# Patient Record
Sex: FEMALE | Race: WHITE | NOT HISPANIC OR LATINO | Employment: UNEMPLOYED | URBAN - METROPOLITAN AREA
[De-identification: names, ages, dates, MRNs, and addresses within clinical notes are randomized per-mention and may not be internally consistent; named-entity substitution may affect disease eponyms.]

---

## 2019-01-20 ENCOUNTER — HOSPITAL ENCOUNTER (OUTPATIENT)
Dept: RADIOLOGY | Facility: MEDICAL CENTER | Age: 54
End: 2019-01-20

## 2019-01-20 ENCOUNTER — HOSPITAL ENCOUNTER (INPATIENT)
Facility: MEDICAL CENTER | Age: 54
LOS: 24 days | DRG: 981 | End: 2019-02-13
Attending: HOSPITALIST | Admitting: HOSPITALIST
Payer: MEDICAID

## 2019-01-20 ENCOUNTER — APPOINTMENT (OUTPATIENT)
Dept: RADIOLOGY | Facility: MEDICAL CENTER | Age: 54
DRG: 981 | End: 2019-01-20
Attending: HOSPITALIST
Payer: MEDICAID

## 2019-01-20 ENCOUNTER — HOSPITAL ENCOUNTER (OUTPATIENT)
Facility: MEDICAL CENTER | Age: 54
DRG: 981 | End: 2019-01-20
Payer: MEDICAID

## 2019-01-20 VITALS — BODY MASS INDEX: 17.04 KG/M2 | HEIGHT: 69 IN | WEIGHT: 115.08 LBS

## 2019-01-20 DIAGNOSIS — R05.9 COUGH: ICD-10-CM

## 2019-01-20 DIAGNOSIS — F41.9 ANXIETY: ICD-10-CM

## 2019-01-20 DIAGNOSIS — C34.90 NON-SMALL CELL LUNG CANCER, UNSPECIFIED LATERALITY (HCC): ICD-10-CM

## 2019-01-20 DIAGNOSIS — J44.89 COPD WITH ASTHMA: ICD-10-CM

## 2019-01-20 PROBLEM — R91.8 LUNG MASS: Status: ACTIVE | Noted: 2019-01-20

## 2019-01-20 PROBLEM — D72.829 LEUKOCYTOSIS: Status: ACTIVE | Noted: 2019-01-20

## 2019-01-20 PROBLEM — Z72.0 TOBACCO ABUSE: Status: ACTIVE | Noted: 2019-01-20

## 2019-01-20 PROBLEM — E43 SEVERE PROTEIN-CALORIE MALNUTRITION (HCC): Status: ACTIVE | Noted: 2019-01-20

## 2019-01-20 LAB
CANCER AG125 SERPL-ACNC: 66.5 U/ML (ref 0–35)
CANCER AG19-9 SERPL-ACNC: 180 U/ML (ref 0–35)
CEA SERPL-MCNC: 27.5 NG/ML (ref 0–3)
EST. AVERAGE GLUCOSE BLD GHB EST-MCNC: 117 MG/DL
HBA1C MFR BLD: 5.7 % (ref 0–5.6)
TSH SERPL DL<=0.005 MIU/L-ACNC: 2.17 UIU/ML (ref 0.38–5.33)

## 2019-01-20 PROCEDURE — 84443 ASSAY THYROID STIM HORMONE: CPT

## 2019-01-20 PROCEDURE — 99255 IP/OBS CONSLTJ NEW/EST HI 80: CPT | Performed by: INTERNAL MEDICINE

## 2019-01-20 PROCEDURE — 700117 HCHG RX CONTRAST REV CODE 255: Performed by: HOSPITALIST

## 2019-01-20 PROCEDURE — 94760 N-INVAS EAR/PLS OXIMETRY 1: CPT

## 2019-01-20 PROCEDURE — 770004 HCHG ROOM/CARE - ONCOLOGY PRIVATE *

## 2019-01-20 PROCEDURE — 86304 IMMUNOASSAY TUMOR CA 125: CPT

## 2019-01-20 PROCEDURE — 83036 HEMOGLOBIN GLYCOSYLATED A1C: CPT

## 2019-01-20 PROCEDURE — 86301 IMMUNOASSAY TUMOR CA 19-9: CPT

## 2019-01-20 PROCEDURE — 74177 CT ABD & PELVIS W/CONTRAST: CPT

## 2019-01-20 PROCEDURE — 82378 CARCINOEMBRYONIC ANTIGEN: CPT

## 2019-01-20 PROCEDURE — A9270 NON-COVERED ITEM OR SERVICE: HCPCS | Performed by: INTERNAL MEDICINE

## 2019-01-20 PROCEDURE — 700102 HCHG RX REV CODE 250 W/ 637 OVERRIDE(OP): Performed by: INTERNAL MEDICINE

## 2019-01-20 PROCEDURE — 82103 ALPHA-1-ANTITRYPSIN TOTAL: CPT

## 2019-01-20 PROCEDURE — 82105 ALPHA-FETOPROTEIN SERUM: CPT

## 2019-01-20 PROCEDURE — 700111 HCHG RX REV CODE 636 W/ 250 OVERRIDE (IP): Performed by: HOSPITALIST

## 2019-01-20 PROCEDURE — 36415 COLL VENOUS BLD VENIPUNCTURE: CPT

## 2019-01-20 PROCEDURE — 99232 SBSQ HOSP IP/OBS MODERATE 35: CPT | Performed by: HOSPITALIST

## 2019-01-20 RX ORDER — IPRATROPIUM BROMIDE AND ALBUTEROL SULFATE 2.5; .5 MG/3ML; MG/3ML
3 SOLUTION RESPIRATORY (INHALATION)
Status: DISCONTINUED | OUTPATIENT
Start: 2019-01-20 | End: 2019-02-13 | Stop reason: HOSPADM

## 2019-01-20 RX ORDER — PROMETHAZINE HYDROCHLORIDE 25 MG/1
12.5-25 TABLET ORAL EVERY 4 HOURS PRN
Status: DISCONTINUED | OUTPATIENT
Start: 2019-01-20 | End: 2019-02-13 | Stop reason: HOSPADM

## 2019-01-20 RX ORDER — HEPARIN SODIUM 5000 [USP'U]/ML
5000 INJECTION, SOLUTION INTRAVENOUS; SUBCUTANEOUS EVERY 8 HOURS
Status: DISCONTINUED | OUTPATIENT
Start: 2019-01-20 | End: 2019-01-22

## 2019-01-20 RX ORDER — ONDANSETRON 2 MG/ML
4 INJECTION INTRAMUSCULAR; INTRAVENOUS EVERY 4 HOURS PRN
Status: DISCONTINUED | OUTPATIENT
Start: 2019-01-20 | End: 2019-02-13 | Stop reason: HOSPADM

## 2019-01-20 RX ORDER — IBUPROFEN 200 MG
200 TABLET ORAL EVERY 6 HOURS PRN
COMMUNITY
End: 2019-02-21

## 2019-01-20 RX ORDER — POLYETHYLENE GLYCOL 3350 17 G/17G
1 POWDER, FOR SOLUTION ORAL
Status: DISCONTINUED | OUTPATIENT
Start: 2019-01-20 | End: 2019-02-13 | Stop reason: HOSPADM

## 2019-01-20 RX ORDER — PROMETHAZINE HYDROCHLORIDE 25 MG/1
12.5-25 SUPPOSITORY RECTAL EVERY 4 HOURS PRN
Status: DISCONTINUED | OUTPATIENT
Start: 2019-01-20 | End: 2019-02-13 | Stop reason: HOSPADM

## 2019-01-20 RX ORDER — AMOXICILLIN 250 MG
2 CAPSULE ORAL 2 TIMES DAILY
Status: DISCONTINUED | OUTPATIENT
Start: 2019-01-20 | End: 2019-02-13 | Stop reason: HOSPADM

## 2019-01-20 RX ORDER — NICOTINE 21 MG/24HR
21 PATCH, TRANSDERMAL 24 HOURS TRANSDERMAL
Status: DISCONTINUED | OUTPATIENT
Start: 2019-01-20 | End: 2019-01-21

## 2019-01-20 RX ORDER — ALBUTEROL SULFATE 90 UG/1
2 AEROSOL, METERED RESPIRATORY (INHALATION) EVERY 4 HOURS
Status: DISCONTINUED | OUTPATIENT
Start: 2019-01-20 | End: 2019-01-23

## 2019-01-20 RX ORDER — BUDESONIDE AND FORMOTEROL FUMARATE DIHYDRATE 160; 4.5 UG/1; UG/1
2 AEROSOL RESPIRATORY (INHALATION) 2 TIMES DAILY
Status: DISCONTINUED | OUTPATIENT
Start: 2019-01-20 | End: 2019-02-13 | Stop reason: HOSPADM

## 2019-01-20 RX ORDER — BISACODYL 10 MG
10 SUPPOSITORY, RECTAL RECTAL
Status: DISCONTINUED | OUTPATIENT
Start: 2019-01-20 | End: 2019-02-13 | Stop reason: HOSPADM

## 2019-01-20 RX ORDER — ONDANSETRON 4 MG/1
4 TABLET, ORALLY DISINTEGRATING ORAL EVERY 4 HOURS PRN
Status: DISCONTINUED | OUTPATIENT
Start: 2019-01-20 | End: 2019-02-13 | Stop reason: HOSPADM

## 2019-01-20 RX ADMIN — HEPARIN SODIUM 5000 UNITS: 5000 INJECTION, SOLUTION INTRAVENOUS; SUBCUTANEOUS at 14:57

## 2019-01-20 RX ADMIN — ALBUTEROL SULFATE 2 PUFF: 90 AEROSOL, METERED RESPIRATORY (INHALATION) at 18:00

## 2019-01-20 RX ADMIN — IOHEXOL 100 ML: 350 INJECTION, SOLUTION INTRAVENOUS at 09:30

## 2019-01-20 RX ADMIN — HEPARIN SODIUM 5000 UNITS: 5000 INJECTION, SOLUTION INTRAVENOUS; SUBCUTANEOUS at 21:34

## 2019-01-20 RX ADMIN — NICOTINE 21 MG: 21 PATCH, EXTENDED RELEASE TRANSDERMAL at 08:38

## 2019-01-20 RX ADMIN — BUDESONIDE AND FORMOTEROL FUMARATE DIHYDRATE 2 PUFF: 160; 4.5 AEROSOL RESPIRATORY (INHALATION) at 17:56

## 2019-01-20 RX ADMIN — HEPARIN SODIUM 5000 UNITS: 5000 INJECTION, SOLUTION INTRAVENOUS; SUBCUTANEOUS at 09:50

## 2019-01-20 RX ADMIN — ALBUTEROL SULFATE 2 PUFF: 90 AEROSOL, METERED RESPIRATORY (INHALATION) at 21:33

## 2019-01-20 ASSESSMENT — ENCOUNTER SYMPTOMS
ALLERGIC/IMMUNOLOGIC NEGATIVE: 1
BLURRED VISION: 0
NAUSEA: 0
EYE DISCHARGE: 0
DEPRESSION: 0
DOUBLE VISION: 0
FOCAL WEAKNESS: 0
SPEECH CHANGE: 0
HALLUCINATIONS: 0
BRUISES/BLEEDS EASILY: 0
WEIGHT LOSS: 1
PSYCHIATRIC NEGATIVE: 1
COUGH: 1
HEMOPTYSIS: 0
SENSORY CHANGE: 0
PALPITATIONS: 0
UNEXPECTED WEIGHT CHANGE: 1
NEUROLOGICAL NEGATIVE: 1
ENDOCRINE NEGATIVE: 1
WEAKNESS: 0
EYES NEGATIVE: 1
VOMITING: 0
CHILLS: 0
FLANK PAIN: 0
MYALGIAS: 0
FATIGUE: 1
DIZZINESS: 0
ABDOMINAL PAIN: 0
SHORTNESS OF BREATH: 1
HEARTBURN: 0
FEVER: 0

## 2019-01-20 ASSESSMENT — COGNITIVE AND FUNCTIONAL STATUS - GENERAL
SUGGESTED CMS G CODE MODIFIER MOBILITY: CH
SUGGESTED CMS G CODE MODIFIER DAILY ACTIVITY: CH
MOBILITY SCORE: 24
DAILY ACTIVITIY SCORE: 24

## 2019-01-20 ASSESSMENT — PAIN SCALES - GENERAL
PAINLEVEL_OUTOF10: 7
PAINLEVEL_OUTOF10: 8

## 2019-01-20 ASSESSMENT — LIFESTYLE VARIABLES
SUBSTANCE_ABUSE: 0
ALCOHOL_USE: NO
EVER_SMOKED: YES

## 2019-01-20 ASSESSMENT — COPD QUESTIONNAIRES
DURING THE PAST 4 WEEKS HOW MUCH DID YOU FEEL SHORT OF BREATH: MOST  OR ALL OF THE TIME
HAVE YOU SMOKED AT LEAST 100 CIGARETTES IN YOUR ENTIRE LIFE: YES
DO YOU EVER COUGH UP ANY MUCUS OR PHLEGM?: YES, A FEW DAYS A WEEK OR MONTH
COPD SCREENING SCORE: 8
IN THE PAST 12 MONTHS DO YOU DO LESS THAN YOU USED TO BECAUSE OF YOUR BREATHING PROBLEMS: STRONGLY AGREE

## 2019-01-20 ASSESSMENT — PATIENT HEALTH QUESTIONNAIRE - PHQ9
1. LITTLE INTEREST OR PLEASURE IN DOING THINGS: NOT AT ALL
2. FEELING DOWN, DEPRESSED, IRRITABLE, OR HOPELESS: NOT AT ALL
SUM OF ALL RESPONSES TO PHQ9 QUESTIONS 1 AND 2: 0

## 2019-01-20 NOTE — DIETARY
"Nutrition services: Day 0 of admit.  Amy Maki is a 53 y.o. female with admitting DX of New Lung Mass. Further Dx per MD notes of Leukocytosis, Severe protein-calorie malnutrition, smoker.     Consult received for Failure to Thrive. Pt reports wt loss over past 2-3 months for unknown reason, pt was eating her normal intake of ~ 2 meals/day. Per pt, UBW was 135#. Discussed importance of adequate intake, pt verbalized understanding and declined supplements at this time but agreeable to high protein snacks.       Assessment:  Height: 175.3 cm (5' 9.02\")  Weight: 54.2 kg (119 lb 7.8 oz)-per stand  Up scale.   Body mass index is 17.64 kg/m².  Diet: Regular.     Evaluation:   1. Pt with 12% wt loss in 2-3 months (Severe) and low BMI.     Malnutrition Risk: Pt with severe malnutrition in context of acute illness related malnutrition likely related to new lung mass with history of smoking as evidenced by severe wt loss noted above and noted fat loss to temporal area and upper arms per observation.     Recommendations/Plan:  1. Added high protein snacks twice daily.   2. Encourage PO intake.   3. Document intake of all PO as % taken in ADL's to provide interdisciplinary communication across all shifts.   4. Monitor weight.  5. Nutrition rep will continue to see patient for ongoing meal and snack preferences.     RD following for adequate intake.             "

## 2019-01-20 NOTE — CONSULTS
Pulmonary Consultation    Date of consult: 1/20/2019    Referring Physician  Lu Metz D.O.    Reason for Consultation  Jean-Pierre mass lobulated    History of Presenting Illness  53 y.o. female who presented 1/20/2019 with SOB since April.  Hx of cervical dysplasias and had several D and Cs and then s/p hysterectomy  25 pound weight loss since April  CXR and followed by CT chest shows left upper lobe hilar lobulated mass obstructing the left main stem. Also has a smaller right hilar mass  She has LLL atelectasis and mass  Diffuse adenopathy  +smoker 30 pk year at least  Father with emphysema and mother dies of lung cancer age 74  + exposure to second hand smoke from parents  No mining/ no metal work/ no asbestos exposure    Code Status  Full Code    Review of Systems  Review of Systems   Constitutional: Positive for fatigue and unexpected weight change.   HENT: Negative.    Eyes: Negative.    Respiratory: Positive for cough and shortness of breath.    Endocrine: Negative.    Genitourinary: Negative.    Musculoskeletal:        Right lateral rib pain for 2 weeks   Skin: Negative.    Allergic/Immunologic: Negative.    Neurological: Negative.    Psychiatric/Behavioral: Negative.        Past Medical History  Cervical dysplasia    Surgical History  Hysterectomy    Family History  Father emphysema  Mother lung cancer    Social History   reports that she has been smoking.  She has been smoking about 0.50 packs per day. She has never used smokeless tobacco. She reports that she does not drink alcohol or use drugs.from [place]    Medications    Current Facility-Administered Medications   Medication Dose Route Frequency Provider Last Rate Last Dose   • senna-docusate (PERICOLACE or SENOKOT S) 8.6-50 MG per tablet 2 Tab  2 Tab Oral BID Katie Raya M.D.        And   • polyethylene glycol/lytes (MIRALAX) PACKET 1 Packet  1 Packet Oral QDAY PRN Katie Raya M.D.        And   • magnesium hydroxide (MILK OF MAGNESIA)  suspension 30 mL  30 mL Oral QDAY PRN Katie Raya M.D.        And   • bisacodyl (DULCOLAX) suppository 10 mg  10 mg Rectal QDAY PRN Katie Raya M.D.       • Respiratory Care per Protocol   Nebulization Continuous RT Katie Raya M.D.       • heparin injection 5,000 Units  5,000 Units Subcutaneous Q8HRS Katie Raya M.D.   5,000 Units at 01/20/19 0950   • ondansetron (ZOFRAN) syringe/vial injection 4 mg  4 mg Intravenous Q4HRS PRN Katie Raya M.D.       • ondansetron (ZOFRAN ODT) dispertab 4 mg  4 mg Oral Q4HRS PRN Katie Raya M.D.       • promethazine (PHENERGAN) tablet 12.5-25 mg  12.5-25 mg Oral Q4HRS PRN Katie Raya M.D.       • promethazine (PHENERGAN) suppository 12.5-25 mg  12.5-25 mg Rectal Q4HRS PRN Katie Raya M.D.       • prochlorperazine (COMPAZINE) injection 5-10 mg  5-10 mg Intravenous Q4HRS PRN Katie Raya M.D.       • nicotine (NICODERM) 21 MG/24HR 21 mg  21 mg Transdermal Daily-0600 ANA HargroveO.   21 mg at 01/20/19 0838    And   • nicotine polacrilex (NICORETTE) 2 MG piece 2 mg  2 mg Oral Q HOUR PRN ANA HargroveO.           Allergies  Allergies   Allergen Reactions   • Percocet [Oxycodone-Acetaminophen] Hives       Vital Signs last 24 hours  Temp:  [36.3 °C (97.4 °F)-36.9 °C (98.4 °F)] 36.9 °C (98.4 °F)  Pulse:  [73-78] 77  Resp:  [18] 18  BP: (134-150)/(74-83) 134/83  SpO2:  [93 %-97 %] 93 %    Physical Exam  Physical Exam   Constitutional: She is oriented to person, place, and time. She appears well-developed and well-nourished. No distress.   HENT:   Head: Normocephalic and atraumatic.   Right Ear: External ear normal.   Left Ear: External ear normal.   Nose: Nose normal.   Mouth/Throat: Oropharynx is clear and moist. No oropharyngeal exudate.   Eyes: Pupils are equal, round, and reactive to light. Conjunctivae and EOM are normal. Right eye exhibits no discharge. Left eye exhibits no discharge. No scleral icterus.   Neck: Normal range  of motion. Neck supple. No JVD present. No tracheal deviation present. No thyromegaly present.   Cardiovascular: Normal rate, regular rhythm and normal heart sounds.    No murmur heard.  Pulmonary/Chest: No stridor. No respiratory distress. She has no wheezes. She has no rales.   Diminished b/l =   Abdominal: Soft. Bowel sounds are normal. She exhibits no distension and no mass. There is no tenderness. There is no rebound and no guarding.   Musculoskeletal: She exhibits tenderness. She exhibits no edema or deformity.   Right lateral Rib pain T10, 11  + clubbing   Lymphadenopathy:     She has no cervical adenopathy.   Neurological: She is alert and oriented to person, place, and time. She has normal reflexes. No cranial nerve deficit. Coordination normal.   Skin: Skin is warm and dry. No rash noted. She is not diaphoretic. No erythema. No pallor.   Psychiatric: She has a normal mood and affect. Her behavior is normal. Judgment and thought content normal.       Fluids  No intake or output data in the 24 hours ending 01/20/19 1147    Laboratory  Recent Results (from the past 48 hour(s))   CBC WITH DIFFERENTIAL    Collection Time: 01/19/19 11:03 PM   Result Value Ref Range    WBC 12.3 (H) 4.8 - 10.8 K/uL    RBC 4.70 4.20 - 5.40 M/uL    Hemoglobin 14.6 13.0 - 17.0 g/dL    Hematocrit 42.9 39.0 - 50.0 %    MCV 91.3 81.0 - 99.0 fL    MCH 31.1 (H) 27.0 - 31.0 pg    MCHC 34.0 33.0 - 37.0 g/dL    RDW 11.9 11.5 - 14.5 %    Platelet Count 441 (H) 130 - 400 K/uL    MPV 8.6 7.4 - 10.4 fL    Neutrophils Automated 56.1 39.0 - 70.0 %    Lymphocytes Automated 28.4 21.0 - 50.0 %    Monocytes Automated 13.1 (H) 2.0 - 9.0 %    Eosinophils Automated 1.4 0.0 - 5.0 %    Basophils Automated 0.7 0.0 - 3.0 %    Abs Neutrophils Automated 6.9 1.8 - 7.7 K/uL    Abs Lymph Automated 3.5 1.2 - 4.8 K/uL    Eosinophil Count, Blood 0.17 0.00 - 0.50 K/uL   COMP METABOLIC PANEL    Collection Time: 01/19/19 11:03 PM   Result Value Ref Range    Sodium 137  "136 - 145 mmol/L    Potassium 4.1 3.5 - 5.1 mmol/L    Chloride 102 98 - 107 mmol/L    Co2 24 21 - 32 mmol/L    Anion Gap 15 10 - 18 mmol/L    Glucose 94 74 - 99 mg/dL    Bun 13 7 - 18 mg/dL    Creatinine 0.8 0.6 - 1.0 mg/dL    Calcium 9.3 8.5 - 11.0 mg/dL    AST(SGOT) 20 15 - 37 U/L    ALT(SGPT) 14 12 - 78 U/L    Alkaline Phosphatase 103 46 - 116 U/L    Total Bilirubin 0.3 0.2 - 1.0 mg/dL    Albumin 3.4 3.4 - 5.0 g/dL    Total Protein 7.6 6.4 - 8.2 g/dL    A-G Ratio 0.8    TROPONIN    Collection Time: 01/19/19 11:03 PM   Result Value Ref Range    Troponin I <0.02 0.00 - 0.06 ng/mL   BTYPE NATRIURETIC PEPTIDE    Collection Time: 01/19/19 11:03 PM   Result Value Ref Range    B Natriuretic Peptide 19 5 - 65 pg/mL   LACTIC ACID    Collection Time: 01/19/19 11:03 PM   Result Value Ref Range    Lactic Acid 0.8 0.4 - 2.0 mmol/L   ESTIMATED GFR    Collection Time: 01/19/19 11:03 PM   Result Value Ref Range    GFR If African American >60 >60 mL/min/1.73 m 2    GFR If Non African American >60 >60 mL/min/1.73 m 2   TSH (Thyroid Stimulating Hormone)    Collection Time: 01/20/19 10:19 AM   Result Value Ref Range    TSH 2.170 0.380 - 5.330 uIU/mL   CEA    Collection Time: 01/20/19 10:19 AM   Result Value Ref Range    Carcinoembryonic Antigen 27.5 (H) 0.0 - 3.0 ng/mL   CA 19-9    Collection Time: 01/20/19 10:19 AM   Result Value Ref Range    Ca 19-9 180.0 (H) 0.0 - 35.0 U/mL       Collection Time: 01/20/19 10:19 AM   Result Value Ref Range    Ca 125 66.5 (H) 0.0 - 35.0 U/mL       Imaging  1/19/19 CT of the chest personally reviewed and patient has MIREYA mass lobulated mass obstructing the left min stem  Diffuse emphysematous changes. Also has mediastinal adenopathy    \"MEDIASTINUM: There is a large left hilar mass. This measures roughly 5.3 x 4.2 cm in greatest axial dimensions. There is marked narrowing with probable invasion of the left mainstem bronchus and adjacent airways of the left lower lobe. Please see axial   image " "#58 There is mass effect on the left main pulmonary artery. Please see axial image #56. Markedly enlarged bihilar lymphadenopathy, left greater than right    Assessment/Plan\"   #Left large hilar mass with known hx of smoking very suspicious for primary lung ca but also mets  Bronchoscopy and EBUS should be able to yield dx  Unable to arrange/schedule EBUS today so will need to be done tomorrow and have to check with Dr. Collado's schedule who is on consults sandovalHermes ALBERTS after midnight  To call extension [phone] in am to schedule for EBUS  Reviewed with patient that high suspicion for cancer lung versus other  She will need a PET scan post as well  Concerning is the right rib pain for mets in the ribs on CT    #Emphysema  Diffuse  Check alpha 1 antitrypsin  Start symbicort 2 puffs bid  Albuterol prn    D/w Dr. Metz        "

## 2019-01-20 NOTE — PROGRESS NOTES
Direct-admit patient coming from [facility] [place] for new lung mass. Dr Brock sending. Dr Simon accepting. ADT order has been signed and held at 0156 - to be released when patient arrives. Oncology staff to contact on-call hospitalist for further orders when patient arrives. Per [facility] staff, patient has opted to come via privately-owned vehicle.

## 2019-01-20 NOTE — H&P
Hospital Medicine History & Physical Note    Date of Service  1/20/2019    Primary Care Physician  Pcp Pt States None    Consultants  none    Code Status  full    Chief Complaint  Shortness of breath    History of Presenting Illness  53 y.o. female who presented 1/20/2019 with cough and shortness of breath since April.  She has diagnosis of bronchitis previously treated with antibiotics prednisone inhalers and no improvement of her symptoms.  She had worsening shortness of breath initially had an x-ray at an outside facility that was concerning for pneumonia or mass.  She denies any fevers she does have night sweats and 25 pound unintentional weight loss and her main areas of pain in her chest.  She was found to have pulmonary masses and transferred to [facility] for further evaluation.  She continues to have shortness of breath especially with exertion here in the hospital.  She does have dizziness no syncope and she continues to smoke.  She otherwise has no known alleviating or exacerbating factors to her symptoms      Upon personal review of outside hospital records patient has a CT a of the chest with large left and small right hilar masses with endobronchial spread of tumor on the left and narrowing of the left main pulmonary artery.  Sodium 137 potassium 4.1 chloride 102 CO2 24 anion gap 15 glucose 94 BUN 13 creatinine 0.8 LFTs unremarkable troponin less than 0.02 lactic acid 0.8 BNP 19 WBC count 12.3 hemoglobin 14.6 platelet count 441    Review of Systems  Review of Systems   Constitutional: Positive for weight loss. Negative for chills and fever.   HENT: Negative for congestion, hearing loss and tinnitus.    Eyes: Negative for blurred vision, double vision and discharge.   Respiratory: Positive for cough and shortness of breath. Negative for hemoptysis.    Cardiovascular: Negative for chest pain, palpitations and leg swelling.   Gastrointestinal: Negative for abdominal pain, heartburn, nausea and vomiting.    Genitourinary: Negative for dysuria and flank pain.   Musculoskeletal: Negative for joint pain and myalgias.   Skin: Negative for rash.   Neurological: Negative for dizziness, sensory change, speech change, focal weakness and weakness.   Endo/Heme/Allergies: Negative for environmental allergies. Does not bruise/bleed easily.   Psychiatric/Behavioral: Negative for depression, hallucinations and substance abuse.       Past Medical History  Reviewed and not pertinent    Surgical History  Reviewed and not pertinent    Family History  Reviewed and not pertinent    Social History   reports that she has been smoking.  She has been smoking about 0.50 packs per day. She has never used smokeless tobacco. She reports that she does not drink alcohol or use drugs.    Allergies  Allergies   Allergen Reactions   • Percocet [Oxycodone-Acetaminophen] Hives       Medications  Prior to Admission Medications   Prescriptions Last Dose Informant Patient Reported? Taking?   ibuprofen (MOTRIN) 200 MG Tab   Yes Yes   Sig: Take 200 mg by mouth every 6 hours as needed.      Facility-Administered Medications: None       Physical Exam       Physical Exam    Laboratory:  Recent Labs      01/19/19   2303   WBC  12.3*   RBC  4.70   HEMOGLOBIN  14.6   HEMATOCRIT  42.9   MCV  91.3   MCH  31.1*   MCHC  34.0   RDW  11.9   PLATELETCT  441*   MPV  8.6     Recent Labs      01/19/19   2303   SODIUM  137   POTASSIUM  4.1   CHLORIDE  102   CO2  24   GLUCOSE  94   BUN  13   CREATININE  0.8   CALCIUM  9.3     Recent Labs      01/19/19   2303   ALTSGPT  14   ASTSGOT  20   ALKPHOSPHAT  103   TBILIRUBIN  0.3   GLUCOSE  94         Recent Labs      01/19/19   2303   BNPBTYPENAT  19         Recent Labs      01/19/19 2303   TROPONINI  <0.02       Urinalysis:    No results found     Imaging:  CT-ABDOMEN-PELVIS WITH    (Results Pending)   DX-NEEDLE BX-LUNG - MEDIASTINUM RIGHT    (Results Pending)   CT a of the chest with large left and small right hilar masses  with endobronchial spread of tumor on the left and narrowing of the left main pulmonary artery.      Assessment/Plan:  I anticipate this patient will require at least two midnights for appropriate medical management, necessitating inpatient admission.    * Lung mass   Assessment & Plan    Bilateral upper lobe lung masses noted on CT   Oncology consult in am   Biopsy ordered may need consult with pulmonary  CT abd/pelvis        Leukocytosis   Assessment & Plan    This is likely reactive  Cont to monitor   No evidence of infectious etiology      Severe protein-calorie malnutrition (HCC)   Assessment & Plan    Nutrition consulted       Tobacco abuse   Assessment & Plan    Encouraged smoking cessation          VTE prophylaxis: heparin

## 2019-01-20 NOTE — PROGRESS NOTES
Assumed care of the patient at 715, received report from the NOC RN. Patient is AOx4, VSS, w/ complaint of 7/10 mid/left upper back pain and R low flank/rib pain (exacerbated by coughing). Patient declines medication.     Patient is anxious and tearful, emotional support provided. Patient updated on POC and all questions have been answered at this time.     Patient is a current smoker. Smoking cessation education provided and nicotine patch applied.    PIV placed in patient's R forearm, SL.    Patient up self/steady, fall precautions implemented. Call light within reach and hourly rounding in place.

## 2019-01-20 NOTE — CARE PLAN
Problem: Communication  Goal: The ability to communicate needs accurately and effectively will improve    Intervention: Educate patient and significant other/support system about the plan of care, procedures, treatments, medications and allow for questions  Patient updated on POC, all questions have been answered at this time.      Problem: Psychosocial Needs:  Goal: Level of anxiety will decrease    Intervention: Identify and develop with patient strategies to cope with anxiety triggers  Patient encouraged to verbalize triggers for anxiety. Emotional support provided.

## 2019-01-21 PROBLEM — J44.89 COPD WITH ASTHMA (HCC): Status: ACTIVE | Noted: 2019-01-21

## 2019-01-21 PROBLEM — F41.9 ANXIETY: Status: ACTIVE | Noted: 2019-01-21

## 2019-01-21 LAB
ALBUMIN SERPL BCP-MCNC: 3.5 G/DL (ref 3.2–4.9)
ALBUMIN/GLOB SERPL: 1.1 G/DL
ALP SERPL-CCNC: 78 U/L (ref 30–99)
ALT SERPL-CCNC: 9 U/L (ref 2–50)
ANION GAP SERPL CALC-SCNC: 5 MMOL/L (ref 0–11.9)
AST SERPL-CCNC: 12 U/L (ref 12–45)
BASOPHILS # BLD AUTO: 0.9 % (ref 0–1.8)
BASOPHILS # BLD: 0.07 K/UL (ref 0–0.12)
BILIRUB SERPL-MCNC: 0.3 MG/DL (ref 0.1–1.5)
BUN SERPL-MCNC: 14 MG/DL (ref 8–22)
CALCIUM SERPL-MCNC: 9.6 MG/DL (ref 8.5–10.5)
CHLORIDE SERPL-SCNC: 107 MMOL/L (ref 96–112)
CHOLEST SERPL-MCNC: 130 MG/DL (ref 100–199)
CO2 SERPL-SCNC: 21 MMOL/L (ref 20–33)
CREAT SERPL-MCNC: 0.72 MG/DL (ref 0.5–1.4)
EOSINOPHIL # BLD AUTO: 0.14 K/UL (ref 0–0.51)
EOSINOPHIL NFR BLD: 1.7 % (ref 0–6.9)
ERYTHROCYTE [DISTWIDTH] IN BLOOD BY AUTOMATED COUNT: 41.7 FL (ref 35.9–50)
GLOBULIN SER CALC-MCNC: 3.1 G/DL (ref 1.9–3.5)
GLUCOSE SERPL-MCNC: 105 MG/DL (ref 65–99)
HCT VFR BLD AUTO: 40 % (ref 37–47)
HDLC SERPL-MCNC: 36 MG/DL
HGB BLD-MCNC: 13.3 G/DL (ref 12–16)
IMM GRANULOCYTES # BLD AUTO: 0.01 K/UL (ref 0–0.11)
IMM GRANULOCYTES NFR BLD AUTO: 0.1 % (ref 0–0.9)
LDLC SERPL CALC-MCNC: 83 MG/DL
LYMPHOCYTES # BLD AUTO: 3.16 K/UL (ref 1–4.8)
LYMPHOCYTES NFR BLD: 38.4 % (ref 22–41)
MCH RBC QN AUTO: 31.1 PG (ref 27–33)
MCHC RBC AUTO-ENTMCNC: 33.3 G/DL (ref 33.6–35)
MCV RBC AUTO: 93.7 FL (ref 81.4–97.8)
MONOCYTES # BLD AUTO: 1.13 K/UL (ref 0–0.85)
MONOCYTES NFR BLD AUTO: 13.7 % (ref 0–13.4)
NEUTROPHILS # BLD AUTO: 3.71 K/UL (ref 2–7.15)
NEUTROPHILS NFR BLD: 45.2 % (ref 44–72)
NRBC # BLD AUTO: 0 K/UL
NRBC BLD-RTO: 0 /100 WBC
PLATELET # BLD AUTO: 383 K/UL (ref 164–446)
PMV BLD AUTO: 8.7 FL (ref 9–12.9)
POTASSIUM SERPL-SCNC: 4.2 MMOL/L (ref 3.6–5.5)
PROT SERPL-MCNC: 6.6 G/DL (ref 6–8.2)
RBC # BLD AUTO: 4.27 M/UL (ref 4.2–5.4)
SODIUM SERPL-SCNC: 133 MMOL/L (ref 135–145)
TRIGL SERPL-MCNC: 53 MG/DL (ref 0–149)
WBC # BLD AUTO: 8.2 K/UL (ref 4.8–10.8)

## 2019-01-21 PROCEDURE — 80061 LIPID PANEL: CPT

## 2019-01-21 PROCEDURE — 99232 SBSQ HOSP IP/OBS MODERATE 35: CPT | Performed by: INTERNAL MEDICINE

## 2019-01-21 PROCEDURE — A9270 NON-COVERED ITEM OR SERVICE: HCPCS | Performed by: INTERNAL MEDICINE

## 2019-01-21 PROCEDURE — 700111 HCHG RX REV CODE 636 W/ 250 OVERRIDE (IP): Performed by: HOSPITALIST

## 2019-01-21 PROCEDURE — 36415 COLL VENOUS BLD VENIPUNCTURE: CPT

## 2019-01-21 PROCEDURE — 700102 HCHG RX REV CODE 250 W/ 637 OVERRIDE(OP): Performed by: HOSPITALIST

## 2019-01-21 PROCEDURE — 700102 HCHG RX REV CODE 250 W/ 637 OVERRIDE(OP): Performed by: INTERNAL MEDICINE

## 2019-01-21 PROCEDURE — 99233 SBSQ HOSP IP/OBS HIGH 50: CPT | Performed by: INTERNAL MEDICINE

## 2019-01-21 PROCEDURE — 770004 HCHG ROOM/CARE - ONCOLOGY PRIVATE *

## 2019-01-21 PROCEDURE — 80053 COMPREHEN METABOLIC PANEL: CPT

## 2019-01-21 PROCEDURE — A9270 NON-COVERED ITEM OR SERVICE: HCPCS | Performed by: HOSPITALIST

## 2019-01-21 PROCEDURE — 85025 COMPLETE CBC W/AUTO DIFF WBC: CPT

## 2019-01-21 RX ORDER — NICOTINE 21 MG/24HR
21 PATCH, TRANSDERMAL 24 HOURS TRANSDERMAL
Status: DISCONTINUED | OUTPATIENT
Start: 2019-01-21 | End: 2019-02-13 | Stop reason: HOSPADM

## 2019-01-21 RX ORDER — ALPRAZOLAM 0.25 MG/1
0.25 TABLET ORAL EVERY 8 HOURS PRN
Status: DISCONTINUED | OUTPATIENT
Start: 2019-01-21 | End: 2019-02-13 | Stop reason: HOSPADM

## 2019-01-21 RX ADMIN — ALBUTEROL SULFATE 2 PUFF: 90 AEROSOL, METERED RESPIRATORY (INHALATION) at 21:34

## 2019-01-21 RX ADMIN — SENNOSIDES AND DOCUSATE SODIUM 2 TABLET: 8.6; 5 TABLET ORAL at 17:32

## 2019-01-21 RX ADMIN — NICOTINE 21 MG: 21 PATCH, EXTENDED RELEASE TRANSDERMAL at 16:25

## 2019-01-21 RX ADMIN — BUDESONIDE AND FORMOTEROL FUMARATE DIHYDRATE 2 PUFF: 160; 4.5 AEROSOL RESPIRATORY (INHALATION) at 05:40

## 2019-01-21 RX ADMIN — ALBUTEROL SULFATE 2 PUFF: 90 AEROSOL, METERED RESPIRATORY (INHALATION) at 05:39

## 2019-01-21 RX ADMIN — NICOTINE 21 MG: 21 PATCH, EXTENDED RELEASE TRANSDERMAL at 05:41

## 2019-01-21 RX ADMIN — ALBUTEROL SULFATE 2 PUFF: 90 AEROSOL, METERED RESPIRATORY (INHALATION) at 02:32

## 2019-01-21 RX ADMIN — ALBUTEROL SULFATE 2 PUFF: 90 AEROSOL, METERED RESPIRATORY (INHALATION) at 10:00

## 2019-01-21 RX ADMIN — HEPARIN SODIUM 5000 UNITS: 5000 INJECTION, SOLUTION INTRAVENOUS; SUBCUTANEOUS at 14:24

## 2019-01-21 RX ADMIN — HEPARIN SODIUM 5000 UNITS: 5000 INJECTION, SOLUTION INTRAVENOUS; SUBCUTANEOUS at 21:33

## 2019-01-21 RX ADMIN — BUDESONIDE AND FORMOTEROL FUMARATE DIHYDRATE 2 PUFF: 160; 4.5 AEROSOL RESPIRATORY (INHALATION) at 17:32

## 2019-01-21 ASSESSMENT — ENCOUNTER SYMPTOMS
ABDOMINAL PAIN: 0
HEARTBURN: 0
WEAKNESS: 1
PHOTOPHOBIA: 0
SPEECH CHANGE: 0
CONSTIPATION: 0
SHORTNESS OF BREATH: 0
NAUSEA: 0
SORE THROAT: 0
SENSORY CHANGE: 0
DIZZINESS: 0
NERVOUS/ANXIOUS: 1
DEPRESSION: 0
HEADACHES: 0
MYALGIAS: 0
BLURRED VISION: 0
INSOMNIA: 1
DIARRHEA: 0
WEIGHT LOSS: 1
FEVER: 0
CHILLS: 0
CLAUDICATION: 0
VOMITING: 0

## 2019-01-21 ASSESSMENT — PAIN SCALES - GENERAL
PAINLEVEL_OUTOF10: 0
PAINLEVEL_OUTOF10: 7

## 2019-01-21 NOTE — PROGRESS NOTES
Patient seen and examined, new lung mass and sent from [facility] for further evaluation and oncology w/u.  D/w Dr Rashid, lung mass would be safer to reach via bronchoscopy with EBUS.  Dr Hwang consulted by me and can coordinate this on Tuesday with Dr Collado (not Monday due to holiday).  Normal CT abdomen/pelvis reviewed with patient and family.

## 2019-01-21 NOTE — PROGRESS NOTES
Assumed care of the patient at 715, received report from the NOC RN.     Patient is AOx4, VSS, w/ complaint of mild nasal congestion and headache.     Patient updated on POC, MRI this afternoon, all questions have been answered.     PIV, SL.     Patient up self/steady, fall precautions implemented. Call light within reach and hourly rounding in place.

## 2019-01-21 NOTE — CARE PLAN
Problem: Safety  Goal: Will remain free from falls  Outcome: PROGRESSING AS EXPECTED  Patient is upself and steady. Family at bedside. Safety precautions in place and safety education provided.    Problem: Bowel/Gastric:  Goal: Normal bowel function is maintained or improved  Outcome: PROGRESSING AS EXPECTED  Patient reports last BM today, 1/20/19.

## 2019-01-21 NOTE — PROGRESS NOTES
Patient A&Ox4, upself with steady gait. Family at bedside. Patient has been ambulating the halls tonight with family. She denies pain, nausea, and discomfort at this time. Patient understands she will be NPO @ 0000 for possible bronchoscopy tomorrow. Call light within reach, safety precautions in place, all needs met at this time.

## 2019-01-21 NOTE — RESPIRATORY CARE
COPD EDUCATION by COPD CLINICAL EDUCATOR  1/21/2019 at 7:05 AM by Nalini Henyr     Patient reviewed by COPD education team. Patient does not qualify for COPD program.

## 2019-01-21 NOTE — CARE PLAN
Problem: Communication  Goal: The ability to communicate needs accurately and effectively will improve    Intervention: Educate patient and significant other/support system about the plan of care, procedures, treatments, medications and allow for questions  Patient updated on POC, all questions have been answered at this time.      Problem: Infection  Goal: Will remain free from infection    Intervention: Implement standard precautions and perform hand washing before and after patient contact  Standard infection prevention has been implemented. Hand hygiene has been performed before and after patient care interactions.

## 2019-01-22 ENCOUNTER — HOSPITAL ENCOUNTER (OUTPATIENT)
Dept: RADIOLOGY | Facility: MEDICAL CENTER | Age: 54
End: 2019-01-22
Attending: INTERNAL MEDICINE

## 2019-01-22 LAB
A1AT SERPL-MCNC: 184 MG/DL (ref 90–200)
AFP-TM SERPL-MCNC: 1 NG/ML (ref 0–9)
ALBUMIN SERPL BCP-MCNC: 3.7 G/DL (ref 3.2–4.9)
ALBUMIN/GLOB SERPL: 1.2 G/DL
ALP SERPL-CCNC: 79 U/L (ref 30–99)
ALT SERPL-CCNC: 12 U/L (ref 2–50)
ANION GAP SERPL CALC-SCNC: 7 MMOL/L (ref 0–11.9)
APTT PPP: 34.8 SEC (ref 24.7–36)
AST SERPL-CCNC: 16 U/L (ref 12–45)
BASOPHILS # BLD AUTO: 0.9 % (ref 0–1.8)
BASOPHILS # BLD: 0.08 K/UL (ref 0–0.12)
BILIRUB SERPL-MCNC: 0.4 MG/DL (ref 0.1–1.5)
BUN SERPL-MCNC: 13 MG/DL (ref 8–22)
CALCIUM SERPL-MCNC: 9.8 MG/DL (ref 8.5–10.5)
CHLORIDE SERPL-SCNC: 103 MMOL/L (ref 96–112)
CO2 SERPL-SCNC: 23 MMOL/L (ref 20–33)
CREAT SERPL-MCNC: 0.75 MG/DL (ref 0.5–1.4)
CYTOLOGY REG CYTOL: NORMAL
EOSINOPHIL # BLD AUTO: 0.12 K/UL (ref 0–0.51)
EOSINOPHIL NFR BLD: 1.4 % (ref 0–6.9)
ERYTHROCYTE [DISTWIDTH] IN BLOOD BY AUTOMATED COUNT: 41.2 FL (ref 35.9–50)
GLOBULIN SER CALC-MCNC: 3.2 G/DL (ref 1.9–3.5)
GLUCOSE SERPL-MCNC: 96 MG/DL (ref 65–99)
HCT VFR BLD AUTO: 43.7 % (ref 37–47)
HGB BLD-MCNC: 14.2 G/DL (ref 12–16)
IMM GRANULOCYTES # BLD AUTO: 0.02 K/UL (ref 0–0.11)
IMM GRANULOCYTES NFR BLD AUTO: 0.2 % (ref 0–0.9)
INR PPP: 0.95 (ref 0.87–1.13)
LYMPHOCYTES # BLD AUTO: 2.59 K/UL (ref 1–4.8)
LYMPHOCYTES NFR BLD: 29.8 % (ref 22–41)
MCH RBC QN AUTO: 30.8 PG (ref 27–33)
MCHC RBC AUTO-ENTMCNC: 32.5 G/DL (ref 33.6–35)
MCV RBC AUTO: 94.8 FL (ref 81.4–97.8)
MONOCYTES # BLD AUTO: 1.22 K/UL (ref 0–0.85)
MONOCYTES NFR BLD AUTO: 14.1 % (ref 0–13.4)
NEUTROPHILS # BLD AUTO: 4.65 K/UL (ref 2–7.15)
NEUTROPHILS NFR BLD: 53.6 % (ref 44–72)
NRBC # BLD AUTO: 0 K/UL
NRBC BLD-RTO: 0 /100 WBC
PLATELET # BLD AUTO: 383 K/UL (ref 164–446)
PMV BLD AUTO: 8.7 FL (ref 9–12.9)
POTASSIUM SERPL-SCNC: 4.3 MMOL/L (ref 3.6–5.5)
PROT SERPL-MCNC: 6.9 G/DL (ref 6–8.2)
PROTHROMBIN TIME: 12.8 SEC (ref 12–14.6)
RBC # BLD AUTO: 4.61 M/UL (ref 4.2–5.4)
SODIUM SERPL-SCNC: 133 MMOL/L (ref 135–145)
WBC # BLD AUTO: 8.7 K/UL (ref 4.8–10.8)

## 2019-01-22 PROCEDURE — 88112 CYTOPATH CELL ENHANCE TECH: CPT

## 2019-01-22 PROCEDURE — 99233 SBSQ HOSP IP/OBS HIGH 50: CPT | Mod: 25 | Performed by: INTERNAL MEDICINE

## 2019-01-22 PROCEDURE — 88104 CYTOPATH FL NONGYN SMEARS: CPT

## 2019-01-22 PROCEDURE — 85610 PROTHROMBIN TIME: CPT

## 2019-01-22 PROCEDURE — 700117 HCHG RX CONTRAST REV CODE 255: Performed by: INTERNAL MEDICINE

## 2019-01-22 PROCEDURE — 99223 1ST HOSP IP/OBS HIGH 75: CPT | Performed by: RADIOLOGY

## 2019-01-22 PROCEDURE — 85730 THROMBOPLASTIN TIME PARTIAL: CPT

## 2019-01-22 PROCEDURE — 0BD88ZX EXTRACTION OF LEFT UPPER LOBE BRONCHUS, VIA NATURAL OR ARTIFICIAL OPENING ENDOSCOPIC, DIAGNOSTIC: ICD-10-PCS | Performed by: INTERNAL MEDICINE

## 2019-01-22 PROCEDURE — 88305 TISSUE EXAM BY PATHOLOGIST: CPT

## 2019-01-22 PROCEDURE — 31624 DX BRONCHOSCOPE/LAVAGE: CPT | Performed by: INTERNAL MEDICINE

## 2019-01-22 PROCEDURE — 88313 SPECIAL STAINS GROUP 2: CPT

## 2019-01-22 PROCEDURE — 70553 MRI BRAIN STEM W/O & W/DYE: CPT

## 2019-01-22 PROCEDURE — 88341 IMHCHEM/IMCYTCHM EA ADD ANTB: CPT | Mod: 91

## 2019-01-22 PROCEDURE — 700102 HCHG RX REV CODE 250 W/ 637 OVERRIDE(OP): Performed by: HOSPITALIST

## 2019-01-22 PROCEDURE — 700111 HCHG RX REV CODE 636 W/ 250 OVERRIDE (IP)

## 2019-01-22 PROCEDURE — 31623 DX BRONCHOSCOPE/BRUSH: CPT | Performed by: INTERNAL MEDICINE

## 2019-01-22 PROCEDURE — 99232 SBSQ HOSP IP/OBS MODERATE 35: CPT | Performed by: HOSPITALIST

## 2019-01-22 PROCEDURE — 85025 COMPLETE CBC W/AUTO DIFF WBC: CPT

## 2019-01-22 PROCEDURE — 770004 HCHG ROOM/CARE - ONCOLOGY PRIVATE *

## 2019-01-22 PROCEDURE — 88342 IMHCHEM/IMCYTCHM 1ST ANTB: CPT

## 2019-01-22 PROCEDURE — 36415 COLL VENOUS BLD VENIPUNCTURE: CPT

## 2019-01-22 PROCEDURE — 99152 MOD SED SAME PHYS/QHP 5/>YRS: CPT | Performed by: INTERNAL MEDICINE

## 2019-01-22 PROCEDURE — 99153 MOD SED SAME PHYS/QHP EA: CPT | Performed by: INTERNAL MEDICINE

## 2019-01-22 PROCEDURE — 0B988ZX DRAINAGE OF LEFT UPPER LOBE BRONCHUS, VIA NATURAL OR ARTIFICIAL OPENING ENDOSCOPIC, DIAGNOSTIC: ICD-10-PCS | Performed by: INTERNAL MEDICINE

## 2019-01-22 PROCEDURE — A9270 NON-COVERED ITEM OR SERVICE: HCPCS | Performed by: INTERNAL MEDICINE

## 2019-01-22 PROCEDURE — 80053 COMPREHEN METABOLIC PANEL: CPT

## 2019-01-22 PROCEDURE — 700102 HCHG RX REV CODE 250 W/ 637 OVERRIDE(OP): Performed by: INTERNAL MEDICINE

## 2019-01-22 PROCEDURE — A9585 GADOBUTROL INJECTION: HCPCS | Performed by: INTERNAL MEDICINE

## 2019-01-22 PROCEDURE — 700111 HCHG RX REV CODE 636 W/ 250 OVERRIDE (IP): Performed by: HOSPITALIST

## 2019-01-22 PROCEDURE — 302978 HCHG BRONCHOSCOPY-DIAGNOSTIC

## 2019-01-22 PROCEDURE — A9270 NON-COVERED ITEM OR SERVICE: HCPCS | Performed by: HOSPITALIST

## 2019-01-22 PROCEDURE — 160048 HCHG OR STATISTICAL LEVEL 1-5: Performed by: INTERNAL MEDICINE

## 2019-01-22 RX ORDER — MIDAZOLAM HYDROCHLORIDE 1 MG/ML
.5-2 INJECTION INTRAMUSCULAR; INTRAVENOUS PRN
Status: ACTIVE | OUTPATIENT
Start: 2019-01-22 | End: 2019-01-22

## 2019-01-22 RX ORDER — MIDAZOLAM HYDROCHLORIDE 1 MG/ML
INJECTION INTRAMUSCULAR; INTRAVENOUS
Status: DISCONTINUED | OUTPATIENT
Start: 2019-01-22 | End: 2019-01-22 | Stop reason: HOSPADM

## 2019-01-22 RX ORDER — GADOBUTROL 604.72 MG/ML
7.5 INJECTION INTRAVENOUS ONCE
Status: COMPLETED | OUTPATIENT
Start: 2019-01-22 | End: 2019-01-22

## 2019-01-22 RX ORDER — IBUPROFEN 400 MG/1
600 TABLET ORAL EVERY 6 HOURS PRN
Status: DISCONTINUED | OUTPATIENT
Start: 2019-01-22 | End: 2019-02-13 | Stop reason: HOSPADM

## 2019-01-22 RX ORDER — SODIUM CHLORIDE 9 MG/ML
500 INJECTION, SOLUTION INTRAVENOUS
Status: ACTIVE | OUTPATIENT
Start: 2019-01-22 | End: 2019-01-22

## 2019-01-22 RX ADMIN — HYDROCODONE BITARTRATE AND HOMATROPINE METHYLBROMIDE 5 ML: 5; 1.5 SOLUTION ORAL at 21:54

## 2019-01-22 RX ADMIN — ALPRAZOLAM 0.25 MG: 0.25 TABLET ORAL at 01:19

## 2019-01-22 RX ADMIN — GADOBUTROL 7.5 ML: 604.72 INJECTION INTRAVENOUS at 00:40

## 2019-01-22 RX ADMIN — ONDANSETRON 4 MG: 2 INJECTION INTRAMUSCULAR; INTRAVENOUS at 12:48

## 2019-01-22 RX ADMIN — BUDESONIDE AND FORMOTEROL FUMARATE DIHYDRATE 2 PUFF: 160; 4.5 AEROSOL RESPIRATORY (INHALATION) at 05:22

## 2019-01-22 RX ADMIN — ALBUTEROL SULFATE 2 PUFF: 90 AEROSOL, METERED RESPIRATORY (INHALATION) at 20:57

## 2019-01-22 RX ADMIN — IBUPROFEN 600 MG: 400 TABLET ORAL at 16:41

## 2019-01-22 RX ADMIN — NICOTINE 21 MG: 21 PATCH, EXTENDED RELEASE TRANSDERMAL at 05:22

## 2019-01-22 ASSESSMENT — ENCOUNTER SYMPTOMS
FEVER: 0
WEAKNESS: 1
COUGH: 0
PALPITATIONS: 0
WEIGHT LOSS: 1
ABDOMINAL PAIN: 0
DIZZINESS: 0
NAUSEA: 0
DIARRHEA: 0
INSOMNIA: 0
HEARTBURN: 0
NERVOUS/ANXIOUS: 1
CONSTIPATION: 0
VOMITING: 0
SHORTNESS OF BREATH: 0
CHILLS: 0
DEPRESSION: 0
HEADACHES: 0

## 2019-01-22 ASSESSMENT — PAIN SCALES - GENERAL: PAINLEVEL_OUTOF10: 9

## 2019-01-22 NOTE — PROGRESS NOTES
Patient A&O x4, upself with steady gait. Family at bedside. C/o back and rib pain but denies need for pain medicine. MRI completed tonight. Patient has been NPO since 0000 for bronchoscopy at 1100 today. Call light within reach, safety precautions in place, all needs met at this time.

## 2019-01-22 NOTE — PROGRESS NOTES
[facility] Medicine Daily Progress Note    Date of Service  1/22/2019    Chief Complaint  53 y.o. female admitted 1/20/2019 with cough since April 2018, multiple round of antibiotics with only transient improvement.  CT chest with lung mass.    Hospital Course    Patient seen by pulmonology as IR recommended bronchial biopsy given central location of mass.  EBUS initially planned but Dr Lynne feels traditional biopsy able to gather necessary tissue for diagnosis.      Interval Problem Update  Bronchoscopy done this morning. I discussed case with Dr. Lynne who informed me that the mass was too friable with easy bleeding so brush biopsy and washings were sent.  I discussed case with Dr. Conteh who will be seeing patient  Having headache which is normal for her post anaesthesia  She is otherwise without complaints. Just feeling anxious about biopsy results      Consultants/Specialty  Pulm - [facility]  Radiation oncology     Code Status  full    Disposition  tbd    Review of Systems  Review of Systems   Constitutional: Positive for weight loss. Negative for chills and fever.   Respiratory: Negative for cough and shortness of breath.    Cardiovascular: Negative for chest pain, palpitations and leg swelling.   Gastrointestinal: Negative for abdominal pain, constipation, diarrhea, heartburn, nausea and vomiting.   Genitourinary: Negative for dysuria and hematuria.   Skin: Negative for itching and rash.   Neurological: Positive for weakness. Negative for dizziness and headaches.   Psychiatric/Behavioral: Negative for depression. The patient is nervous/anxious. The patient does not have insomnia.    All other systems reviewed and are negative.       Physical Exam  Temp:  [36.2 °C (97.2 °F)-36.9 °C (98.4 °F)] 36.8 °C (98.2 °F)  Pulse:  [65-96] 82  Resp:  [17-78] 18  BP: (107-145)/(61-89) 139/84  SpO2:  [92 %-100 %] 94 %    Physical Exam   Constitutional: She is oriented to person, place, and time. She appears well-developed and  well-nourished. No distress.   HENT:   Head: Normocephalic and atraumatic.   Eyes: Conjunctivae are normal. No scleral icterus.   Cardiovascular: Normal rate, regular rhythm and normal heart sounds.    No murmur heard.  Pulmonary/Chest: Effort normal and breath sounds normal. No respiratory distress. She exhibits tenderness (right lower chest wall).   Decreased at bases bilaterally     Abdominal: Soft. Bowel sounds are normal. She exhibits no distension. There is no guarding.   Musculoskeletal: She exhibits no edema or tenderness.   Neurological: She is alert and oriented to person, place, and time. No cranial nerve deficit.   Skin: Skin is warm and dry. She is not diaphoretic. No erythema. No pallor.   Psychiatric: She has a normal mood and affect. Her behavior is normal.   Nursing note and vitals reviewed.      Fluids    Intake/Output Summary (Last 24 hours) at 01/22/19 1836  Last data filed at 01/22/19 1200   Gross per 24 hour   Intake              500 ml   Output                0 ml   Net              500 ml       Laboratory  Recent Labs      01/19/19 2303 01/21/19 0008 01/22/19 0142   WBC  12.3*  8.2  8.7   RBC  4.70  4.27  4.61   HEMOGLOBIN  14.6  13.3  14.2   HEMATOCRIT  42.9  40.0  43.7   MCV  91.3  93.7  94.8   MCH  31.1*  31.1  30.8   MCHC  34.0  33.3*  32.5*   RDW  11.9  41.7  41.2   PLATELETCT  441*  383  383   MPV  8.6  8.7*  8.7*     Recent Labs      01/19/19 2303 01/21/19 0008 01/22/19 0142   SODIUM  137  133*  133*   POTASSIUM  4.1  4.2  4.3   CHLORIDE  102  107  103   CO2  24  21  23   GLUCOSE  94  105*  96   BUN  13  14  13   CREATININE  0.8  0.72  0.75   CALCIUM  9.3  9.6  9.8     Recent Labs      01/22/19 0142   APTT  34.8   INR  0.95     Recent Labs      01/19/19 2303   BNPBTYPENAT  19     Recent Labs      01/21/19 0008   TRIGLYCERIDE  53   HDL  36*   LDL  83       Imaging  MR-BRAIN-WITH & W/O   Final Result      1.  There is no intracranial metastasis.   2.  Mild cerebral  atrophy.   3.  Mild chronic microvascular ischemic disease.      CT-ABDOMEN-PELVIS WITH   Final Result      No evidence of metastatic disease is identified.      Status post cholecystectomy.      Pancreatic calcifications likely represent sequelae of chronic pancreatitis.      Atherosclerotic plaque.      Nonvisualization of the appendix, limiting evaluation.      Left lower lobe lobulated masslike density with surrounding patchy opacities, better evaluated on the prior CT chest. Volume loss is again noted on the left.           Assessment/Plan  * Lung mass   Assessment & Plan    Bilateral upper lobe lung masses noted on CT, suspcious for lung cancer  Pulmonology consulted - d/w Julianna Hwang and Maged  CT abdomen pelvis without evidence of malignancy  MRI brain without mets  1/22 bronchoscopy with brush biopsy done. F/u pathology    I discussed case with Dr. Lynne and Dr. Conteh       Anxiety   Assessment & Plan    Secondary to possible cancer  Low dose xanax PRN       Leukocytosis   Assessment & Plan    Resolved   likely reactive  No evidence of infectious etiology      Severe protein-calorie malnutrition (HCC)   Assessment & Plan    Nutrition consulted       Tobacco abuse   Assessment & Plan    Encouraged smoking cessation           VTE prophylaxis: scds

## 2019-01-22 NOTE — PROCEDURES
DATE OF SERVICE:  01/22/2019    PROCEDURES:  Bronchoscopy with brush biopsy.    INDICATION:  Left lung mass, large left upper lobe endobronchial with smoking   history, suspect bronchogenic cancer.    DESCRIPTION OF PROCEDURE:  The patient gave informed consent, we explained the   risks of bleeding, heart rhythm disturbance, low oxygen level, even death,   benefit to allow diagnosis of what appears to be bronchogenic malignancy   potentially advanced age.  The patient gave consent, family was informed and   in the bronchoscopy suite, we were able with nurse in attendance and   respiratory therapy assistance to pass a flexible scope, right naris, but the   size of the scope initially utilized did not allow passage of brush or biopsy   forceps.  Small caliber diagnostic scope was then exchanged for a standard   adult larger caliber scope, and this admitted bronchoscopy forceps and brush.    The patient required 6 mg of Versed, titrated over the duration of the   procedure carefully, maintaining saturations carefully monitored, at one point   with cough, blood pressure did rise, but when rechecked without cough was   within acceptable range.    We found a large friable occluding mass in the left upper lobe, photos were   taken, but photo capability on the bronchoscopic device was not available, and   no photos were able to process.  We obtained blank images for the photos   taken, but the visualization of a large friable tumor within 2 cm of the main   geraldine, occluding, very friable.    We were able to wash and lavage, with coughing the patient did develop some   bleeding, this was washed, lavaged, suctioned and cleared.  After reinspection   and control, brush sampling was obtained and slides were sent for cytologic   study.  With the bleeding evident in the marked friability, we did not do   transbronchial lung biopsy forceps sampling.    We then removed the bronchoscope, heart rate was 85, sinus rhythm, blood    pressure 125/75, saturations 98% and breathing rate is 27 at conclusion of   procedure.  She is easily aroused, has normal peripheral motor exam, and   appears stable.  She was observed in the post-procedure days until   hemodynamics and saturations were in normal range.    IMPRESSION:  1.  Large friable mass totally occluding left upper lobe.  2.  Marked friability and easy bleeding.  3.  Brush sampling obtained.  4.  Wash and brush sent for cytologic study.    DISCUSSION:  The procedure was accomplished with exchange of the bronchoscope   was noted, titrated Versed up to 6 mg, fentanyl 75 mcg over the duration of   the procedure.  Nurse in attendance, RT in attendance and no significant   bleeding at the conclusion of procedure, the last inspection with the   bronchoscope showed no active bleeding from the left upper lobe lesion.    Procedure is tolerated and patient can be transported back to the oncology   floor.       ____________________________________     MD RODOLFO VARGAS / RYLAN    DD:  01/22/2019 12:03:33  DT:  01/22/2019 12:51:59    D#:  [id number]  Job#:  [id number]

## 2019-01-22 NOTE — PROGRESS NOTES
Pulmonary Care Progress Note    Date of admission  1/20/2019    Chief Complaint  53 y.o. female admitted 1/20/2019 with lung mass and 25 pound weight loss    Hospital Course    Patient admitted with possible postobstructive pneumonia, left upper lobe atelectasis, CT scan demonstrates significant obstruction and multiple areas of involvement      Interval Problem Update  Reviewed last 24 hour events:  Rib pain could be from cough fracture or metastatic bronchogenic cancer; bronchoscopy scheduled, appears to have proximal endobronchial lesions amenable and accessible to standard bronchoscopy discussed in detail with patient, family at bedside, including risks and indications for procedure    Review of Systems  ROS Review of Systems   Constitutional: Negative for chills and fever. Significant for 25 pound weight loss over the past year  HENT: Negative for congestion and sore throat.    Eyes: Negative for photophobia and discharge.   Respiratory: positive for cough,scant sputum production, mildshortness of breath and wheezing.    Cardiovascular: Negative for chest pain, palpitations and leg swelling.   Gastrointestinal: Negative for abdominal pain, diarrhea, nausea and vomiting.   Musculoskeletal: Negative for back pain and myalgias.  Does have right lateral chest pain, possible cough fracture, locally tender   Neurological: Negative for focal weakness, weakness and headaches.     Vital Signs for last 24 hours   Temp:  [36.4 °C (97.6 °F)-36.8 °C (98.2 °F)] 36.4 °C (97.6 °F)  Pulse:  [73-92] 73  Resp:  [18] 18  BP: (118-124)/(74-78) 122/74  SpO2:  [93 %] 93 %    Hemodynamic parameters for last 24 hours       Respiratory       Physical Exam   Physical Exam   Constitutional: She is oriented to person, place, and time. She appears well-developed.   HENT:   Head: Normocephalic.   Eyes: Pupils are equal, round, and reactive to light.   Neck: Normal range of motion.   Cardiovascular: Normal rate, regular rhythm and normal  heart sounds.    Pulmonary/Chest: Effort normal and breath sounds normal. She exhibits tenderness.   Slightly diminished, also has tenderness right lateral chest wall   Abdominal: Soft. Bowel sounds are normal.   Musculoskeletal: Normal range of motion.   Neurological: She is alert and oriented to person, place, and time.   Skin: Skin is warm and dry.   Psychiatric: She has a normal mood and affect.       Medications  Current Facility-Administered Medications   Medication Dose Route Frequency Provider Last Rate Last Dose   • ALPRAZolam (XANAX) tablet 0.25 mg  0.25 mg Oral Q8HRS PRN CAROLANN Hargrove.O.       • nicotine (NICODERM) 21 MG/24HR 21 mg  21 mg Transdermal Daily-0600 Lu Metz D.O.        And   • nicotine polacrilex (NICORETTE) 2 MG piece 2 mg  2 mg Oral Q HOUR PRN ANA HargroveO.       • senna-docusate (PERICOLACE or SENOKOT S) 8.6-50 MG per tablet 2 Tab  2 Tab Oral BID Katie Raya M.D.   Stopped at 01/21/19 0541    And   • polyethylene glycol/lytes (MIRALAX) PACKET 1 Packet  1 Packet Oral QDAY PRN Katie Raya M.D.        And   • magnesium hydroxide (MILK OF MAGNESIA) suspension 30 mL  30 mL Oral QDAY PRN Katie Raya M.D.        And   • bisacodyl (DULCOLAX) suppository 10 mg  10 mg Rectal QDAY PRN Katie Raya M.D.       • Respiratory Care per Protocol   Nebulization Continuous RT Katie Raya M.D.       • heparin injection 5,000 Units  5,000 Units Subcutaneous Q8HRS Katie Raya M.D.   5,000 Units at 01/21/19 1424   • ondansetron (ZOFRAN) syringe/vial injection 4 mg  4 mg Intravenous Q4HRS PRN Katie Raya M.D.       • ondansetron (ZOFRAN ODT) dispertab 4 mg  4 mg Oral Q4HRS PRN Katie Raya M.D.       • promethazine (PHENERGAN) tablet 12.5-25 mg  12.5-25 mg Oral Q4HRS PRN Katie Raya, M.D.       • promethazine (PHENERGAN) suppository 12.5-25 mg  12.5-25 mg Rectal Q4HRS ASHOK Raya M.D.       • prochlorperazine (COMPAZINE) injection 5-10 mg   5-10 mg Intravenous Q4HRS PRN Katie Raya M.D.       • budesonide-formoterol (SYMBICORT) 160-4.5 MCG/ACT inhaler 2 Puff  2 Puff Inhalation BID Yan Wolff M.D.   2 Puff at 01/21/19 0540   • albuterol inhaler 2 Puff  2 Puff Inhalation Q4HRS Yan Wolff M.D.   2 Puff at 01/21/19 1000   • ipratropium-albuterol (DUONEB) nebulizer solution  3 mL Nebulization Q4H PRN (RT) Lu Metz D.O.           Fluids  No intake or output data in the 24 hours ending 01/21/19 1600    Laboratory      Recent Labs      01/19/19 2303   TROPONINI  <0.02   BNPBTYPENAT  19     Recent Labs      01/19/19 2303 01/21/19   0008   SODIUM  137  133*   POTASSIUM  4.1  4.2   CHLORIDE  102  107   CO2  24  21   BUN  13  14   CREATININE  0.8  0.72   CALCIUM  9.3  9.6     Recent Labs      01/19/19 2303 01/21/19   0008   ALTSGPT  14  9   ASTSGOT  20  12   ALKPHOSPHAT  103  78   TBILIRUBIN  0.3  0.3   GLUCOSE  94  105*     Recent Labs      01/19/19 2303 01/21/19   0008   WBC  12.3*  8.2   NEUTSPOLYS   --   45.20   LYMPHOCYTES   --   38.40   MONOCYTES   --   13.70*   EOSINOPHILS   --   1.70   BASOPHILS   --   0.90   ASTSGOT  20  12   ALTSGPT  14  9   ALKPHOSPHAT  103  78   TBILIRUBIN  0.3  0.3     Recent Labs      01/19/19 2303 01/21/19   0008   RBC  4.70  4.27   HEMOGLOBIN  14.6  13.3   HEMATOCRIT  42.9  40.0   PLATELETCT  441*  383       Imaging  Personally reviewed imaging including CAT scan and discussed with family    Assessment/Plan  * Lung mass   Assessment & Plan    Imaging reviewed, proximal obstruction/ atelectasis, should be reachable by standard bronchoscopy , tentatively scheduled for 11 am jan 22, tomorrow will discuss with Pt     COPD with asthma (HCC)   Assessment & Plan    Symbicort, NPPB     Tobacco abuse   Assessment & Plan    Counseling          Reviewed with patient and family in detail, high likelihood of advanced stage bronchogenic cancer, endobronchial appearance lends to simple bronchoscopy  and hopefully diagnosis.  Oncology will guide subsequent recommendations, radiation and chemotherapy could be considerations.

## 2019-01-22 NOTE — CARE PLAN
Problem: Communication  Goal: The ability to communicate needs accurately and effectively will improve  Outcome: PROGRESSING AS EXPECTED  Discussed POC with patient to included ordered MRI and NPO @ 0000 for bronchoscopy tomorrow. Patient verbalizes understanding, all questions answered at this time.    Problem: Pain Management  Goal: Pain level will decrease to patient's comfort goal  Outcome: PROGRESSING AS EXPECTED  Patient c/o intermittent back pain and rib pain when she coughs and rates it at 7/10. Denies need for pain medication at this time. Will continue to assess.

## 2019-01-22 NOTE — PROGRESS NOTES
[facility] Medicine Daily Progress Note    Date of Service  1/21/2019    Chief Complaint  53 y.o. female admitted 1/20/2019 with cough since April 2018, multiple round of antibiotics with only transient improvement.  CT chest with lung mass.    Hospital Course    Patient seen by pulmonology as IR recommended bronchial biopsy given central location of mass.  EBUS initially planned but Dr Lynne feels traditional biopsy able to gather necessary tissue for diagnosis.      Interval Problem Update  Patient anxious and tearful of possible diagnosis.  Family comforting patient and requesting small dose medication to allow patient to calm down.  Xanax 0.25 mg ordered PRN.  NPO at [facility] for bronchoscopy 11am tomorrow.    Consultants/Specialty  Rose Lynne    Code Status  full    Disposition  tbd    Review of Systems  Review of Systems   Constitutional: Positive for weight loss. Negative for chills and fever.   HENT: Negative for congestion and sore throat.    Eyes: Negative for blurred vision and photophobia.   Respiratory: Negative for shortness of breath.    Cardiovascular: Negative for chest pain, claudication and leg swelling.   Gastrointestinal: Negative for abdominal pain, constipation, diarrhea, heartburn, nausea and vomiting.   Genitourinary: Negative for dysuria and hematuria.   Musculoskeletal: Negative for joint pain and myalgias.   Skin: Negative for itching and rash.   Neurological: Positive for weakness. Negative for dizziness, sensory change, speech change and headaches.   Psychiatric/Behavioral: Negative for depression. The patient is nervous/anxious and has insomnia.         Physical Exam  Temp:  [36.4 °C (97.6 °F)-36.9 °C (98.4 °F)] 36.9 °C (98.4 °F)  Pulse:  [73-88] 80  Resp:  [18] 18  BP: (118-122)/(66-77) 121/66  SpO2:  [91 %-93 %] 91 %    Physical Exam   Constitutional: She is oriented to person, place, and time. She appears well-developed and well-nourished. No distress.   HENT:   Head:  Normocephalic and atraumatic.   Eyes: Conjunctivae are normal. No scleral icterus.   Neck: Neck supple. No JVD present.   Cardiovascular: Normal rate, regular rhythm and normal heart sounds.  Exam reveals no gallop and no friction rub.    No murmur heard.  Pulmonary/Chest: Effort normal and breath sounds normal. No respiratory distress. She exhibits no tenderness.   Decreased at bases bilaterally     Abdominal: Soft. Bowel sounds are normal. She exhibits no distension. There is no guarding.   Musculoskeletal: She exhibits no edema or tenderness.   Lymphadenopathy:     She has no cervical adenopathy.   Neurological: She is alert and oriented to person, place, and time. No cranial nerve deficit.   Skin: Skin is warm and dry. She is not diaphoretic. No erythema. No pallor.   Psychiatric: She has a normal mood and affect. Her behavior is normal.   Nursing note and vitals reviewed.      Fluids  No intake or output data in the 24 hours ending 01/21/19 2035    Laboratory  Recent Labs      01/19/19 2303 01/21/19   0008   WBC  12.3*  8.2   RBC  4.70  4.27   HEMOGLOBIN  14.6  13.3   HEMATOCRIT  42.9  40.0   MCV  91.3  93.7   MCH  31.1*  31.1   MCHC  34.0  33.3*   RDW  11.9  41.7   PLATELETCT  441*  383   MPV  8.6  8.7*     Recent Labs      01/19/19 2303 01/21/19   0008   SODIUM  137  133*   POTASSIUM  4.1  4.2   CHLORIDE  102  107   CO2  24  21   GLUCOSE  94  105*   BUN  13  14   CREATININE  0.8  0.72   CALCIUM  9.3  9.6         Recent Labs      01/19/19 2303   BNPBTYPENAT  19     Recent Labs      01/21/19   0008   TRIGLYCERIDE  53   HDL  36*   LDL  83       Imaging  CT-ABDOMEN-PELVIS WITH   Final Result      No evidence of metastatic disease is identified.      Status post cholecystectomy.      Pancreatic calcifications likely represent sequelae of chronic pancreatitis.      Atherosclerotic plaque.      Nonvisualization of the appendix, limiting evaluation.      Left lower lobe lobulated masslike density with  surrounding patchy opacities, better evaluated on the prior CT chest. Volume loss is again noted on the left.      MR-BRAIN-WITH & W/O    (Results Pending)        Assessment/Plan  * Lung mass   Assessment & Plan    Bilateral upper lobe lung masses noted on CT, suspcious for lung cancer  Pulmonology consulted - d/w Julianna Hwang and Maged  Bronchoscopy planned 1/22 at 11am with Dr Lynne  CT abdomen pelvis without evidence of malignancy  MRI brain pending          Anxiety   Assessment & Plan    Secondary to possible cancer  Low dose xanax PRN       Leukocytosis   Assessment & Plan    Resolved   likely reactive  No evidence of infectious etiology      Severe protein-calorie malnutrition (HCC)   Assessment & Plan    Nutrition consulted       Tobacco abuse   Assessment & Plan    Encouraged smoking cessation           VTE prophylaxis: scds

## 2019-01-22 NOTE — CONSULTS
RADIATION ONCOLOGY CONSULT    DATE OF SERVICE: 1/22/2019    IDENTIFICATION: A 53 y.o. female with left upper lobe mass and right hilar mass.  Patient has worsening shortness of breath cough and 25 pound weight loss.  She is here at the kind request of Dr. Ramos and Dr. Lynne.      HISTORY OF PRESENT ILLNESS: Consulted today for a 53-year-old female patient with left upper lobe mass and right hilar mass.  Patient started to exhibit shortness of breath with a cough in April 2018.  Cough and shortness of breath continued to get worse.  Patient was seen in a traveling  medical clinic in [place] in July 2018 where they thought this was a chronic cough and gave steroids.  Patient went to [facility] August 2018 for continued signs of shortness of breath cough and weight loss.  She received a chest x-ray at the time along with steroids and antibiotics.  Patient continued to exhibit worsening cough with one episode of hemoptysis, shortness of breath, and weight loss of 25 pounds and returned again to [facility] on 18 January 2019.  X-ray was obtained and physician told patient that this was either pneumonia or a mass.  Physician suggested that she go to the emergency room for CT and further evaluation.  Patient was seen in the emergency room on 19 January 2019.  CT scan on 19 January 2019 showed large left hilar mass markedly narrows both left main pulmonary and left mainstem bronchus.  Left lung base consolidation.  Right hilar lymphadenopathy likely local regional metastasis.  MR brain with and without contrast was obtained on 22 January 2019 that showed no intracranial metastases.  Bronchoscopy on 22 January 2019 showed large mass in the left mainstem level of left upper lobe washing in brush biopsy were obtained no further sample obtained due to patient bleeding.  Pending pathological results.  She is seen here today about the role of radiation therapy in her overall treatment plan  PAST MEDICAL HISTORY:   Reviewed  and no significant past medical history    PAST SURGICAL HISTORY:  -Sinusectomy and rhinoplasty   -Hysterectomy   -Gallbladder   GYNECOLOGICAL STATUS:      CURRENT MEDICATIONS:  Current Facility-Administered Medications   Medication Dose Route Frequency Provider Last Rate Last Dose   • NS (BOLUS) infusion 500 mL  500 mL Intravenous Once PRN Lois Lynne M.D.       • fentaNYL (SUBLIMAZE) injection 12.5-50 mcg  12.5-50 mcg Intravenous PRN Lois Lynne M.D.       • midazolam (VERSED) injection 0.5-2 mg  0.5-2 mg Intravenous PRN Lois Lynne M.D.       • ALPRAZolam (XANAX) tablet 0.25 mg  0.25 mg Oral Q8HRS PRN CAROLANN Hargrove.O.   0.25 mg at 19 0119   • nicotine (NICODERM) 21 MG/24HR 21 mg  21 mg Transdermal Daily-0600 CAROLANN Hargrove.O.   21 mg at 19 0522    And   • nicotine polacrilex (NICORETTE) 2 MG piece 2 mg  2 mg Oral Q HOUR PRN Lu Metz D.O.       • senna-docusate (PERICOLACE or SENOKOT S) 8.6-50 MG per tablet 2 Tab  2 Tab Oral BID Katie Raya M.D.   2 Tab at 19 1732    And   • polyethylene glycol/lytes (MIRALAX) PACKET 1 Packet  1 Packet Oral QDAY PRN Katie Raya M.D.        And   • magnesium hydroxide (MILK OF MAGNESIA) suspension 30 mL  30 mL Oral QDAY PRN Katie Raya M.D.        And   • bisacodyl (DULCOLAX) suppository 10 mg  10 mg Rectal QDAY PRN Katie Raya M.D.       • Respiratory Care per Protocol   Nebulization Continuous RT Katie Raya M.D.       • heparin injection 5,000 Units  5,000 Units Subcutaneous Q8HRS Katie Raya M.D.   Stopped at 19 0600   • ondansetron (ZOFRAN) syringe/vial injection 4 mg  4 mg Intravenous Q4HRS PRN Katie Raya M.D.   4 mg at 19 1248   • ondansetron (ZOFRAN ODT) dispertab 4 mg  4 mg Oral Q4HRS PRN Katie Raya M.D.       • promethazine (PHENERGAN) tablet 12.5-25 mg  12.5-25 mg Oral Q4HRS PRN Katie Raya M.D.       • promethazine (PHENERGAN) suppository 12.5-25  mg  12.5-25 mg Rectal Q4HRS PRN Katie Raya M.D.       • prochlorperazine (COMPAZINE) injection 5-10 mg  5-10 mg Intravenous Q4HRS PRN Katie Raya M.D.       • budesonide-formoterol (SYMBICORT) 160-4.5 MCG/ACT inhaler 2 Puff  2 Puff Inhalation BID Yan Wolff M.D.   2 Puff at 19 0522   • albuterol inhaler 2 Puff  2 Puff Inhalation Q4HRS Yan Wolff M.D.   Stopped at 19 1000   • ipratropium-albuterol (DUONEB) nebulizer solution  3 mL Nebulization Q4H PRN (RT) Lu Metz D.O.           ALLERGIES:    Percocet [oxycodone-acetaminophen]    FAMILY HISTORY:    -Mother:  of lung cancer at age 76 lifetime smoker  -Father:  of stroke in his 40s  -Maternal Grandmother  of breast cancer at age 86        SOCIAL HISTORY:    -Occasional light drinker (once or twice a month)  -30 pack year history  -No illicit drug use since her teens  -Works as a  in [employer]. Lives with her sister, daughter, son-in-law, and grandaughter    Review of Systems:   Constitutional: No fever/chills, weight loss of 25 since 2018  HENT: No neck pain, Pt has off and on headache, no dizziness, no hearing loss  Eyes: No vision changes,   Respiratory: See HPI  Cardiovascular: No palpations, chest pain or easy fatiguability   Gastrointestinal: No diarrhea, abdominal pain or constipation.  No blood in stool.  Genitourinal: No hematuria, dysuria, incontinence  Musculoskeletal: Lower rib mid clavicular line   Skin: No itching or rash, or new lesions  Neurological: No numbness or tingling, weakness, siezures  Endocrine: No thyroid problems or diabetes      PHYSICAL EXAM:    0= Fully active, able to carry on all pre-disease performance without restriction.  Vitals:    19 1249 19 1309 19 1320 19 1342   BP: 136/89 127/78 136/80 133/82   Pulse: 86 77 75 79   Resp: 20 20 18 20   Temp: 36.3 °C (97.3 °F) 36.3 °C (97.3 °F) 36.2 °C (97.2 °F) 36.3 °C (97.3 °F)   TempSrc:  Temporal  Temporal Temporal   SpO2: 94% 93% 98% 97%   Weight:       Height:       Location: Back, Rib Cage  Description: Aching, Sharp        General: NAD, Appears older than stated age.  HEENT:  Pupils are equal, round, and reactive to light.  Extraocular muscles   are intact. Sclerae nonicteric.  Conjunctivae pink.  Oral cavity, tongue   protrudes midline.  1cm well circumscribed pink papule with talengtasias, non-ulcerated, non-bleeding.  NECK:   No peripheral adenopathy of the neck, supraclavicular fossa.  LUNGS:  Clear to ascultation. TTP on the 8th rib mid clavicular line  HEART:  Regular rate and rhythm.  No murmur appreciated  ABDOMEN:  Soft. No TTP No evidence of hepatosplenomegaly.    EXTREMITIES:  Without Edema.  NEUROLOGIC:  Normal stance and gait motor and sensory grossly within normal limits. Normal strength, reflexes 2+. Normal sensation.      IMPRESSION:    A 53 y.o. with left upper lobe and right hilar mass pending pathological results.    RECOMMENDATIONS:   Patient understands we have to await the pathology results to determine what is the best treatment for her.  I think we should also consider a PET CT scan when she gets discharged from the hospital.  I've briefly described the details of radiation along with the side effects both acute and chronic, including but not exclusive to fatigue, skin reaction, local soreness, swelling, delayed healing, scarring fibrosis discoloration. Ample time was allowed for questions, and patient understands.    We will follow along and await the other consultants opinions and the pathology results.    Thank you for the opportunity to participate in her care.  If any questions or comments, please do not hesitate in calling.    Please note that this dictation was created using voice recognition software. I have made every reasonable attempt to correct obvious errors, but I expect that there are errors of grammar and possibly content that I did not discover before finalizing  the note.

## 2019-01-22 NOTE — PROCEDURES
Bronchoscopy revealed MIREYA large occluding mass, friable, brush biopsy and washes for cytology, arises within close proximity to main geraldine. See full dictation    [id number]    Lois Lynne MD

## 2019-01-22 NOTE — PROGRESS NOTES
Pulmonary Care Progress Note    Date of admission  1/20/2019    Chief Complaint  53 y.o. female admitted 1/20/2019 with lung mass and 25 pound weight loss    Hospital Course    Patient admitted with possible postobstructive pneumonia, left upper lobe atelectasis, CT scan demonstrates significant obstruction and multiple areas of involvement      Interval Problem Update  Reviewed last 24 hour events:  Rib pain could be from cough fracture or metastatic bronchogenic cancer; bronchoscopy done today revealed large mass left upper lobe occluding, very friable, easy bleeding, brush biopsy and washings sampling sent.  Discussed with family, hospitalist, may need early radiation oncology intervention.  Attempted photos but bronchoscopic device was not processing.    Review of Systems  ROS Review of Systems   Constitutional: Negative for chills and fever. Significant for 25 pound weight loss over the past year  HENT: Negative for congestion and sore throat.    Eyes: Negative for photophobia and discharge.   Respiratory: positive for cough,scant sputum production, mildshortness of breath and wheezing.    Cardiovascular: Negative for chest pain, palpitations and leg swelling.   Gastrointestinal: Negative for abdominal pain, diarrhea, nausea and vomiting.   Musculoskeletal: Negative for back pain and myalgias.  Does have right lateral chest pain, possible cough fracture, locally tender   Neurological: Negative for focal weakness, weakness and headaches.     Vital Signs for last 24 hours   Temp:  [36.3 °C (97.3 °F)-36.9 °C (98.4 °F)] 36.9 °C (98.4 °F)  Pulse:  [65-96] 96  Resp:  [17-78] 22  BP: (107-121)/(61-83) 121/83  SpO2:  [91 %-100 %] 95 %    Hemodynamic parameters for last 24 hours       Respiratory       Physical Exam   Physical Exam   Constitutional: She is oriented to person, place, and time. She appears well-developed.   HENT:   Head: Normocephalic.   Eyes: Pupils are equal, round, and reactive to light.   Neck: Normal  range of motion.   Cardiovascular: Normal rate, regular rhythm and normal heart sounds.    Pulmonary/Chest: Effort normal and breath sounds normal. She exhibits tenderness.   Slightly diminished, also has tenderness right lateral chest wall   Abdominal: Soft. Bowel sounds are normal.   Musculoskeletal: Normal range of motion.   Neurological: She is alert and oriented to person, place, and time.   Skin: Skin is warm and dry.   Psychiatric: She has a normal mood and affect.       Medications  Current Facility-Administered Medications   Medication Dose Route Frequency Provider Last Rate Last Dose   • NS (BOLUS) infusion 500 mL  500 mL Intravenous Once PRN Lois Lynne M.D.       • fentaNYL (SUBLIMAZE) injection 12.5-50 mcg  12.5-50 mcg Intravenous PRN Lois Lynne M.D.       • midazolam (VERSED) injection 0.5-2 mg  0.5-2 mg Intravenous PRN Lois Lynne M.D.       • [MAR Hold] ALPRAZolam (XANAX) tablet 0.25 mg  0.25 mg Oral Q8HRS PRN Lu Metz D.O.   0.25 mg at 01/22/19 0119   • [MAR Hold] nicotine (NICODERM) 21 MG/24HR 21 mg  21 mg Transdermal Daily-0600 CAROLANN Hargrove.O.   21 mg at 01/22/19 0522    And   • [MAR Hold] nicotine polacrilex (NICORETTE) 2 MG piece 2 mg  2 mg Oral Q HOUR PRN Lu Metz D.O.       • [MAR Hold] senna-docusate (PERICOLACE or SENOKOT S) 8.6-50 MG per tablet 2 Tab  2 Tab Oral BID Katie Raya M.D.   2 Tab at 01/21/19 1732    And   • [MAR Hold] polyethylene glycol/lytes (MIRALAX) PACKET 1 Packet  1 Packet Oral QDAY PRN Katie Raya M.D.        And   • [MAR Hold] magnesium hydroxide (MILK OF MAGNESIA) suspension 30 mL  30 mL Oral QDAY PRN Katie Raya M.D.        And   • [MAR Hold] bisacodyl (DULCOLAX) suppository 10 mg  10 mg Rectal QDAY PRN Katie Raya M.D.       • [MAR Hold] Respiratory Care per Protocol   Nebulization Continuous RT Katie Raya M.D.       • [MAR Hold] heparin injection 5,000 Units  5,000 Units Subcutaneous Q8HRS Katie Raya,  M.D.   Stopped at 01/22/19 0600   • [MAR Hold] ondansetron (ZOFRAN) syringe/vial injection 4 mg  4 mg Intravenous Q4HRS PRN Katie Raya M.D.       • [MAR Hold] ondansetron (ZOFRAN ODT) dispertab 4 mg  4 mg Oral Q4HRS PRN Katie Raya M.D.       • [MAR Hold] promethazine (PHENERGAN) tablet 12.5-25 mg  12.5-25 mg Oral Q4HRS PRN Katie Raya M.D.       • [MAR Hold] promethazine (PHENERGAN) suppository 12.5-25 mg  12.5-25 mg Rectal Q4HRS PREDUARDO Raya M.D.       • [MAR Hold] prochlorperazine (COMPAZINE) injection 5-10 mg  5-10 mg Intravenous Q4HRS PRN Katie Raya M.D.       • [MAR Hold] budesonide-formoterol (SYMBICORT) 160-4.5 MCG/ACT inhaler 2 Puff  2 Puff Inhalation BID Yan Wolff M.D.   2 Puff at 01/22/19 0522   • [MAR Hold] albuterol inhaler 2 Puff  2 Puff Inhalation Q4HRS Yan Wolff M.D.   2 Puff at 01/21/19 2134   • [MAR Hold] ipratropium-albuterol (DUONEB) nebulizer solution  3 mL Nebulization Q4H PRN (RT) Lu Metz D.O.           Fluids    Intake/Output Summary (Last 24 hours) at 01/22/19 1230  Last data filed at 01/22/19 1200   Gross per 24 hour   Intake              500 ml   Output                0 ml   Net              500 ml       Laboratory      Recent Labs      01/19/19 2303   TROPONINI  <0.02   BNPBTYPENAT  19     Recent Labs      01/19/19   2303  01/21/19   0008  01/22/19   0142   SODIUM  137  133*  133*   POTASSIUM  4.1  4.2  4.3   CHLORIDE  102  107  103   CO2  24  21  23   BUN  13  14  13   CREATININE  0.8  0.72  0.75   CALCIUM  9.3  9.6  9.8     Recent Labs      01/19/19 2303 01/21/19 0008  01/22/19 0142   ALTSGPT  14  9  12   ASTSGOT  20  12  16   ALKPHOSPHAT  103  78  79   TBILIRUBIN  0.3  0.3  0.4   GLUCOSE  94  105*  96     Recent Labs      01/19/19 2303 01/21/19 0008 01/22/19 0142   WBC  12.3*  8.2  8.7   NEUTSPOLYS   --   45.20  53.60   LYMPHOCYTES   --   38.40  29.80   MONOCYTES   --   13.70*  14.10*   EOSINOPHILS   --    1.70  1.40   BASOPHILS   --   0.90  0.90   ASTSGOT  20  12  16   ALTSGPT  14  9  12   ALKPHOSPHAT  103  78  79   TBILIRUBIN  0.3  0.3  0.4     Recent Labs      01/19/19   2303  01/21/19   0008  01/22/19   0142   RBC  4.70  4.27  4.61   HEMOGLOBIN  14.6  13.3  14.2   HEMATOCRIT  42.9  40.0  43.7   PLATELETCT  441*  383  383   PROTHROMBTM   --    --   12.8   APTT   --    --   34.8   INR   --    --   0.95       Imaging  Personally reviewed imaging including CAT scan and discussed with family    Assessment/Plan  * Lung mass   Assessment & Plan    Bronchoscopy reveals large mass in left mainstem at level of left upper lobe, nearly occluding, washes and brush biopsy sampling obtained.  Reviewed with hospitalist, will likely need radiation giving the large proximal involvement and potential for occlusion of the entire left mainstem.   Discussed results with family, likely bronchogenic and advanced stage     COPD with asthma (HCC)   Assessment & Plan    Symbicort, NPPB     Tobacco abuse   Assessment & Plan    Counseling          Reviewed with patient and family in detail, high likelihood of advanced stage bronchogenic cancer, endobronchial appearance sampled brush bx with bronchoscopy and hopefully diagnosis.  Oncology will guide subsequent recommendations, radiation and chemotherapy could be considerations.

## 2019-01-23 PROCEDURE — 99232 SBSQ HOSP IP/OBS MODERATE 35: CPT | Performed by: HOSPITALIST

## 2019-01-23 PROCEDURE — A9270 NON-COVERED ITEM OR SERVICE: HCPCS | Performed by: HOSPITALIST

## 2019-01-23 PROCEDURE — A9270 NON-COVERED ITEM OR SERVICE: HCPCS | Performed by: INTERNAL MEDICINE

## 2019-01-23 PROCEDURE — 700102 HCHG RX REV CODE 250 W/ 637 OVERRIDE(OP): Performed by: INTERNAL MEDICINE

## 2019-01-23 PROCEDURE — 700111 HCHG RX REV CODE 636 W/ 250 OVERRIDE (IP): Performed by: HOSPITALIST

## 2019-01-23 PROCEDURE — 700102 HCHG RX REV CODE 250 W/ 637 OVERRIDE(OP): Performed by: HOSPITALIST

## 2019-01-23 PROCEDURE — 99233 SBSQ HOSP IP/OBS HIGH 50: CPT | Performed by: INTERNAL MEDICINE

## 2019-01-23 PROCEDURE — 770004 HCHG ROOM/CARE - ONCOLOGY PRIVATE *

## 2019-01-23 RX ORDER — ALBUTEROL SULFATE 90 UG/1
2 AEROSOL, METERED RESPIRATORY (INHALATION) EVERY 4 HOURS PRN
Status: DISCONTINUED | OUTPATIENT
Start: 2019-01-23 | End: 2019-02-13 | Stop reason: HOSPADM

## 2019-01-23 RX ORDER — NICOTINE 21 MG/24HR
21 PATCH, TRANSDERMAL 24 HOURS TRANSDERMAL ONCE
Status: COMPLETED | OUTPATIENT
Start: 2019-01-23 | End: 2019-01-24

## 2019-01-23 RX ADMIN — HYDROCODONE BITARTRATE AND HOMATROPINE METHYLBROMIDE 5 ML: 5; 1.5 SOLUTION ORAL at 23:19

## 2019-01-23 RX ADMIN — IBUPROFEN 600 MG: 400 TABLET ORAL at 04:27

## 2019-01-23 RX ADMIN — NICOTINE 21 MG: 21 PATCH, EXTENDED RELEASE TRANSDERMAL at 04:28

## 2019-01-23 RX ADMIN — BUDESONIDE AND FORMOTEROL FUMARATE DIHYDRATE 2 PUFF: 160; 4.5 AEROSOL RESPIRATORY (INHALATION) at 04:26

## 2019-01-23 RX ADMIN — SENNOSIDES AND DOCUSATE SODIUM 2 TABLET: 8.6; 5 TABLET ORAL at 04:28

## 2019-01-23 RX ADMIN — NICOTINE 21 MG: 21 PATCH, EXTENDED RELEASE TRANSDERMAL at 09:35

## 2019-01-23 RX ADMIN — ONDANSETRON 4 MG: 4 TABLET, ORALLY DISINTEGRATING ORAL at 22:53

## 2019-01-23 RX ADMIN — HYDROCODONE BITARTRATE AND HOMATROPINE METHYLBROMIDE 5 ML: 5; 1.5 SOLUTION ORAL at 22:53

## 2019-01-23 RX ADMIN — ENOXAPARIN SODIUM 40 MG: 100 INJECTION SUBCUTANEOUS at 04:26

## 2019-01-23 RX ADMIN — ONDANSETRON 4 MG: 4 TABLET, ORALLY DISINTEGRATING ORAL at 09:39

## 2019-01-23 ASSESSMENT — ENCOUNTER SYMPTOMS
VOMITING: 0
DIARRHEA: 0
CHILLS: 0
DIZZINESS: 0
PALPITATIONS: 0
WEIGHT LOSS: 1
HEADACHES: 0
NERVOUS/ANXIOUS: 1
ABDOMINAL PAIN: 0
DEPRESSION: 0
SHORTNESS OF BREATH: 0
CONSTIPATION: 0
WEAKNESS: 1
NAUSEA: 0
INSOMNIA: 0
COUGH: 0
FEVER: 0
HEARTBURN: 0

## 2019-01-23 ASSESSMENT — PAIN SCALES - GENERAL: PAINLEVEL_OUTOF10: 9

## 2019-01-23 NOTE — PROGRESS NOTES
Pulmonary Care Progress Note    Date of admission  1/20/2019    Chief Complaint  53 y.o. female admitted 1/20/2019 with lung mass and 25 pound weight loss    Hospital Course    Patient admitted with possible postobstructive pneumonia, left upper lobe atelectasis, CT scan demonstrates significant obstruction and multiple areas of involvement      Interval Problem Update  Reviewed last 24 hour events:  Rib pain could be from cough fracture or metastatic bronchogenic cancer; bronchoscopy done today revealed large mass left upper lobe occluding, very friable, easy bleeding, brush biopsy and washings sampling sent.  Discussed with family, hospitalist, may need early radiation oncology intervention.  Attempted photos but bronchoscopic device was not processing.    1/23, reviewed today with pathology, cytology and cell blocks show malignancy.  Final stains may give us more specific cell type.    Review of Systems  ROS Review of Systems   Constitutional: Negative for chills and fever. Significant for 25 pound weight loss over the past year  HENT: Negative for congestion and sore throat.    Eyes: Negative for photophobia and discharge.   Respiratory: positive for cough,scant sputum production, mildshortness of breath and wheezing.    Cardiovascular: Negative for chest pain, palpitations and leg swelling.   Gastrointestinal: Negative for abdominal pain, diarrhea, nausea and vomiting.   Musculoskeletal: Negative for back pain and myalgias.  Does have right lateral chest pain, possible cough fracture, locally tender   Neurological: Negative for focal weakness, weakness and headaches.     Vital Signs for last 24 hours   Temp:  [36.2 °C (97.2 °F)-36.9 °C (98.5 °F)] 36.9 °C (98.5 °F)  Pulse:  [75-96] 93  Resp:  [18-78] 18  BP: (106-145)/(65-89) 106/73  SpO2:  [93 %-100 %] 93 %    Hemodynamic parameters for last 24 hours       Respiratory       Physical Exam   Physical Exam   Constitutional: She is oriented to person, place, and  time. She appears well-developed.   HENT:   Head: Normocephalic.   Eyes: Pupils are equal, round, and reactive to light.   Neck: Normal range of motion.   Cardiovascular: Normal rate, regular rhythm and normal heart sounds.    Pulmonary/Chest: Effort normal and breath sounds normal. She exhibits tenderness.   Slightly diminished, also has tenderness right lateral chest wall   Abdominal: Soft. Bowel sounds are normal.   Musculoskeletal: Normal range of motion.   Neurological: She is alert and oriented to person, place, and time.   Skin: Skin is warm and dry.   Psychiatric: She has a normal mood and affect.       Medications  Current Facility-Administered Medications   Medication Dose Route Frequency Provider Last Rate Last Dose   • ibuprofen (MOTRIN) tablet 600 mg  600 mg Oral Q6HRS PRN Bob Ramos M.D.   600 mg at 01/23/19 0427   • enoxaparin (LOVENOX) inj 40 mg  40 mg Subcutaneous DAILY Bob Ramos M.D.   40 mg at 01/23/19 0426   • HYDROcodone-homatropine 5-1.5 mg/5 mL solution 5 mL  5 mL Oral Q4HRS PRN Bob Ramos M.D.   5 mL at 01/22/19 2154   • ALPRAZolam (XANAX) tablet 0.25 mg  0.25 mg Oral Q8HRS PRN Lu Metz D.O.   0.25 mg at 01/22/19 0119   • nicotine (NICODERM) 21 MG/24HR 21 mg  21 mg Transdermal Daily-0600 CAROLANN Hargrove.O.   21 mg at 01/23/19 0428    And   • nicotine polacrilex (NICORETTE) 2 MG piece 2 mg  2 mg Oral Q HOUR PRN Lu Metz D.O.       • senna-docusate (PERICOLACE or SENOKOT S) 8.6-50 MG per tablet 2 Tab  2 Tab Oral BID Katie Raya M.D.   2 Tab at 01/23/19 0428    And   • polyethylene glycol/lytes (MIRALAX) PACKET 1 Packet  1 Packet Oral QDAY PRN Katie Raya M.D.        And   • magnesium hydroxide (MILK OF MAGNESIA) suspension 30 mL  30 mL Oral QDAY PRN Katie Raya M.D.        And   • bisacodyl (DULCOLAX) suppository 10 mg  10 mg Rectal QDAY PRN Katie Raya M.D.       • Respiratory Care per Protocol   Nebulization Continuous RT Katie Raya,  M.D.       • ondansetron (ZOFRAN) syringe/vial injection 4 mg  4 mg Intravenous Q4HRS PRN Katie Raya M.D.   4 mg at 01/22/19 1248   • ondansetron (ZOFRAN ODT) dispertab 4 mg  4 mg Oral Q4HRS PRN Katie Raya M.D.       • promethazine (PHENERGAN) tablet 12.5-25 mg  12.5-25 mg Oral Q4HRS PRN Katie Raya M.D.       • promethazine (PHENERGAN) suppository 12.5-25 mg  12.5-25 mg Rectal Q4HRS PRN Katie Raya M.D.       • prochlorperazine (COMPAZINE) injection 5-10 mg  5-10 mg Intravenous Q4HRS PRN Katie Raya M.D.       • budesonide-formoterol (SYMBICORT) 160-4.5 MCG/ACT inhaler 2 Puff  2 Puff Inhalation BID Yan Wolff M.D.   2 Puff at 01/23/19 0426   • albuterol inhaler 2 Puff  2 Puff Inhalation Q4HRS Yan Wolff M.D.   2 Puff at 01/22/19 2057   • ipratropium-albuterol (DUONEB) nebulizer solution  3 mL Nebulization Q4H PRN (RT) Lu Metz D.O.           Fluids    Intake/Output Summary (Last 24 hours) at 01/23/19 0730  Last data filed at 01/22/19 1200   Gross per 24 hour   Intake              500 ml   Output                0 ml   Net              500 ml       Laboratory          Recent Labs      01/21/19   0008  01/22/19   0142   SODIUM  133*  133*   POTASSIUM  4.2  4.3   CHLORIDE  107  103   CO2  21  23   BUN  14  13   CREATININE  0.72  0.75   CALCIUM  9.6  9.8     Recent Labs      01/21/19   0008  01/22/19   0142   ALTSGPT  9  12   ASTSGOT  12  16   ALKPHOSPHAT  78  79   TBILIRUBIN  0.3  0.4   GLUCOSE  105*  96     Recent Labs      01/21/19   0008  01/22/19   0142   WBC  8.2  8.7   NEUTSPOLYS  45.20  53.60   LYMPHOCYTES  38.40  29.80   MONOCYTES  13.70*  14.10*   EOSINOPHILS  1.70  1.40   BASOPHILS  0.90  0.90   ASTSGOT  12  16   ALTSGPT  9  12   ALKPHOSPHAT  78  79   TBILIRUBIN  0.3  0.4     Recent Labs      01/21/19   0008  01/22/19   0142   RBC  4.27  4.61   HEMOGLOBIN  13.3  14.2   HEMATOCRIT  40.0  43.7   PLATELETCT  383  383   PROTHROMBTM   --   12.8   APTT    --   34.8   INR   --   0.95       Imaging  Personally reviewed imaging including CAT scan and discussed with family    Assessment/Plan  * Lung mass   Assessment & Plan    Bronchoscopy reveals large mass in left mainstem at level of left upper lobe, nearly occluding, washes and brush biopsy sampling obtained.  Reviewed with hospitalist, will likely need radiation giving the large proximal involvement and potential for occlusion of the entire left mainstem.   Discussed results with family, likely bronchogenic and advanced stage     COPD with asthma (HCC)   Assessment & Plan    Symbicort, NPPB     Tobacco abuse   Assessment & Plan    Counseling          Reviewed with patient and family in detail, high likelihood of advanced stage bronchogenic cancer, endobronchial appearance sampled brush bx with bronchoscopy and hopefully diagnosis.  Oncology will guide subsequent recommendations, radiation and chemotherapy could be considerations.    1/23, discussed with pathology, malignancy on cell block and cytology, final stains for more specific cell type, appears to be bronchogenic malignancy.    Lois Lynne MD , FCCP, Pulmonary Service

## 2019-01-23 NOTE — PROGRESS NOTES
Patient alert and oriented. Tolerated bronchoscopy. Nausea following sedation improved with zofran use. Medicated with ibuprofen for headache. Orders received for codeine PRN for cough. Family at bedside. Up self with steady gait.

## 2019-01-23 NOTE — PROGRESS NOTES
Hospital Medicine Daily Progress Note    Date of Service  1/23/2019    Chief Complaint  53 y.o. female admitted 1/20/2019 with cough since April 2018, multiple round of antibiotics with only transient improvement.  CT chest with lung mass.    Hospital Course    Patient seen by pulmonology as IR recommended bronchial biopsy given central location of mass.  EBUS initially planned but Dr Lynne feels traditional biopsy able to gather necessary tissue for diagnosis.      Interval Problem Update  Pt very anxious about biopsy results  Otherwise without any complaints today  Headache has resolved    Consultants/Specialty  Pulm - Smith  Radiation oncology     Code Status  full    Disposition  tbd    Review of Systems  Review of Systems   Constitutional: Positive for weight loss. Negative for chills and fever.   Respiratory: Negative for cough and shortness of breath.    Cardiovascular: Negative for chest pain, palpitations and leg swelling.   Gastrointestinal: Negative for abdominal pain, constipation, diarrhea, heartburn, nausea and vomiting.   Genitourinary: Negative for dysuria and hematuria.   Skin: Negative for itching and rash.   Neurological: Positive for weakness. Negative for dizziness and headaches.   Psychiatric/Behavioral: Negative for depression. The patient is nervous/anxious. The patient does not have insomnia.    All other systems reviewed and are negative.       Physical Exam  Temp:  [36.7 °C (98.1 °F)-37 °C (98.6 °F)] 37 °C (98.6 °F)  Pulse:  [83-94] 94  Resp:  [16-20] 16  BP: (103-109)/(65-73) 103/70  SpO2:  [92 %-95 %] 92 %    Physical Exam   Constitutional: She is oriented to person, place, and time. She appears well-developed and well-nourished. No distress.   HENT:   Head: Normocephalic and atraumatic.   Eyes: Conjunctivae are normal. No scleral icterus.   Cardiovascular: Normal rate, regular rhythm and normal heart sounds.    No murmur heard.  Pulmonary/Chest: Effort normal and breath sounds normal.  No respiratory distress. She exhibits tenderness (right lower chest wall).   Decreased at bases bilaterally     Abdominal: Soft. Bowel sounds are normal. She exhibits no distension. There is no guarding.   Musculoskeletal: She exhibits no edema or tenderness.   Neurological: She is alert and oriented to person, place, and time. No cranial nerve deficit.   Skin: Skin is warm and dry. She is not diaphoretic. No erythema. No pallor.   Psychiatric: She has a normal mood and affect. Her behavior is normal.   Nursing note and vitals reviewed.      Fluids  No intake or output data in the 24 hours ending 01/23/19 1741    Laboratory  Recent Labs      01/21/19 0008 01/22/19   0142   WBC  8.2  8.7   RBC  4.27  4.61   HEMOGLOBIN  13.3  14.2   HEMATOCRIT  40.0  43.7   MCV  93.7  94.8   MCH  31.1  30.8   MCHC  33.3*  32.5*   RDW  41.7  41.2   PLATELETCT  383  383   MPV  8.7*  8.7*     Recent Labs      01/21/19 0008  01/22/19 0142   SODIUM  133*  133*   POTASSIUM  4.2  4.3   CHLORIDE  107  103   CO2  21  23   GLUCOSE  105*  96   BUN  14  13   CREATININE  0.72  0.75   CALCIUM  9.6  9.8     Recent Labs      01/22/19 0142   APTT  34.8   INR  0.95         Recent Labs      01/21/19 0008   TRIGLYCERIDE  53   HDL  36*   LDL  83       Imaging  MR-BRAIN-WITH & W/O   Final Result      1.  There is no intracranial metastasis.   2.  Mild cerebral atrophy.   3.  Mild chronic microvascular ischemic disease.      CT-ABDOMEN-PELVIS WITH   Final Result      No evidence of metastatic disease is identified.      Status post cholecystectomy.      Pancreatic calcifications likely represent sequelae of chronic pancreatitis.      Atherosclerotic plaque.      Nonvisualization of the appendix, limiting evaluation.      Left lower lobe lobulated masslike density with surrounding patchy opacities, better evaluated on the prior CT chest. Volume loss is again noted on the left.           Assessment/Plan  * Lung mass   Assessment & Plan    Bilateral  upper lobe lung masses noted on CT, suspcious for lung cancer  Pulmonology consulted - d/w Julianna Hwang and Maged  CT abdomen pelvis without evidence of malignancy  MRI brain without mets  1/22 bronchoscopy with brush biopsy done. F/u pathology    I discussed case with Dr. Lynne today       Anxiety   Assessment & Plan    Secondary to possible cancer  Low dose xanax PRN       Leukocytosis   Assessment & Plan    Resolved   likely reactive  No evidence of infectious etiology      Severe protein-calorie malnutrition (HCC)   Assessment & Plan    Nutrition consulted       Tobacco abuse   Assessment & Plan    Encouraged smoking cessation           VTE prophylaxis: scds

## 2019-01-23 NOTE — PROGRESS NOTES
Patient A&O x4, upself and steady. Family at bedside; anxious and tearful tonight. Emotional support provided. Patient requested cough syrup before bed to help her sleep. Otherwise, she has no complaints. She has required 1L O2 overnight for oxygen sats in the high 80s while sleeping. Will continue to monitor oxygen and wean patient as tolerable. Call light within reach, all needs met at this time.

## 2019-01-23 NOTE — CARE PLAN
Problem: Safety  Goal: Will remain free from injury  Outcome: PROGRESSING AS EXPECTED  Patient is upself and steady. Family at bedside. Safety precautions in place.    Problem: Respiratory:  Goal: Respiratory status will improve  Outcome: PROGRESSING AS EXPECTED  Patient tolerating RA while awake and 1L O@ via NC while sleeping for sats in the high 80s.  in place to monitor oxygen sats.

## 2019-01-23 NOTE — DIETARY
Nutrition services update: following for adequate nutritional intake    Day 3 of admit. Regular diet, no PO intake documented in ADLs at this time.     Visited pt at bedside, however pt was sleeping soundly - spoke with family member at bedside. Family states that pt has been eating meals consistently, however does not consumed all of the meals. Estimated that pt has been consuming ~50% of each tray. Pt has already declined supplements, but offered a high protein milkshake which family member thinks pt will enjoy - also an additional banana added as snack per family member's request.     Plan/Recommend:  1. Snacks BID - high protein milkshake once a day  2. Encourage PO intake of meals and snacks  3. Nutrition Representative to see daily for meal selections/snack modification  4. Please record intake as % meals/snacks consumed in ADLs  5. RD to monitor PO intake, wt trends, and nutrition labs/meds    RD will continue to follow

## 2019-01-24 PROBLEM — D72.829 LEUKOCYTOSIS: Status: RESOLVED | Noted: 2019-01-20 | Resolved: 2019-01-24

## 2019-01-24 PROCEDURE — 700111 HCHG RX REV CODE 636 W/ 250 OVERRIDE (IP): Performed by: HOSPITALIST

## 2019-01-24 PROCEDURE — 700102 HCHG RX REV CODE 250 W/ 637 OVERRIDE(OP): Performed by: INTERNAL MEDICINE

## 2019-01-24 PROCEDURE — 99233 SBSQ HOSP IP/OBS HIGH 50: CPT | Performed by: INTERNAL MEDICINE

## 2019-01-24 PROCEDURE — 700102 HCHG RX REV CODE 250 W/ 637 OVERRIDE(OP): Performed by: HOSPITALIST

## 2019-01-24 PROCEDURE — 99232 SBSQ HOSP IP/OBS MODERATE 35: CPT | Performed by: HOSPITALIST

## 2019-01-24 PROCEDURE — A9270 NON-COVERED ITEM OR SERVICE: HCPCS | Performed by: HOSPITALIST

## 2019-01-24 PROCEDURE — A9270 NON-COVERED ITEM OR SERVICE: HCPCS | Performed by: INTERNAL MEDICINE

## 2019-01-24 PROCEDURE — 770004 HCHG ROOM/CARE - ONCOLOGY PRIVATE *

## 2019-01-24 RX ADMIN — ONDANSETRON 4 MG: 4 TABLET, ORALLY DISINTEGRATING ORAL at 21:12

## 2019-01-24 RX ADMIN — NICOTINE 21 MG: 21 PATCH, EXTENDED RELEASE TRANSDERMAL at 09:48

## 2019-01-24 RX ADMIN — BUDESONIDE AND FORMOTEROL FUMARATE DIHYDRATE 2 PUFF: 160; 4.5 AEROSOL RESPIRATORY (INHALATION) at 17:37

## 2019-01-24 RX ADMIN — HYDROCODONE BITARTRATE AND HOMATROPINE METHYLBROMIDE 5 ML: 5; 1.5 SOLUTION ORAL at 21:12

## 2019-01-24 RX ADMIN — ENOXAPARIN SODIUM 40 MG: 100 INJECTION SUBCUTANEOUS at 05:58

## 2019-01-24 ASSESSMENT — ENCOUNTER SYMPTOMS
COUGH: 0
INSOMNIA: 0
VOMITING: 0
NAUSEA: 0
DIZZINESS: 0
SHORTNESS OF BREATH: 0
CHILLS: 0
PALPITATIONS: 0
ABDOMINAL PAIN: 0
HEADACHES: 0
DIARRHEA: 0
HEARTBURN: 0
DEPRESSION: 0
WEIGHT LOSS: 1
NERVOUS/ANXIOUS: 1
WEAKNESS: 1
FEVER: 0
CONSTIPATION: 0

## 2019-01-24 NOTE — PROGRESS NOTES
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    Lois Lynne MD , [facility], [facility]

## 2019-01-24 NOTE — PROGRESS NOTES
Pt A & O x 4. Pt having intermittent nausea. Resolved w/ zofran PRN. Still waiting for brush biopsy results. Patient up self. On room air, satting in the 90's. Family in the room, very helpful and interested in her care.

## 2019-01-24 NOTE — PROGRESS NOTES
Pulmonary Care Progress Note    Date of admission  1/20/2019    Chief Complaint  53 y.o. female admitted 1/20/2019 with lung mass and 25 pound weight loss    Hospital Course    Patient admitted with possible postobstructive pneumonia, left upper lobe atelectasis, CT scan demonstrates significant obstruction and multiple areas of involvement      Interval Problem Update  Reviewed last 24 hour events:  Rib pain could be from cough fracture or metastatic bronchogenic cancer; bronchoscopy done today revealed large mass left upper lobe occluding, very friable, easy bleeding, brush biopsy and washings sampling sent.  Discussed with family, hospitalist, may need early radiation oncology intervention.  Attempted photos but bronchoscopic device was not processing.    1/23, reviewed today with pathology, cytology and cell blocks show malignancy.  Final stains may give us more specific cell type.    1/24, Pathologist indicates poorly differentiated non small cell, d/w Pt, family, Dr Conteh and Dr Gauthier    Review of Systems  ROS Review of Systems   Constitutional: Negative for chills and fever. Significant for 25 pound weight loss over the past year  HENT: Negative for congestion and sore throat.    Eyes: Negative for photophobia and discharge.   Respiratory: positive for cough,scant sputum production, mildshortness of breath and wheezing.    Cardiovascular: Negative for chest pain, palpitations and leg swelling.   Gastrointestinal: Negative for abdominal pain, diarrhea, nausea and vomiting.   Musculoskeletal: Negative for back pain and myalgias.  Does have right lateral chest pain, possible cough fracture, locally tender   Neurological: Negative for focal weakness, weakness and headaches.     Vital Signs for last 24 hours   Temp:  [36.6 °C (97.9 °F)-37.1 °C (98.7 °F)] 37.1 °C (98.7 °F)  Pulse:  [70-91] 70  Resp:  [18-24] 18  BP: (105-132)/(61-78) 123/78  SpO2:  [90 %-95 %] 90 %    Hemodynamic parameters for last 24 hours        Respiratory       Physical Exam   Physical Exam   Constitutional: She is oriented to person, place, and time. She appears well-developed.   HENT:   Head: Normocephalic.   Eyes: Pupils are equal, round, and reactive to light.   Neck: Normal range of motion.   Cardiovascular: Normal rate, regular rhythm and normal heart sounds.    Pulmonary/Chest: Effort normal and breath sounds normal. She exhibits tenderness.   Slightly diminished, also has tenderness right lateral chest wall   Abdominal: Soft. Bowel sounds are normal.   Musculoskeletal: Normal range of motion.   Neurological: She is alert and oriented to person, place, and time.   Skin: Skin is warm and dry.   Psychiatric: She has a normal mood and affect.       Medications  Current Facility-Administered Medications   Medication Dose Route Frequency Provider Last Rate Last Dose   • albuterol inhaler 2 Puff  2 Puff Inhalation Q4HRS PRN Lois Lynne M.D.       • ibuprofen (MOTRIN) tablet 600 mg  600 mg Oral Q6HRS PRN Bob Ramos M.D.   600 mg at 01/23/19 0427   • enoxaparin (LOVENOX) inj 40 mg  40 mg Subcutaneous DAILY Bob Ramos M.D.   40 mg at 01/24/19 0558   • HYDROcodone-homatropine 5-1.5 mg/5 mL solution 5 mL  5 mL Oral Q4HRS PRN Bob Ramos M.D.   5 mL at 01/23/19 2319   • ALPRAZolam (XANAX) tablet 0.25 mg  0.25 mg Oral Q8HRS PRN ANA HargroveOHermes   0.25 mg at 01/22/19 0119   • nicotine (NICODERM) 21 MG/24HR 21 mg  21 mg Transdermal Daily-0600 ANA HargroveO.   21 mg at 01/24/19 0948    And   • nicotine polacrilex (NICORETTE) 2 MG piece 2 mg  2 mg Oral Q HOUR PRN ANA HargroveO.       • senna-docusate (PERICOLACE or SENOKOT S) 8.6-50 MG per tablet 2 Tab  2 Tab Oral BID Katie Raya M.D.   2 Tab at 01/23/19 0428    And   • polyethylene glycol/lytes (MIRALAX) PACKET 1 Packet  1 Packet Oral QDAY PRN Katie Y Hanif, M.D.        And   • magnesium hydroxide (MILK OF MAGNESIA) suspension 30 mL  30 mL Oral QDAY PRN Mohammad Y Hanif,  M.D.        And   • bisacodyl (DULCOLAX) suppository 10 mg  10 mg Rectal QDAY PRN Katie Raya M.D.       • Respiratory Care per Protocol   Nebulization Continuous RT Katie Raya M.D.       • ondansetron (ZOFRAN) syringe/vial injection 4 mg  4 mg Intravenous Q4HRS PRN Katie Raya M.D.   4 mg at 01/22/19 1248   • ondansetron (ZOFRAN ODT) dispertab 4 mg  4 mg Oral Q4HRS PRN Katie Raya M.D.   4 mg at 01/23/19 2253   • promethazine (PHENERGAN) tablet 12.5-25 mg  12.5-25 mg Oral Q4HRS PRN Katie Raya M.D.       • promethazine (PHENERGAN) suppository 12.5-25 mg  12.5-25 mg Rectal Q4HRS PRN Katie Raya M.D.       • prochlorperazine (COMPAZINE) injection 5-10 mg  5-10 mg Intravenous Q4HRS PRN Katie Raya M.D.       • budesonide-formoterol (SYMBICORT) 160-4.5 MCG/ACT inhaler 2 Puff  2 Puff Inhalation BID Yan Wolff M.D.   2 Puff at 01/23/19 0426   • ipratropium-albuterol (DUONEB) nebulizer solution  3 mL Nebulization Q4H PRN (RT) Lu Metz D.O.           Fluids    Intake/Output Summary (Last 24 hours) at 01/24/19 1307  Last data filed at 01/24/19 0446   Gross per 24 hour   Intake              360 ml   Output                0 ml   Net              360 ml       Laboratory          Recent Labs      01/22/19   0142   SODIUM  133*   POTASSIUM  4.3   CHLORIDE  103   CO2  23   BUN  13   CREATININE  0.75   CALCIUM  9.8     Recent Labs      01/22/19   0142   ALTSGPT  12   ASTSGOT  16   ALKPHOSPHAT  79   TBILIRUBIN  0.4   GLUCOSE  96     Recent Labs      01/22/19   0142   WBC  8.7   NEUTSPOLYS  53.60   LYMPHOCYTES  29.80   MONOCYTES  14.10*   EOSINOPHILS  1.40   BASOPHILS  0.90   ASTSGOT  16   ALTSGPT  12   ALKPHOSPHAT  79   TBILIRUBIN  0.4     Recent Labs      01/22/19   0142   RBC  4.61   HEMOGLOBIN  14.2   HEMATOCRIT  43.7   PLATELETCT  383   PROTHROMBTM  12.8   APTT  34.8   INR  0.95       Imaging  Personally reviewed imaging including CAT scan and discussed with  family    Assessment/Plan  * Lung mass   Assessment & Plan    Bronchoscopy reveals large mass in left mainstem at level of left upper lobe, nearly occluding, washes and brush biopsy sampling obtained.  Reviewed with hospitalist, will likely need radiation giving the large proximal involvement and potential for occlusion of the entire left mainstem.   Discussed results with family and patient, pathologist indicates poorly differentiated non-small cell malignancy, discussed with Dr. Conteh rad Onc and Dr. Gauthier, med onc     COPD with asthma (HCC)   Assessment & Plan    Symbicort, NPPB     Tobacco abuse   Assessment & Plan    Counseling              Lois Lynne MD , [facility], [facility]

## 2019-01-24 NOTE — DISCHARGE PLANNING
Anticipated Discharge Disposition: Home    Action: Notified by Jenelle lopez RN of needed PET scan, and pt's concern r/t pending Medicaid.   New diagnosis of poorly differentiated non small cell malignancy.   Met with pt and dgt at bedside.  Discussed discharge planning needs.  Pt is Aox4, reports no PMH, denies having a primary care physician, sts she has worked full time but her employer has not provided insurance.  Reviewed [place] [employer], sts she could not afford to pay for insurance.  Prior to becoming ill, pt made approx $1400.00/month. Reviewed address, pt provided physical address, updated demographic tab.     Discussed possible primary care at [facility] or [facility].  Pt would like to obtain all needed care in [place].     Pt emotional, and upset regarding obtaining needed PET scan.  Advised the medical team would collaborate pt's needs, and provide a definitive plan of care, prior to discharge. Provided active listening, and emotional support.  Pt's dgt and extended family at bedside.   Discussed needed PET scan with Susan, and Kerri, Supervisors of [employer].  They will contact Imaging to discuss needed outpt PET scan.       Barriers to Discharge: No PCP, uninsured, pending [place] Medicaid.     Plan: Pt will need outpt PET scan.  Supervisors [employer] working to help coordinate needed test.     Care Transition Team Assessment    Information Source  Orientation : Oriented x 4  Information Given By: Patient  Informant's Name: Amy Maki  Who is responsible for making decisions for patient? : Patient    Readmission Evaluation  Is this a readmission?: No        Interdisciplinary Discharge Planning  Does Admitting Nurse Feel This Could be a Complex Discharge?: No  Primary Care Physician: Don't have one   Lives with - Patient's Self Care Capacity: Adult Children  Patient or legal guardian wants to designate a caregiver (see row info): No  Support Systems: Family Member(s)  Housing /  Facility: [address] [place] [address]  Name of Care Facility: n/a   Do You Take your Prescribed Medications Regularly: Yes  Reasons Why Not Taking Medications :  (does not take any medications )  Able to Return to Previous ADL's: Yes  Mobility Issues: No  Prior Services: None  Patient Expects to be Discharged to:: Home   Assistance Needed: No  Durable Medical Equipment: Not Applicable    Discharge Preparedness  What is your plan after discharge?: Home with help  What are your discharge supports?: Child  Prior Functional Level: Independent with Activities of Daily Living, Independent with Medication Management  Difficulity with ADLs: None  Difficulity with IADLs: None    Functional Assesment  Prior Functional Level: Independent with Activities of Daily Living, Independent with Medication Management    Finances  Financial Barriers to Discharge: Yes  Average Monthly Income: 0 $  Source of Income:  (Unemployed r/t illness)  Prescription Coverage: No  If Rx required at discharge pt will need to us HCP.     Discharge Risks or Barriers  Discharge risks or barriers?: No PCP, Uninsured / underinsured, Complex medical needs  Patient risk factors: No PCP, Uninsured or underinsured    Anticipated Discharge Information  Anticipated discharge disposition: Home  Discharge Address: [address] [place] [id number]  Discharge Contact Phone Number: [phone]

## 2019-01-24 NOTE — PROGRESS NOTES
Hospital Medicine Daily Progress Note    Date of Service  1/24/2019    Chief Complaint  53 y.o. female admitted 1/20/2019 with cough since April 2018, multiple round of antibiotics with only transient improvement.  CT chest with lung mass.    Hospital Course    Patient seen by pulmonology as IR recommended bronchial biopsy given central location of mass.  EBUS initially planned but Dr Lynne feels traditional biopsy able to gather necessary tissue for diagnosis.      Interval Problem Update  I discussed case with Dr. Lynne and pathology showing poorly differentiated NSCC  Discussed with patient and she is tearful and anxious as she is uninsured   I discussed case with oncology who will be seeing patient  I discussed case with CM about pt needing outpatient     Consultants/Specialty  Pulm - [facility]  Radiation oncology   Oncology    Code Status  full    Disposition  tbd    Review of Systems  Review of Systems   Constitutional: Positive for weight loss. Negative for chills and fever.   Respiratory: Negative for cough and shortness of breath.    Cardiovascular: Negative for chest pain, palpitations and leg swelling.   Gastrointestinal: Negative for abdominal pain, constipation, diarrhea, heartburn, nausea and vomiting.   Genitourinary: Negative for dysuria and hematuria.   Skin: Negative for itching and rash.   Neurological: Positive for weakness. Negative for dizziness and headaches.   Psychiatric/Behavioral: Negative for depression. The patient is nervous/anxious. The patient does not have insomnia.    All other systems reviewed and are negative.       Physical Exam  Temp:  [36.6 °C (97.9 °F)-37.1 °C (98.7 °F)] 37.1 °C (98.7 °F)  Pulse:  [70-91] 70  Resp:  [18-24] 18  BP: (105-132)/(61-78) 123/78  SpO2:  [90 %-95 %] 90 %    Physical Exam   Constitutional: She is oriented to person, place, and time. She appears well-developed and well-nourished. No distress.   HENT:   Head: Normocephalic and atraumatic.   Eyes:  Conjunctivae are normal. No scleral icterus.   Cardiovascular: Normal rate, regular rhythm and normal heart sounds.    No murmur heard.  Pulmonary/Chest: Effort normal and breath sounds normal. No respiratory distress. She exhibits tenderness (right lower chest wall).   Decreased at bases bilaterally     Abdominal: Soft. Bowel sounds are normal. She exhibits no distension. There is no guarding.   Musculoskeletal: She exhibits no edema or tenderness.   Neurological: She is alert and oriented to person, place, and time. No cranial nerve deficit.   Skin: Skin is warm and dry. She is not diaphoretic. No erythema. No pallor.   Psychiatric: She has a normal mood and affect. Her behavior is normal.   Nursing note and vitals reviewed.      Fluids    Intake/Output Summary (Last 24 hours) at 01/24/19 1419  Last data filed at 01/24/19 0446   Gross per 24 hour   Intake              360 ml   Output                0 ml   Net              360 ml       Laboratory  Recent Labs      01/22/19   0142   WBC  8.7   RBC  4.61   HEMOGLOBIN  14.2   HEMATOCRIT  43.7   MCV  94.8   MCH  30.8   MCHC  32.5*   RDW  41.2   PLATELETCT  383   MPV  8.7*     Recent Labs      01/22/19   0142   SODIUM  133*   POTASSIUM  4.3   CHLORIDE  103   CO2  23   GLUCOSE  96   BUN  13   CREATININE  0.75   CALCIUM  9.8     Recent Labs      01/22/19   0142   APTT  34.8   INR  0.95               Imaging  MR-BRAIN-WITH & W/O   Final Result      1.  There is no intracranial metastasis.   2.  Mild cerebral atrophy.   3.  Mild chronic microvascular ischemic disease.      CT-ABDOMEN-PELVIS WITH   Final Result      No evidence of metastatic disease is identified.      Status post cholecystectomy.      Pancreatic calcifications likely represent sequelae of chronic pancreatitis.      Atherosclerotic plaque.      Nonvisualization of the appendix, limiting evaluation.      Left lower lobe lobulated masslike density with surrounding patchy opacities, better evaluated on the  prior CT chest. Volume loss is again noted on the left.           Assessment/Plan  * Lung mass   Assessment & Plan    Bilateral upper lobe lung masses noted on CT, suspcious for lung cancer  Pulmonology consulted - d/w Julianna Hwang and Maged  CT abdomen pelvis without evidence of malignancy  MRI brain without mets  1/22 bronchoscopy with brush biopsy done  Pathology showing poorly differentiated NSCC   I discussed case with Dr. Lynne and Dr. Gauthier today       Anxiety   Assessment & Plan    Secondary to possible cancer  Low dose xanax PRN       Severe protein-calorie malnutrition (HCC)   Assessment & Plan    Nutrition consulted       Tobacco abuse   Assessment & Plan    Encouraged smoking cessation           VTE prophylaxis: scds

## 2019-01-24 NOTE — PROGRESS NOTES
Patient A&O x4, upself with steady gait. Family is at bedside asking for work notes since they have been here with the patient for several days; let them know social work would be in tomorrow to assist them with this request; they understood.   Pt requested cough syrup before bed; zofran given since patient became nauseous last night after cough syrup. Otherwise patient has no complaints. Room air with  in place. PIV SL. Call light within reach, all needs met at this time.

## 2019-01-24 NOTE — CARE PLAN
Problem: Pain Management  Goal: Pain level will decrease to patient's comfort goal  Outcome: PROGRESSING AS EXPECTED  Patient appropriately verbalizes 0 to 10 pain scale. Calls for medication as needed.     Problem: Psychosocial Needs:  Goal: Level of anxiety will decrease  Outcome: PROGRESSING AS EXPECTED  Family at bedside. Patient understands plan of care. Calls for assistance as needed.

## 2019-01-24 NOTE — CARE PLAN
Problem: Pain Management  Goal: Pain level will decrease to patient's comfort goal  Outcome: PROGRESSING AS EXPECTED  Patient c/o pain in the right side of her rib cage. She denies headache at this time. Patient declines need for pain medications at this time. Will continue to assess.    Problem: Respiratory:  Goal: Respiratory status will improve  Outcome: PROGRESSING AS EXPECTED  Patient maintain O2 sats on room air. Strong, dry cough; PRN cough syrup available

## 2019-01-25 PROCEDURE — 77334 RADIATION TREATMENT AID(S): CPT | Mod: 26 | Performed by: RADIOLOGY

## 2019-01-25 PROCEDURE — 700111 HCHG RX REV CODE 636 W/ 250 OVERRIDE (IP): Performed by: HOSPITALIST

## 2019-01-25 PROCEDURE — 99233 SBSQ HOSP IP/OBS HIGH 50: CPT | Performed by: INTERNAL MEDICINE

## 2019-01-25 PROCEDURE — 770004 HCHG ROOM/CARE - ONCOLOGY PRIVATE *

## 2019-01-25 PROCEDURE — 77290 THER RAD SIMULAJ FIELD CPLX: CPT | Performed by: RADIOLOGY

## 2019-01-25 PROCEDURE — 77470 SPECIAL RADIATION TREATMENT: CPT | Mod: 26 | Performed by: RADIOLOGY

## 2019-01-25 PROCEDURE — 77334 RADIATION TREATMENT AID(S): CPT | Performed by: RADIOLOGY

## 2019-01-25 PROCEDURE — 77470 SPECIAL RADIATION TREATMENT: CPT | Performed by: RADIOLOGY

## 2019-01-25 PROCEDURE — 77290 THER RAD SIMULAJ FIELD CPLX: CPT | Mod: 26 | Performed by: RADIOLOGY

## 2019-01-25 PROCEDURE — 99232 SBSQ HOSP IP/OBS MODERATE 35: CPT | Performed by: HOSPITALIST

## 2019-01-25 PROCEDURE — 77263 THER RADIOLOGY TX PLNG CPLX: CPT | Performed by: RADIOLOGY

## 2019-01-25 PROCEDURE — 700102 HCHG RX REV CODE 250 W/ 637 OVERRIDE(OP): Performed by: HOSPITALIST

## 2019-01-25 PROCEDURE — A9270 NON-COVERED ITEM OR SERVICE: HCPCS | Performed by: HOSPITALIST

## 2019-01-25 PROCEDURE — 700102 HCHG RX REV CODE 250 W/ 637 OVERRIDE(OP): Performed by: INTERNAL MEDICINE

## 2019-01-25 PROCEDURE — A9270 NON-COVERED ITEM OR SERVICE: HCPCS | Performed by: INTERNAL MEDICINE

## 2019-01-25 RX ADMIN — ONDANSETRON 4 MG: 4 TABLET, ORALLY DISINTEGRATING ORAL at 22:14

## 2019-01-25 RX ADMIN — BUDESONIDE AND FORMOTEROL FUMARATE DIHYDRATE 2 PUFF: 160; 4.5 AEROSOL RESPIRATORY (INHALATION) at 17:10

## 2019-01-25 RX ADMIN — ENOXAPARIN SODIUM 40 MG: 100 INJECTION SUBCUTANEOUS at 05:13

## 2019-01-25 RX ADMIN — BUDESONIDE AND FORMOTEROL FUMARATE DIHYDRATE 2 PUFF: 160; 4.5 AEROSOL RESPIRATORY (INHALATION) at 05:13

## 2019-01-25 RX ADMIN — NICOTINE 21 MG: 21 PATCH, EXTENDED RELEASE TRANSDERMAL at 05:13

## 2019-01-25 RX ADMIN — HYDROCODONE BITARTRATE AND HOMATROPINE METHYLBROMIDE 5 ML: 5; 1.5 SOLUTION ORAL at 22:14

## 2019-01-25 ASSESSMENT — ENCOUNTER SYMPTOMS
INSOMNIA: 0
CONSTIPATION: 0
DEPRESSION: 0
DIZZINESS: 0
HEMOPTYSIS: 0
BACK PAIN: 0
ABDOMINAL PAIN: 0
NAUSEA: 0
SHORTNESS OF BREATH: 0
WEIGHT LOSS: 1
NECK PAIN: 0
VOMITING: 0
TINGLING: 0
COUGH: 0
CHILLS: 0
DIARRHEA: 0
PALPITATIONS: 0
SPUTUM PRODUCTION: 1
NERVOUS/ANXIOUS: 0
SORE THROAT: 0
FEVER: 0
WEAKNESS: 1
SHORTNESS OF BREATH: 1
COUGH: 1
NERVOUS/ANXIOUS: 1
HEADACHES: 0
MYALGIAS: 0
HEARTBURN: 0

## 2019-01-25 NOTE — PROGRESS NOTES
RADIATION ONCOLOGY FOLLOW-UP    DATE OF SERVICE: 1/25/2019    IDENTIFICATION:   A 53 y.o. female with poorly differentiated non-small cell lung cancer probable stage III with impending lung collapse.      HISTORY OF PRESENT ILLNESS:   Since last seen in the hospital on Wednesday patient is doing well we finally have the pathology.  Patient is really anxious about getting started with treatments.  She understands that a PET CT scan would help us pinpoint the radiation treatments.  She has no new respiratory symptoms and in fact she is actually feeling quite good.      CURRENT MEDICATIONS:  Current Facility-Administered Medications   Medication Dose Route Frequency Provider Last Rate Last Dose   • albuterol inhaler 2 Puff  2 Puff Inhalation Q4HRS PRN Lois Lynne M.D.       • ibuprofen (MOTRIN) tablet 600 mg  600 mg Oral Q6HRS PRN Bob Ramos M.D.   600 mg at 01/23/19 0427   • enoxaparin (LOVENOX) inj 40 mg  40 mg Subcutaneous DAILY Bob Ramos M.D.   40 mg at 01/25/19 0513   • HYDROcodone-homatropine 5-1.5 mg/5 mL solution 5 mL  5 mL Oral Q4HRS PRN Bob Ramos M.D.   5 mL at 01/24/19 2112   • ALPRAZolam (XANAX) tablet 0.25 mg  0.25 mg Oral Q8HRS PRN Lu Metz D.O.   0.25 mg at 01/22/19 0119   • nicotine (NICODERM) 21 MG/24HR 21 mg  21 mg Transdermal Daily-0600 Lu Metz D.O.   21 mg at 01/25/19 0513    And   • nicotine polacrilex (NICORETTE) 2 MG piece 2 mg  2 mg Oral Q HOUR PRN Lu Metz D.O.       • senna-docusate (PERICOLACE or SENOKOT S) 8.6-50 MG per tablet 2 Tab  2 Tab Oral BID Katie Raya M.D.   Stopped at 01/25/19 0600    And   • polyethylene glycol/lytes (MIRALAX) PACKET 1 Packet  1 Packet Oral QDAY PRN Katie Raya M.D.        And   • magnesium hydroxide (MILK OF MAGNESIA) suspension 30 mL  30 mL Oral QDAY PRN Katie Raya M.D.        And   • bisacodyl (DULCOLAX) suppository 10 mg  10 mg Rectal QDAY PRN Katie Raya M.D.       • Respiratory Care per Protocol    Nebulization Continuous RT Katie Raya M.D.       • ondansetron (ZOFRAN) syringe/vial injection 4 mg  4 mg Intravenous Q4HRS PRN Katie aRya M.D.   4 mg at 01/22/19 1248   • ondansetron (ZOFRAN ODT) dispertab 4 mg  4 mg Oral Q4HRS PRN Katie Raya M.D.   4 mg at 01/24/19 2112   • promethazine (PHENERGAN) tablet 12.5-25 mg  12.5-25 mg Oral Q4HRS PRN Katie Raya M.D.       • promethazine (PHENERGAN) suppository 12.5-25 mg  12.5-25 mg Rectal Q4HRS PRN Katie Raya M.D.       • prochlorperazine (COMPAZINE) injection 5-10 mg  5-10 mg Intravenous Q4HRS PRN Katie Raya M.D.       • budesonide-formoterol (SYMBICORT) 160-4.5 MCG/ACT inhaler 2 Puff  2 Puff Inhalation BID Yan Wolff M.D.   2 Puff at 01/25/19 0513   • ipratropium-albuterol (DUONEB) nebulizer solution  3 mL Nebulization Q4H PRN (RT) Lu Metz D.O.           ALLERGIES:  Percocet [oxycodone-acetaminophen]    FAMILY HISTORY:    [facility] file.[email or web address]        SOCIAL HISTORY:     reports that she has been smoking.  She has been smoking about 0.50 packs per day. She has never used smokeless tobacco. She reports that she does not drink alcohol or use drugs.    PAIN: Patient has no pain    REVIEW OF SYSTEMS: Is significant for that mentioned in the HPI  The rest of the review of systems has been reviewed by me and is documented in the nursing note in [facility] dated 1/23/2019    PHYSICAL EXAM:     ECOG PERFORMANCE STATUS:  1= Restricted in physically strenuous activity, but ambulatory and able to carry out work of a light sedentary nature, e.g., light housework, office work.       Vitals:    01/24/19 0900 01/24/19 2100 01/25/19 0538 01/25/19 0830   BP: 123/78 114/70 114/71 114/70   Pulse: 70 83 69 87   Resp: 18 20 18 18   Temp: 37.1 °C (98.7 °F) 36.4 °C (97.6 °F) 36.7 °C (98 °F) 36.4 °C (97.6 °F)   TempSrc: Temporal Temporal Temporal Temporal   SpO2: 90%      Weight:       Height:       Location: Rib  Cage  Description: Aching        GENERAL: Well-appearing alert and oriented x3 in no apparent distress  HEENT:  Pupils are equal, round, and reactive to light.  Extraocular muscles   are intact. Sclerae nonicteric.  Conjunctivae pink.  Oral cavity, tongue   protrudes midline.   NECK:  Supple without evidence of thyromegaly.  NODES:  No peripheral adenopathy of the neck, supraclavicular fossa or axillae   bilaterally.  LUNGS:  Clear    HEART:  Regular rate and rhythm.   EXTREMITIES:  Without Edema.  Mild clubbing  NEUROLOGIC:  Cranial nerves II through XII were intact. Normal stance and gait motor and sensory grossly within normal limits          IMPRESSION:    A 53 y.o. with at least a stage III non-small cell lung cancer with impending compromise of the left bronchus.    RECOMMENDATIONS:   I discussed with the patient that I would recommend combined chemoradiation therapy.  Because she prefers to get her treatment here at [facility] we can do the simulation today and get started probably Tuesday of next week.  I told her that we ideally would like to have a PET CT scan to help us pinpoint her field but I think it is important to just get started with radiation therapy at this point and we can alter her fields if we are able to get a PET/CT.  Otherwise I feel comfortable planning with the CT scan we have available.    I described the details of the radiation along with the side effects both acute and chronic including but not exclusive to generalized fatigue damage to the structures within the treatment field including the mediastinum and lung.  She understands that she could have long-term damage but the most likely side effects would be acute and would be a mild esophagitis dry cough shortness of breath.  She understands and is excited to get going with treatment.      Thank you for the opportunity to participate in her care.  If any questions or comments, please do not hesitate in calling.      Please note that this  dictation was created using voice recognition software. I have made every reasonable attempt to correct obvious errors, but I expect that there are errors of grammar and possibly content that I did not discover before finalizing the note.

## 2019-01-25 NOTE — DISCHARGE PLANNING
Anticipated Discharge Disposition: Undecided at this time.      Action: This RN CM spoke with Dr. Ramos and she is waiting to find out if we have been able to work out anything as to how the patient's PET scan can be done, considering that she has no medical insurance.  My coworker, Emmy, had reached out to our supervisors of  to see if they can help with that.  I sent an email asking if they have been able to work anything out yet.  I received a response that as long as the patient is pending Medicaid, which she is, they will do the PET scan. The patient will need to sign a waiver (in case she is not eligible for Medicaid) but she wouldn't have to pay for it upfront. The supervisor said that Medicaid goes retro and covers 90 days of previous care.  So the PET scan should be covered unless she doesn't qualify for Medicaid.    Barriers to Discharge:  A PET scan will need to be ordered and scheduled if Dr. Gauthier wants the patient to have one done.    Plan:  Dr. Franck Gauthier is following the patient and his consult is on the patient's chart.  I didn't see any mention of a PET scan in his consult.  The radiation oncologist, Dr. Ana Conteh, said in her notes that we should consider a PET CT scan after the patient is discharged from the hospital.

## 2019-01-25 NOTE — CARE PLAN
Problem: Knowledge Deficit  Goal: Knowledge of disease process/condition, treatment plan, diagnostic tests, and medications will improve    Intervention: Assess knowledge level of disease process/condition, treatment plan, diagnostic tests, and medications  Patient continues to have frequent questions regarding care and all education given as best able on shift. Will continue to educate as appropriate.       Problem: Pain Management  Goal: Pain level will decrease to patient's comfort goal    Intervention: Follow pain managment plan developed in collaboration with patient and Interdisciplinary Team  Patient continues to have frequent pain when coughing, PRN medication in place and warm tea provided, will continue with POC.

## 2019-01-25 NOTE — PROGRESS NOTES
Pulmonary Care Progress Note    Date of admission  1/20/2019    Chief Complaint  53 y.o. female admitted 1/20/2019 with lung mass and 25 pound weight loss    Hospital Course    Patient admitted with possible postobstructive pneumonia, left upper lobe atelectasis, CT scan demonstrates significant obstruction and multiple areas of involvement      Interval Problem Update  Reviewed last 24 hour events:  Rib pain could be from cough fracture or metastatic bronchogenic cancer; bronchoscopy done today revealed large mass left upper lobe occluding, very friable, easy bleeding, brush biopsy and washings sampling sent.  Discussed with family, hospitalist, may need early radiation oncology intervention.  Attempted photos but bronchoscopic device was not processing.    1/23, reviewed today with pathology, cytology and cell blocks show malignancy.  Final stains may give us more specific cell type.    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 Gauthier, oncology, combined radiation and chemotherapy plan, PET scan being arranged, financial assistance    Review of Systems  ROS Review of Systems   Constitutional: Negative for chills and fever. Significant for 25 pound weight loss over the past year  HENT: Negative for congestion and sore throat.    Eyes: Negative for photophobia and discharge.   Respiratory: positive for cough,scant sputum production, mildshortness of breath and wheezing.    Cardiovascular: Negative for chest pain, palpitations and leg swelling.   Gastrointestinal: Negative for abdominal pain, diarrhea, nausea and vomiting.   Musculoskeletal: Negative for back pain and myalgias.  Does have right lateral chest pain, possible cough fracture, locally tender   Neurological: Negative for focal weakness, weakness and headaches.     Vital Signs for last 24 hours   Temp:  [36.4 °C (97.6 °F)-37.1 °C (98.7 °F)] 36.7 °C (98 °F)  Pulse:   [69-83] 69  Resp:  [18-20] 18  BP: (114-123)/(70-78) 114/71  SpO2:  [90 %] 90 %    Hemodynamic parameters for last 24 hours       Respiratory       Physical Exam   Physical Exam   Constitutional: She is oriented to person, place, and time. She appears well-developed.   HENT:   Head: Normocephalic.   Eyes: Pupils are equal, round, and reactive to light.   Neck: Normal range of motion.   Cardiovascular: Normal rate, regular rhythm and normal heart sounds.    Pulmonary/Chest: Effort normal and breath sounds normal. She exhibits tenderness.   Slightly diminished, also has tenderness right lateral chest wall   Abdominal: Soft. Bowel sounds are normal.   Musculoskeletal: Normal range of motion.   Neurological: She is alert and oriented to person, place, and time.   Skin: Skin is warm and dry.   Psychiatric: She has a normal mood and affect.       Medications  Current Facility-Administered Medications   Medication Dose Route Frequency Provider Last Rate Last Dose   • albuterol inhaler 2 Puff  2 Puff Inhalation Q4HRS PRN Lois Lynne M.D.       • ibuprofen (MOTRIN) tablet 600 mg  600 mg Oral Q6HRS PRN Bob Ramos M.D.   600 mg at 01/23/19 0427   • enoxaparin (LOVENOX) inj 40 mg  40 mg Subcutaneous DAILY Bob Ramos M.D.   40 mg at 01/25/19 0513   • HYDROcodone-homatropine 5-1.5 mg/5 mL solution 5 mL  5 mL Oral Q4HRS PRN Bob Ramos M.D.   5 mL at 01/24/19 2112   • ALPRAZolam (XANAX) tablet 0.25 mg  0.25 mg Oral Q8HRS PRN Lu Metz D.O.   0.25 mg at 01/22/19 0119   • nicotine (NICODERM) 21 MG/24HR 21 mg  21 mg Transdermal Daily-0600 Lu Metz D.O.   21 mg at 01/25/19 0513    And   • nicotine polacrilex (NICORETTE) 2 MG piece 2 mg  2 mg Oral Q HOUR PRN Lu Metz D.O.       • senna-docusate (PERICOLACE or SENOKOT S) 8.6-50 MG per tablet 2 Tab  2 Tab Oral BID Katie Raya M.D.   Stopped at 01/25/19 0600    And   • polyethylene glycol/lytes (MIRALAX) PACKET 1 Packet  1 Packet Oral QDAY PRN  Katie Raya M.D.        And   • magnesium hydroxide (MILK OF MAGNESIA) suspension 30 mL  30 mL Oral QDAY PRN Katie Raya M.D.        And   • bisacodyl (DULCOLAX) suppository 10 mg  10 mg Rectal QDAY PRN Katie Raya M.D.       • Respiratory Care per Protocol   Nebulization Continuous RT Katie Raay M.D.       • ondansetron (ZOFRAN) syringe/vial injection 4 mg  4 mg Intravenous Q4HRS PRN Katie Raya M.D.   4 mg at 01/22/19 1248   • ondansetron (ZOFRAN ODT) dispertab 4 mg  4 mg Oral Q4HRS PRN Katie Raya M.D.   4 mg at 01/24/19 2112   • promethazine (PHENERGAN) tablet 12.5-25 mg  12.5-25 mg Oral Q4HRS PRN Katie Raya M.D.       • promethazine (PHENERGAN) suppository 12.5-25 mg  12.5-25 mg Rectal Q4HRS PRN Katie Raya M.D.       • prochlorperazine (COMPAZINE) injection 5-10 mg  5-10 mg Intravenous Q4HRS PRN Katie Raya M.D.       • budesonide-formoterol (SYMBICORT) 160-4.5 MCG/ACT inhaler 2 Puff  2 Puff Inhalation BID Yan Wolff M.D.   2 Puff at 01/25/19 0513   • ipratropium-albuterol (DUONEB) nebulizer solution  3 mL Nebulization Q4H PRN (RT) Lu Metz D.MELINDA.           Fluids  No intake or output data in the 24 hours ending 01/25/19 0734    Laboratory          No results for input(s): SODIUM, POTASSIUM, CHLORIDE, CO2, BUN, CREATININE, MAGNESIUM, PHOSPHORUS, CALCIUM in the last 72 hours.  No results for input(s): ALTSGPT, ASTSGOT, ALKPHOSPHAT, TBILIRUBIN, DBILIRUBIN, GAMMAGT, AMYLASE, LIPASE, ALB, PREALBUMIN, GLUCOSE in the last 72 hours.      No results for input(s): RBC, HEMOGLOBIN, HEMATOCRIT, PLATELETCT, PROTHROMBTM, APTT, INR, IRON, FERRITIN, TOTIRONBC in the last 72 hours.    Imaging  Personally reviewed imaging including CAT scan and discussed with family    Assessment/Plan  * Lung mass   Assessment & Plan    Bronchoscopy reveals large mass in left mainstem at level of left upper lobe, nearly occluding, washes and brush biopsy sampling obtained.   Reviewed with hospitalist, will likely need radiation giving the large proximal involvement and potential for occlusion of the entire left mainstem.   Discussed results with family and patient, pathologist indicates poorly differentiated non-small cell malignancy, discussed with Dr. Conteh rad Onc and Dr. Gauthier, med onc     COPD with asthma (HCC)   Assessment & Plan    Symbicort, NPPB     Tobacco abuse   Assessment & Plan    Counseling              Lois Lynne MD , [facility], Pulmonary Service

## 2019-01-25 NOTE — CARE PLAN
Problem: Safety  Goal: Will remain free from injury    Intervention: Provide assistance with mobility  Pt ambulates frequently. Family accompanies pt on walks. Steady gait.       Problem: Discharge Barriers/Planning  Goal: Patient's continuum of care needs will be met  Outcome: PROGRESSING AS EXPECTED  Radiation mapping done today. SW still working on details of PET scan.

## 2019-01-25 NOTE — PROGRESS NOTES
Received report from [facility], assumed care of pt 0700.  Pt is A&Ox4, up self. Skin intact. Pt denies n/t.   Assessment complete, charting in epic.  PIV to left upper arm patent, flushed, is SL. Dressing CDI.  PT denies pain, denies nausea. She states she only pain she has is with deep inspiration, right flank radiating towards front ribs.   Pt with dry cough at times, cough syrup offered, pt declined.   All needs met at this time.

## 2019-01-25 NOTE — CARE PLAN
Problem: Safety  Goal: Will remain free from injury  Outcome: PROGRESSING AS EXPECTED  Up with steady gait. Bed locked in low position.     Problem: Pain Management  Goal: Pain level will decrease to patient's comfort goal  Outcome: PROGRESSING AS EXPECTED  Patient appropriately verbalizes 0 to 10 pain scale. Calls for medication as needed.

## 2019-01-25 NOTE — DISCHARGE PLANNING
Anticipated Discharge Disposition: Home    Action: Called to outpatient PET Scan Scheduling x [phone]. Spoke to Pito. He stated they will schedule and complete PET Scan for Pending Medicaid patients. They will have patient sign a waiver but not require any payment at time of scan. Medicaid will go retro and cover any services 90 days prior.     Provided update to Unit RN CM.

## 2019-01-25 NOTE — PROGRESS NOTES
Patient alert and oriented. Up self with steady gait. In better spirits today. Frequently ambulating. Denying pain or nausea.

## 2019-01-25 NOTE — CONSULTS
DATE OF SERVICE:  01/24/2019    CONSULTATION CALLED BY:  Dr. Lois Lynne.    REASON FOR CONSULTATION:  Non-small cell carcinoma of the lung, stage IIIB   disease (T3N3M0 non-small cell lung cancer).    HISTORY OF PRESENT ILLNESS:  The patient is a very pleasant 53-year-old lady   who was seen by me as an inpatient at [facility] on   01/24/2019 at the request of Dr. Lois Lynne for the above-mentioned   problems.  The patient was admitted to the hospital with shortness of breath.    CT scan of the chest done on 01/19/2019 showed a large left hilar mass   measuring 5.3x4.2 cm in the greatest dimension with marked narrowing and   probable invasion of the left main stem bronchus and adjacent airways off the   left lower lobe.  There was mass effect on the main pulmonary artery tree.    Markedly enlarged bilateral hilar adenopathy, left greater than right, was   also seen.  CT of the abdomen and pelvis did not show any metastasis.  The   patient underwent a bronchoscopy and bronchial washings were consistent with   poorly differentiated non-small cell carcinoma.  On a bronchoscopy, a large   left upper lobe occluding mass, friable was noted.  Scurry biopsy are done,   larger biopsy could not be done because of the bleeding noted.  Heme/onc   consultation was called for further management of her condition.  The patient   is otherwise stable.  The patient was smoking until very recently prior to her   hospitalization.  She denies any hemoptysis.  She also denies any significant   weight loss.  There is no orthopnea, PND, or any chest pain.  She denies any   rash or any bruising.  She denies any headaches or problems with her balance.    The patient is very healthy and does not follow up with any physicians.  She   denies any history of COPD or repeated lung infections.    PAST SURGICAL HISTORY:  As per the patient, she has not had any surgeries   done.    FAMILY HISTORY:  There is history of lung  cancer in many family members, all   of whom were smokers.    PERSONAL AND SOCIAL HISTORY:  The patient is a smoker.  No history of alcohol   abuse.  Lives in [place].    REVIEW OF SYSTEMS:  GENERAL AND CONSTITUTIONAL:  Denies any weight loss.  She is complaining of   fatigue.  No fevers or chills.  HEAD AND NECK, EARS, NOSE, AND THROAT:  Denies any headaches or any visual   symptoms.  RESPIRATORY:  She has shortness of breath.  Denies any hemoptysis.  Also, see   history of present illness.  CARDIOVASCULAR:  No chest pain or palpitations.  GASTROINTESTINAL:  No nausea, vomiting, diarrhea, or constipation.  NEUROLOGIC:  Denies any seizures or any weakness.  PSYCHIATRIC:  Anxious, but denies any depression.  MUSCULOSKELETAL:  Has chronic joint pains, but nothing new.  No back pain.  ENDOCRINE:  No dyslipidemia or thyroid problems.  She denies any history of   diabetes.  SKIN AND INTEGUMENTARY:  No rash or any bruising.  Rest of her review of systems is negative per CMS/AMA criteria unless as   mentioned in history of present or past illness.    PHYSICAL EXAMINATION:  GENERAL:  She is alert and oriented x3.  She is sitting comfortably in the   bed, not in any distress.  VITAL SIGNS:  Temperature 37.1, pulse 70, respirations 18, /78.  HEENT:  Pupils are equal.  There is no icterus.  Conjunctivae normal.  Oral   mucosa reveals no mucositis.  Oropharynx is normal.  LUNGS:  Reveal bilateral wheezing.  There are no rales.  HEART:  Reveals no S3, S4.  ABDOMEN:  There is no hepatosplenomegaly.  There is no tenderness.  EXTREMITIES:  There is no edema of lower extremities.  NEUROLOGIC:  She is unremarkable with power being equal that is 5/5 in both   upper and lower extremities.  There is no peripheral neuropathy.  Cranial   nerves appear intact.  PSYCHIATRIC:  Anxiety, but no depression.  SKIN AND INTEGUMENTARY:  No rash or bruising.  LYMPHATIC:  No supraclavicular, axillary, or any inguinal adenopathy.  BACK:   Reveals no kyphoscoliosis.  There is no joint deformity.    LABORATORY DATA:  Reviewed.    RADIOLOGICAL STUDIES:  Reviewed as above.    ASSESSMENT AND PLAN:  1.  Non-small cell lung cancer.  Patient appears to have stage IIIB lung   cancer with involvement on both sides of her chest.  MRI of the brain done on   2019 does not show any metastasis.  I discussed her diagnosis and her   treatment plan, which would be chemotherapy and radiation followed by possible   maintenance therapy with her in detail.  Patient is aware that non-small cell   lung cancer, especially in stage IIIB, tend to have very high risk of   recurrences and only about 5% of these patients at best get cured of the   disease.  She has verbalized understanding of all these issues and wishes to   proceed with the treatment.  I discussed the case with Dr. Conteh as well.  2.  Smoking disorder.  I explained to the patient the relationship between   smoking and lung cancer.  She has a good understanding of that and many family   members have  of lung cancer and all of them were smokers.  The patient   does not wish to smoke in the future and if she needs any help, she will let   me know.  She is currently on a nicotine patch.       ____________________________________     FERNANDOVIR MD CHARLIE CHAVEZ / RYLAN    DD:  2019 12:50:58  DT:  2019 13:55:16    D#:  [id number]  Job#:  [id number]

## 2019-01-25 NOTE — CARE PLAN
Problem: Nutritional:  Goal: Achieve adequate nutritional intake  Patient will consume >50% of meals   Outcome: PROGRESSING SLOWER THAN EXPECTED    Intervention: Monitor PO intake, weights, and laboratory values  No PO intake has been documented in ADL flowsheet.   Previously known pt has been eating ~50% per discussion with family member at bedside.   Snacks BID and high protein milkshake 1x day in place.     RD will continue to follow

## 2019-01-26 PROCEDURE — 99232 SBSQ HOSP IP/OBS MODERATE 35: CPT | Performed by: HOSPITALIST

## 2019-01-26 PROCEDURE — 770004 HCHG ROOM/CARE - ONCOLOGY PRIVATE *

## 2019-01-26 PROCEDURE — A9270 NON-COVERED ITEM OR SERVICE: HCPCS | Performed by: INTERNAL MEDICINE

## 2019-01-26 PROCEDURE — 700102 HCHG RX REV CODE 250 W/ 637 OVERRIDE(OP): Performed by: HOSPITALIST

## 2019-01-26 PROCEDURE — 700111 HCHG RX REV CODE 636 W/ 250 OVERRIDE (IP): Performed by: HOSPITALIST

## 2019-01-26 PROCEDURE — 99232 SBSQ HOSP IP/OBS MODERATE 35: CPT | Performed by: INTERNAL MEDICINE

## 2019-01-26 PROCEDURE — A9270 NON-COVERED ITEM OR SERVICE: HCPCS | Performed by: HOSPITALIST

## 2019-01-26 PROCEDURE — 700102 HCHG RX REV CODE 250 W/ 637 OVERRIDE(OP): Performed by: INTERNAL MEDICINE

## 2019-01-26 RX ORDER — ACETAMINOPHEN 325 MG/1
650 TABLET ORAL EVERY 6 HOURS PRN
Status: DISCONTINUED | OUTPATIENT
Start: 2019-01-26 | End: 2019-02-13 | Stop reason: HOSPADM

## 2019-01-26 RX ADMIN — SENNOSIDES AND DOCUSATE SODIUM 2 TABLET: 8.6; 5 TABLET ORAL at 18:31

## 2019-01-26 RX ADMIN — BUDESONIDE AND FORMOTEROL FUMARATE DIHYDRATE 2 PUFF: 160; 4.5 AEROSOL RESPIRATORY (INHALATION) at 18:31

## 2019-01-26 RX ADMIN — ENOXAPARIN SODIUM 40 MG: 100 INJECTION SUBCUTANEOUS at 05:15

## 2019-01-26 RX ADMIN — ACETAMINOPHEN 650 MG: 325 TABLET, FILM COATED ORAL at 14:19

## 2019-01-26 RX ADMIN — HYDROCODONE BITARTRATE AND HOMATROPINE METHYLBROMIDE 5 ML: 5; 1.5 SOLUTION ORAL at 21:09

## 2019-01-26 RX ADMIN — ONDANSETRON 4 MG: 4 TABLET, ORALLY DISINTEGRATING ORAL at 21:09

## 2019-01-26 RX ADMIN — BUDESONIDE AND FORMOTEROL FUMARATE DIHYDRATE 2 PUFF: 160; 4.5 AEROSOL RESPIRATORY (INHALATION) at 05:16

## 2019-01-26 RX ADMIN — ALPRAZOLAM 0.25 MG: 0.25 TABLET ORAL at 05:20

## 2019-01-26 RX ADMIN — NICOTINE 21 MG: 21 PATCH, EXTENDED RELEASE TRANSDERMAL at 05:15

## 2019-01-26 ASSESSMENT — ENCOUNTER SYMPTOMS
CHILLS: 0
PALPITATIONS: 0
WEIGHT LOSS: 1
CONSTIPATION: 0
COUGH: 1
SHORTNESS OF BREATH: 1
NAUSEA: 0
NECK PAIN: 0
BACK PAIN: 0
HEARTBURN: 0
INSOMNIA: 0
TINGLING: 0
NERVOUS/ANXIOUS: 1
WEAKNESS: 1
VOMITING: 0
HEMOPTYSIS: 0
HEADACHES: 0
SHORTNESS OF BREATH: 0
DEPRESSION: 0
NERVOUS/ANXIOUS: 0
FEVER: 0
SPUTUM PRODUCTION: 1
SORE THROAT: 0
COUGH: 0
DIARRHEA: 0
MYALGIAS: 0
ABDOMINAL PAIN: 0
DIZZINESS: 0

## 2019-01-26 NOTE — PROGRESS NOTES
Pulmonary Care Progress Note    Date of admission  1/20/2019    Chief Complaint  53 y.o. female admitted 1/20/2019 with lung mass and 25 pound weight loss    Hospital Course    Patient admitted with possible postobstructive pneumonia, left upper lobe atelectasis, CT scan demonstrates significant obstruction and multiple areas of involvement      Interval Problem Update  Reviewed last 24 hour events:  Rib pain could be from cough fracture or metastatic bronchogenic cancer; bronchoscopy done today revealed large mass left upper lobe occluding, very friable, easy bleeding, brush biopsy and washings sampling sent.  Discussed with family, hospitalist, may need early radiation oncology intervention.  Attempted photos but bronchoscopic device was not processing.    1/23, reviewed today with pathology, cytology and cell blocks show malignancy.  Final stains may give us more specific cell type.    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 Gauthier, oncology, combined radiation and chemotherapy plan, PET scan being arranged, financial assistance    1/26, Dr. Conteh‘s plan for radiation reviewed, agree with initiation as large proximal nearly obstructing tumor mass. Dr. Gauthier plans chemotherapy, combined approach. No new hemoptysis or toxicity to suggest postobstructive pneumonia but certainly at risk; reviewed current situation and plan with patient and family at bedside, they are upbeat, but her overall situation is very precarious.  Fortunately no hemoptysis and no toxicity from postobstructive process, will need ongoing pulmonary follow-up once discharged as near obstruction left mainstem but await response to radiation and chemotherapy  Review of Systems  Review of Systems   Respiratory: Positive for sputum production.    Cardiovascular: Negative for palpitations.    Review of Systems   Constitutional: Negative for chills and fever.  Significant for 25 pound weight loss over the past year  HENT: Negative for congestion and sore throat.    Eyes: Negative for photophobia and discharge.   Respiratory: positive for cough,scant sputum production, mildshortness of breath and wheezing.    Cardiovascular: Negative for chest pain, palpitations and leg swelling.   Gastrointestinal: Negative for abdominal pain, diarrhea, nausea and vomiting.   Musculoskeletal: Negative for back pain and myalgias.  Does have right lateral chest pain, possible cough fracture, locally tender   Neurological: Negative for focal weakness, weakness and headaches.     Vital Signs for last 24 hours   Temp:  [36.1 °C (97 °F)-36.9 °C (98.4 °F)] 36.5 °C (97.7 °F)  Pulse:  [70-87] 80  Resp:  [14-24] 14  BP: (114-134)/(63-76) 121/63  SpO2:  [91 %-93 %] 93 %    Hemodynamic parameters for last 24 hours       Respiratory       Physical Exam   Physical Exam   Constitutional: She is oriented to person, place, and time. She appears well-developed.   HENT:   Head: Normocephalic.   Eyes: Pupils are equal, round, and reactive to light.   Neck: Normal range of motion.   Cardiovascular: Normal rate, regular rhythm and normal heart sounds.    Pulmonary/Chest: Effort normal and breath sounds normal. She exhibits tenderness.   Slightly diminished, also has tenderness right lateral chest wall   Abdominal: Soft. Bowel sounds are normal.   Musculoskeletal: Normal range of motion.   Neurological: She is alert and oriented to person, place, and time.   Skin: Skin is warm and dry.   Psychiatric: She has a normal mood and affect.       Medications  Current Facility-Administered Medications   Medication Dose Route Frequency Provider Last Rate Last Dose   • albuterol inhaler 2 Puff  2 Puff Inhalation Q4HRS PRN Lois Lynne M.D.       • ibuprofen (MOTRIN) tablet 600 mg  600 mg Oral Q6HRS PRN Bob Ramos M.D.   600 mg at 01/23/19 0427   • enoxaparin (LOVENOX) inj 40 mg  40 mg Subcutaneous DAILY Jaskarin  STEVEN Ramos   40 mg at 01/26/19 0515   • HYDROcodone-homatropine 5-1.5 mg/5 mL solution 5 mL  5 mL Oral Q4HRS PRN Bob Ramos M.D.   5 mL at 01/25/19 2214   • ALPRAZolam (XANAX) tablet 0.25 mg  0.25 mg Oral Q8HRS PRN ANA HargroveO.   0.25 mg at 01/26/19 0520   • nicotine (NICODERM) 21 MG/24HR 21 mg  21 mg Transdermal Daily-0600 ANA HargroveO.   21 mg at 01/26/19 0515    And   • nicotine polacrilex (NICORETTE) 2 MG piece 2 mg  2 mg Oral Q HOUR PRN CAROLANN Hargrove.O.       • senna-docusate (PERICOLACE or SENOKOT S) 8.6-50 MG per tablet 2 Tab  2 Tab Oral BID Katie Raya M.D.   Stopped at 01/25/19 0600    And   • polyethylene glycol/lytes (MIRALAX) PACKET 1 Packet  1 Packet Oral QDAY PRN Katie Raya M.D.        And   • magnesium hydroxide (MILK OF MAGNESIA) suspension 30 mL  30 mL Oral QDAY PRN Katie Raya M.D.        And   • bisacodyl (DULCOLAX) suppository 10 mg  10 mg Rectal QDAY PRN Katie Raya M.D.       • Respiratory Care per Protocol   Nebulization Continuous RT Katie Raya M.D.       • ondansetron (ZOFRAN) syringe/vial injection 4 mg  4 mg Intravenous Q4HRS PRN Katie Raya M.D.   4 mg at 01/22/19 1248   • ondansetron (ZOFRAN ODT) dispertab 4 mg  4 mg Oral Q4HRS PRN Katie Raya M.D.   4 mg at 01/25/19 2214   • promethazine (PHENERGAN) tablet 12.5-25 mg  12.5-25 mg Oral Q4HRS PRN Katie Raya M.D.       • promethazine (PHENERGAN) suppository 12.5-25 mg  12.5-25 mg Rectal Q4HRS PRN Kaite Raya M.D.       • prochlorperazine (COMPAZINE) injection 5-10 mg  5-10 mg Intravenous Q4HRS PRN Katie Raya M.D.       • budesonide-formoterol (SYMBICORT) 160-4.5 MCG/ACT inhaler 2 Puff  2 Puff Inhalation BID Yan Wolff M.D.   2 Puff at 01/26/19 0516   • ipratropium-albuterol (DUONEB) nebulizer solution  3 mL Nebulization Q4H PRN (RT) Lu Metz D.O.           Fluids    Intake/Output Summary (Last 24 hours) at 01/26/19 0719  Last data filed at  01/26/19 0500   Gross per 24 hour   Intake              640 ml   Output                0 ml   Net              640 ml       Laboratory          No results for input(s): SODIUM, POTASSIUM, CHLORIDE, CO2, BUN, CREATININE, MAGNESIUM, PHOSPHORUS, CALCIUM in the last 72 hours.  No results for input(s): ALTSGPT, ASTSGOT, ALKPHOSPHAT, TBILIRUBIN, DBILIRUBIN, GAMMAGT, AMYLASE, LIPASE, ALB, PREALBUMIN, GLUCOSE in the last 72 hours.      No results for input(s): RBC, HEMOGLOBIN, HEMATOCRIT, PLATELETCT, PROTHROMBTM, APTT, INR, IRON, FERRITIN, TOTIRONBC in the last 72 hours.    Imaging  Personally reviewed imaging including CAT scan and discussed with family    Assessment/Plan  * Lung mass   Assessment & Plan    Bronchoscopy reveals large mass in left mainstem at level of left upper lobe, nearly occluding, washes and brush biopsy sampling obtained.  Reviewed with hospitalist, will likely need radiation giving the large proximal involvement and potential for occlusion of the entire left mainstem.   Discussed results with family and patient, pathologist indicates poorly differentiated non-small cell malignancy, discussed with Dr. Conteh rad Onc and Dr. Gauthier, med onc     COPD with asthma (HCC)   Assessment & Plan    Symbicort, NPPB     Tobacco abuse   Assessment & Plan    Counseling              Lois Lynne MD , [facility], Pulmonary Service

## 2019-01-26 NOTE — PROGRESS NOTES
Patient is alert and oriented on shift. Up self in room and ambulating in hospital with steady gait, aware of safety precautions. Patient has had a few questions on shift regarding diagnosis and has been receptive to education. Appetite was well on shift for dinner, no c/o pain noted but did request to have cough medication later into the evening. Will continue to monitor patient as appropriate throughout shift.

## 2019-01-26 NOTE — PROGRESS NOTES
Oncology/Hematology Progress Note               Author: Shadejtsalvador Abrahan Date & Time created: 2019  2:54 PM   DX-NSCLC  Interval History:  19-Stable. resp status is stable. No hemoptysis.  2019-stable no new problems.  Family at the bedside.    Review of Systems:  Review of Systems   Constitutional: Positive for malaise/fatigue. Negative for chills and fever.   HENT: Negative for hearing loss and sore throat.    Respiratory: Positive for cough, sputum production and shortness of breath. Negative for hemoptysis.    Genitourinary: Negative for dysuria and hematuria.   Musculoskeletal: Negative for back pain, myalgias and neck pain.   Skin: Negative for itching and rash.   Neurological: Positive for weakness. Negative for dizziness, tingling and headaches.   Psychiatric/Behavioral: Negative for depression. The patient is nervous/anxious.        Physical Exam:  Physical Exam   Constitutional: She is oriented to person, place, and time. She appears well-developed and well-nourished.   HENT:   Head: Normocephalic.   Mouth/Throat: No oropharyngeal exudate.   Pulmonary/Chest: Effort normal. No respiratory distress. She has wheezes. She exhibits no tenderness.   Abdominal: Soft. Bowel sounds are normal. She exhibits no distension. There is no tenderness. There is no rebound.   Neurological: She is alert and oriented to person, place, and time.       Labs:          No results for input(s): SODIUM, POTASSIUM, CHLORIDE, CO2, BUN, CREATININE, MAGNESIUM, PHOSPHORUS, CALCIUM in the last 72 hours.  No results for input(s): ALTSGPT, ASTSGOT, ALKPHOSPHAT, TBILIRUBIN, DBILIRUBIN, GAMMAGT, AMYLASE, LIPASE, ALB, PREALBUMIN, GLUCOSE in the last 72 hours.  No results for input(s): RBC, HEMOGLOBIN, HEMATOCRIT, PLATELETCT, PROTHROMBTM, APTT, INR, IRON, FERRITIN, TOTIRONBC in the last 72 hours.          Hemodynamics:  Temp (24hrs), Av.6 °C (97.8 °F), Min:36.1 °C (97 °F), Max:36.9 °C (98.4 °F)  Temperature: 36.6 °C (97.9  °F)  Pulse  Av.8  Min: 65  Max: 96   Blood Pressure: 126/78     Respiratory:    Respiration: 18, Pulse Oximetry: 90 %     Work Of Breathing / Effort: Mild  RUL Breath Sounds: Diminished, RML Breath Sounds: Diminished, RLL Breath Sounds: Diminished, MIREYA Breath Sounds: Diminished, LLL Breath Sounds: Diminished  Fluids:  No intake or output data in the 24 hours ending 19 1638  Weight: 53.2 kg (117 lb 4.6 oz)  GI/Nutrition:  Orders Placed This Encounter   Procedures   • Diet Order Regular     Standing Status:   Standing     Number of Occurrences:   1     Order Specific Question:   Diet:     Answer:   Regular [1]     Medical Decision Making, by Problem:  Active Hospital Problems    Diagnosis   • *Lung mass [R91.8]   • COPD with asthma (HCC) [J44.9]   • Anxiety [F41.9]   • Tobacco abuse [Z72.0]   • Severe protein-calorie malnutrition (HCC) [E43]       Plan:  NSCLC-Pt has stage 3 B lung ca T3N3M0 Disease. I reviewed her stage and diagosis with her in detail. Plan is combined chemo XRT.Chemo will consist of weekly Carbo and Taxol. The treatment will be followed my maint therapy likely with megha.  Patient and her family's questions about the chemotherapy it side effects etc. were answered to their satisfaction.  Will follow up  Smoking disorder-Pt has agreed reg smoking cessation.     Quality-Core Measures

## 2019-01-26 NOTE — PROGRESS NOTES
Received report from NOC, resumed care of pt this morning at 0700.  Pt c/o back pain. States she broke her back years ago form a horse back riding accident, says at home she takes Tylenol and that works for her.   Will relay this to physician.  Heat packs provided.   Assessment complete, all needs met at this time.   PIV patent, flushes well, dressing CDI.

## 2019-01-26 NOTE — PROGRESS NOTES
Oncology/Hematology Progress Note               Author: Franck Gauthier Date & Time created: 2019  4:38 PM   DX-NSCLC  Interval History:  19-Stable. resp status is stable. No hemoptysis.    Review of Systems:  Review of Systems   Constitutional: Positive for malaise/fatigue.   HENT: Negative for hearing loss and sore throat.    Respiratory: Positive for cough, sputum production and shortness of breath. Negative for hemoptysis.    Genitourinary: Negative for dysuria and hematuria.   Musculoskeletal: Negative for back pain, myalgias and neck pain.   Skin: Negative for itching and rash.   Neurological: Positive for weakness. Negative for dizziness, tingling and headaches.   Psychiatric/Behavioral: Negative for depression. The patient is nervous/anxious.        Physical Exam:  Physical Exam   Constitutional: She is oriented to person, place, and time. She appears well-developed and well-nourished.   HENT:   Head: Normocephalic.   Mouth/Throat: No oropharyngeal exudate.   Pulmonary/Chest: Effort normal. No respiratory distress. She has wheezes. She exhibits no tenderness.   Abdominal: Soft. Bowel sounds are normal. She exhibits no distension. There is no tenderness. There is no rebound.   Neurological: She is alert and oriented to person, place, and time.       Labs:          No results for input(s): SODIUM, POTASSIUM, CHLORIDE, CO2, BUN, CREATININE, MAGNESIUM, PHOSPHORUS, CALCIUM in the last 72 hours.  No results for input(s): ALTSGPT, ASTSGOT, ALKPHOSPHAT, TBILIRUBIN, DBILIRUBIN, GAMMAGT, AMYLASE, LIPASE, ALB, PREALBUMIN, GLUCOSE in the last 72 hours.  No results for input(s): RBC, HEMOGLOBIN, HEMATOCRIT, PLATELETCT, PROTHROMBTM, APTT, INR, IRON, FERRITIN, TOTIRONBC in the last 72 hours.          Hemodynamics:  Temp (24hrs), Av.5 °C (97.7 °F), Min:36.4 °C (97.6 °F), Max:36.7 °C (98 °F)  Temperature: 36.4 °C (97.6 °F)  Pulse  Av.1  Min: 65  Max: 96   Blood Pressure: 114/70     Respiratory:     Respiration: 18, Pulse Oximetry: 91 %     Work Of Breathing / Effort: Mild  RUL Breath Sounds: Diminished, RML Breath Sounds: Diminished, RLL Breath Sounds: Clear, MIREYA Breath Sounds: Diminished, LLL Breath Sounds: Clear  Fluids:  No intake or output data in the 24 hours ending 01/25/19 1638  Weight: 53.6 kg (118 lb 2.7 oz)  GI/Nutrition:  Orders Placed This Encounter   Procedures   • Diet Order Regular     Standing Status:   Standing     Number of Occurrences:   1     Order Specific Question:   Diet:     Answer:   Regular [1]     Medical Decision Making, by Problem:  Active Hospital Problems    Diagnosis   • *Lung mass [R91.8]   • COPD with asthma (HCC) [J44.9]   • Anxiety [F41.9]   • Tobacco abuse [Z72.0]   • Severe protein-calorie malnutrition (HCC) [E43]       Plan:  NSCLC-Pt has stage 3 B lung ca T3N3 M0 Disease. I reviewed her stage and diagosis with her in detail. Plan is combined chemo XRT.Chemo will consist of weekly Carbo and Taxol. The treatment will be followed my maint therapy.  Smoking disorder-Pt has agreed reg smoking cessation. DW Dr Conteh.    Quality-Core Measures

## 2019-01-26 NOTE — PROGRESS NOTES
Hospital Medicine Daily Progress Note    Date of Service  1/25/2019    Chief Complaint  53 y.o. female admitted 1/20/2019 with cough since April 2018, multiple round of antibiotics with only transient improvement.  CT chest with lung mass.    Hospital Course    Patient seen by pulmonology as IR recommended bronchial biopsy given central location of mass.  EBUS initially planned but Dr Lynne feels traditional biopsy able to gather necessary tissue for diagnosis.      Interval Problem Update  Patient will be starting radiation soon  Chemotherapy plan has been discussed with oncology  I discussed case with Dr. Gauthier and PET scan is not urgent at this time and patient would be able to be discharged after 1-2 doses of chemo inpatient    Consultants/Specialty  Pulm - Smith  Radiation oncology   Oncology    Code Status  full    Disposition  Home once chemo has been started    Review of Systems  Review of Systems   Constitutional: Positive for weight loss. Negative for chills and fever.   Respiratory: Negative for cough and shortness of breath.    Cardiovascular: Negative for chest pain, palpitations and leg swelling.   Gastrointestinal: Negative for abdominal pain, constipation, diarrhea, heartburn, nausea and vomiting.   Genitourinary: Negative for dysuria and hematuria.   Skin: Negative for itching and rash.   Neurological: Positive for weakness. Negative for dizziness and headaches.   Psychiatric/Behavioral: Negative for depression. The patient is not nervous/anxious and does not have insomnia.    All other systems reviewed and are negative.       Physical Exam  Temp:  [36.4 °C (97.6 °F)-36.7 °C (98 °F)] 36.4 °C (97.6 °F)  Pulse:  [69-87] 87  Resp:  [18-20] 18  BP: (114)/(70-71) 114/70  SpO2:  [91 %] 91 %    Physical Exam   Constitutional: She is oriented to person, place, and time. She appears well-developed and well-nourished. No distress.   HENT:   Head: Normocephalic and atraumatic.   Eyes: Conjunctivae are normal.  No scleral icterus.   Cardiovascular: Normal rate, regular rhythm and normal heart sounds.    No murmur heard.  Pulmonary/Chest: Effort normal and breath sounds normal. No respiratory distress. She exhibits tenderness (right lower chest wall).   Decreased at bases bilaterally     Abdominal: Soft. Bowel sounds are normal. She exhibits no distension. There is no guarding.   Musculoskeletal: She exhibits no edema or tenderness.   Neurological: She is alert and oriented to person, place, and time. No cranial nerve deficit.   Skin: Skin is warm and dry. She is not diaphoretic. No erythema. No pallor.   Psychiatric: She has a normal mood and affect. Her behavior is normal.   Nursing note and vitals reviewed.      Fluids  No intake or output data in the 24 hours ending 01/25/19 1708    Laboratory                        Imaging  MR-BRAIN-WITH & W/O   Final Result      1.  There is no intracranial metastasis.   2.  Mild cerebral atrophy.   3.  Mild chronic microvascular ischemic disease.      CT-ABDOMEN-PELVIS WITH   Final Result      No evidence of metastatic disease is identified.      Status post cholecystectomy.      Pancreatic calcifications likely represent sequelae of chronic pancreatitis.      Atherosclerotic plaque.      Nonvisualization of the appendix, limiting evaluation.      Left lower lobe lobulated masslike density with surrounding patchy opacities, better evaluated on the prior CT chest. Volume loss is again noted on the left.           Assessment/Plan  * Lung mass   Assessment & Plan    Bilateral upper lobe lung masses noted on CT, suspcious for lung cancer  Pulmonology consulted - d/w Julianna Hwang and Maged  CT abdomen pelvis without evidence of malignancy  MRI brain without mets  1/22 bronchoscopy with brush biopsy done  Pathology showing poorly differentiated NSCC   I discussed case with Dr. Lynne and Dr. Gauthier today       Anxiety   Assessment & Plan    Secondary to possible cancer  Low dose xanax PRN        Severe protein-calorie malnutrition (HCC)   Assessment & Plan    Nutrition consulted       Tobacco abuse   Assessment & Plan    Encouraged smoking cessation           VTE prophylaxis: scds

## 2019-01-26 NOTE — CARE PLAN
Problem: Knowledge Deficit  Goal: Knowledge of disease process/condition, treatment plan, diagnostic tests, and medications will improve    Intervention: Assess knowledge level of disease process/condition, treatment plan, diagnostic tests, and medications  Patient encouraged to ask questions regarding new diagnosis and POC. Encouraged patient to be actively involved in care. Patient also encouraged to understand the plans each day and for the future plans and expectations. Patient is actively participating, will continue with POC.      Problem: Fluid Volume:  Goal: Will maintain balanced intake and output    Intervention: Monitor, educate, and encourage compliance with therapeutic intake of liquids  Patient educated on nutritional intake for nutritious foods and fluids, cooperative. Encouraged to eat small snacks throughout the day to promote a better eating habit and has been cooperative. Will continue with POC.

## 2019-01-26 NOTE — PROGRESS NOTES
Hospital Medicine Daily Progress Note    Date of Service  1/26/2019    Chief Complaint  53 y.o. female admitted 1/20/2019 with cough since April 2018, multiple round of antibiotics with only transient improvement.  CT chest with lung mass.    Hospital Course    Patient seen by pulmonology as IR recommended bronchial biopsy given central location of mass.  EBUS initially planned but Dr Lynne feels traditional biopsy able to gather necessary tissue for diagnosis.      Interval Problem Update  Having some back pain which is chronic for her and releaved with tylenol  Otherwise without any complaints    Consultants/Specialty  Pulm - Smith  Radiation oncology   Oncology    Code Status  full    Disposition  Home once chemo has been started    Review of Systems  Review of Systems   Constitutional: Positive for weight loss. Negative for chills and fever.   Respiratory: Negative for cough and shortness of breath.    Cardiovascular: Negative for chest pain, palpitations and leg swelling.   Gastrointestinal: Negative for abdominal pain, constipation, diarrhea, heartburn, nausea and vomiting.   Genitourinary: Negative for dysuria and hematuria.   Skin: Negative for itching and rash.   Neurological: Positive for weakness. Negative for dizziness and headaches.   Psychiatric/Behavioral: Negative for depression. The patient is not nervous/anxious and does not have insomnia.    All other systems reviewed and are negative.       Physical Exam  Temp:  [36.1 °C (97 °F)-36.9 °C (98.4 °F)] 36.6 °C (97.9 °F)  Pulse:  [70-89] 89  Resp:  [14-24] 18  BP: (121-134)/(63-78) 126/78  SpO2:  [90 %-93 %] 90 %    Physical Exam   Constitutional: She is oriented to person, place, and time. She appears well-developed and well-nourished. No distress.   HENT:   Head: Normocephalic and atraumatic.   Eyes: Conjunctivae are normal. No scleral icterus.   Cardiovascular: Normal rate, regular rhythm and normal heart sounds.    No murmur  heard.  Pulmonary/Chest: Effort normal and breath sounds normal. No respiratory distress. She exhibits tenderness (right lower chest wall).   Decreased at bases bilaterally     Abdominal: Soft. Bowel sounds are normal. She exhibits no distension. There is no guarding.   Musculoskeletal: She exhibits no edema or tenderness.   Neurological: She is alert and oriented to person, place, and time. No cranial nerve deficit.   Skin: Skin is warm and dry. She is not diaphoretic. No erythema. No pallor.   Psychiatric: She has a normal mood and affect. Her behavior is normal.   Nursing note and vitals reviewed.      Fluids    Intake/Output Summary (Last 24 hours) at 01/26/19 1218  Last data filed at 01/26/19 1000   Gross per 24 hour   Intake              880 ml   Output                0 ml   Net              880 ml       Laboratory                        Imaging  MR-BRAIN-WITH & W/O   Final Result      1.  There is no intracranial metastasis.   2.  Mild cerebral atrophy.   3.  Mild chronic microvascular ischemic disease.      CT-ABDOMEN-PELVIS WITH   Final Result      No evidence of metastatic disease is identified.      Status post cholecystectomy.      Pancreatic calcifications likely represent sequelae of chronic pancreatitis.      Atherosclerotic plaque.      Nonvisualization of the appendix, limiting evaluation.      Left lower lobe lobulated masslike density with surrounding patchy opacities, better evaluated on the prior CT chest. Volume loss is again noted on the left.           Assessment/Plan  * Lung mass   Assessment & Plan    Bilateral upper lobe lung masses noted on CT, suspcious for lung cancer  Pulmonology consulted - d/w Julianna Hwang and Maged  CT abdomen pelvis without evidence of malignancy  MRI brain without mets  1/22 bronchoscopy with brush biopsy done  Pathology showing poorly differentiated NSCC   I discussed case with Dr. Lynne and Dr. Gauthier today       Anxiety   Assessment & Plan    Secondary to possible  cancer  Low dose xanax PRN       Severe protein-calorie malnutrition (HCC)   Assessment & Plan    Nutrition consulted       Tobacco abuse   Assessment & Plan    Encouraged smoking cessation           VTE prophylaxis: scds

## 2019-01-27 PROBLEM — C34.90 NON-SMALL CELL LUNG CANCER (HCC): Status: ACTIVE | Noted: 2019-01-20

## 2019-01-27 LAB
ALBUMIN SERPL BCP-MCNC: 3.2 G/DL (ref 3.2–4.9)
ALBUMIN/GLOB SERPL: 1.1 G/DL
ALP SERPL-CCNC: 63 U/L (ref 30–99)
ALT SERPL-CCNC: 16 U/L (ref 2–50)
ANION GAP SERPL CALC-SCNC: 4 MMOL/L (ref 0–11.9)
AST SERPL-CCNC: 16 U/L (ref 12–45)
BASOPHILS # BLD AUTO: 0.3 % (ref 0–1.8)
BASOPHILS # BLD: 0.03 K/UL (ref 0–0.12)
BILIRUB SERPL-MCNC: 0.5 MG/DL (ref 0.1–1.5)
BUN SERPL-MCNC: 16 MG/DL (ref 8–22)
CALCIUM SERPL-MCNC: 8.4 MG/DL (ref 8.5–10.5)
CHLORIDE SERPL-SCNC: 103 MMOL/L (ref 96–112)
CO2 SERPL-SCNC: 27 MMOL/L (ref 20–33)
CREAT SERPL-MCNC: 0.72 MG/DL (ref 0.5–1.4)
EOSINOPHIL # BLD AUTO: 0.07 K/UL (ref 0–0.51)
EOSINOPHIL NFR BLD: 0.6 % (ref 0–6.9)
ERYTHROCYTE [DISTWIDTH] IN BLOOD BY AUTOMATED COUNT: 41.7 FL (ref 35.9–50)
GLOBULIN SER CALC-MCNC: 3 G/DL (ref 1.9–3.5)
GLUCOSE SERPL-MCNC: 120 MG/DL (ref 65–99)
HCT VFR BLD AUTO: 38 % (ref 37–47)
HGB BLD-MCNC: 12.3 G/DL (ref 12–16)
IMM GRANULOCYTES # BLD AUTO: 0.03 K/UL (ref 0–0.11)
IMM GRANULOCYTES NFR BLD AUTO: 0.3 % (ref 0–0.9)
LYMPHOCYTES # BLD AUTO: 2.27 K/UL (ref 1–4.8)
LYMPHOCYTES NFR BLD: 21.1 % (ref 22–41)
MCH RBC QN AUTO: 30.8 PG (ref 27–33)
MCHC RBC AUTO-ENTMCNC: 32.4 G/DL (ref 33.6–35)
MCV RBC AUTO: 95.2 FL (ref 81.4–97.8)
MONOCYTES # BLD AUTO: 1.62 K/UL (ref 0–0.85)
MONOCYTES NFR BLD AUTO: 15 % (ref 0–13.4)
NEUTROPHILS # BLD AUTO: 6.76 K/UL (ref 2–7.15)
NEUTROPHILS NFR BLD: 62.7 % (ref 44–72)
NRBC # BLD AUTO: 0 K/UL
NRBC BLD-RTO: 0 /100 WBC
PLATELET # BLD AUTO: 294 K/UL (ref 164–446)
PMV BLD AUTO: 8.9 FL (ref 9–12.9)
POTASSIUM SERPL-SCNC: 4.5 MMOL/L (ref 3.6–5.5)
PROT SERPL-MCNC: 6.2 G/DL (ref 6–8.2)
RBC # BLD AUTO: 3.99 M/UL (ref 4.2–5.4)
SODIUM SERPL-SCNC: 134 MMOL/L (ref 135–145)
WBC # BLD AUTO: 10.8 K/UL (ref 4.8–10.8)

## 2019-01-27 PROCEDURE — 700111 HCHG RX REV CODE 636 W/ 250 OVERRIDE (IP): Performed by: HOSPITALIST

## 2019-01-27 PROCEDURE — 36415 COLL VENOUS BLD VENIPUNCTURE: CPT

## 2019-01-27 PROCEDURE — 99232 SBSQ HOSP IP/OBS MODERATE 35: CPT | Performed by: INTERNAL MEDICINE

## 2019-01-27 PROCEDURE — 05HM33Z INSERTION OF INFUSION DEVICE INTO RIGHT INTERNAL JUGULAR VEIN, PERCUTANEOUS APPROACH: ICD-10-PCS | Performed by: RADIOLOGY

## 2019-01-27 PROCEDURE — 99232 SBSQ HOSP IP/OBS MODERATE 35: CPT | Performed by: HOSPITALIST

## 2019-01-27 PROCEDURE — 80053 COMPREHEN METABOLIC PANEL: CPT

## 2019-01-27 PROCEDURE — A9270 NON-COVERED ITEM OR SERVICE: HCPCS | Performed by: INTERNAL MEDICINE

## 2019-01-27 PROCEDURE — A9270 NON-COVERED ITEM OR SERVICE: HCPCS | Performed by: HOSPITALIST

## 2019-01-27 PROCEDURE — 700102 HCHG RX REV CODE 250 W/ 637 OVERRIDE(OP): Performed by: HOSPITALIST

## 2019-01-27 PROCEDURE — 700102 HCHG RX REV CODE 250 W/ 637 OVERRIDE(OP): Performed by: INTERNAL MEDICINE

## 2019-01-27 PROCEDURE — 85025 COMPLETE CBC W/AUTO DIFF WBC: CPT

## 2019-01-27 PROCEDURE — 770004 HCHG ROOM/CARE - ONCOLOGY PRIVATE *

## 2019-01-27 PROCEDURE — 0JH60WZ INSERTION OF TOTALLY IMPLANTABLE VASCULAR ACCESS DEVICE INTO CHEST SUBCUTANEOUS TISSUE AND FASCIA, OPEN APPROACH: ICD-10-PCS | Performed by: RADIOLOGY

## 2019-01-27 RX ADMIN — ONDANSETRON 4 MG: 4 TABLET, ORALLY DISINTEGRATING ORAL at 21:58

## 2019-01-27 RX ADMIN — BUDESONIDE AND FORMOTEROL FUMARATE DIHYDRATE 2 PUFF: 160; 4.5 AEROSOL RESPIRATORY (INHALATION) at 17:00

## 2019-01-27 RX ADMIN — BUDESONIDE AND FORMOTEROL FUMARATE DIHYDRATE 2 PUFF: 160; 4.5 AEROSOL RESPIRATORY (INHALATION) at 04:40

## 2019-01-27 RX ADMIN — HYDROCODONE BITARTRATE AND HOMATROPINE METHYLBROMIDE 5 ML: 5; 1.5 SOLUTION ORAL at 14:05

## 2019-01-27 RX ADMIN — ONDANSETRON 4 MG: 2 INJECTION INTRAMUSCULAR; INTRAVENOUS at 16:52

## 2019-01-27 RX ADMIN — HYDROCODONE BITARTRATE AND HOMATROPINE METHYLBROMIDE 5 ML: 5; 1.5 SOLUTION ORAL at 21:58

## 2019-01-27 RX ADMIN — NICOTINE 21 MG: 21 PATCH, EXTENDED RELEASE TRANSDERMAL at 04:40

## 2019-01-27 RX ADMIN — SENNOSIDES AND DOCUSATE SODIUM 2 TABLET: 8.6; 5 TABLET ORAL at 17:00

## 2019-01-27 RX ADMIN — ENOXAPARIN SODIUM 40 MG: 100 INJECTION SUBCUTANEOUS at 04:39

## 2019-01-27 ASSESSMENT — ENCOUNTER SYMPTOMS
WEAKNESS: 1
SHORTNESS OF BREATH: 1
CONSTIPATION: 0
DIARRHEA: 0
PALPITATIONS: 0
NAUSEA: 0
BACK PAIN: 0
SORE THROAT: 0
COUGH: 1
INSOMNIA: 0
DIZZINESS: 0
NECK PAIN: 0
DEPRESSION: 0
FEVER: 0
ABDOMINAL PAIN: 0
SHORTNESS OF BREATH: 0
CHILLS: 0
HEARTBURN: 0
NERVOUS/ANXIOUS: 1
HEADACHES: 0
WEIGHT LOSS: 1
NERVOUS/ANXIOUS: 0
HEMOPTYSIS: 0
TINGLING: 0
VOMITING: 0
SPUTUM PRODUCTION: 1
MYALGIAS: 0

## 2019-01-27 ASSESSMENT — PATIENT HEALTH QUESTIONNAIRE - PHQ9
SUM OF ALL RESPONSES TO PHQ9 QUESTIONS 1 AND 2: 0
1. LITTLE INTEREST OR PLEASURE IN DOING THINGS: NOT AT ALL
2. FEELING DOWN, DEPRESSED, IRRITABLE, OR HOPELESS: NOT AT ALL

## 2019-01-27 NOTE — PROGRESS NOTES
Oncology/Hematology Progress Note               Author: Qasimsalvador Abrahan Date & Time created: 1/27/2019  1:54 PM   DX-NSCLC  Interval History:  1/25/19-Stable. resp status is stable. No hemoptysis.  1/26/2019-stable no new problems.  Family at the bedside.  1/27/2019-feels much better.  Much less fatigued respiratory status is slightly better.    Review of Systems:  Review of Systems   Constitutional: Negative for chills, fever and malaise/fatigue.   HENT: Negative for hearing loss and sore throat.    Respiratory: Positive for cough, sputum production and shortness of breath. Negative for hemoptysis.    Genitourinary: Negative for dysuria and hematuria.   Musculoskeletal: Negative for back pain, myalgias and neck pain.   Skin: Negative for itching and rash.   Neurological: Positive for weakness. Negative for dizziness, tingling and headaches.   Psychiatric/Behavioral: Negative for depression. The patient is nervous/anxious.        Physical Exam:  Physical Exam   Constitutional: She is oriented to person, place, and time. She appears well-developed and well-nourished.   HENT:   Head: Normocephalic.   Mouth/Throat: No oropharyngeal exudate.   Pulmonary/Chest: Effort normal. No respiratory distress. She has wheezes. She exhibits no tenderness.   Abdominal: Soft. Bowel sounds are normal. She exhibits no distension. There is no tenderness. There is no rebound.   Neurological: She is alert and oriented to person, place, and time.       Labs:          Recent Labs      01/27/19   0022   SODIUM  134*   POTASSIUM  4.5   CHLORIDE  103   CO2  27   BUN  16   CREATININE  0.72   CALCIUM  8.4*     Recent Labs      01/27/19   0022   ALTSGPT  16   ASTSGOT  16   ALKPHOSPHAT  63   TBILIRUBIN  0.5   GLUCOSE  120*     Recent Labs      01/27/19   0022   RBC  3.99*   HEMOGLOBIN  12.3   HEMATOCRIT  38.0   PLATELETCT  294     Recent Labs      01/27/19   0022   WBC  10.8   NEUTSPOLYS  62.70   LYMPHOCYTES  21.10*   MONOCYTES  15.00*   EOSINOPHILS   0.60   BASOPHILS  0.30   ASTSGOT  16   ALTSGPT  16   ALKPHOSPHAT  63   TBILIRUBIN  0.5     Recent Labs      19   0022   SODIUM  134*   POTASSIUM  4.5   CHLORIDE  103   CO2  27   GLUCOSE  120*   BUN  16   CREATININE  0.72   CALCIUM  8.4*     Hemodynamics:  Temp (24hrs), Av.1 °C (98.8 °F), Min:36.8 °C (98.2 °F), Max:37.5 °C (99.5 °F)  Temperature: 37.2 °C (98.9 °F)  Pulse  Av.3  Min: 65  Max: 101   Blood Pressure: 116/65     Respiratory:    Respiration: 16, Pulse Oximetry: 95 %     Work Of Breathing / Effort: Mild  RUL Breath Sounds: Diminished, RML Breath Sounds: Diminished, RLL Breath Sounds: Diminished, MIREYA Breath Sounds: Diminished, LLL Breath Sounds: Diminished  Fluids:  No intake or output data in the 24 hours ending 19 1638     GI/Nutrition:  Orders Placed This Encounter   Procedures   • Diet Order Regular     Standing Status:   Standing     Number of Occurrences:   1     Order Specific Question:   Diet:     Answer:   Regular [1]     Medical Decision Making, by Problem:  Active Hospital Problems    Diagnosis   • *Lung mass [R91.8]   • COPD with asthma (HCC) [J44.9]   • Anxiety [F41.9]   • Tobacco abuse [Z72.0]   • Severe protein-calorie malnutrition (HCC) [E43]       Plan:  NSCLC-Pt has stage 3 B lung ca T3N3M0 Disease. I reviewed her stage and diagosis with her in detail. Plan is combined chemo XRT.Chemo will consist of weekly Carbo and Taxol. The treatment will be followed my maint therapy likely with megha.  At this time we plan on starting her treatment on Tuesday.    Smoking disorder-Pt has agreed reg smoking cessation.     Quality-Core Measures

## 2019-01-27 NOTE — PROGRESS NOTES
Hospital Medicine Daily Progress Note    Date of Service  1/27/2019    Chief Complaint  53 y.o. female admitted 1/20/2019 with cough since April 2018, multiple round of antibiotics with only transient improvement.  CT chest with lung mass.    Hospital Course    Patient seen by pulmonology as IR recommended bronchial biopsy given central location of mass.  EBUS initially planned but Dr Lynne feels traditional biopsy able to gather necessary tissue for diagnosis.      Interval Problem Update  No acute events overnight  Plan to start chemo on Tuesday  Port placement ordered    Consultants/Specialty  Pulm - [facility]  Radiation oncology   Oncology    Code Status  full    Disposition  Home once chemo has been started    Review of Systems  Review of Systems   Constitutional: Positive for weight loss. Negative for chills and fever.   Respiratory: Positive for cough. Negative for shortness of breath.    Cardiovascular: Negative for chest pain, palpitations and leg swelling.   Gastrointestinal: Negative for abdominal pain, constipation, diarrhea, heartburn, nausea and vomiting.   Genitourinary: Negative for dysuria and hematuria.   Skin: Negative for itching and rash.   Neurological: Positive for weakness. Negative for dizziness and headaches.   Psychiatric/Behavioral: Negative for depression. The patient is not nervous/anxious and does not have insomnia.    All other systems reviewed and are negative.       Physical Exam  Temp:  [36.8 °C (98.2 °F)-37.5 °C (99.5 °F)] 37.2 °C (98.9 °F)  Pulse:  [] 80  Resp:  [16-18] 16  BP: (106-126)/(64-76) 116/65  SpO2:  [90 %-95 %] 95 %    Physical Exam   Constitutional: She is oriented to person, place, and time. She appears well-developed and well-nourished. No distress.   HENT:   Head: Normocephalic and atraumatic.   Eyes: Conjunctivae are normal. No scleral icterus.   Cardiovascular: Normal rate, regular rhythm and normal heart sounds.    No murmur heard.  Pulmonary/Chest: Effort  normal and breath sounds normal. No respiratory distress. She exhibits tenderness (right lower chest wall).   Decreased at bases bilaterally     Abdominal: Soft. Bowel sounds are normal. She exhibits no distension. There is no guarding.   Musculoskeletal: She exhibits no edema or tenderness.   Neurological: She is alert and oriented to person, place, and time. No cranial nerve deficit.   Skin: Skin is warm and dry. She is not diaphoretic. No erythema. No pallor.   Psychiatric: She has a normal mood and affect. Her behavior is normal.   Nursing note and vitals reviewed.      Fluids    Intake/Output Summary (Last 24 hours) at 01/27/19 1255  Last data filed at 01/27/19 0500   Gross per 24 hour   Intake              900 ml   Output                0 ml   Net              900 ml       Laboratory  Recent Labs      01/27/19   0022   WBC  10.8   RBC  3.99*   HEMOGLOBIN  12.3   HEMATOCRIT  38.0   MCV  95.2   MCH  30.8   MCHC  32.4*   RDW  41.7   PLATELETCT  294   MPV  8.9*     Recent Labs      01/27/19   0022   SODIUM  134*   POTASSIUM  4.5   CHLORIDE  103   CO2  27   GLUCOSE  120*   BUN  16   CREATININE  0.72   CALCIUM  8.4*                   Imaging  MR-BRAIN-WITH & W/O   Final Result      1.  There is no intracranial metastasis.   2.  Mild cerebral atrophy.   3.  Mild chronic microvascular ischemic disease.      CT-ABDOMEN-PELVIS WITH   Final Result      No evidence of metastatic disease is identified.      Status post cholecystectomy.      Pancreatic calcifications likely represent sequelae of chronic pancreatitis.      Atherosclerotic plaque.      Nonvisualization of the appendix, limiting evaluation.      Left lower lobe lobulated masslike density with surrounding patchy opacities, better evaluated on the prior CT chest. Volume loss is again noted on the left.      IR-CVC PORT PLACEMENT > AGE 5    (Results Pending)        Assessment/Plan  * Non-small cell lung cancer (HCC)   Assessment & Plan    Bilateral upper lobe  lung masses noted on CT, suspcious for lung cancer  Pulmonology consulted - d/w Julianna Hwang and Maged  CT abdomen pelvis without evidence of malignancy  MRI brain without mets  1/22 bronchoscopy with brush biopsy done  Pathology showing poorly differentiated NSCC   I discussed case with Dr. Lynne and Dr. Gauthier today  Plan for chemoradiation and chemo to start on Tuesday 1/29       Anxiety   Assessment & Plan    Secondary to possible cancer  Low dose xanax PRN       Severe protein-calorie malnutrition (HCC)   Assessment & Plan    Nutrition consulted       Tobacco abuse   Assessment & Plan    Encouraged smoking cessation           VTE prophylaxis: scds

## 2019-01-27 NOTE — CARE PLAN
Problem: Fluid Volume:  Goal: Will maintain balanced intake and output    Intervention: Monitor, educate, and encourage compliance with therapeutic intake of liquids  Patient encouraged to consume nutritious foods and liquids on shift. Educated on maintaining nutrition for future POC. Will continue to educate as appropriate.       Problem: Psychosocial Needs:  Goal: Level of anxiety will decrease    Intervention: Identify and develop with patient strategies to cope with anxiety triggers  Patient educated on anxiety triggers and new diagnosis. Encouraged to ask questions and be actively participating in care and has been cooperative. PRN medication in place if needed, will continue with POC.

## 2019-01-27 NOTE — CARE PLAN
Problem: Pain Management  Goal: Pain level will decrease to patient's comfort goal    Intervention: Follow pain managment plan developed in collaboration with patient and Interdisciplinary Team  Order obtained for Tylenol and administered per MAR with good results.

## 2019-01-27 NOTE — PROGRESS NOTES
Patient is alert and oriented on shift. Cooperative with care. Up self in room and ambulating in hospital with steady gait, aware of safety precautions. Patient has had a few questions on shift regarding diagnosis and has been receptive to education. Appetite was well on shift for dinner, no c/o pain noted but did request to have cough medication prior to bed and has been effective for relief. Will continue to monitor patient as appropriate throughout shift.

## 2019-01-27 NOTE — PROGRESS NOTES
Pulmonary Care Progress Note    Date of admission  1/20/2019    Chief Complaint  53 y.o. female admitted 1/20/2019 with lung mass and 25 pound weight loss    Hospital Course    Patient admitted with possible postobstructive pneumonia, left upper lobe atelectasis, CT scan demonstrates significant obstruction and multiple areas of involvement      Interval Problem Update  Reviewed last 24 hour events:    1/22, Rib pain could be from cough fracture or metastatic bronchogenic cancer; bronchoscopy done today revealed large mass left upper lobe occluding, very friable, easy bleeding, brush biopsy and washings sampling sent.  Discussed with family, hospitalist, may need early radiation oncology intervention.  Attempted photos but bronchoscopic device was not processing.    1/23, reviewed today with pathology, cytology and cell blocks show malignancy.  Final stains may give us more specific cell type.    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 Gauthier, oncology, combined radiation and chemotherapy plan, PET scan being arranged, financial assistance    1/26, Dr. Conteh‘s plan for radiation reviewed, agree with initiation as large proximal nearly obstructing tumor mass. Dr. Gauthier plans chemotherapy, combined approach. No new hemoptysis or toxicity to suggest postobstructive pneumonia but certainly at risk; reviewed current situation and plan with patient and family at bedside, they are upbeat, but her overall situation is very precarious.  Fortunately no hemoptysis and no toxicity from postobstructive process, will need ongoing pulmonary follow-up once discharged as near obstruction left mainstem but await response to radiation and chemotherapy  1/27, treatment plan noted, Labs today mild hyponatremia; d/w Dr Ramos, could easily obstruct L mainstem bronchus, but audible airflow bilat today; will sign off call if again needed  .  Review of  Systems  Review of Systems   Respiratory: Positive for sputum production.    Cardiovascular: Negative for palpitations.    Review of Systems   Constitutional: Negative for chills and fever. Significant for 25 pound weight loss over the past year  HENT: Negative for congestion and sore throat.    Eyes: Negative for photophobia and discharge.   Respiratory: positive for cough,scant sputum production, mildshortness of breath and wheezing.    Cardiovascular: Negative for chest pain, palpitations and leg swelling.   Gastrointestinal: Negative for abdominal pain, diarrhea, nausea and vomiting.   Musculoskeletal: Negative for back pain and myalgias.  Does have right lateral chest pain, possible cough fracture, locally tender   Neurological: Negative for focal weakness, weakness and headaches.     Vital Signs for last 24 hours   Temp:  [36.6 °C (97.9 °F)-37.5 °C (99.5 °F)] 37.5 °C (99.5 °F)  Pulse:  [] 75  Resp:  [18] 18  BP: (106-126)/(64-78) 108/65  SpO2:  [90 %-93 %] 91 %    Hemodynamic parameters for last 24 hours       Respiratory       Physical Exam   Physical Exam   Constitutional: She is oriented to person, place, and time. She appears well-developed.   HENT:   Head: Normocephalic.   Eyes: Pupils are equal, round, and reactive to light.   Neck: Normal range of motion.   Cardiovascular: Normal rate, regular rhythm and normal heart sounds.    Pulmonary/Chest: Effort normal and breath sounds normal. She exhibits tenderness.   Slightly diminished, also has tenderness right lateral chest wall   Abdominal: Soft. Bowel sounds are normal.   Musculoskeletal: Normal range of motion.   Neurological: She is alert and oriented to person, place, and time.   Skin: Skin is warm and dry.   Psychiatric: She has a normal mood and affect.       Medications  Current Facility-Administered Medications   Medication Dose Route Frequency Provider Last Rate Last Dose   • acetaminophen (TYLENOL) tablet 650 mg  650 mg Oral Q6HRS PRN  Bob Ramos M.D.   650 mg at 01/26/19 1419   • albuterol inhaler 2 Puff  2 Puff Inhalation Q4HRS PRN Lois Lynne M.D.       • ibuprofen (MOTRIN) tablet 600 mg  600 mg Oral Q6HRS PRN Bob Ramos M.D.   600 mg at 01/23/19 0427   • enoxaparin (LOVENOX) inj 40 mg  40 mg Subcutaneous DAILY Bob Ramos M.D.   40 mg at 01/27/19 0439   • HYDROcodone-homatropine 5-1.5 mg/5 mL solution 5 mL  5 mL Oral Q4HRS PRN Bob Ramos M.D.   5 mL at 01/26/19 2109   • ALPRAZolam (XANAX) tablet 0.25 mg  0.25 mg Oral Q8HRS PRN ANA HargroveO.   0.25 mg at 01/26/19 0520   • nicotine (NICODERM) 21 MG/24HR 21 mg  21 mg Transdermal Daily-0600 CAROLANN Hargrove.O.   21 mg at 01/27/19 0440    And   • nicotine polacrilex (NICORETTE) 2 MG piece 2 mg  2 mg Oral Q HOUR PRN CAROLANN Hargrove.O.       • senna-docusate (PERICOLACE or SENOKOT S) 8.6-50 MG per tablet 2 Tab  2 Tab Oral BID Katie Raya M.D.   2 Tab at 01/26/19 1831    And   • polyethylene glycol/lytes (MIRALAX) PACKET 1 Packet  1 Packet Oral QDAY PRN Katie Raya M.D.        And   • magnesium hydroxide (MILK OF MAGNESIA) suspension 30 mL  30 mL Oral QDAY PRN Katie Raya M.D.        And   • bisacodyl (DULCOLAX) suppository 10 mg  10 mg Rectal QDAY PRN Katie Raya M.D.       • Respiratory Care per Protocol   Nebulization Continuous RT Katie Raya M.D.       • ondansetron (ZOFRAN) syringe/vial injection 4 mg  4 mg Intravenous Q4HRS PRN Katie Raya M.D.   4 mg at 01/22/19 1248   • ondansetron (ZOFRAN ODT) dispertab 4 mg  4 mg Oral Q4HRS PRN Katie Raya M.D.   4 mg at 01/26/19 2109   • promethazine (PHENERGAN) tablet 12.5-25 mg  12.5-25 mg Oral Q4HRS PREDUARDO Raya M.D.       • promethazine (PHENERGAN) suppository 12.5-25 mg  12.5-25 mg Rectal Q4HRS PREDUARDO Raya M.D.       • prochlorperazine (COMPAZINE) injection 5-10 mg  5-10 mg Intravenous Q4HRS PREDUARDO Raya M.D.       • budesonide-formoterol (SYMBICORT)  160-4.5 MCG/ACT inhaler 2 Puff  2 Puff Inhalation BID Yan Wolff M.D.   2 Puff at 01/27/19 0440   • ipratropium-albuterol (DUONEB) nebulizer solution  3 mL Nebulization Q4H PRN (RT) Lu Metz D.O.           Fluids    Intake/Output Summary (Last 24 hours) at 01/27/19 0625  Last data filed at 01/27/19 0500   Gross per 24 hour   Intake             1140 ml   Output                0 ml   Net             1140 ml       Laboratory          Recent Labs      01/27/19   0022   SODIUM  134*   POTASSIUM  4.5   CHLORIDE  103   CO2  27   BUN  16   CREATININE  0.72   CALCIUM  8.4*     Recent Labs      01/27/19   0022   ALTSGPT  16   ASTSGOT  16   ALKPHOSPHAT  63   TBILIRUBIN  0.5   GLUCOSE  120*     Recent Labs      01/27/19   0022   WBC  10.8   NEUTSPOLYS  62.70   LYMPHOCYTES  21.10*   MONOCYTES  15.00*   EOSINOPHILS  0.60   BASOPHILS  0.30   ASTSGOT  16   ALTSGPT  16   ALKPHOSPHAT  63   TBILIRUBIN  0.5     Recent Labs      01/27/19   0022   RBC  3.99*   HEMOGLOBIN  12.3   HEMATOCRIT  38.0   PLATELETCT  294       Imaging  Personally reviewed imaging including CAT scan and discussed with family    Assessment/Plan  * Lung mass   Assessment & Plan    Bronchoscopy reveals large mass in left mainstem at level of left upper lobe, nearly occluding, washes and brush biopsy sampling obtained.  Reviewed with hospitalist, will likely need radiation giving the large proximal involvement and potential for occlusion of the entire left mainstem.   Discussed results with family and patient, pathologist indicates poorly differentiated non-small cell malignancy, discussed with Dr. Conteh rad Onc and Dr. Gauthier, med onc     COPD with asthma (HCC)   Assessment & Plan    Symbicort, NPPB     Tobacco abuse   Assessment & Plan    Counseling        Pulm sign off please call if can again assist      Lois Lynne MD , FCCP, Pulmonary Service

## 2019-01-27 NOTE — CARE PLAN
Problem: Safety  Goal: Will remain free from injury  Outcome: PROGRESSING AS EXPECTED  Pt ambulates hallways with family. Steady gait.

## 2019-01-27 NOTE — PROGRESS NOTES
Assumed care of patient from night shift RN  53year old female  Full code  Allergies to percoset  Admitted 1/20 d/t coughing  Pain: 0/10 ambulating at this time  A&O x 4  RA  Cardiac: WNL  LBM 1/27  Regular diet  Voiding  20 g L upper arm, SL  Skin intact  Up self  No fall risk per [place] espinal  Plan: start radiation Tuesday then begin chemo as an outpt with Dr. lambert  All questions answered and all needs met at this time. Bed in low, locked position. Call light within reach. RN will implement hourly rounding and answer call lights appropriately.

## 2019-01-28 ENCOUNTER — APPOINTMENT (OUTPATIENT)
Dept: RADIOLOGY | Facility: MEDICAL CENTER | Age: 54
DRG: 981 | End: 2019-01-28
Attending: HOSPITALIST
Payer: MEDICAID

## 2019-01-28 PROCEDURE — A9270 NON-COVERED ITEM OR SERVICE: HCPCS | Performed by: HOSPITALIST

## 2019-01-28 PROCEDURE — 99232 SBSQ HOSP IP/OBS MODERATE 35: CPT | Performed by: HOSPITALIST

## 2019-01-28 PROCEDURE — 77334 RADIATION TREATMENT AID(S): CPT | Mod: 26 | Performed by: RADIOLOGY

## 2019-01-28 PROCEDURE — 700101 HCHG RX REV CODE 250

## 2019-01-28 PROCEDURE — A9270 NON-COVERED ITEM OR SERVICE: HCPCS | Performed by: INTERNAL MEDICINE

## 2019-01-28 PROCEDURE — 700102 HCHG RX REV CODE 250 W/ 637 OVERRIDE(OP): Performed by: HOSPITALIST

## 2019-01-28 PROCEDURE — 77300 RADIATION THERAPY DOSE PLAN: CPT | Performed by: RADIOLOGY

## 2019-01-28 PROCEDURE — 77295 3-D RADIOTHERAPY PLAN: CPT | Mod: 26 | Performed by: RADIOLOGY

## 2019-01-28 PROCEDURE — 77300 RADIATION THERAPY DOSE PLAN: CPT | Mod: 26 | Performed by: RADIOLOGY

## 2019-01-28 PROCEDURE — 77293 RESPIRATOR MOTION MGMT SIMUL: CPT | Mod: 26 | Performed by: RADIOLOGY

## 2019-01-28 PROCEDURE — 77334 RADIATION TREATMENT AID(S): CPT | Performed by: RADIOLOGY

## 2019-01-28 PROCEDURE — 700102 HCHG RX REV CODE 250 W/ 637 OVERRIDE(OP): Performed by: INTERNAL MEDICINE

## 2019-01-28 PROCEDURE — 77293 RESPIRATOR MOTION MGMT SIMUL: CPT | Performed by: RADIOLOGY

## 2019-01-28 PROCEDURE — 77295 3-D RADIOTHERAPY PLAN: CPT | Performed by: RADIOLOGY

## 2019-01-28 PROCEDURE — 700111 HCHG RX REV CODE 636 W/ 250 OVERRIDE (IP): Performed by: HOSPITALIST

## 2019-01-28 PROCEDURE — 770004 HCHG ROOM/CARE - ONCOLOGY PRIVATE *

## 2019-01-28 PROCEDURE — 99153 MOD SED SAME PHYS/QHP EA: CPT

## 2019-01-28 PROCEDURE — 700111 HCHG RX REV CODE 636 W/ 250 OVERRIDE (IP)

## 2019-01-28 RX ORDER — MIDAZOLAM HYDROCHLORIDE 1 MG/ML
.5-2 INJECTION INTRAMUSCULAR; INTRAVENOUS PRN
Status: ACTIVE | OUTPATIENT
Start: 2019-01-28 | End: 2019-01-28

## 2019-01-28 RX ORDER — ONDANSETRON 2 MG/ML
4 INJECTION INTRAMUSCULAR; INTRAVENOUS PRN
Status: ACTIVE | OUTPATIENT
Start: 2019-01-28 | End: 2019-01-28

## 2019-01-28 RX ORDER — CEFAZOLIN SODIUM 2 G/100ML
2 INJECTION, SOLUTION INTRAVENOUS ONCE
Status: DISCONTINUED | OUTPATIENT
Start: 2019-01-28 | End: 2019-01-28

## 2019-01-28 RX ORDER — MIDAZOLAM HYDROCHLORIDE 1 MG/ML
INJECTION INTRAMUSCULAR; INTRAVENOUS
Status: COMPLETED
Start: 2019-01-28 | End: 2019-01-28

## 2019-01-28 RX ORDER — ONDANSETRON 2 MG/ML
INJECTION INTRAMUSCULAR; INTRAVENOUS
Status: COMPLETED
Start: 2019-01-28 | End: 2019-01-28

## 2019-01-28 RX ORDER — SODIUM CHLORIDE 9 MG/ML
500 INJECTION, SOLUTION INTRAVENOUS
Status: ACTIVE | OUTPATIENT
Start: 2019-01-28 | End: 2019-01-28

## 2019-01-28 RX ORDER — LIDOCAINE HYDROCHLORIDE AND EPINEPHRINE BITARTRATE 20; .01 MG/ML; MG/ML
INJECTION, SOLUTION SUBCUTANEOUS
Status: COMPLETED
Start: 2019-01-28 | End: 2019-01-28

## 2019-01-28 RX ORDER — CEFAZOLIN SODIUM 1 G/3ML
INJECTION, POWDER, FOR SOLUTION INTRAMUSCULAR; INTRAVENOUS
Status: COMPLETED
Start: 2019-01-28 | End: 2019-01-28

## 2019-01-28 RX ADMIN — BUDESONIDE AND FORMOTEROL FUMARATE DIHYDRATE 2 PUFF: 160; 4.5 AEROSOL RESPIRATORY (INHALATION) at 05:15

## 2019-01-28 RX ADMIN — SENNOSIDES AND DOCUSATE SODIUM 2 TABLET: 8.6; 5 TABLET ORAL at 05:15

## 2019-01-28 RX ADMIN — HYDROCODONE BITARTRATE AND HOMATROPINE METHYLBROMIDE 5 ML: 5; 1.5 SOLUTION ORAL at 15:05

## 2019-01-28 RX ADMIN — ONDANSETRON 4 MG: 4 TABLET, ORALLY DISINTEGRATING ORAL at 21:46

## 2019-01-28 RX ADMIN — LIDOCAINE HYDROCHLORIDE AND EPINEPHRINE: 20; 10 INJECTION, SOLUTION INFILTRATION; PERINEURAL at 18:30

## 2019-01-28 RX ADMIN — ACETAMINOPHEN 650 MG: 325 TABLET, FILM COATED ORAL at 05:15

## 2019-01-28 RX ADMIN — ONDANSETRON 4 MG: 2 INJECTION INTRAMUSCULAR; INTRAVENOUS at 18:24

## 2019-01-28 RX ADMIN — HEPARIN 500 UNITS: 100 SYRINGE at 18:47

## 2019-01-28 RX ADMIN — MIDAZOLAM HYDROCHLORIDE 1 MG: 1 INJECTION INTRAMUSCULAR; INTRAVENOUS at 18:27

## 2019-01-28 RX ADMIN — MIDAZOLAM 1 MG: 1 INJECTION INTRAMUSCULAR; INTRAVENOUS at 18:27

## 2019-01-28 RX ADMIN — ONDANSETRON 4 MG: 4 TABLET, ORALLY DISINTEGRATING ORAL at 15:07

## 2019-01-28 RX ADMIN — FENTANYL CITRATE 25 MCG: 50 INJECTION, SOLUTION INTRAMUSCULAR; INTRAVENOUS at 18:27

## 2019-01-28 RX ADMIN — NICOTINE 21 MG: 21 PATCH, EXTENDED RELEASE TRANSDERMAL at 05:15

## 2019-01-28 RX ADMIN — HYDROCODONE BITARTRATE AND HOMATROPINE METHYLBROMIDE 5 ML: 5; 1.5 SOLUTION ORAL at 21:46

## 2019-01-28 RX ADMIN — CEFAZOLIN 2 G: 1 INJECTION, POWDER, FOR SOLUTION INTRAMUSCULAR; INTRAVENOUS at 18:30

## 2019-01-28 ASSESSMENT — ENCOUNTER SYMPTOMS
DIARRHEA: 0
PALPITATIONS: 0
HEARTBURN: 0
WEAKNESS: 1
DEPRESSION: 0
CONSTIPATION: 0
NERVOUS/ANXIOUS: 0
INSOMNIA: 0
COUGH: 1
SHORTNESS OF BREATH: 0
DIZZINESS: 0
FEVER: 0
HEADACHES: 0
ABDOMINAL PAIN: 0
VOMITING: 0
CHILLS: 0
NAUSEA: 0
WEIGHT LOSS: 1

## 2019-01-28 NOTE — PROGRESS NOTES
Patient A&O x4, upself with steady gait. She denies pain, nausea, headaches at this time. IR unsure if patients port will be placed today or not; might be pushed off until tomorrow; MD Ramos notified. Patient on RA, PIV patent and intact, SL. Call light within reach, all needs met at this time.

## 2019-01-28 NOTE — PROGRESS NOTES
Hospital Medicine Daily Progress Note    Date of Service  1/28/2019    Chief Complaint  53 y.o. female admitted 1/20/2019 with cough since April 2018, multiple round of antibiotics with only transient improvement.  CT chest with lung mass.    Hospital Course    Patient seen by pulmonology as IR recommended bronchial biopsy given central location of mass.  EBUS initially planned but Dr Lynne feels traditional biopsy able to gather necessary tissue for diagnosis.      Interval Problem Update  Pending port placement  Plan to start chemo tomorrow  Without any new complaints today     Consultants/Specialty  Pulm - [facility]  Radiation oncology   Oncology    Code Status  full    Disposition  Home once chemo has been started    Review of Systems  Review of Systems   Constitutional: Positive for weight loss. Negative for chills and fever.   Respiratory: Positive for cough. Negative for shortness of breath.    Cardiovascular: Negative for chest pain, palpitations and leg swelling.   Gastrointestinal: Negative for abdominal pain, constipation, diarrhea, heartburn, nausea and vomiting.   Genitourinary: Negative for dysuria and hematuria.   Skin: Negative for itching and rash.   Neurological: Positive for weakness. Negative for dizziness and headaches.   Psychiatric/Behavioral: Negative for depression. The patient is not nervous/anxious and does not have insomnia.    All other systems reviewed and are negative.       Physical Exam  Temp:  [36.4 °C (97.5 °F)-38.2 °C (100.8 °F)] 36.4 °C (97.5 °F)  Pulse:  [70-92] 70  Resp:  [16-18] 17  BP: ()/(50-72) 98/59  SpO2:  [90 %-93 %] 93 %    Physical Exam   Constitutional: She is oriented to person, place, and time. She appears well-developed and well-nourished. No distress.   HENT:   Head: Normocephalic and atraumatic.   Eyes: Conjunctivae are normal. No scleral icterus.   Cardiovascular: Normal rate, regular rhythm and normal heart sounds.    No murmur heard.  Pulmonary/Chest: Effort  normal and breath sounds normal. No respiratory distress. She exhibits tenderness (right lower chest wall).   Decreased at bases bilaterally     Abdominal: Soft. Bowel sounds are normal. She exhibits no distension. There is no guarding.   Musculoskeletal: She exhibits no edema or tenderness.   Neurological: She is alert and oriented to person, place, and time. No cranial nerve deficit.   Skin: Skin is warm and dry. She is not diaphoretic. No erythema. No pallor.   Psychiatric: She has a normal mood and affect. Her behavior is normal.   Nursing note and vitals reviewed.      Fluids  No intake or output data in the 24 hours ending 01/28/19 1449    Laboratory  Recent Labs      01/27/19   0022   WBC  10.8   RBC  3.99*   HEMOGLOBIN  12.3   HEMATOCRIT  38.0   MCV  95.2   MCH  30.8   MCHC  32.4*   RDW  41.7   PLATELETCT  294   MPV  8.9*     Recent Labs      01/27/19   0022   SODIUM  134*   POTASSIUM  4.5   CHLORIDE  103   CO2  27   GLUCOSE  120*   BUN  16   CREATININE  0.72   CALCIUM  8.4*                   Imaging  MR-BRAIN-WITH & W/O   Final Result      1.  There is no intracranial metastasis.   2.  Mild cerebral atrophy.   3.  Mild chronic microvascular ischemic disease.      CT-ABDOMEN-PELVIS WITH   Final Result      No evidence of metastatic disease is identified.      Status post cholecystectomy.      Pancreatic calcifications likely represent sequelae of chronic pancreatitis.      Atherosclerotic plaque.      Nonvisualization of the appendix, limiting evaluation.      Left lower lobe lobulated masslike density with surrounding patchy opacities, better evaluated on the prior CT chest. Volume loss is again noted on the left.      IR-CVC PORT PLACEMENT > AGE 5    (Results Pending)        Assessment/Plan  * Non-small cell lung cancer (HCC)   Assessment & Plan    Bilateral upper lobe lung masses noted on CT, suspcious for lung cancer  Pulmonology consulted - d/w Julianna Hwang and Maged  CT abdomen pelvis without evidence of  malignancy  MRI brain without mets  1/22 bronchoscopy with brush biopsy done  Pathology showing poorly differentiated NSCC   I discussed case with Dr. Lynne and Dr. Gauthier today  Plan for chemoradiation and chemo to start on Tuesday 1/29       Anxiety   Assessment & Plan    Secondary to possible cancer  Low dose xanax PRN       Severe protein-calorie malnutrition (HCC)   Assessment & Plan    Nutrition consulted       Tobacco abuse   Assessment & Plan    Encouraged smoking cessation           VTE prophylaxis: scds

## 2019-01-28 NOTE — PROGRESS NOTES
Nurse Navigator met with patient in her hospital room.  She reports that she is very overwhelmed with her new diagnosis.  She also reports that she has alot of family support from her family.  She lives with her daughter and son in law and granddaughter.  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  She stated that she did not receive any kind of benefits there.  She also reports that she has no health insurance but a  here as assisted her in getting medicaid application.  The patient stated that her daughter is applying for [place] to be off work to get her to her appointments for xrt daily M-f for 7 weeks.  Nurse navigation cover all services offered by [facility] for cancer listed in the patient resource guide which was also given to the patient.  Nurse Navigation started a general information conversation about receiving chemotherapy in general.  Nurse navigation will provide written education materials as well.

## 2019-01-28 NOTE — PROGRESS NOTES
Assumed patient care @ 1900, report received from day-shift RN.Patient assessed, L upper miline IV patent and S/L. Patient is A&Ox4, upself and steady, no c/o pain at this time. Pt c/o cough - requested cough medication per eMAR. POC discussed-all questions answered at this time. Patient will be NPO @ midnight in anticipation of port placement in AM. Call light and personal belongings w/i reach, bed in lowest/locked position. Hourly rounding in place.

## 2019-01-28 NOTE — CARE PLAN
Problem: Knowledge Deficit  Goal: Knowledge of disease process/condition, treatment plan, diagnostic tests, and medications will improve    Intervention: Explain information regarding disease process/condition, treatment plan, diagnostic tests, and medications and document in education  Plan of care discussed, all questions answered.       Problem: Respiratory:  Goal: Respiratory status will improve    Intervention: Assess and monitor pulmonary status  Pulmonary status assessed and monitored throughout shift.

## 2019-01-28 NOTE — CARE PLAN
Problem: Communication  Goal: The ability to communicate needs accurately and effectively will improve  Outcome: PROGRESSING AS EXPECTED  Discussed POC for this shift. Patient understands NPO status for port placement today- unsure of the time. Patient communicates her needs effectively.    Problem: Respiratory:  Goal: Respiratory status will improve  Outcome: PROGRESSING AS EXPECTED  Patient on RA. C/o dry cough; denies need for cough medication at this time.

## 2019-01-29 ENCOUNTER — HOSPITAL ENCOUNTER (OUTPATIENT)
Dept: RADIATION ONCOLOGY | Facility: MEDICAL CENTER | Age: 54
End: 2019-01-29

## 2019-01-29 PROCEDURE — 3E04305 INTRODUCTION OF OTHER ANTINEOPLASTIC INTO CENTRAL VEIN, PERCUTANEOUS APPROACH: ICD-10-PCS | Performed by: INTERNAL MEDICINE

## 2019-01-29 PROCEDURE — A9270 NON-COVERED ITEM OR SERVICE: HCPCS | Performed by: HOSPITALIST

## 2019-01-29 PROCEDURE — 700102 HCHG RX REV CODE 250 W/ 637 OVERRIDE(OP): Performed by: HOSPITALIST

## 2019-01-29 PROCEDURE — 700102 HCHG RX REV CODE 250 W/ 637 OVERRIDE(OP): Performed by: INTERNAL MEDICINE

## 2019-01-29 PROCEDURE — 770004 HCHG ROOM/CARE - ONCOLOGY PRIVATE *

## 2019-01-29 PROCEDURE — 700111 HCHG RX REV CODE 636 W/ 250 OVERRIDE (IP): Performed by: HOSPITALIST

## 2019-01-29 PROCEDURE — 77280 THER RAD SIMULAJ FIELD SMPL: CPT | Performed by: RADIOLOGY

## 2019-01-29 PROCEDURE — 700111 HCHG RX REV CODE 636 W/ 250 OVERRIDE (IP): Performed by: INTERNAL MEDICINE

## 2019-01-29 PROCEDURE — 77412 RADIATION TX DELIVERY LVL 3: CPT | Performed by: RADIOLOGY

## 2019-01-29 PROCEDURE — 77280 THER RAD SIMULAJ FIELD SMPL: CPT | Mod: 26 | Performed by: RADIOLOGY

## 2019-01-29 PROCEDURE — 99232 SBSQ HOSP IP/OBS MODERATE 35: CPT | Performed by: INTERNAL MEDICINE

## 2019-01-29 PROCEDURE — 700105 HCHG RX REV CODE 258: Performed by: INTERNAL MEDICINE

## 2019-01-29 PROCEDURE — A9270 NON-COVERED ITEM OR SERVICE: HCPCS | Performed by: INTERNAL MEDICINE

## 2019-01-29 PROCEDURE — DB021ZZ BEAM RADIATION OF LUNG USING PHOTONS 1 - 10 MEV: ICD-10-PCS | Performed by: RADIOLOGY

## 2019-01-29 RX ORDER — ONDANSETRON 8 MG/1
8 TABLET, ORALLY DISINTEGRATING ORAL
Status: CANCELLED | OUTPATIENT
Start: 2019-01-29

## 2019-01-29 RX ORDER — 0.9 % SODIUM CHLORIDE 0.9 %
VIAL (ML) INJECTION PRN
Status: CANCELLED | OUTPATIENT
Start: 2019-01-29

## 2019-01-29 RX ORDER — SODIUM CHLORIDE 9 MG/ML
INJECTION, SOLUTION INTRAVENOUS CONTINUOUS
Status: CANCELLED | OUTPATIENT
Start: 2019-01-29

## 2019-01-29 RX ORDER — 0.9 % SODIUM CHLORIDE 0.9 %
5 VIAL (ML) INJECTION PRN
Status: CANCELLED | OUTPATIENT
Start: 2019-01-29

## 2019-01-29 RX ORDER — ONDANSETRON 2 MG/ML
4 INJECTION INTRAMUSCULAR; INTRAVENOUS
Status: CANCELLED | OUTPATIENT
Start: 2019-01-29

## 2019-01-29 RX ORDER — ONDANSETRON 2 MG/ML
8 INJECTION INTRAMUSCULAR; INTRAVENOUS ONCE
Status: CANCELLED | OUTPATIENT
Start: 2019-01-29

## 2019-01-29 RX ORDER — PROCHLORPERAZINE MALEATE 10 MG
10 TABLET ORAL EVERY 6 HOURS PRN
Status: CANCELLED | OUTPATIENT
Start: 2019-01-29

## 2019-01-29 RX ORDER — ONDANSETRON 2 MG/ML
8 INJECTION INTRAMUSCULAR; INTRAVENOUS ONCE
Status: COMPLETED | OUTPATIENT
Start: 2019-01-29 | End: 2019-01-29

## 2019-01-29 RX ORDER — CALCIUM CARBONATE 500 MG/1
500-1000 TABLET, CHEWABLE ORAL
Status: DISCONTINUED | OUTPATIENT
Start: 2019-01-29 | End: 2019-02-13 | Stop reason: HOSPADM

## 2019-01-29 RX ADMIN — HYDROCODONE BITARTRATE AND HOMATROPINE METHYLBROMIDE 5 ML: 5; 1.5 SOLUTION ORAL at 21:21

## 2019-01-29 RX ADMIN — DIPHENHYDRAMINE HYDROCHLORIDE 25 MG: 50 INJECTION INTRAMUSCULAR; INTRAVENOUS at 17:36

## 2019-01-29 RX ADMIN — ONDANSETRON 8 MG: 2 INJECTION INTRAMUSCULAR; INTRAVENOUS at 17:36

## 2019-01-29 RX ADMIN — ACETAMINOPHEN 650 MG: 325 TABLET, FILM COATED ORAL at 03:53

## 2019-01-29 RX ADMIN — HYDROCODONE BITARTRATE AND HOMATROPINE METHYLBROMIDE 5 ML: 5; 1.5 SOLUTION ORAL at 15:39

## 2019-01-29 RX ADMIN — SENNOSIDES AND DOCUSATE SODIUM 2 TABLET: 8.6; 5 TABLET ORAL at 06:15

## 2019-01-29 RX ADMIN — ALBUTEROL SULFATE 2 PUFF: 90 AEROSOL, METERED RESPIRATORY (INHALATION) at 11:06

## 2019-01-29 RX ADMIN — ENOXAPARIN SODIUM 40 MG: 100 INJECTION SUBCUTANEOUS at 06:16

## 2019-01-29 RX ADMIN — FAMOTIDINE 20 MG: 10 INJECTION INTRAVENOUS at 17:35

## 2019-01-29 RX ADMIN — CARBOPLATIN 200 MG: 10 INJECTION, SOLUTION INTRAVENOUS at 20:23

## 2019-01-29 RX ADMIN — ONDANSETRON 4 MG: 4 TABLET, ORALLY DISINTEGRATING ORAL at 21:21

## 2019-01-29 RX ADMIN — NICOTINE 21 MG: 21 PATCH, EXTENDED RELEASE TRANSDERMAL at 06:15

## 2019-01-29 RX ADMIN — ONDANSETRON 4 MG: 4 TABLET, ORALLY DISINTEGRATING ORAL at 13:59

## 2019-01-29 RX ADMIN — BUDESONIDE AND FORMOTEROL FUMARATE DIHYDRATE 2 PUFF: 160; 4.5 AEROSOL RESPIRATORY (INHALATION) at 06:17

## 2019-01-29 RX ADMIN — PACLITAXEL 72.5 MG: 300 INJECTION, SOLUTION INTRAVENOUS at 18:15

## 2019-01-29 RX ADMIN — DEXAMETHASONE SODIUM PHOSPHATE 12 MG: 4 INJECTION, SOLUTION INTRAMUSCULAR; INTRAVENOUS at 17:36

## 2019-01-29 ASSESSMENT — ENCOUNTER SYMPTOMS
WEAKNESS: 1
MUSCULOSKELETAL NEGATIVE: 1
SHORTNESS OF BREATH: 1
CARDIOVASCULAR NEGATIVE: 1
COUGH: 1
GASTROINTESTINAL NEGATIVE: 1
EYES NEGATIVE: 1
NERVOUS/ANXIOUS: 1

## 2019-01-29 NOTE — OR SURGEON
Immediate Post- Operative Note        PostOp Diagnosis: Needs port catheter      Procedure(s): Right IJ port placement      Estimated Blood Loss: Less than 5 ml        Complications: None            1/28/2019     6:39 PM     Griffin Spicer

## 2019-01-29 NOTE — CARE PLAN
Problem: Communication  Goal: The ability to communicate needs accurately and effectively will improve    Intervention: Educate patient and significant other/support system about the plan of care, procedures, treatments, medications and allow for questions  Plan of care discussed-all questions answered.       Problem: Infection  Goal: Will remain free from infection    Intervention: Assess signs and symptoms of infection  Patient assessed and monitored for s/s of infection.

## 2019-01-29 NOTE — PROGRESS NOTES
Oncology/Hematology Progress Note               Author: Demetrius Wilson Date & Time created: 1/29/2019  2:00 PM     Non-small cell lung cancer    Interval History:  1/29/19 she tells me she is doing good.  She is post to go down for radiation today.  She has no new complaints.  She also understood that chemotherapy is planned.  She has no new complaints.    Review of Systems:  Review of Systems   Constitutional: Positive for malaise/fatigue.   HENT: Negative.    Eyes: Negative.    Respiratory: Positive for cough and shortness of breath.    Cardiovascular: Negative.    Gastrointestinal: Negative.    Genitourinary: Negative.    Musculoskeletal: Negative.    Skin: Negative.    Neurological: Positive for weakness.   Endo/Heme/Allergies: Negative.    Psychiatric/Behavioral: The patient is nervous/anxious.        Physical Exam:  Physical Exam   Constitutional: She is oriented to person, place, and time. She appears well-developed.   HENT:   Head: Normocephalic.   Eyes: Pupils are equal, round, and reactive to light. Conjunctivae are normal.   Cardiovascular: Normal rate, regular rhythm and normal heart sounds.    Pulmonary/Chest: Effort normal and breath sounds normal. No respiratory distress.   Abdominal: Soft. Bowel sounds are normal. She exhibits no distension. There is no tenderness.   Musculoskeletal: She exhibits no edema.   Neurological: She is alert and oriented to person, place, and time. No cranial nerve deficit.   Skin: No erythema.   Psychiatric: She has a normal mood and affect. Her behavior is normal. Judgment and thought content normal.       Labs:          Recent Labs      01/27/19   0022   SODIUM  134*   POTASSIUM  4.5   CHLORIDE  103   CO2  27   BUN  16   CREATININE  0.72   CALCIUM  8.4*     Recent Labs      01/27/19   0022   ALTSGPT  16   ASTSGOT  16   ALKPHOSPHAT  63   TBILIRUBIN  0.5   GLUCOSE  120*     Recent Labs      01/27/19   0022   RBC  3.99*   HEMOGLOBIN  12.3   HEMATOCRIT  38.0   PLATELETCT   294     Recent Labs      19   0022   WBC  10.8   NEUTSPOLYS  62.70   LYMPHOCYTES  21.10*   MONOCYTES  15.00*   EOSINOPHILS  0.60   BASOPHILS  0.30   ASTSGOT  16   ALTSGPT  16   ALKPHOSPHAT  63   TBILIRUBIN  0.5     Recent Labs      19   0022   SODIUM  134*   POTASSIUM  4.5   CHLORIDE  103   CO2  27   GLUCOSE  120*   BUN  16   CREATININE  0.72   CALCIUM  8.4*     Hemodynamics:  Temp (24hrs), Av.3 °C (99.1 °F), Min:36.6 °C (97.8 °F), Max:38.3 °C (100.9 °F)  Temperature: 36.9 °C (98.5 °F)  Pulse  Av  Min: 65  Max: 108Heart Rate (Monitored): 96  Blood Pressure: 105/67, NIBP: 109/59     Respiratory:    Respiration: 18, Pulse Oximetry: 90 %     Work Of Breathing / Effort: Mild  RUL Breath Sounds: Inspiratory Wheezes, RML Breath Sounds: Inspiratory Wheezes, RLL Breath Sounds: Inspiratory Wheezes, MIREYA Breath Sounds: Inspiratory Wheezes, LLL Breath Sounds: Inspiratory Wheezes  Fluids:  No intake or output data in the 24 hours ending 19 1400     GI/Nutrition:  Orders Placed This Encounter   Procedures   • Diet Order Regular     Standing Status:   Standing     Number of Occurrences:   1     Order Specific Question:   Diet:     Answer:   Regular [1]     Medical Decision Making, by Problem:  Active Hospital Problems    Diagnosis   • *Non-small cell lung cancer (HCC) [C34.90]   • 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 Gauthier and felt to be stage IIIb T3 N3 M0.  His plan was weekly carbo Taxol concurrently with radiation to be followed by immunotherapy infinzi.    I went through her chemotherapy of carboplatinum and Taxol the Dr. Gauthier has planned out.  The nursing staff will give her handouts on carboplatinum and Taxol.  We went through all the potential  toxicities.  The patient wants to move forward with treatment.  She will need outpatient set up at the infusion center as well.  I told her I did speak with Dr. Cynthia Conteh.  Dr. Cynthia Conteh is setting up for a PET/CT as an outpatient.    Labs are appropriate for treatment today.      Moderate complexity/moderate risk for mortality morbidity/drug toxicity monitoring/discussed with nursing and primary team  Please note that this dictation was created using voice recognition software. I have made every reasonable attempt to correct obvious errors, but I expect that there are errors of grammar and possibly context that I did not discover before finalizing the note  Quality-Core Measures   Reviewed items::  Radiology images reviewed, Labs reviewed and Medications reviewed

## 2019-01-29 NOTE — PROGRESS NOTES
IR RN note:    Site  Confirmed with MD, patient and RN pre procedure   Pt request for little sedation due to being very nauseas post procedure   Tunneled port placement with moderate sedation by MD Spicer assisted by RT Tavia,Right chest and IJ access site;  End Tidal CO2 range 21-24 during procedure   Port placed   BARD PowerPort ClearVUE SLim implantable port 8 fr 24 cm open ended single lumen REF# [id number] LOT# [id number]   Patient tolerated procedure, hemodynamically stable; pt awake and talking post procedure; report given to ALICE Tidwell;   Patient to be  transported to room via IR RN monitored then transferred care to report RN

## 2019-01-29 NOTE — PROGRESS NOTES
Pt was transported to the department for radiation tx 1 of 30 to lung/mediastinum. Pt was given schedule for future appointments in case she gets discharged. She will return tomorrow for her next appointment at 9:45am. BRADY approved all imaging before tx.

## 2019-01-29 NOTE — PROGRESS NOTES
"Pharmacy Chemotherapy Verification  Patient Name: Amy Maki  DX: NSCLC  Cycle 1   Previous treatment = N/A  Regimen: weekly PACLItaxel/CARBOplatin + XRT  PACLItaxel (Taxol) 45-50 mg/m2 IV over 60 min on day 1  CARBOplatin AUC 2 IV over 30 min on day 1  Weekly x 7 weeks with concurrent radiation therapy  NCCN Guidelines for NSCLC V.9.2017  Delia VALLADARES, et al - J Clin Oncol. 2005 Sep 1;23(39):2714-67. Epub 2005 Aug 8.     Allergies:Percocet [oxycodone-acetaminophen]  /67   Pulse 74   Temp 36.9 °C (98.5 °F) (Temporal)   Resp 18   Ht 1.753 m (5' 9.02\")   Wt 53.5 kg (117 lb 15.1 oz)   LMP  (LMP Unknown)   SpO2 90%   Breastfeeding? No   BMI 17.41 kg/m²   Body surface area is 1.61 meters squared.     Labs 1/27/19    ANC ~ 6760  PLT = 294k   Hgb = 12.3   SCr = 0.72  Est CrCl ~ 74.5 ml/min    AST/ALT/AP = 16/16/63 Tbili = 0.5    PACLItaxel (Taxol) 45 mg/m2  X 1.61 m2 = 72.45 mg   <5% difference, ok to treat with final dose = 72.5 mg IV     CARBOplatin AUC 2 (74.5 + 25) = 199 mg    <5% difference, ok to treat with final dose = 200 mg IV    Shruthi Juarez, PharmD, BCOP          "

## 2019-01-29 NOTE — CARE PLAN
Problem: Communication  Goal: The ability to communicate needs accurately and effectively will improve  Outcome: PROGRESSING AS EXPECTED  Discussed POC for this shift to include first radiation and chemotherapy treatments. Patient understood, all questions answered at this time.     Problem: Pain Management  Goal: Pain level will decrease to patient's comfort goal  Outcome: PROGRESSING AS EXPECTED  Pt denies pain at this time; will continue to assess.

## 2019-01-29 NOTE — PROGRESS NOTES
Assumed patient care @ 1900, pt A&O, denies pain/nausea, pt up-self and steady, family at bedside, call light w/i reach, bed in lowest/locked position, hourly rounding in place.

## 2019-01-30 ENCOUNTER — HOSPITAL ENCOUNTER (OUTPATIENT)
Dept: RADIATION ONCOLOGY | Facility: MEDICAL CENTER | Age: 54
End: 2019-01-30

## 2019-01-30 LAB
ANION GAP SERPL CALC-SCNC: 8 MMOL/L (ref 0–11.9)
BASOPHILS # BLD AUTO: 0.1 % (ref 0–1.8)
BASOPHILS # BLD: 0.01 K/UL (ref 0–0.12)
BUN SERPL-MCNC: 11 MG/DL (ref 8–22)
CALCIUM SERPL-MCNC: 7.9 MG/DL (ref 8.5–10.5)
CHLORIDE SERPL-SCNC: 102 MMOL/L (ref 96–112)
CO2 SERPL-SCNC: 22 MMOL/L (ref 20–33)
CREAT SERPL-MCNC: 0.54 MG/DL (ref 0.5–1.4)
EOSINOPHIL # BLD AUTO: 0.01 K/UL (ref 0–0.51)
EOSINOPHIL NFR BLD: 0.1 % (ref 0–6.9)
ERYTHROCYTE [DISTWIDTH] IN BLOOD BY AUTOMATED COUNT: 40.4 FL (ref 35.9–50)
GLUCOSE SERPL-MCNC: 245 MG/DL (ref 65–99)
HCT VFR BLD AUTO: 34.5 % (ref 37–47)
HGB BLD-MCNC: 11.1 G/DL (ref 12–16)
IMM GRANULOCYTES # BLD AUTO: 0.02 K/UL (ref 0–0.11)
IMM GRANULOCYTES NFR BLD AUTO: 0.2 % (ref 0–0.9)
LYMPHOCYTES # BLD AUTO: 0.51 K/UL (ref 1–4.8)
LYMPHOCYTES NFR BLD: 4.5 % (ref 22–41)
MCH RBC QN AUTO: 30.7 PG (ref 27–33)
MCHC RBC AUTO-ENTMCNC: 32.2 G/DL (ref 33.6–35)
MCV RBC AUTO: 95.3 FL (ref 81.4–97.8)
MONOCYTES # BLD AUTO: 0.26 K/UL (ref 0–0.85)
MONOCYTES NFR BLD AUTO: 2.3 % (ref 0–13.4)
NEUTROPHILS # BLD AUTO: 10.61 K/UL (ref 2–7.15)
NEUTROPHILS NFR BLD: 92.8 % (ref 44–72)
NRBC # BLD AUTO: 0 K/UL
NRBC BLD-RTO: 0 /100 WBC
PLATELET # BLD AUTO: 343 K/UL (ref 164–446)
PMV BLD AUTO: 9.2 FL (ref 9–12.9)
POTASSIUM SERPL-SCNC: 4.3 MMOL/L (ref 3.6–5.5)
RBC # BLD AUTO: 3.62 M/UL (ref 4.2–5.4)
SODIUM SERPL-SCNC: 132 MMOL/L (ref 135–145)
WBC # BLD AUTO: 11.4 K/UL (ref 4.8–10.8)

## 2019-01-30 PROCEDURE — 77014 PR CT GUIDANCE PLACEMENT RAD THERAPY FIELDS: CPT | Mod: 26 | Performed by: RADIOLOGY

## 2019-01-30 PROCEDURE — A9270 NON-COVERED ITEM OR SERVICE: HCPCS | Performed by: INTERNAL MEDICINE

## 2019-01-30 PROCEDURE — 85025 COMPLETE CBC W/AUTO DIFF WBC: CPT

## 2019-01-30 PROCEDURE — 77387 GUIDANCE FOR RADJ TX DLVR: CPT | Performed by: RADIOLOGY

## 2019-01-30 PROCEDURE — A9270 NON-COVERED ITEM OR SERVICE: HCPCS | Performed by: HOSPITALIST

## 2019-01-30 PROCEDURE — 700102 HCHG RX REV CODE 250 W/ 637 OVERRIDE(OP): Performed by: INTERNAL MEDICINE

## 2019-01-30 PROCEDURE — 77412 RADIATION TX DELIVERY LVL 3: CPT | Performed by: RADIOLOGY

## 2019-01-30 PROCEDURE — 700111 HCHG RX REV CODE 636 W/ 250 OVERRIDE (IP): Performed by: HOSPITALIST

## 2019-01-30 PROCEDURE — 770004 HCHG ROOM/CARE - ONCOLOGY PRIVATE *

## 2019-01-30 PROCEDURE — 99232 SBSQ HOSP IP/OBS MODERATE 35: CPT | Performed by: INTERNAL MEDICINE

## 2019-01-30 PROCEDURE — 700102 HCHG RX REV CODE 250 W/ 637 OVERRIDE(OP): Performed by: HOSPITALIST

## 2019-01-30 PROCEDURE — 80048 BASIC METABOLIC PNL TOTAL CA: CPT

## 2019-01-30 RX ADMIN — ONDANSETRON 4 MG: 4 TABLET, ORALLY DISINTEGRATING ORAL at 20:58

## 2019-01-30 RX ADMIN — NICOTINE 21 MG: 21 PATCH, EXTENDED RELEASE TRANSDERMAL at 05:16

## 2019-01-30 RX ADMIN — HYDROCODONE BITARTRATE AND HOMATROPINE METHYLBROMIDE 5 ML: 5; 1.5 SOLUTION ORAL at 20:57

## 2019-01-30 RX ADMIN — ALPRAZOLAM 0.25 MG: 0.25 TABLET ORAL at 22:25

## 2019-01-30 RX ADMIN — BUDESONIDE AND FORMOTEROL FUMARATE DIHYDRATE 2 PUFF: 160; 4.5 AEROSOL RESPIRATORY (INHALATION) at 05:16

## 2019-01-30 RX ADMIN — ENOXAPARIN SODIUM 40 MG: 100 INJECTION SUBCUTANEOUS at 05:16

## 2019-01-30 ASSESSMENT — ENCOUNTER SYMPTOMS
SORE THROAT: 0
MYALGIAS: 0
WEAKNESS: 1
CLAUDICATION: 0
BLURRED VISION: 0
DEPRESSION: 0
HEARTBURN: 0
DIARRHEA: 0
CONSTIPATION: 0
DIZZINESS: 0
SHORTNESS OF BREATH: 0
FEVER: 0
INSOMNIA: 1
SPEECH CHANGE: 0
PHOTOPHOBIA: 0
NAUSEA: 0
VOMITING: 0
CHILLS: 0
NERVOUS/ANXIOUS: 1
ABDOMINAL PAIN: 0
HEADACHES: 0
WEIGHT LOSS: 1
SENSORY CHANGE: 0

## 2019-01-30 NOTE — DISCHARGE PLANNING
"Anticipated Discharge Disposition: Home/DME    Action: Met with pt and family at bedside.  Pt sitting up in chair, Aox4, pleasant affect.  Discussed needed Home 02, and lack of secured payor source.  Questioned pt's ability to provide a credit card, if needed.  Pt sts, \"Yes, I have one, my daughter has it.\"  Discussed DME providers, and choice form.  Pt has no preference.  Sent choice form to [facility] per protocol.     Reviewed pending chemo appt at [facility].  Advised this CM RN is waiting for confirmation of appt time.     Contacted [facility], s/w Emily, she will work on scheduling pt for needed chemo appt.     Barriers to Discharge: uninsured, pending MARIBEL    Plan:  Referral sent to [facility] for Home 02 needs.  Pt has agreed to use her credit card, as Medicaid is pending.  [facility] is working to provide weekly chemo appt every Tuesday.  Radiation schedule has been established.      "

## 2019-01-30 NOTE — DISCHARGE PLANNING
Anticipated Discharge Disposition: Home/DME    Action: Reviewed continued POC with EFREN Gonzales RN ONN. Pt will need to attend weekly chemo appointments at [facility].  Radiation schedule has been established, see AVS.    Contacted [facility], s/w Anysia, sts she will s/w her supervisor and contact this CM RN when appts have been confirmed.   Reviewed chart, pt requiring home 02.  Dr. Metz to provide needed orders.  Pt is pending [place] Medicaid, will s/w pt in regards to securing 02 with credit card, as this the requirement of DME suppliers.       Barriers to Discharge: uninsured    Plan: Home with support of family.  Home 02 to be obtained prior to discharge.

## 2019-01-30 NOTE — DISCHARGE PLANNING
Anticipated Discharge Disposition: Home/DME    Action: Received PC from Emily at [facility].  Sts Tuesday Feb 7th is first available appt, as this is a cancellation.    Notified Dr. Metz of this via Tiger Text.     Barriers to Discharge: uninsured pending MARIBEL     Plan: [facility] to f/u on pending home 02 referral.  First available chemo appt at [facility] cannot be facilitated until 02/07/19.  Dr. Metz aware.

## 2019-01-30 NOTE — CARE PLAN
Problem: Safety  Goal: Will remain free from injury  Outcome: PROGRESSING AS EXPECTED  Bed locked and in lowest position. Call light within reach. Pt using call light when assistance is needed.    Problem: Pain Management  Goal: Pain level will decrease to patient's comfort goal  Outcome: PROGRESSING AS EXPECTED  Pt receiving hydrocodone cough syrup for cough pain.

## 2019-01-30 NOTE — DISCHARGE PLANNING
Agency[facility]Facility Name: [facility]  Spoke To: Lu  Outcome: Confirmed referral was received. Currently in review.

## 2019-01-30 NOTE — PROGRESS NOTES
"Pharmacy Chemotherapy Verification:    DX: NSCLC    Regimen: weekly taxol/carboplatin + XRT  Paclitaxel (taxol) 45-50mg/m2 IV over 60 min on day 1  Carboplatin AUC 2 IV over 30 min on day 1  Weekly x 7 weeks with concurrent radiation therapy  NCCN Guidelines for NSCLC V.9.2017  Delia VALLADARES, et al - J Clin Oncol. 2005 Sep 1;23(25):5811-80. Epub 2005 Aug 8.    /67   Pulse 74   Temp 36.9 °C (98.5 °F) (Temporal)   Resp 18   Ht 1.753 m (5' 9.02\")   Wt 53.5 kg (117 lb 15.1 oz)   LMP  (LMP Unknown)   SpO2 90%   Breastfeeding? No   BMI 17.41 kg/m²      Body surface area is 1.61 meters squared.    All lab results 1/27/19 within treatment parameters.      Drug Order   (Drug name, dose, route, IV Fluid & volume, frequency, number of doses) Cycle: 1      Previous treatment: n/a     Medication = Paclitaxel (Taxol)  Base Dose = 45mg/m²   Calc Dose: Base Dose x 1.61 m² = 72.45 mg  Final Dose = 72.5 mg  Route = IV  Fluid & Volume = NSS 250mL  Admin Duration = Over 1hours          <5% difference, ok to treat with final dose   Medication = Carboplatin (Paraplatin)  Base Dose = AUC 2  Calc Dose: Base Dose x (75 + 25) = 200 mg  Final Dose = 200 mg  Route = IV  Fluid & Volume =  mL  Admin Duration = Over 30 minutes          <5% difference, ok to treat with final dose     By my signature below, I confirm this process was performed independently with the BSA and all final chemotherapy dosing calculations congruent. I have reviewed the above chemotherapy order and that my calculation of the final dose and BSA (when applicable) corroborate those calculations of the  pharmacist. Discrepancies of 5% or greater in the written dose have been addressed and documented within the [facility] Progress notes.    Loli PastranaD.        "

## 2019-01-30 NOTE — PROGRESS NOTES
Hospital Medicine Daily Progress Note    Date of Service  1/29/2019    Chief Complaint  53 y.o. female admitted 1/20/2019 with cough since April 2018, multiple round of antibiotics with only transient improvement.  CT chest with lung mass.    Hospital Course    Patient seen by pulmonology as IR recommended bronchial biopsy given central location of mass.  EBUS initially planned but Dr Lynne feels traditional biopsy able to gather necessary tissue for diagnosis.      Interval Problem Update  1/21/19 Patient anxious and tearful of possible diagnosis.  Family comforting patient and requesting small dose medication to allow patient to calm down.  Xanax 0.25 mg ordered PRN.  NPO at [facility] for bronchoscopy 11am tomorrow.  1/29/19 Patient had first radiation and expecting to start chemo this afternoon.  She is hoping to discharge home tomorrow but is agreeable to make sure all is authorized given her pending insurance status.  She has continued radiation and will need to figure out where she will receive chemo.    Consultants/Specialty  Pulm - Smith  onc - Wilson    Code Status  full    Disposition  Home when appropriate    Review of Systems  Review of Systems   Constitutional: Positive for weight loss. Negative for chills and fever.   HENT: Negative for congestion and sore throat.    Eyes: Negative for blurred vision and photophobia.   Respiratory: Negative for shortness of breath.    Cardiovascular: Negative for chest pain, claudication and leg swelling.   Gastrointestinal: Negative for abdominal pain, constipation, diarrhea, heartburn, nausea and vomiting.   Genitourinary: Negative for dysuria and hematuria.   Musculoskeletal: Negative for joint pain and myalgias.   Skin: Negative for itching and rash.   Neurological: Positive for weakness. Negative for dizziness, sensory change, speech change and headaches.   Psychiatric/Behavioral: Negative for depression. The patient is nervous/anxious and has insomnia.          Physical Exam  Temp:  [36.7 °C (98 °F)-38.3 °C (100.9 °F)] 36.7 °C (98 °F)  Pulse:  [74-97] 90  Resp:  [16-18] 18  BP: ()/(62-67) 107/62  SpO2:  [90 %-93 %] 93 %    Physical Exam   Constitutional: She is oriented to person, place, and time. She appears well-developed and well-nourished. No distress.   HENT:   Head: Normocephalic and atraumatic.   Eyes: Conjunctivae are normal. No scleral icterus.   Neck: Neck supple. No JVD present.   Cardiovascular: Normal rate, regular rhythm and normal heart sounds.  Exam reveals no gallop and no friction rub.    No murmur heard.  Pulmonary/Chest: Effort normal and breath sounds normal. No respiratory distress. She exhibits no tenderness.   Decreased at bases bilaterally     Abdominal: Soft. Bowel sounds are normal. She exhibits no distension. There is no guarding.   Musculoskeletal: She exhibits no edema or tenderness.   Lymphadenopathy:     She has no cervical adenopathy.   Neurological: She is alert and oriented to person, place, and time. No cranial nerve deficit.   Skin: Skin is warm and dry. She is not diaphoretic. No erythema. No pallor.   Psychiatric: She has a normal mood and affect. Her behavior is normal.   Nursing note and vitals reviewed.      Fluids  No intake or output data in the 24 hours ending 01/30/19 0023    Laboratory                        Imaging  IR-CVC PORT PLACEMENT > AGE 5   Final Result      1. Ultrasound and fluoroscopic guided placement of a right internal jugular single lumen Bard PowerPort venous access device.      2. The port may be used immediately as clinically indicated. Flushes per protocol.      3. The skin staples and suture should be removed in 10-12 days. This can be performed in the radiology department on any weekday without a prior appointment if desired.      MR-BRAIN-WITH & W/O   Final Result      1.  There is no intracranial metastasis.   2.  Mild cerebral atrophy.   3.  Mild chronic microvascular ischemic disease.       CT-ABDOMEN-PELVIS WITH   Final Result      No evidence of metastatic disease is identified.      Status post cholecystectomy.      Pancreatic calcifications likely represent sequelae of chronic pancreatitis.      Atherosclerotic plaque.      Nonvisualization of the appendix, limiting evaluation.      Left lower lobe lobulated masslike density with surrounding patchy opacities, better evaluated on the prior CT chest. Volume loss is again noted on the left.           Assessment/Plan  * Non-small cell lung cancer (HCC)   Assessment & Plan    Bilateral upper lobe lung masses noted on CT, suspcious for lung cancer  Pulmonology consulted - s/p bronch with Dr Lynne  CT abdomen pelvis without evidence of malignancy  MRI brain without mets  1/22 bronchoscopy with brush biopsy done  Pathology showing poorly differentiated NSCC  chemoradiation started 1/29       Anxiety   Assessment & Plan    Secondary to possible cancer  Low dose xanax PRN       Severe protein-calorie malnutrition (HCC)   Assessment & Plan    Nutrition consulted       Tobacco abuse   Assessment & Plan    Encouraged smoking cessation           VTE prophylaxis: scds

## 2019-01-30 NOTE — PROGRESS NOTES
Chemotherapy Verification - PRIMARY RN      Height = 175.3 cm  Weight = 53.5 kg  BSA = 1.61 m2      Medication: Paclitaxel (taxol)  Dose: 45 mg/m2  Calculated Dose: 72.45 mg (ordered dose 72.5 mg)                             (In mg/m2, AUC, mg/kg)     Carboplatin calculation (if applicable):  Target AUC: 2    Dose: 200.9 mg (ordered dose 200 mg)      I confirm this process was performed independently with the BSA and all final chemotherapy dosing calculations congruent.  Any discrepancies of 5% or greater have been addressed with the chemotherapy pharmacist. The resolution of the discrepancy has been documented in the EPIC progress notes.

## 2019-01-30 NOTE — PROGRESS NOTES
Patient A&O x4, upself with steady gait. Patient received 1/30 XRT today. Her cough worsened this afternoon; PRN cough syrup given. Patient was maintaining sats in the high 80s while coughing and resting in bed. Placed 2L oxygen on pt with sats now in the low 90s. Patient c/o pain with coughing but denies anything more than cough syrup. Port patent with blood return. PIV patent and intact. Taxol infusing via port; patient anxious and tearful. Call light within reach, family at bedside.

## 2019-01-30 NOTE — FACE TO FACE
"Face to Face Note  -  Durable Medical Equipment    Lu Metz D.O. - NPI: [id number]  I certify that this patient is under my care and that they had a durable medical equipment(DME)face to face encounter by myself that meets the physician DME face-to-face encounter requirements with this patient on:    Date of encounter:   Patient:                    MRN:                       YOB: 2020  Amy Maki  [id number]  1965     The encounter with the patient was in whole, or in part, for the following medical condition, which is the primary reason for durable medical equipment:  COPD and Lung Cancer    I certify that, based on my findings, the following durable medical equipment is medically necessary:  Oxygen.    HOME O2 Saturation Measurements:(Values must be present for Home Oxygen orders)  Room air sat at rest: 93  Room air sat with amb: 87  With liters of O2: 3, O2 sat at rest with O2: 96  With Liters of O2: 3, O2 sat with amb with O2 : 94  Is the patient mobile?: Yes    My Clinical findings support the need for the above equipment due to:  Hypoxia    Supporting Symptoms: The patient requires supplemental oxygen, as the following interventions have been tried with limited or no improvement: \"Ambulation with oximetry    "

## 2019-01-30 NOTE — PROGRESS NOTES
Rec'd report from day shift RN. Assumed pt care. Assessment completed. AAOx4. Patient complaining of cough - hydrocodone cough solution given. No other s/s of discomfort or distress. Port with positive blood return - chemo hanging. Pt ambulates to the bathroom with SBA, maintains steady gait and tolerates well. Bed in lowest position, bed locked, bed alarm is not on. Patient calls appropriately. Treaded socks in place, RN and CNA numbers provided, call light within reach. Pt needs met at this time.

## 2019-01-30 NOTE — PROGRESS NOTES
Nurse Navigation met with patient again yesterday provided more resources.  Encouraged her to apply for ss Disability sooner than later online.  Patient stated that her daughter has a computer and will be able to help her with this task.  Medicaid tana pending.   referral placed.

## 2019-01-30 NOTE — PROGRESS NOTES
Pt was transported to the department for radiation tx 2 of 30 to lung/mediastinum. She will return tomorrow for her next appointment at 9:45am.

## 2019-01-30 NOTE — PROGRESS NOTES
Chemotherapy Verification - PRIMARY RN      Height = 1.753 m  Weight = 53.5 kg  BSA = 53.5 kg         Carboplatin calculation (if applicable): AUC 2 Dose: 200.88 mg       I confirm this process was performed independently with the BSA and all final chemotherapy dosing calculations congruent.  Any discrepancies of 5% or greater have been addressed with the chemotherapy pharmacist. The resolution of the discrepancy has been documented in the EPIC progress notes.

## 2019-01-30 NOTE — DISCHARGE PLANNING
Received Choice form at 5180 from Emmy (RNCM)  Agency/Facility Name: [facility]  Referral sent per Choice form @ 5973

## 2019-01-30 NOTE — PROGRESS NOTES
Chemotherapy Verification - SECONDARY RN       Height = 1.7m  Weight = 53.5kg  BSA = 1.61m2       Medication: Taxol  Dose: 45mg/m2  Calculated Dose: 72.45mg (72.5mg=ordered dose)                             (In mg/m2, AUC, mg/kg)     Medication: Carboplatin  Dose: AUC 2  Calculated Dose: 200.9mg (200mg=ordered dose)                             (In mg/m2, AUC, mg/kg)        I confirm that this process was performed independently.

## 2019-01-31 ENCOUNTER — PATIENT OUTREACH (OUTPATIENT)
Dept: HEALTH INFORMATION MANAGEMENT | Facility: OTHER | Age: 54
End: 2019-01-31

## 2019-01-31 ENCOUNTER — HOSPITAL ENCOUNTER (OUTPATIENT)
Dept: RADIATION ONCOLOGY | Facility: MEDICAL CENTER | Age: 54
End: 2019-01-31

## 2019-01-31 PROBLEM — R05.9 COUGH: Status: ACTIVE | Noted: 2019-01-31

## 2019-01-31 LAB
ANION GAP SERPL CALC-SCNC: 7 MMOL/L (ref 0–11.9)
BASOPHILS # BLD AUTO: 0.2 % (ref 0–1.8)
BASOPHILS # BLD: 0.02 K/UL (ref 0–0.12)
BUN SERPL-MCNC: 13 MG/DL (ref 8–22)
CALCIUM SERPL-MCNC: 9.1 MG/DL (ref 8.5–10.5)
CHLORIDE SERPL-SCNC: 105 MMOL/L (ref 96–112)
CO2 SERPL-SCNC: 22 MMOL/L (ref 20–33)
CREAT SERPL-MCNC: 0.57 MG/DL (ref 0.5–1.4)
EOSINOPHIL # BLD AUTO: 0.1 K/UL (ref 0–0.51)
EOSINOPHIL NFR BLD: 0.8 % (ref 0–6.9)
ERYTHROCYTE [DISTWIDTH] IN BLOOD BY AUTOMATED COUNT: 40.4 FL (ref 35.9–50)
GLUCOSE SERPL-MCNC: 83 MG/DL (ref 65–99)
HCT VFR BLD AUTO: 35.6 % (ref 37–47)
HGB BLD-MCNC: 11.7 G/DL (ref 12–16)
IMM GRANULOCYTES # BLD AUTO: 0.05 K/UL (ref 0–0.11)
IMM GRANULOCYTES NFR BLD AUTO: 0.4 % (ref 0–0.9)
LYMPHOCYTES # BLD AUTO: 1.6 K/UL (ref 1–4.8)
LYMPHOCYTES NFR BLD: 13.6 % (ref 22–41)
MCH RBC QN AUTO: 31.5 PG (ref 27–33)
MCHC RBC AUTO-ENTMCNC: 32.9 G/DL (ref 33.6–35)
MCV RBC AUTO: 96 FL (ref 81.4–97.8)
MONOCYTES # BLD AUTO: 0.63 K/UL (ref 0–0.85)
MONOCYTES NFR BLD AUTO: 5.3 % (ref 0–13.4)
NEUTROPHILS # BLD AUTO: 9.4 K/UL (ref 2–7.15)
NEUTROPHILS NFR BLD: 79.7 % (ref 44–72)
NRBC # BLD AUTO: 0 K/UL
NRBC BLD-RTO: 0 /100 WBC
PLATELET # BLD AUTO: 401 K/UL (ref 164–446)
PMV BLD AUTO: 9.1 FL (ref 9–12.9)
POTASSIUM SERPL-SCNC: 4.3 MMOL/L (ref 3.6–5.5)
RBC # BLD AUTO: 3.71 M/UL (ref 4.2–5.4)
SODIUM SERPL-SCNC: 134 MMOL/L (ref 135–145)
WBC # BLD AUTO: 11.8 K/UL (ref 4.8–10.8)

## 2019-01-31 PROCEDURE — 99232 SBSQ HOSP IP/OBS MODERATE 35: CPT | Performed by: INTERNAL MEDICINE

## 2019-01-31 PROCEDURE — A9270 NON-COVERED ITEM OR SERVICE: HCPCS | Performed by: HOSPITALIST

## 2019-01-31 PROCEDURE — 85025 COMPLETE CBC W/AUTO DIFF WBC: CPT

## 2019-01-31 PROCEDURE — A9270 NON-COVERED ITEM OR SERVICE: HCPCS | Performed by: INTERNAL MEDICINE

## 2019-01-31 PROCEDURE — 77387 GUIDANCE FOR RADJ TX DLVR: CPT | Performed by: RADIOLOGY

## 2019-01-31 PROCEDURE — 77336 RADIATION PHYSICS CONSULT: CPT | Performed by: RADIOLOGY

## 2019-01-31 PROCEDURE — 700102 HCHG RX REV CODE 250 W/ 637 OVERRIDE(OP): Performed by: HOSPITALIST

## 2019-01-31 PROCEDURE — 80048 BASIC METABOLIC PNL TOTAL CA: CPT

## 2019-01-31 PROCEDURE — 77412 RADIATION TX DELIVERY LVL 3: CPT | Performed by: RADIOLOGY

## 2019-01-31 PROCEDURE — 700102 HCHG RX REV CODE 250 W/ 637 OVERRIDE(OP): Performed by: INTERNAL MEDICINE

## 2019-01-31 PROCEDURE — 770004 HCHG ROOM/CARE - ONCOLOGY PRIVATE *

## 2019-01-31 PROCEDURE — 700111 HCHG RX REV CODE 636 W/ 250 OVERRIDE (IP): Performed by: HOSPITALIST

## 2019-01-31 PROCEDURE — 77014 PR CT GUIDANCE PLACEMENT RAD THERAPY FIELDS: CPT | Mod: 26 | Performed by: RADIOLOGY

## 2019-01-31 RX ORDER — BUDESONIDE AND FORMOTEROL FUMARATE DIHYDRATE 160; 4.5 UG/1; UG/1
2 AEROSOL RESPIRATORY (INHALATION) 2 TIMES DAILY
Qty: 1 INHALER | Refills: 2 | Status: SHIPPED | OUTPATIENT
Start: 2019-01-31 | End: 2019-02-13

## 2019-01-31 RX ORDER — BENZONATATE 100 MG/1
100 CAPSULE ORAL 3 TIMES DAILY PRN
Status: DISCONTINUED | OUTPATIENT
Start: 2019-01-31 | End: 2019-02-10

## 2019-01-31 RX ORDER — NICOTINE 21 MG/24HR
1 PATCH, TRANSDERMAL 24 HOURS TRANSDERMAL EVERY 24 HOURS
Qty: 30 PATCH | Refills: 3 | Status: SHIPPED | OUTPATIENT
Start: 2019-01-31 | End: 2019-02-13

## 2019-01-31 RX ORDER — ALPRAZOLAM 0.25 MG/1
0.25 TABLET ORAL EVERY 8 HOURS PRN
Qty: 30 TAB | Refills: 0 | Status: SHIPPED | OUTPATIENT
Start: 2019-01-31 | End: 2019-02-13

## 2019-01-31 RX ORDER — DIAZEPAM 5 MG/1
2.5 TABLET ORAL
Status: DISCONTINUED | OUTPATIENT
Start: 2019-01-31 | End: 2019-02-13 | Stop reason: HOSPADM

## 2019-01-31 RX ADMIN — HYDROCODONE BITARTRATE AND HOMATROPINE METHYLBROMIDE 5 ML: 5; 1.5 SOLUTION ORAL at 14:09

## 2019-01-31 RX ADMIN — BENZONATATE 100 MG: 100 CAPSULE ORAL at 17:29

## 2019-01-31 RX ADMIN — ONDANSETRON 4 MG: 4 TABLET, ORALLY DISINTEGRATING ORAL at 21:35

## 2019-01-31 RX ADMIN — BUDESONIDE AND FORMOTEROL FUMARATE DIHYDRATE 2 PUFF: 160; 4.5 AEROSOL RESPIRATORY (INHALATION) at 05:09

## 2019-01-31 RX ADMIN — SENNOSIDES AND DOCUSATE SODIUM 2 TABLET: 8.6; 5 TABLET ORAL at 21:28

## 2019-01-31 RX ADMIN — HYDROCODONE BITARTRATE AND HOMATROPINE METHYLBROMIDE 5 ML: 5; 1.5 SOLUTION ORAL at 21:35

## 2019-01-31 RX ADMIN — ONDANSETRON 4 MG: 4 TABLET, ORALLY DISINTEGRATING ORAL at 05:08

## 2019-01-31 RX ADMIN — DIAZEPAM 2.5 MG: 5 TABLET ORAL at 22:41

## 2019-01-31 RX ADMIN — ONDANSETRON 4 MG: 4 TABLET, ORALLY DISINTEGRATING ORAL at 14:09

## 2019-01-31 RX ADMIN — BUDESONIDE AND FORMOTEROL FUMARATE DIHYDRATE 2 PUFF: 160; 4.5 AEROSOL RESPIRATORY (INHALATION) at 17:29

## 2019-01-31 RX ADMIN — NICOTINE 21 MG: 21 PATCH, EXTENDED RELEASE TRANSDERMAL at 05:09

## 2019-01-31 RX ADMIN — HYDROCODONE BITARTRATE AND HOMATROPINE METHYLBROMIDE 5 ML: 5; 1.5 SOLUTION ORAL at 05:09

## 2019-01-31 ASSESSMENT — ENCOUNTER SYMPTOMS
HEARTBURN: 0
SHORTNESS OF BREATH: 0
WEIGHT LOSS: 1
DEPRESSION: 0
CHILLS: 0
SORE THROAT: 0
DIARRHEA: 0
WEAKNESS: 1
ABDOMINAL PAIN: 0
NAUSEA: 0
BLURRED VISION: 0
DIZZINESS: 0
SPEECH CHANGE: 0
MYALGIAS: 0
NERVOUS/ANXIOUS: 1
SENSORY CHANGE: 0
CLAUDICATION: 0
CONSTIPATION: 0
VOMITING: 0
FEVER: 0
PHOTOPHOBIA: 0
INSOMNIA: 1
HEADACHES: 0

## 2019-01-31 NOTE — PROGRESS NOTES
Patient A&O x4, upself with steady gait. Home O2 eval completed this morning. Patient has been on 3L throughout the day. She showered and has been compliant with saline rinses. Port patent with blood return. She denies pain. 2/30 XRT completed today. Call light within reach, all needs met at this time.

## 2019-01-31 NOTE — PROGRESS NOTES
Hospital Medicine Daily Progress Note    Date of Service  1/29/2019    Chief Complaint  53 y.o. female admitted 1/20/2019 with cough since April 2018, multiple round of antibiotics with only transient improvement.  CT chest with lung mass.    Hospital Course    Patient seen by pulmonology as IR recommended bronchial biopsy given central location of mass.  EBUS initially planned but Dr Lynne feels traditional biopsy able to gather necessary tissue for diagnosis.      Interval Problem Update  1/21/19 Patient anxious and tearful of possible diagnosis.  Family comforting patient and requesting small dose medication to allow patient to calm down.  Xanax 0.25 mg ordered PRN.  NPO at [facility] for bronchoscopy 11am tomorrow.  1/29/19 Patient had first radiation and expecting to start chemo this afternoon.  She is hoping to discharge home tomorrow but is agreeable to make sure all is authorized given her pending insurance status.  She has continued radiation and will need to figure out where she will receive chemo.  1/30 Patient doing well with chemo from yesterday, tolerating radiation.  She will require oxygen at home as she is requiring 3L with any exertion to maintain saturations.  [facility] cannot accommodate chemo appointment until 2/7 - will need to verify with oncology if this is okay for a 2 day delay in treatment, if so plan to dc home early tomorrow am as patient has agreed to use credit care to secure oxygen payment until insurance starts.    Consultants/Specialty  Pulm - Smith  onc - Wilson    Code Status  full    Disposition  Home when appropriate    Review of Systems  Review of Systems   Constitutional: Positive for weight loss. Negative for chills and fever.   HENT: Negative for congestion and sore throat.    Eyes: Negative for blurred vision and photophobia.   Respiratory: Negative for shortness of breath.    Cardiovascular: Negative for chest pain, claudication and leg swelling.   Gastrointestinal: Negative for  abdominal pain, constipation, diarrhea, heartburn, nausea and vomiting.   Genitourinary: Negative for dysuria and hematuria.   Musculoskeletal: Negative for joint pain and myalgias.   Skin: Negative for itching and rash.   Neurological: Positive for weakness. Negative for dizziness, sensory change, speech change and headaches.   Psychiatric/Behavioral: Negative for depression. The patient is nervous/anxious and has insomnia.         Physical Exam  Temp:  [36.3 °C (97.3 °F)-36.4 °C (97.5 °F)] 36.4 °C (97.5 °F)  Pulse:  [62-68] 62  Resp:  [18-20] 18  BP: (112-137)/(63-73) 128/64  SpO2:  [94 %-100 %] 94 %    Physical Exam   Constitutional: She is oriented to person, place, and time. She appears well-developed and well-nourished. No distress.   HENT:   Head: Normocephalic and atraumatic.   Eyes: Conjunctivae are normal. No scleral icterus.   Neck: Neck supple. No JVD present.   Cardiovascular: Normal rate, regular rhythm and normal heart sounds.  Exam reveals no gallop and no friction rub.    No murmur heard.  Pulmonary/Chest: Effort normal and breath sounds normal. No respiratory distress. She exhibits no tenderness.   Decreased at bases bilaterally     Abdominal: Soft. Bowel sounds are normal. She exhibits no distension. There is no guarding.   Musculoskeletal: She exhibits no edema or tenderness.   Lymphadenopathy:     She has no cervical adenopathy.   Neurological: She is alert and oriented to person, place, and time. No cranial nerve deficit.   Skin: Skin is warm and dry. She is not diaphoretic. No erythema. No pallor.   Psychiatric: She has a normal mood and affect. Her behavior is normal.   Nursing note and vitals reviewed.      Fluids  No intake or output data in the 24 hours ending 01/30/19 2124    Laboratory  Recent Labs      01/30/19   0025   WBC  11.4*   RBC  3.62*   HEMOGLOBIN  11.1*   HEMATOCRIT  34.5*   MCV  95.3   MCH  30.7   MCHC  32.2*   RDW  40.4   PLATELETCT  343   MPV  9.2     Recent Labs       01/30/19   0025   SODIUM  132*   POTASSIUM  4.3   CHLORIDE  102   CO2  22   GLUCOSE  245*   BUN  11   CREATININE  0.54   CALCIUM  7.9*                   Imaging  IR-CVC PORT PLACEMENT > AGE 5   Final Result      1. Ultrasound and fluoroscopic guided placement of a right internal jugular single lumen Bard PowerPort venous access device.      2. The port may be used immediately as clinically indicated. Flushes per protocol.      3. The skin staples and suture should be removed in 10-12 days. This can be performed in the radiology department on any weekday without a prior appointment if desired.      MR-BRAIN-WITH & W/O   Final Result      1.  There is no intracranial metastasis.   2.  Mild cerebral atrophy.   3.  Mild chronic microvascular ischemic disease.      CT-ABDOMEN-PELVIS WITH   Final Result      No evidence of metastatic disease is identified.      Status post cholecystectomy.      Pancreatic calcifications likely represent sequelae of chronic pancreatitis.      Atherosclerotic plaque.      Nonvisualization of the appendix, limiting evaluation.      Left lower lobe lobulated masslike density with surrounding patchy opacities, better evaluated on the prior CT chest. Volume loss is again noted on the left.           Assessment/Plan  * Non-small cell lung cancer (HCC)   Assessment & Plan    Bilateral upper lobe lung masses noted on CT, suspcious for lung cancer  Pulmonology consulted - s/p bronch with Dr Lynne  CT abdomen pelvis without evidence of malignancy  MRI brain without mets  1/22 bronchoscopy with brush biopsy done  Pathology showing poorly differentiated NSCC  chemoradiation started 1/29  [facility] cannot accommodate chemo until 2/7/19 (delay of schedule by 2 days), as her only place she can current receive chemo is either inpatient or [facility]       Anxiety   Assessment & Plan    Secondary to possible cancer  Low dose xanax PRN       Severe protein-calorie malnutrition (HCC)   Assessment & Plan    Nutrition  consulted       Tobacco abuse   Assessment & Plan    Encouraged smoking cessation           VTE prophylaxis: scds

## 2019-01-31 NOTE — DISCHARGE PLANNING
"LSW placed call to [facility] to clarify oxygen quote. Per [facility], oxygen is $155 a month for tanks and concentrator, $10 to  tanks at facility or $22 for delivery.   LSW placed call to son-in-law in regards to new oxygen set-up. Son-in-law stated that they quoted him more than the $155 that was quoted to LSW this morning, requested LSW call back at noon as he is unable to discuss this at work.      Update:  Per [facility] department patient's \"presumptive Medicaid\" was denied due to not being able to verify income for the month of January. Kirsty with [facility] stated it was escalated and that PFA department can come meet with patient today to verify income is below $1,398.  "

## 2019-01-31 NOTE — DISCHARGE PLANNING
Agency/Facility Name: [facility]  Spoke to: intake  Outcome: [facility] has been in contact with the Pt son-in-law. Just waiting for a call back from him to set up credit card payment. Son-in-law said yesterday he would call [employer] this morning.     Payton (RNCM) notified.

## 2019-01-31 NOTE — DIETARY
Nutrition Services Update: following for adequate nutritional intake    Day 10 of admit, pt now started chemo and radiation for new dx of non-small cell lung cancer.    ADL documentation limited, meals documented vary % consumed. Visited pt at bedside and pt states that she feels her appetite has been doing well, and states that her intake is consistently at least 50% but also frequently consumes >75%. Pt is receiving snacks TID and high protein milkshakes BID, which she states she has been consuming.     Although intake has been fairly adequate with snacks and shakes, wt has trended down 0.7 kg (1%) since admit (x 10 days), which is not classified as significant, however is worth noting given high risk dx of hypermetabolic disease and BMI of 17.4. Discussed with pt benefits of high calories snacks whenever possible, educating on different high calorie options. Pt is aware of importance of nutrition, and has been focusing on drinking whole milk, and consuming snacks.     Plan/Recommend:  1. Provided pt with handout on lung cancer and nutrition, with resource for high calorie foods and tips for combating side effects of treatment  2. Informed pt of outpatient RD services for after discharge - and encouraged her to be proactive given she already has ongoing wt loss   3. Will continue snacks TID and milkshakes BID during admit to optimize caloric intake  4. Please continue to encourage PO intake of meals and snacks  5. Record intake as % meals consumed in ADLs  6. Nutrition Representative to see daily for meal selection  7. RD to monitor PO intake, wt trends, and nutrition labs/meds

## 2019-01-31 NOTE — PROGRESS NOTES
Rec'd report from day shift RN. Assumed pt care. Assessment completed. AAOx4. Patient has no complaints of pain at this time. Pt is tearful after hearing news of her discharge being delayed. One-on-one discussion and support given. Pt understands reasons, just disappointed. Port with positive blood return. Pt ambulates to the bathroom with SBA, maintains steady gait and tolerates well. Bed in lowest position, bed locked, bed alarm is not on. Patient calls appropriately. Treaded socks in place, RN and CNA numbers provided, call light within reach. Pt needs met at this time.

## 2019-01-31 NOTE — CARE PLAN
Problem: Nutritional:  Goal: Achieve adequate nutritional intake  Patient will consume >50% of meals   Outcome: PROGRESSING AS EXPECTED    Intervention: Monitor PO intake, weights, and laboratory values  Intake appears adequate, however pt continues to have ongoing wt loss likely 2' to hypermetabolic disease state.     RD to continue following

## 2019-02-01 ENCOUNTER — HOSPITAL ENCOUNTER (OUTPATIENT)
Dept: RADIATION ONCOLOGY | Facility: MEDICAL CENTER | Age: 54
End: 2019-02-01

## 2019-02-01 LAB
ANION GAP SERPL CALC-SCNC: 4 MMOL/L (ref 0–11.9)
BASOPHILS # BLD AUTO: 0.1 % (ref 0–1.8)
BASOPHILS # BLD: 0.01 K/UL (ref 0–0.12)
BUN SERPL-MCNC: 11 MG/DL (ref 8–22)
CALCIUM SERPL-MCNC: 8.4 MG/DL (ref 8.5–10.5)
CHLORIDE SERPL-SCNC: 105 MMOL/L (ref 96–112)
CO2 SERPL-SCNC: 24 MMOL/L (ref 20–33)
CREAT SERPL-MCNC: 0.54 MG/DL (ref 0.5–1.4)
EOSINOPHIL # BLD AUTO: 0.07 K/UL (ref 0–0.51)
EOSINOPHIL NFR BLD: 0.7 % (ref 0–6.9)
ERYTHROCYTE [DISTWIDTH] IN BLOOD BY AUTOMATED COUNT: 40.7 FL (ref 35.9–50)
GLUCOSE SERPL-MCNC: 92 MG/DL (ref 65–99)
HCT VFR BLD AUTO: 33 % (ref 37–47)
HGB BLD-MCNC: 10.7 G/DL (ref 12–16)
IMM GRANULOCYTES # BLD AUTO: 0.03 K/UL (ref 0–0.11)
IMM GRANULOCYTES NFR BLD AUTO: 0.3 % (ref 0–0.9)
LYMPHOCYTES # BLD AUTO: 1.12 K/UL (ref 1–4.8)
LYMPHOCYTES NFR BLD: 10.9 % (ref 22–41)
MCH RBC QN AUTO: 30.8 PG (ref 27–33)
MCHC RBC AUTO-ENTMCNC: 32.4 G/DL (ref 33.6–35)
MCV RBC AUTO: 95.1 FL (ref 81.4–97.8)
MONOCYTES # BLD AUTO: 0.61 K/UL (ref 0–0.85)
MONOCYTES NFR BLD AUTO: 5.9 % (ref 0–13.4)
NEUTROPHILS # BLD AUTO: 8.42 K/UL (ref 2–7.15)
NEUTROPHILS NFR BLD: 82.1 % (ref 44–72)
NRBC # BLD AUTO: 0 K/UL
NRBC BLD-RTO: 0 /100 WBC
PLATELET # BLD AUTO: 366 K/UL (ref 164–446)
PMV BLD AUTO: 9.1 FL (ref 9–12.9)
POTASSIUM SERPL-SCNC: 4.6 MMOL/L (ref 3.6–5.5)
RBC # BLD AUTO: 3.47 M/UL (ref 4.2–5.4)
SODIUM SERPL-SCNC: 133 MMOL/L (ref 135–145)
WBC # BLD AUTO: 10.3 K/UL (ref 4.8–10.8)

## 2019-02-01 PROCEDURE — 700102 HCHG RX REV CODE 250 W/ 637 OVERRIDE(OP): Performed by: HOSPITALIST

## 2019-02-01 PROCEDURE — 770004 HCHG ROOM/CARE - ONCOLOGY PRIVATE *

## 2019-02-01 PROCEDURE — 80048 BASIC METABOLIC PNL TOTAL CA: CPT

## 2019-02-01 PROCEDURE — A9270 NON-COVERED ITEM OR SERVICE: HCPCS | Performed by: HOSPITALIST

## 2019-02-01 PROCEDURE — A9270 NON-COVERED ITEM OR SERVICE: HCPCS | Performed by: INTERNAL MEDICINE

## 2019-02-01 PROCEDURE — 77387 GUIDANCE FOR RADJ TX DLVR: CPT | Performed by: RADIOLOGY

## 2019-02-01 PROCEDURE — 77014 PR CT GUIDANCE PLACEMENT RAD THERAPY FIELDS: CPT | Mod: 26 | Performed by: RADIOLOGY

## 2019-02-01 PROCEDURE — 99232 SBSQ HOSP IP/OBS MODERATE 35: CPT | Performed by: INTERNAL MEDICINE

## 2019-02-01 PROCEDURE — 85025 COMPLETE CBC W/AUTO DIFF WBC: CPT

## 2019-02-01 PROCEDURE — 700102 HCHG RX REV CODE 250 W/ 637 OVERRIDE(OP): Performed by: INTERNAL MEDICINE

## 2019-02-01 PROCEDURE — 77412 RADIATION TX DELIVERY LVL 3: CPT | Performed by: RADIOLOGY

## 2019-02-01 PROCEDURE — 700111 HCHG RX REV CODE 636 W/ 250 OVERRIDE (IP): Performed by: HOSPITALIST

## 2019-02-01 RX ADMIN — SENNOSIDES AND DOCUSATE SODIUM 2 TABLET: 8.6; 5 TABLET ORAL at 17:00

## 2019-02-01 RX ADMIN — HYDROCODONE BITARTRATE AND HOMATROPINE METHYLBROMIDE 5 ML: 5; 1.5 SOLUTION ORAL at 15:49

## 2019-02-01 RX ADMIN — ONDANSETRON 4 MG: 4 TABLET, ORALLY DISINTEGRATING ORAL at 04:28

## 2019-02-01 RX ADMIN — BUDESONIDE AND FORMOTEROL FUMARATE DIHYDRATE 2 PUFF: 160; 4.5 AEROSOL RESPIRATORY (INHALATION) at 17:00

## 2019-02-01 RX ADMIN — SENNOSIDES AND DOCUSATE SODIUM 2 TABLET: 8.6; 5 TABLET ORAL at 04:19

## 2019-02-01 RX ADMIN — ONDANSETRON 4 MG: 4 TABLET, ORALLY DISINTEGRATING ORAL at 15:49

## 2019-02-01 RX ADMIN — BUDESONIDE AND FORMOTEROL FUMARATE DIHYDRATE 2 PUFF: 160; 4.5 AEROSOL RESPIRATORY (INHALATION) at 04:19

## 2019-02-01 RX ADMIN — DIAZEPAM 2.5 MG: 5 TABLET ORAL at 22:43

## 2019-02-01 RX ADMIN — HYDROCODONE BITARTRATE AND HOMATROPINE METHYLBROMIDE 5 ML: 5; 1.5 SOLUTION ORAL at 04:28

## 2019-02-01 RX ADMIN — ACETAMINOPHEN 650 MG: 325 TABLET, FILM COATED ORAL at 22:43

## 2019-02-01 RX ADMIN — HYDROCODONE BITARTRATE AND HOMATROPINE METHYLBROMIDE 5 ML: 5; 1.5 SOLUTION ORAL at 21:08

## 2019-02-01 RX ADMIN — NICOTINE 21 MG: 21 PATCH, EXTENDED RELEASE TRANSDERMAL at 04:19

## 2019-02-01 RX ADMIN — ONDANSETRON 4 MG: 4 TABLET, ORALLY DISINTEGRATING ORAL at 21:07

## 2019-02-01 ASSESSMENT — ENCOUNTER SYMPTOMS
NAUSEA: 0
FEVER: 0
WEAKNESS: 1
WEIGHT LOSS: 1
CLAUDICATION: 0
SHORTNESS OF BREATH: 0
PHOTOPHOBIA: 0
SENSORY CHANGE: 0
DIZZINESS: 0
MYALGIAS: 0
DIARRHEA: 0
DEPRESSION: 0
ABDOMINAL PAIN: 0
CHILLS: 0
HEARTBURN: 0
HEADACHES: 0
SORE THROAT: 0
VOMITING: 0
CONSTIPATION: 0
BLURRED VISION: 0
NERVOUS/ANXIOUS: 1
SPEECH CHANGE: 0
INSOMNIA: 0

## 2019-02-01 NOTE — PROGRESS NOTES
Patient received treatment in Radiation Therapy. Patient received treatment number 4 of 30 planned.

## 2019-02-01 NOTE — CARE PLAN
Problem: Communication  Goal: The ability to communicate needs accurately and effectively will improve  Outcome: PROGRESSING AS EXPECTED  discussed poc, family at bedside all questions answered    Problem: Safety  Goal: Will remain free from injury  Outcome: PROGRESSING AS EXPECTED  Pt is up self with a steady gait.

## 2019-02-01 NOTE — PROGRESS NOTES
Patient A&O x4, upself with steady gait. Port patent with blood return infusing tko fluids. Pt c/o cough and rib pain; cough syrup in use and patient started on tessalon today.   Spoke with daughter and ; daughter is withholding from making payment to oxygen company until insurance status can be confirmed in order to approve [facility] appointments. Per, [facility], patient and family could be liable for self pay of [facility] appointments unless Medicaid can be presumptively approved. Daughter, patient, and MD aware of financial barrier to discharge.

## 2019-02-01 NOTE — PROGRESS NOTES
Hospital Medicine Daily Progress Note    Date of Service  1/29/2019    Chief Complaint  53 y.o. female admitted 1/20/2019 with cough since April 2018, multiple round of antibiotics with only transient improvement.  CT chest with lung mass.    Hospital Course    Patient seen by pulmonology as IR recommended bronchial biopsy given central location of mass.  EBUS initially planned but Dr Lynne feels traditional biopsy able to gather necessary tissue for diagnosis.      Interval Problem Update  1/21/19 Patient anxious and tearful of possible diagnosis.  Family comforting patient and requesting small dose medication to allow patient to calm down.  Xanax 0.25 mg ordered PRN.  NPO at [facility] for bronchoscopy 11am tomorrow.  1/29/19 Patient had first radiation and expecting to start chemo this afternoon.  She is hoping to discharge home tomorrow but is agreeable to make sure all is authorized given her pending insurance status.  She has continued radiation and will need to figure out where she will receive chemo.  1/30 Patient doing well with chemo from yesterday, tolerating radiation.  She will require oxygen at home as she is requiring 3L with any exertion to maintain saturations.  [facility] cannot accommodate chemo appointment until 2/7 - will need to verify with oncology if this is okay for a 2 day delay in treatment, if so plan to dc home early tomorrow am as patient has agreed to use credit care to secure oxygen payment until insurance starts.  1/31 Patient developed cough overnight as is likely related to her initiation of chemo/rads and irritation of tumor.  Hycodan and tessalon started.  She is having significant rib irritiation right side with cough.  She did not sleep last night due to cough and pain.  I ordered valium qhs tonight for muscle relaxant and mild sedative (2.5mg and may repeat dose x 1 qhs).  CXR tomorrow if cough remains same.    Consultants/Specialty  Pulm - Smith  onc - Wilson    Code  Status  full    Disposition  Home when appropriate    Review of Systems  Review of Systems   Constitutional: Positive for weight loss. Negative for chills and fever.   HENT: Negative for congestion and sore throat.    Eyes: Negative for blurred vision and photophobia.   Respiratory: Negative for shortness of breath.    Cardiovascular: Negative for chest pain, claudication and leg swelling.   Gastrointestinal: Negative for abdominal pain, constipation, diarrhea, heartburn, nausea and vomiting.   Genitourinary: Negative for dysuria and hematuria.   Musculoskeletal: Negative for joint pain and myalgias.   Skin: Negative for itching and rash.   Neurological: Positive for weakness. Negative for dizziness, sensory change, speech change and headaches.   Psychiatric/Behavioral: Negative for depression. The patient is nervous/anxious and has insomnia.         Physical Exam  Temp:  [36.3 °C (97.3 °F)-36.4 °C (97.6 °F)] 36.3 °C (97.4 °F)  Pulse:  [62-74] 74  Resp:  [18] 18  BP: (104-128)/(61-70) 117/70  SpO2:  [94 %-96 %] 95 %    Physical Exam   Constitutional: She is oriented to person, place, and time. She appears well-developed and well-nourished. No distress.   HENT:   Head: Normocephalic and atraumatic.   Eyes: Conjunctivae are normal. No scleral icterus.   Neck: Neck supple. No JVD present.   Cardiovascular: Normal rate, regular rhythm and normal heart sounds.  Exam reveals no gallop and no friction rub.    No murmur heard.  Pulmonary/Chest: Effort normal and breath sounds normal. No respiratory distress. She exhibits no tenderness.   Decreased at bases bilaterally     Abdominal: Soft. Bowel sounds are normal. She exhibits no distension. There is no guarding.   Musculoskeletal: She exhibits no edema or tenderness.   Lymphadenopathy:     She has no cervical adenopathy.   Neurological: She is alert and oriented to person, place, and time. No cranial nerve deficit.   Skin: Skin is warm and dry. She is not diaphoretic. No  erythema. No pallor.   Psychiatric: She has a normal mood and affect. Her behavior is normal.   Nursing note and vitals reviewed.      Fluids  No intake or output data in the 24 hours ending 01/31/19 1915    Laboratory  Recent Labs      01/30/19   0025 01/31/19   0520   WBC  11.4*  11.8*   RBC  3.62*  3.71*   HEMOGLOBIN  11.1*  11.7*   HEMATOCRIT  34.5*  35.6*   MCV  95.3  96.0   MCH  30.7  31.5   MCHC  32.2*  32.9*   RDW  40.4  40.4   PLATELETCT  343  401   MPV  9.2  9.1     Recent Labs      01/30/19 0025  01/31/19   0520   SODIUM  132*  134*   POTASSIUM  4.3  4.3   CHLORIDE  102  105   CO2  22  22   GLUCOSE  245*  83   BUN  11  13   CREATININE  0.54  0.57   CALCIUM  7.9*  9.1                   Imaging  IR-CVC PORT PLACEMENT > AGE 5   Final Result      1. Ultrasound and fluoroscopic guided placement of a right internal jugular single lumen Bard PowerPort venous access device.      2. The port may be used immediately as clinically indicated. Flushes per protocol.      3. The skin staples and suture should be removed in 10-12 days. This can be performed in the radiology department on any weekday without a prior appointment if desired.      MR-BRAIN-WITH & W/O   Final Result      1.  There is no intracranial metastasis.   2.  Mild cerebral atrophy.   3.  Mild chronic microvascular ischemic disease.      CT-ABDOMEN-PELVIS WITH   Final Result      No evidence of metastatic disease is identified.      Status post cholecystectomy.      Pancreatic calcifications likely represent sequelae of chronic pancreatitis.      Atherosclerotic plaque.      Nonvisualization of the appendix, limiting evaluation.      Left lower lobe lobulated masslike density with surrounding patchy opacities, better evaluated on the prior CT chest. Volume loss is again noted on the left.           Assessment/Plan  * Non-small cell lung cancer (HCC)   Assessment & Plan    Bilateral upper lobe lung masses noted on CT, suspcious for lung  cancer  Pulmonology consulted - s/p bronch with Dr Lynne  CT abdomen pelvis without evidence of malignancy  MRI brain without mets  1/22 bronchoscopy with brush biopsy done  Pathology showing poorly differentiated NSCC  chemoradiation started 1/29  [facility] cannot accommodate chemo until 2/7/19 (delay of schedule by 2 days), as her only place she can current receive chemo is either inpatient or [facility] -  Appointment held but also pending insurance organization         Cough   Assessment & Plan    Likely due to initiation of chemoradiation and irritation of tumor  Tessalon, hycodan       Anxiety   Assessment & Plan    Secondary to possible cancer  Low dose xanax PRN       Severe protein-calorie malnutrition (HCC)   Assessment & Plan    Nutrition consulted       Tobacco abuse   Assessment & Plan    Encouraged smoking cessation           VTE prophylaxis: scds

## 2019-02-01 NOTE — PROGRESS NOTES
A/ox4, up self, family at bedside.   Port flushed + blood return.   Waiting for Medicaid to approve her status then she will dc.   Given Valium to help sleep.   Rounding in place.

## 2019-02-02 LAB
ANION GAP SERPL CALC-SCNC: 6 MMOL/L (ref 0–11.9)
BASOPHILS # BLD AUTO: 0.2 % (ref 0–1.8)
BASOPHILS # BLD: 0.02 K/UL (ref 0–0.12)
BUN SERPL-MCNC: 10 MG/DL (ref 8–22)
CALCIUM SERPL-MCNC: 8.4 MG/DL (ref 8.5–10.5)
CHLORIDE SERPL-SCNC: 104 MMOL/L (ref 96–112)
CO2 SERPL-SCNC: 24 MMOL/L (ref 20–33)
CREAT SERPL-MCNC: 0.53 MG/DL (ref 0.5–1.4)
EOSINOPHIL # BLD AUTO: 0.06 K/UL (ref 0–0.51)
EOSINOPHIL NFR BLD: 0.6 % (ref 0–6.9)
ERYTHROCYTE [DISTWIDTH] IN BLOOD BY AUTOMATED COUNT: 41.1 FL (ref 35.9–50)
GLUCOSE SERPL-MCNC: 96 MG/DL (ref 65–99)
HCT VFR BLD AUTO: 31.7 % (ref 37–47)
HGB BLD-MCNC: 10.3 G/DL (ref 12–16)
IMM GRANULOCYTES # BLD AUTO: 0.04 K/UL (ref 0–0.11)
IMM GRANULOCYTES NFR BLD AUTO: 0.4 % (ref 0–0.9)
LYMPHOCYTES # BLD AUTO: 0.79 K/UL (ref 1–4.8)
LYMPHOCYTES NFR BLD: 7.8 % (ref 22–41)
MCH RBC QN AUTO: 31 PG (ref 27–33)
MCHC RBC AUTO-ENTMCNC: 32.5 G/DL (ref 33.6–35)
MCV RBC AUTO: 95.5 FL (ref 81.4–97.8)
MONOCYTES # BLD AUTO: 0.57 K/UL (ref 0–0.85)
MONOCYTES NFR BLD AUTO: 5.6 % (ref 0–13.4)
NEUTROPHILS # BLD AUTO: 8.67 K/UL (ref 2–7.15)
NEUTROPHILS NFR BLD: 85.4 % (ref 44–72)
NRBC # BLD AUTO: 0 K/UL
NRBC BLD-RTO: 0 /100 WBC
PLATELET # BLD AUTO: 370 K/UL (ref 164–446)
PMV BLD AUTO: 8.8 FL (ref 9–12.9)
POTASSIUM SERPL-SCNC: 4.5 MMOL/L (ref 3.6–5.5)
RBC # BLD AUTO: 3.32 M/UL (ref 4.2–5.4)
SODIUM SERPL-SCNC: 134 MMOL/L (ref 135–145)
WBC # BLD AUTO: 10.2 K/UL (ref 4.8–10.8)

## 2019-02-02 PROCEDURE — 700102 HCHG RX REV CODE 250 W/ 637 OVERRIDE(OP): Performed by: HOSPITALIST

## 2019-02-02 PROCEDURE — A9270 NON-COVERED ITEM OR SERVICE: HCPCS | Performed by: HOSPITALIST

## 2019-02-02 PROCEDURE — 700111 HCHG RX REV CODE 636 W/ 250 OVERRIDE (IP): Performed by: HOSPITALIST

## 2019-02-02 PROCEDURE — 80048 BASIC METABOLIC PNL TOTAL CA: CPT

## 2019-02-02 PROCEDURE — 700102 HCHG RX REV CODE 250 W/ 637 OVERRIDE(OP): Performed by: INTERNAL MEDICINE

## 2019-02-02 PROCEDURE — 770004 HCHG ROOM/CARE - ONCOLOGY PRIVATE *

## 2019-02-02 PROCEDURE — 99232 SBSQ HOSP IP/OBS MODERATE 35: CPT | Performed by: INTERNAL MEDICINE

## 2019-02-02 PROCEDURE — 85025 COMPLETE CBC W/AUTO DIFF WBC: CPT

## 2019-02-02 PROCEDURE — A9270 NON-COVERED ITEM OR SERVICE: HCPCS | Performed by: INTERNAL MEDICINE

## 2019-02-02 RX ADMIN — DIAZEPAM 2.5 MG: 5 TABLET ORAL at 23:07

## 2019-02-02 RX ADMIN — ONDANSETRON 4 MG: 4 TABLET, ORALLY DISINTEGRATING ORAL at 15:53

## 2019-02-02 RX ADMIN — BUDESONIDE AND FORMOTEROL FUMARATE DIHYDRATE 2 PUFF: 160; 4.5 AEROSOL RESPIRATORY (INHALATION) at 05:52

## 2019-02-02 RX ADMIN — ONDANSETRON 4 MG: 4 TABLET, ORALLY DISINTEGRATING ORAL at 11:18

## 2019-02-02 RX ADMIN — ENOXAPARIN SODIUM 40 MG: 100 INJECTION SUBCUTANEOUS at 05:52

## 2019-02-02 RX ADMIN — SENNOSIDES AND DOCUSATE SODIUM 2 TABLET: 8.6; 5 TABLET ORAL at 17:31

## 2019-02-02 RX ADMIN — BENZONATATE 100 MG: 100 CAPSULE ORAL at 13:51

## 2019-02-02 RX ADMIN — BUDESONIDE AND FORMOTEROL FUMARATE DIHYDRATE 2 PUFF: 160; 4.5 AEROSOL RESPIRATORY (INHALATION) at 17:32

## 2019-02-02 RX ADMIN — SENNOSIDES AND DOCUSATE SODIUM 2 TABLET: 8.6; 5 TABLET ORAL at 05:52

## 2019-02-02 RX ADMIN — HYDROCODONE BITARTRATE AND HOMATROPINE METHYLBROMIDE 5 ML: 5; 1.5 SOLUTION ORAL at 15:53

## 2019-02-02 RX ADMIN — HYDROCODONE BITARTRATE AND HOMATROPINE METHYLBROMIDE 5 ML: 5; 1.5 SOLUTION ORAL at 11:18

## 2019-02-02 RX ADMIN — BENZONATATE 100 MG: 100 CAPSULE ORAL at 05:51

## 2019-02-02 RX ADMIN — NICOTINE 21 MG: 21 PATCH, EXTENDED RELEASE TRANSDERMAL at 05:51

## 2019-02-02 RX ADMIN — HYDROCODONE BITARTRATE AND HOMATROPINE METHYLBROMIDE 5 ML: 5; 1.5 SOLUTION ORAL at 21:11

## 2019-02-02 RX ADMIN — ONDANSETRON 4 MG: 4 TABLET, ORALLY DISINTEGRATING ORAL at 05:52

## 2019-02-02 RX ADMIN — ONDANSETRON 4 MG: 4 TABLET, ORALLY DISINTEGRATING ORAL at 21:11

## 2019-02-02 ASSESSMENT — ENCOUNTER SYMPTOMS
FEVER: 0
SPEECH CHANGE: 0
CHILLS: 0
DEPRESSION: 0
SENSORY CHANGE: 0
NERVOUS/ANXIOUS: 1
PHOTOPHOBIA: 0
SHORTNESS OF BREATH: 0
DIZZINESS: 0
INSOMNIA: 0
CONSTIPATION: 0
DIARRHEA: 0
VOMITING: 0
WEAKNESS: 1
MYALGIAS: 0
SORE THROAT: 0
HEARTBURN: 0
BLURRED VISION: 0
NAUSEA: 0
ABDOMINAL PAIN: 0
WEIGHT LOSS: 1
CLAUDICATION: 0
HEADACHES: 0

## 2019-02-02 NOTE — PROGRESS NOTES
Assumed care of pt @0700. Bedside report received. Pt AOX 4. Pt rates 4-5/10 declines med. Pt on 02 saturating above 90 %. Pt denies nausea and SOB. Pt complains about cough. Cough syrup given. Port patent with positive blood return. Last Bm 02/01. Pt up self. Fall precaution in place. POC discussed with pt, all questions answered at this time. Pt makes needs known, call light within reach, hourly rounding in place.

## 2019-02-02 NOTE — PROGRESS NOTES
Assumed care of pt @0700. Bedside report received. Pt AOX 4. Pt rates 1-2/10, declines med. Pt on 02 saturating above 90 %. Pt denies nausea and SOB. Pt complains about cough. Cough syrup given. Port patent with positive blood return. Last Bm 01/30. Pt ambulated around the unit. Pt up self. Fall precaution in place. POC discussed with pt, all questions answered at this time. Pt makes needs known, call light within reach, hourly rounding in place.

## 2019-02-02 NOTE — CARE PLAN
Problem: Bowel/Gastric:  Goal: Normal bowel function is maintained or improved  Outcome: PROGRESSING AS EXPECTED  Pt denies nausea. Last Bm 01/30. Pt passing gas.     Problem: Pain Management  Goal: Pain level will decrease to patient's comfort goal  Outcome: PROGRESSING AS EXPECTED  Pt rates pain 2/10, declines pain med.     Problem: Respiratory:  Goal: Respiratory status will improve  Outcome: PROGRESSING AS EXPECTED  Pt on 02 saturating above 90 %.     Problem: Mobility  Goal: Risk for activity intolerance will decrease  Outcome: PROGRESSING AS EXPECTED  Pt ambulated around the camara.

## 2019-02-02 NOTE — PROGRESS NOTES
Hospital Medicine Daily Progress Note    Date of Service  1/29/2019    Chief Complaint  53 y.o. female admitted 1/20/2019 with cough since April 2018, multiple round of antibiotics with only transient improvement.  CT chest with lung mass.    Hospital Course    Patient seen by pulmonology as IR recommended bronchial biopsy given central location of mass.  EBUS initially planned but Dr Lynne feels traditional biopsy able to gather necessary tissue for diagnosis.      Interval Problem Update  1/21/19 Patient anxious and tearful of possible diagnosis.  Family comforting patient and requesting small dose medication to allow patient to calm down.  Xanax 0.25 mg ordered PRN.  NPO at [facility] [place] for bronchoscopy 11am tomorrow.  1/29/19 Patient had first radiation and expecting to start chemo this afternoon.  She is hoping to discharge home tomorrow but is agreeable to make sure all is authorized given her pending insurance status.  She has continued radiation and will need to figure out where she will receive chemo.  1/30 Patient doing well with chemo from yesterday, tolerating radiation.  She will require oxygen at home as she is requiring 3L with any exertion to maintain saturations.  [facility] cannot accommodate chemo appointment until 2/7 - will need to verify with oncology if this is okay for a 2 day delay in treatment, if so plan to dc home early tomorrow am as patient has agreed to use credit care to secure oxygen payment until insurance starts.  1/31 Patient developed cough overnight as is likely related to her initiation of chemo/rads and irritation of tumor.  Hycodan and tessalon started.  She is having significant rib irritiation right side with cough.  She did not sleep last night due to cough and pain.  I ordered valium qhs tonight for muscle relaxant and mild sedative (2.5mg and may repeat dose x 1 qhs).  CXR tomorrow if cough remains same.  2/1 Patient feeling well today, cough has improved significantly.  She is  "tolerating radiation without pain.  Still issue with coordination with insurance \"probable\".  Financial assistance is supposed to be coming to bedside and discuss with patient.  2/2 Patient states she is doing okay, today is her birthday and she has had many visitors wishing her well.  Cough is still present but not as severe each day.       Consultants/Specialty  Pulm - Smith  onc - Wilson    Code Status  full    Disposition  Home when appropriate    Review of Systems  Review of Systems   Constitutional: Positive for weight loss. Negative for chills and fever.   HENT: Negative for congestion and sore throat.    Eyes: Negative for blurred vision and photophobia.   Respiratory: Negative for shortness of breath.    Cardiovascular: Negative for chest pain, claudication and leg swelling.   Gastrointestinal: Negative for abdominal pain, constipation, diarrhea, heartburn, nausea and vomiting.   Genitourinary: Negative for dysuria and hematuria.   Musculoskeletal: Negative for joint pain and myalgias.   Skin: Negative for itching and rash.   Neurological: Positive for weakness. Negative for dizziness, sensory change, speech change and headaches.   Psychiatric/Behavioral: Negative for depression. The patient is nervous/anxious. The patient does not have insomnia.         Physical Exam  Temp:  [36.8 °C (98.3 °F)-37.7 °C (99.8 °F)] 37.2 °C (99 °F)  Pulse:  [70-84] 71  Resp:  [16-20] 16  BP: ()/(52-62) 97/54  SpO2:  [92 %-96 %] 93 %    Physical Exam   Constitutional: She is oriented to person, place, and time. She appears well-developed and well-nourished. No distress.   HENT:   Head: Normocephalic and atraumatic.   Eyes: Conjunctivae are normal. No scleral icterus.   Neck: Neck supple. No JVD present.   Cardiovascular: Normal rate, regular rhythm and normal heart sounds.  Exam reveals no gallop and no friction rub.    No murmur heard.  Pulmonary/Chest: Effort normal and breath sounds normal. No respiratory distress. She " exhibits no tenderness.   Decreased at bases bilaterally     Abdominal: Soft. Bowel sounds are normal. She exhibits no distension. There is no guarding.   Musculoskeletal: She exhibits no edema or tenderness.   Lymphadenopathy:     She has no cervical adenopathy.   Neurological: She is alert and oriented to person, place, and time. No cranial nerve deficit.   Skin: Skin is warm and dry. She is not diaphoretic. No erythema. No pallor.   Psychiatric: She has a normal mood and affect. Her behavior is normal.   Nursing note and vitals reviewed.      Fluids    Intake/Output Summary (Last 24 hours) at 02/02/19 1535  Last data filed at 02/02/19 0930   Gross per 24 hour   Intake              910 ml   Output                0 ml   Net              910 ml       Laboratory  Recent Labs      01/31/19 0520 02/01/19 0400 02/02/19   0425   WBC  11.8*  10.3  10.2   RBC  3.71*  3.47*  3.32*   HEMOGLOBIN  11.7*  10.7*  10.3*   HEMATOCRIT  35.6*  33.0*  31.7*   MCV  96.0  95.1  95.5   MCH  31.5  30.8  31.0   MCHC  32.9*  32.4*  32.5*   RDW  40.4  40.7  41.1   PLATELETCT  401  366  370   MPV  9.1  9.1  8.8*     Recent Labs      01/31/19 0520 02/01/19 0400 02/02/19   0425   SODIUM  134*  133*  134*   POTASSIUM  4.3  4.6  4.5   CHLORIDE  105  105  104   CO2  22  24  24   GLUCOSE  83  92  96   BUN  13  11  10   CREATININE  0.57  0.54  0.53   CALCIUM  9.1  8.4*  8.4*                   Imaging  IR-CVC PORT PLACEMENT > AGE 5   Final Result      1. Ultrasound and fluoroscopic guided placement of a right internal jugular single lumen Bard PowerPort venous access device.      2. The port may be used immediately as clinically indicated. Flushes per protocol.      3. The skin staples and suture should be removed in 10-12 days. This can be performed in the radiology department on any weekday without a prior appointment if desired.      MR-BRAIN-WITH & W/O   Final Result      1.  There is no intracranial metastasis.   2.  Mild cerebral  atrophy.   3.  Mild chronic microvascular ischemic disease.      CT-ABDOMEN-PELVIS WITH   Final Result      No evidence of metastatic disease is identified.      Status post cholecystectomy.      Pancreatic calcifications likely represent sequelae of chronic pancreatitis.      Atherosclerotic plaque.      Nonvisualization of the appendix, limiting evaluation.      Left lower lobe lobulated masslike density with surrounding patchy opacities, better evaluated on the prior CT chest. Volume loss is again noted on the left.           Assessment/Plan  * Non-small cell lung cancer (HCC)   Assessment & Plan    Bilateral upper lobe lung masses noted on CT, suspcious for lung cancer  Pulmonology consulted - s/p bronch with Dr Lynne  CT abdomen pelvis without evidence of malignancy  MRI brain without mets  1/22 bronchoscopy with brush biopsy done  Pathology showing poorly differentiated NSCC  chemoradiation started 1/29  [facility] cannot accommodate chemo until 2/7/19 (delay of schedule by 2 days), as her only place she can current receive chemo is either inpatient or [facility] -  Appointment held but also pending insurance organization         Cough   Assessment & Plan    Likely due to initiation of chemoradiation and irritation of tumor  Tessalon hycodan       Anxiety   Assessment & Plan    Secondary to possible cancer  Low dose xanax PRN       Severe protein-calorie malnutrition (HCC)   Assessment & Plan    Nutrition consulted       Tobacco abuse   Assessment & Plan    Encouraged smoking cessation           VTE prophylaxis: scds

## 2019-02-02 NOTE — PROGRESS NOTES
Pt A&Ox4. Family at bedside. Medicated for pain and cough this night. Port with positive blood return. Pt calls appropriately for needs All needs met at this time.

## 2019-02-02 NOTE — CARE PLAN
Problem: Safety  Goal: Will remain free from injury  Outcome: PROGRESSING AS EXPECTED  Bed alarm on, A&O x4, slipper socks, bed locked and in low position, call light and personal belongings with reach, report given to CNA, appropriate signs outside door, hourly rounding in place.     Problem: Infection  Goal: Will remain free from infection  Outcome: PROGRESSING AS EXPECTED  Standard precautions in place.     Problem: Pain Management  Goal: Pain level will decrease to patient's comfort goal  Outcome: PROGRESSING AS EXPECTED  Pt complains about rib pain, declines med.

## 2019-02-02 NOTE — PROGRESS NOTES
Hospital Medicine Daily Progress Note    Date of Service  1/29/2019    Chief Complaint  53 y.o. female admitted 1/20/2019 with cough since April 2018, multiple round of antibiotics with only transient improvement.  CT chest with lung mass.    Hospital Course    Patient seen by pulmonology as IR recommended bronchial biopsy given central location of mass.  EBUS initially planned but Dr Lynne feels traditional biopsy able to gather necessary tissue for diagnosis.      Interval Problem Update  1/21/19 Patient anxious and tearful of possible diagnosis.  Family comforting patient and requesting small dose medication to allow patient to calm down.  Xanax 0.25 mg ordered PRN.  NPO at [facility] for bronchoscopy 11am tomorrow.  1/29/19 Patient had first radiation and expecting to start chemo this afternoon.  She is hoping to discharge home tomorrow but is agreeable to make sure all is authorized given her pending insurance status.  She has continued radiation and will need to figure out where she will receive chemo.  1/30 Patient doing well with chemo from yesterday, tolerating radiation.  She will require oxygen at home as she is requiring 3L with any exertion to maintain saturations.  [facility] cannot accommodate chemo appointment until 2/7 - will need to verify with oncology if this is okay for a 2 day delay in treatment, if so plan to dc home early tomorrow am as patient has agreed to use credit care to secure oxygen payment until insurance starts.  1/31 Patient developed cough overnight as is likely related to her initiation of chemo/rads and irritation of tumor.  Hycodan and tessalon started.  She is having significant rib irritiation right side with cough.  She did not sleep last night due to cough and pain.  I ordered valium qhs tonight for muscle relaxant and mild sedative (2.5mg and may repeat dose x 1 qhs).  CXR tomorrow if cough remains same.  2/1 Patient feeling well today, cough has improved significantly.  She is  "tolerating radiation without pain.  Still issue with coordination with insurance \"probable\".  Financial assistance is supposed to be coming to bedside and discuss with patient.    Consultants/Specialty  Pulm - Smith  onc - Wilson    Code Status  full    Disposition  Home when appropriate    Review of Systems  Review of Systems   Constitutional: Positive for weight loss. Negative for chills and fever.   HENT: Negative for congestion and sore throat.    Eyes: Negative for blurred vision and photophobia.   Respiratory: Negative for shortness of breath.    Cardiovascular: Negative for chest pain, claudication and leg swelling.   Gastrointestinal: Negative for abdominal pain, constipation, diarrhea, heartburn, nausea and vomiting.   Genitourinary: Negative for dysuria and hematuria.   Musculoskeletal: Negative for joint pain and myalgias.   Skin: Negative for itching and rash.   Neurological: Positive for weakness. Negative for dizziness, sensory change, speech change and headaches.   Psychiatric/Behavioral: Negative for depression. The patient is nervous/anxious. The patient does not have insomnia.         Physical Exam  Temp:  [36.3 °C (97.4 °F)-36.8 °C (98.3 °F)] 36.8 °C (98.3 °F)  Pulse:  [70-88] 70  Resp:  [16-20] 16  BP: (110-118)/(60-77) 110/60  SpO2:  [91 %-96 %] 96 %    Physical Exam   Constitutional: She is oriented to person, place, and time. She appears well-developed and well-nourished. No distress.   HENT:   Head: Normocephalic and atraumatic.   Eyes: Conjunctivae are normal. No scleral icterus.   Neck: Neck supple. No JVD present.   Cardiovascular: Normal rate, regular rhythm and normal heart sounds.  Exam reveals no gallop and no friction rub.    No murmur heard.  Pulmonary/Chest: Effort normal and breath sounds normal. No respiratory distress. She exhibits no tenderness.   Decreased at bases bilaterally     Abdominal: Soft. Bowel sounds are normal. She exhibits no distension. There is no guarding. "   Musculoskeletal: She exhibits no edema or tenderness.   Lymphadenopathy:     She has no cervical adenopathy.   Neurological: She is alert and oriented to person, place, and time. No cranial nerve deficit.   Skin: Skin is warm and dry. She is not diaphoretic. No erythema. No pallor.   Psychiatric: She has a normal mood and affect. Her behavior is normal.   Nursing note and vitals reviewed.      Fluids    Intake/Output Summary (Last 24 hours) at 02/01/19 1831  Last data filed at 02/01/19 1800   Gross per 24 hour   Intake              600 ml   Output                0 ml   Net              600 ml       Laboratory  Recent Labs      01/30/19   0025  01/31/19   0520  02/01/19   0400   WBC  11.4*  11.8*  10.3   RBC  3.62*  3.71*  3.47*   HEMOGLOBIN  11.1*  11.7*  10.7*   HEMATOCRIT  34.5*  35.6*  33.0*   MCV  95.3  96.0  95.1   MCH  30.7  31.5  30.8   MCHC  32.2*  32.9*  32.4*   RDW  40.4  40.4  40.7   PLATELETCT  343  401  366   MPV  9.2  9.1  9.1     Recent Labs      01/30/19 0025  01/31/19   0520  02/01/19   0400   SODIUM  132*  134*  133*   POTASSIUM  4.3  4.3  4.6   CHLORIDE  102  105  105   CO2  22  22  24   GLUCOSE  245*  83  92   BUN  11  13  11   CREATININE  0.54  0.57  0.54   CALCIUM  7.9*  9.1  8.4*                   Imaging  IR-CVC PORT PLACEMENT > AGE 5   Final Result      1. Ultrasound and fluoroscopic guided placement of a right internal jugular single lumen Bard PowerPort venous access device.      2. The port may be used immediately as clinically indicated. Flushes per protocol.      3. The skin staples and suture should be removed in 10-12 days. This can be performed in the radiology department on any weekday without a prior appointment if desired.      MR-BRAIN-WITH & W/O   Final Result      1.  There is no intracranial metastasis.   2.  Mild cerebral atrophy.   3.  Mild chronic microvascular ischemic disease.      CT-ABDOMEN-PELVIS WITH   Final Result      No evidence of metastatic disease is  identified.      Status post cholecystectomy.      Pancreatic calcifications likely represent sequelae of chronic pancreatitis.      Atherosclerotic plaque.      Nonvisualization of the appendix, limiting evaluation.      Left lower lobe lobulated masslike density with surrounding patchy opacities, better evaluated on the prior CT chest. Volume loss is again noted on the left.           Assessment/Plan  * Non-small cell lung cancer (HCC)   Assessment & Plan    Bilateral upper lobe lung masses noted on CT, suspcious for lung cancer  Pulmonology consulted - s/p bronch with Dr Lynne  CT abdomen pelvis without evidence of malignancy  MRI brain without mets  1/22 bronchoscopy with brush biopsy done  Pathology showing poorly differentiated NSCC  chemoradiation started 1/29  [facility] cannot accommodate chemo until 2/7/19 (delay of schedule by 2 days), as her only place she can current receive chemo is either inpatient or [facility] -  Appointment held but also pending insurance organization         Cough   Assessment & Plan    Likely due to initiation of chemoradiation and irritation of tumor  Tessalon, hycodan       Anxiety   Assessment & Plan    Secondary to possible cancer  Low dose xanax PRN       Severe protein-calorie malnutrition (HCC)   Assessment & Plan    Nutrition consulted       Tobacco abuse   Assessment & Plan    Encouraged smoking cessation           VTE prophylaxis: scds

## 2019-02-03 LAB
ANION GAP SERPL CALC-SCNC: 5 MMOL/L (ref 0–11.9)
BASOPHILS # BLD AUTO: 0.6 % (ref 0–1.8)
BASOPHILS # BLD: 0.06 K/UL (ref 0–0.12)
BUN SERPL-MCNC: 11 MG/DL (ref 8–22)
CALCIUM SERPL-MCNC: 8.1 MG/DL (ref 8.5–10.5)
CHLORIDE SERPL-SCNC: 100 MMOL/L (ref 96–112)
CO2 SERPL-SCNC: 23 MMOL/L (ref 20–33)
CREAT SERPL-MCNC: 0.56 MG/DL (ref 0.5–1.4)
EOSINOPHIL # BLD AUTO: 0.06 K/UL (ref 0–0.51)
EOSINOPHIL NFR BLD: 0.6 % (ref 0–6.9)
ERYTHROCYTE [DISTWIDTH] IN BLOOD BY AUTOMATED COUNT: 41 FL (ref 35.9–50)
GLUCOSE SERPL-MCNC: 116 MG/DL (ref 65–99)
HCT VFR BLD AUTO: 29.1 % (ref 37–47)
HGB BLD-MCNC: 9.5 G/DL (ref 12–16)
IMM GRANULOCYTES # BLD AUTO: 0.17 K/UL (ref 0–0.11)
IMM GRANULOCYTES NFR BLD AUTO: 1.7 % (ref 0–0.9)
LYMPHOCYTES # BLD AUTO: 0.74 K/UL (ref 1–4.8)
LYMPHOCYTES NFR BLD: 7.6 % (ref 22–41)
MCH RBC QN AUTO: 31.5 PG (ref 27–33)
MCHC RBC AUTO-ENTMCNC: 32.6 G/DL (ref 33.6–35)
MCV RBC AUTO: 96.4 FL (ref 81.4–97.8)
MONOCYTES # BLD AUTO: 0.68 K/UL (ref 0–0.85)
MONOCYTES NFR BLD AUTO: 7 % (ref 0–13.4)
NEUTROPHILS # BLD AUTO: 8.06 K/UL (ref 2–7.15)
NEUTROPHILS NFR BLD: 82.5 % (ref 44–72)
NRBC # BLD AUTO: 0 K/UL
NRBC BLD-RTO: 0 /100 WBC
PLATELET # BLD AUTO: 368 K/UL (ref 164–446)
PMV BLD AUTO: 8.7 FL (ref 9–12.9)
POTASSIUM SERPL-SCNC: 4.2 MMOL/L (ref 3.6–5.5)
RBC # BLD AUTO: 3.02 M/UL (ref 4.2–5.4)
SODIUM SERPL-SCNC: 128 MMOL/L (ref 135–145)
WBC # BLD AUTO: 9.8 K/UL (ref 4.8–10.8)

## 2019-02-03 PROCEDURE — 700111 HCHG RX REV CODE 636 W/ 250 OVERRIDE (IP): Performed by: HOSPITALIST

## 2019-02-03 PROCEDURE — A9270 NON-COVERED ITEM OR SERVICE: HCPCS | Performed by: HOSPITALIST

## 2019-02-03 PROCEDURE — 770004 HCHG ROOM/CARE - ONCOLOGY PRIVATE *

## 2019-02-03 PROCEDURE — 99232 SBSQ HOSP IP/OBS MODERATE 35: CPT | Performed by: INTERNAL MEDICINE

## 2019-02-03 PROCEDURE — 85025 COMPLETE CBC W/AUTO DIFF WBC: CPT

## 2019-02-03 PROCEDURE — 80048 BASIC METABOLIC PNL TOTAL CA: CPT

## 2019-02-03 PROCEDURE — 700102 HCHG RX REV CODE 250 W/ 637 OVERRIDE(OP): Performed by: HOSPITALIST

## 2019-02-03 PROCEDURE — 700102 HCHG RX REV CODE 250 W/ 637 OVERRIDE(OP): Performed by: INTERNAL MEDICINE

## 2019-02-03 PROCEDURE — A9270 NON-COVERED ITEM OR SERVICE: HCPCS | Performed by: INTERNAL MEDICINE

## 2019-02-03 RX ADMIN — SENNOSIDES AND DOCUSATE SODIUM 2 TABLET: 8.6; 5 TABLET ORAL at 05:08

## 2019-02-03 RX ADMIN — NICOTINE 21 MG: 21 PATCH, EXTENDED RELEASE TRANSDERMAL at 05:08

## 2019-02-03 RX ADMIN — ONDANSETRON 4 MG: 4 TABLET, ORALLY DISINTEGRATING ORAL at 21:02

## 2019-02-03 RX ADMIN — HYDROCODONE BITARTRATE AND HOMATROPINE METHYLBROMIDE 5 ML: 5; 1.5 SOLUTION ORAL at 17:00

## 2019-02-03 RX ADMIN — ONDANSETRON 4 MG: 4 TABLET, ORALLY DISINTEGRATING ORAL at 05:08

## 2019-02-03 RX ADMIN — ENOXAPARIN SODIUM 40 MG: 100 INJECTION SUBCUTANEOUS at 05:08

## 2019-02-03 RX ADMIN — HYDROCODONE BITARTRATE AND HOMATROPINE METHYLBROMIDE 5 ML: 5; 1.5 SOLUTION ORAL at 11:11

## 2019-02-03 RX ADMIN — BENZONATATE 100 MG: 100 CAPSULE ORAL at 21:02

## 2019-02-03 RX ADMIN — BUDESONIDE AND FORMOTEROL FUMARATE DIHYDRATE 2 PUFF: 160; 4.5 AEROSOL RESPIRATORY (INHALATION) at 16:57

## 2019-02-03 RX ADMIN — ONDANSETRON 4 MG: 4 TABLET, ORALLY DISINTEGRATING ORAL at 17:00

## 2019-02-03 RX ADMIN — HYDROCODONE BITARTRATE AND HOMATROPINE METHYLBROMIDE 5 ML: 5; 1.5 SOLUTION ORAL at 05:08

## 2019-02-03 RX ADMIN — DIAZEPAM 2.5 MG: 5 TABLET ORAL at 21:02

## 2019-02-03 RX ADMIN — ONDANSETRON 4 MG: 4 TABLET, ORALLY DISINTEGRATING ORAL at 11:13

## 2019-02-03 RX ADMIN — BUDESONIDE AND FORMOTEROL FUMARATE DIHYDRATE 2 PUFF: 160; 4.5 AEROSOL RESPIRATORY (INHALATION) at 05:13

## 2019-02-03 RX ADMIN — HYDROCODONE BITARTRATE AND HOMATROPINE METHYLBROMIDE 5 ML: 5; 1.5 SOLUTION ORAL at 21:49

## 2019-02-03 ASSESSMENT — ENCOUNTER SYMPTOMS
CLAUDICATION: 0
WEIGHT LOSS: 1
NAUSEA: 0
CONSTIPATION: 0
SORE THROAT: 0
PHOTOPHOBIA: 0
HEADACHES: 0
HEARTBURN: 0
VOMITING: 0
FEVER: 0
COUGH: 1
DEPRESSION: 0
SPEECH CHANGE: 0
ABDOMINAL PAIN: 0
DIARRHEA: 0
DIZZINESS: 0
NERVOUS/ANXIOUS: 1
INSOMNIA: 0
SENSORY CHANGE: 0
SHORTNESS OF BREATH: 0
MYALGIAS: 0
CHILLS: 0
WEAKNESS: 1
BLURRED VISION: 0

## 2019-02-03 NOTE — PROGRESS NOTES
"Pt A&Ox4. VS: BP (!) 96/59   Pulse 75   Temp 37.5 °C (99.5 °F) (Oral)   Resp 18   Ht 1.753 m (5' 9.02\")   Wt 54.6 kg (120 lb 5.9 oz)   LMP  (LMP Unknown)   SpO2 94%   Breastfeeding? No   BMI 17.77 kg/m² . Pt denies pain, n/v, numbness, tingling, SOB and chest pain. Port patent with positive blood return. Family at bedside. Pt needs met at this time, call light within reach, hourly rounding in effect, and will continue to monitor.       "

## 2019-02-03 NOTE — PROGRESS NOTES
Pt A&Ox4. Family at bedside. Re-accessed port this shift. Pt tolerated intervention well, but site . Positive blood return. Medicated for 5/10 rib/coughing pain. Pt calling appropriately for needs. All needs met at this moment.

## 2019-02-03 NOTE — CARE PLAN
Problem: Safety  Goal: Will remain free from injury  Hourly rounding in effect, pt instructed to call for assistance, bed locked and in lowest position. Bed alarm off, with Emily as second RN. Pt calls appropriately for assistance. Ambulates with a steady gait. Family at bedside.      Problem: Knowledge Deficit  Goal: Knowledge of disease process/condition, treatment plan, diagnostic tests, and medications will improve  Pt updated and educated on nursing interventions, medications and POC

## 2019-02-04 ENCOUNTER — HOSPITAL ENCOUNTER (OUTPATIENT)
Dept: RADIATION ONCOLOGY | Facility: MEDICAL CENTER | Age: 54
End: 2019-02-04

## 2019-02-04 ENCOUNTER — APPOINTMENT (OUTPATIENT)
Dept: RADIOLOGY | Facility: MEDICAL CENTER | Age: 54
DRG: 981 | End: 2019-02-04
Attending: INTERNAL MEDICINE
Payer: MEDICAID

## 2019-02-04 PROBLEM — R50.9 FEVER: Status: ACTIVE | Noted: 2019-02-04

## 2019-02-04 LAB
ANION GAP SERPL CALC-SCNC: 4 MMOL/L (ref 0–11.9)
APPEARANCE UR: CLEAR
BACTERIA #/AREA URNS HPF: NEGATIVE /HPF
BASOPHILS # BLD AUTO: 0.4 % (ref 0–1.8)
BASOPHILS # BLD: 0.04 K/UL (ref 0–0.12)
BILIRUB UR QL STRIP.AUTO: NEGATIVE
BUN SERPL-MCNC: 9 MG/DL (ref 8–22)
CALCIUM SERPL-MCNC: 8.7 MG/DL (ref 8.5–10.5)
CHLORIDE SERPL-SCNC: 99 MMOL/L (ref 96–112)
CO2 SERPL-SCNC: 26 MMOL/L (ref 20–33)
COLOR UR: YELLOW
CREAT SERPL-MCNC: 0.53 MG/DL (ref 0.5–1.4)
EOSINOPHIL # BLD AUTO: 0.09 K/UL (ref 0–0.51)
EOSINOPHIL NFR BLD: 1 % (ref 0–6.9)
EPI CELLS #/AREA URNS HPF: NEGATIVE /HPF
ERYTHROCYTE [DISTWIDTH] IN BLOOD BY AUTOMATED COUNT: 39.6 FL (ref 35.9–50)
GLUCOSE SERPL-MCNC: 116 MG/DL (ref 65–99)
GLUCOSE UR STRIP.AUTO-MCNC: NEGATIVE MG/DL
GRAM STN SPEC: NORMAL
HCT VFR BLD AUTO: 29.7 % (ref 37–47)
HGB BLD-MCNC: 9.8 G/DL (ref 12–16)
HYALINE CASTS #/AREA URNS LPF: NORMAL /LPF
IMM GRANULOCYTES # BLD AUTO: 0.22 K/UL (ref 0–0.11)
IMM GRANULOCYTES NFR BLD AUTO: 2.4 % (ref 0–0.9)
KETONES UR STRIP.AUTO-MCNC: NEGATIVE MG/DL
LEUKOCYTE ESTERASE UR QL STRIP.AUTO: NEGATIVE
LYMPHOCYTES # BLD AUTO: 0.83 K/UL (ref 1–4.8)
LYMPHOCYTES NFR BLD: 9 % (ref 22–41)
MCH RBC QN AUTO: 31.3 PG (ref 27–33)
MCHC RBC AUTO-ENTMCNC: 33 G/DL (ref 33.6–35)
MCV RBC AUTO: 94.9 FL (ref 81.4–97.8)
MICRO URNS: ABNORMAL
MONOCYTES # BLD AUTO: 1.05 K/UL (ref 0–0.85)
MONOCYTES NFR BLD AUTO: 11.4 % (ref 0–13.4)
NEUTROPHILS # BLD AUTO: 6.97 K/UL (ref 2–7.15)
NEUTROPHILS NFR BLD: 75.8 % (ref 44–72)
NITRITE UR QL STRIP.AUTO: NEGATIVE
NRBC # BLD AUTO: 0 K/UL
NRBC BLD-RTO: 0 /100 WBC
PH UR STRIP.AUTO: 6.5 [PH]
PLATELET # BLD AUTO: 418 K/UL (ref 164–446)
PMV BLD AUTO: 8.4 FL (ref 9–12.9)
POTASSIUM SERPL-SCNC: 4.3 MMOL/L (ref 3.6–5.5)
PROCALCITONIN SERPL-MCNC: 0.16 NG/ML
PROT UR QL STRIP: NEGATIVE MG/DL
RBC # BLD AUTO: 3.13 M/UL (ref 4.2–5.4)
RBC # URNS HPF: NORMAL /HPF
RBC UR QL AUTO: ABNORMAL
SIGNIFICANT IND 70042: NORMAL
SITE SITE: NORMAL
SODIUM SERPL-SCNC: 129 MMOL/L (ref 135–145)
SOURCE SOURCE: NORMAL
SP GR UR STRIP.AUTO: 1.01
UROBILINOGEN UR STRIP.AUTO-MCNC: 0.2 MG/DL
WBC # BLD AUTO: 9.2 K/UL (ref 4.8–10.8)
WBC #/AREA URNS HPF: NORMAL /HPF

## 2019-02-04 PROCEDURE — 700102 HCHG RX REV CODE 250 W/ 637 OVERRIDE(OP): Performed by: INTERNAL MEDICINE

## 2019-02-04 PROCEDURE — 77387 GUIDANCE FOR RADJ TX DLVR: CPT | Performed by: RADIOLOGY

## 2019-02-04 PROCEDURE — 87040 BLOOD CULTURE FOR BACTERIA: CPT

## 2019-02-04 PROCEDURE — A9270 NON-COVERED ITEM OR SERVICE: HCPCS | Performed by: HOSPITALIST

## 2019-02-04 PROCEDURE — 80048 BASIC METABOLIC PNL TOTAL CA: CPT

## 2019-02-04 PROCEDURE — 770004 HCHG ROOM/CARE - ONCOLOGY PRIVATE *

## 2019-02-04 PROCEDURE — 81001 URINALYSIS AUTO W/SCOPE: CPT

## 2019-02-04 PROCEDURE — 700102 HCHG RX REV CODE 250 W/ 637 OVERRIDE(OP): Performed by: HOSPITALIST

## 2019-02-04 PROCEDURE — 99232 SBSQ HOSP IP/OBS MODERATE 35: CPT | Performed by: INTERNAL MEDICINE

## 2019-02-04 PROCEDURE — 84145 PROCALCITONIN (PCT): CPT

## 2019-02-04 PROCEDURE — 87086 URINE CULTURE/COLONY COUNT: CPT

## 2019-02-04 PROCEDURE — 85025 COMPLETE CBC W/AUTO DIFF WBC: CPT

## 2019-02-04 PROCEDURE — 77427 RADIATION TX MANAGEMENT X5: CPT | Performed by: RADIOLOGY

## 2019-02-04 PROCEDURE — 77412 RADIATION TX DELIVERY LVL 3: CPT | Performed by: RADIOLOGY

## 2019-02-04 PROCEDURE — 700111 HCHG RX REV CODE 636 W/ 250 OVERRIDE (IP): Performed by: HOSPITALIST

## 2019-02-04 PROCEDURE — 87205 SMEAR GRAM STAIN: CPT

## 2019-02-04 PROCEDURE — 36415 COLL VENOUS BLD VENIPUNCTURE: CPT

## 2019-02-04 PROCEDURE — 71045 X-RAY EXAM CHEST 1 VIEW: CPT

## 2019-02-04 PROCEDURE — A9270 NON-COVERED ITEM OR SERVICE: HCPCS | Performed by: INTERNAL MEDICINE

## 2019-02-04 PROCEDURE — 77014 PR CT GUIDANCE PLACEMENT RAD THERAPY FIELDS: CPT | Mod: 26 | Performed by: RADIOLOGY

## 2019-02-04 RX ORDER — LEVOFLOXACIN 750 MG/1
750 TABLET, FILM COATED ORAL DAILY
Status: COMPLETED | OUTPATIENT
Start: 2019-02-04 | End: 2019-02-10

## 2019-02-04 RX ADMIN — SENNOSIDES AND DOCUSATE SODIUM 2 TABLET: 8.6; 5 TABLET ORAL at 05:26

## 2019-02-04 RX ADMIN — HYDROCODONE BITARTRATE AND HOMATROPINE METHYLBROMIDE 5 ML: 5; 1.5 SOLUTION ORAL at 11:52

## 2019-02-04 RX ADMIN — DIAZEPAM 2.5 MG: 5 TABLET ORAL at 21:06

## 2019-02-04 RX ADMIN — HYDROCODONE BITARTRATE AND HOMATROPINE METHYLBROMIDE 5 ML: 5; 1.5 SOLUTION ORAL at 16:41

## 2019-02-04 RX ADMIN — ENOXAPARIN SODIUM 40 MG: 100 INJECTION SUBCUTANEOUS at 05:26

## 2019-02-04 RX ADMIN — NICOTINE 21 MG: 21 PATCH, EXTENDED RELEASE TRANSDERMAL at 05:27

## 2019-02-04 RX ADMIN — ONDANSETRON 4 MG: 4 TABLET, ORALLY DISINTEGRATING ORAL at 21:06

## 2019-02-04 RX ADMIN — HYDROCODONE BITARTRATE AND HOMATROPINE METHYLBROMIDE 5 ML: 5; 1.5 SOLUTION ORAL at 21:06

## 2019-02-04 RX ADMIN — ONDANSETRON 4 MG: 4 TABLET, ORALLY DISINTEGRATING ORAL at 11:52

## 2019-02-04 RX ADMIN — ACETAMINOPHEN 650 MG: 325 TABLET, FILM COATED ORAL at 16:41

## 2019-02-04 RX ADMIN — BUDESONIDE AND FORMOTEROL FUMARATE DIHYDRATE 2 PUFF: 160; 4.5 AEROSOL RESPIRATORY (INHALATION) at 16:41

## 2019-02-04 RX ADMIN — ONDANSETRON 4 MG: 4 TABLET, ORALLY DISINTEGRATING ORAL at 05:30

## 2019-02-04 RX ADMIN — HYDROCODONE BITARTRATE AND HOMATROPINE METHYLBROMIDE 5 ML: 5; 1.5 SOLUTION ORAL at 05:30

## 2019-02-04 RX ADMIN — ACETAMINOPHEN 650 MG: 325 TABLET, FILM COATED ORAL at 05:26

## 2019-02-04 RX ADMIN — LEVOFLOXACIN 750 MG: 750 TABLET, FILM COATED ORAL at 18:11

## 2019-02-04 RX ADMIN — BUDESONIDE AND FORMOTEROL FUMARATE DIHYDRATE 2 PUFF: 160; 4.5 AEROSOL RESPIRATORY (INHALATION) at 05:27

## 2019-02-04 RX ADMIN — SENNOSIDES AND DOCUSATE SODIUM 2 TABLET: 8.6; 5 TABLET ORAL at 16:41

## 2019-02-04 RX ADMIN — ONDANSETRON 4 MG: 4 TABLET, ORALLY DISINTEGRATING ORAL at 16:41

## 2019-02-04 RX ADMIN — BENZONATATE 100 MG: 100 CAPSULE ORAL at 15:14

## 2019-02-04 ASSESSMENT — ENCOUNTER SYMPTOMS
FEVER: 1
NERVOUS/ANXIOUS: 1
WEAKNESS: 1
SORE THROAT: 0
SPEECH CHANGE: 0
INSOMNIA: 0
DEPRESSION: 0
CLAUDICATION: 0
MYALGIAS: 0
HEARTBURN: 0
CHILLS: 0
PHOTOPHOBIA: 0
SHORTNESS OF BREATH: 0
SENSORY CHANGE: 0
COUGH: 1
DIZZINESS: 0
DIARRHEA: 0
NAUSEA: 0
HEADACHES: 0
WEIGHT LOSS: 1
ABDOMINAL PAIN: 0
VOMITING: 0
CONSTIPATION: 0
BLURRED VISION: 0

## 2019-02-04 NOTE — CARE PLAN
Problem: Safety  Goal: Will remain free from injury  Hourly rounding in effect, pt instructed to call for assistance, bed locked and in lowest position. Bed alarm off, with Anirudh as second RN. Pt ambulates with a steady gait. Friend at bedside.      Problem: Knowledge Deficit  Goal: Knowledge of disease process/condition, treatment plan, diagnostic tests, and medications will improve  Pt educated on nursing interventions, medications and POC

## 2019-02-04 NOTE — PROGRESS NOTES
"Pt A&Ox4. VS: BP (!) 91/58   Pulse 83   Temp (!) 38.1 °C (100.6 °F) (Oral) Comment: RN notified  Resp 18   Ht 1.753 m (5' 9.02\")   Wt 56.2 kg (123 lb 14.4 oz)   LMP  (LMP Unknown)   SpO2 95%   Breastfeeding? No   BMI 18.29 kg/m² . Pt denies pain, n/v, numbness, tingling, SOB and chest pain. Port patent with blood return. Friend at bedside. Awaiting for transport to . Pt needs met at this time, call light within reach, hourly rounding in effect, and will continue to monitor.       "

## 2019-02-04 NOTE — PROGRESS NOTES
Patient febrile at 0024 with oral temperature 100.7.  Patient asymptomatic, blanket removed, cool cloth placed on forehead.  Patient afebrile at 0115 with oral temperature 98.7.  Blankets replaced and cool cloth removed.  Vitals reassessed at 0153: temp (oral) 100.1; pulse 87; resp 18; /60.  On-call MD paged for orders.  Per on-call MD, orders for procalcitonin, peripheral blood cultures x2, urinalysis, urine culture, and sputum sample placed.  Patient and family informed.

## 2019-02-04 NOTE — PROGRESS NOTES
Received report from day RN and assumed care of patient at 1900.  Pt A/Ox4, up self.  She denies pain/n/v.  Patient febrile overnight; on-call MD contacted for orders and workup done (see previous note).  Patient reported mild rib pain this AM from coughing.  Tyenol and cough medicines administered per MAR.  Family at bedside, anxious.  Plan of care reviewed, patient board updated, safety precautions in place, and patient calling appropriately.

## 2019-02-04 NOTE — CARE PLAN
Problem: Nutritional:  Goal: Achieve adequate nutritional intake  Patient will consume >50% of meals   Outcome: MET Date Met: 02/04/19  Per chart, pt is consuming >50% of the last 7 consecutive meals.  Pt is eating adequately at this time.  Please consult RD as needed.

## 2019-02-04 NOTE — PROGRESS NOTES
Pt was transported to the department for radiation tx 5 of 30, after tx she will be transported back to her room. She will return tomorrow for tx @ 7:30am

## 2019-02-04 NOTE — PROGRESS NOTES
Hospital Medicine Daily Progress Note    Date of Service  1/29/2019    Chief Complaint  53 y.o. female admitted 1/20/2019 with cough since April 2018, multiple round of antibiotics with only transient improvement.  CT chest with lung mass.    Hospital Course    Patient seen by pulmonology as IR recommended bronchial biopsy given central location of mass.  EBUS initially planned but Dr Lynne feels traditional biopsy able to gather necessary tissue for diagnosis.      Interval Problem Update  1/21/19 Patient anxious and tearful of possible diagnosis.  Family comforting patient and requesting small dose medication to allow patient to calm down.  Xanax 0.25 mg ordered PRN.  NPO at [facility] for bronchoscopy 11am tomorrow.  1/29/19 Patient had first radiation and expecting to start chemo this afternoon.  She is hoping to discharge home tomorrow but is agreeable to make sure all is authorized given her pending insurance status.  She has continued radiation and will need to figure out where she will receive chemo.  1/30 Patient doing well with chemo from yesterday, tolerating radiation.  She will require oxygen at home as she is requiring 3L with any exertion to maintain saturations.  [facility] cannot accommodate chemo appointment until 2/7 - will need to verify with oncology if this is okay for a 2 day delay in treatment, if so plan to dc home early tomorrow am as patient has agreed to use credit care to secure oxygen payment until insurance starts.  1/31 Patient developed cough overnight as is likely related to her initiation of chemo/rads and irritation of tumor.  Hycodan and tessalon started.  She is having significant rib irritiation right side with cough.  She did not sleep last night due to cough and pain.  I ordered valium qhs tonight for muscle relaxant and mild sedative (2.5mg and may repeat dose x 1 qhs).  CXR tomorrow if cough remains same.  2/1 Patient feeling well today, cough has improved significantly.  She is  "tolerating radiation without pain.  Still issue with coordination with insurance \"probable\".  Financial assistance is supposed to be coming to bedside and discuss with patient.  2/2 Patient states she is doing okay, today is her birthday and she has had many visitors wishing her well.  Cough is still present but not as severe each day.     2/3 Patient with friend and out of hospital room for a brief period of time.  See on bottom floor and given directions on how to get outside for some clean air.  She denies any significant changes.  Anticipate chemo again on Tuesday unless finances/insurance figured out for dc home safely to receive needed treatments.    Consultants/Specialty  Pulm - Smith  onc - Wilson    Code Status  full    Disposition  Home when appropriate    Review of Systems  Review of Systems   Constitutional: Positive for weight loss. Negative for chills and fever.   HENT: Negative for congestion and sore throat.    Eyes: Negative for blurred vision and photophobia.   Respiratory: Positive for cough. Negative for shortness of breath.    Cardiovascular: Negative for chest pain, claudication and leg swelling.   Gastrointestinal: Negative for abdominal pain, constipation, diarrhea, heartburn, nausea and vomiting.   Genitourinary: Negative for dysuria and hematuria.   Musculoskeletal: Negative for joint pain and myalgias.   Skin: Negative for itching and rash.   Neurological: Positive for weakness. Negative for dizziness, sensory change, speech change and headaches.   Psychiatric/Behavioral: Negative for depression. The patient is nervous/anxious. The patient does not have insomnia.         Physical Exam  Temp:  [36.9 °C (98.4 °F)-37.5 °C (99.5 °F)] 36.9 °C (98.4 °F)  Pulse:  [70-91] 87  Resp:  [17-20] 18  BP: ()/(57-64) 91/64  SpO2:  [92 %-94 %] 93 %    Physical Exam   Constitutional: She is oriented to person, place, and time. She appears well-developed and well-nourished. No distress.   HENT:   Head: " Normocephalic and atraumatic.   Eyes: Conjunctivae are normal. No scleral icterus.   Neck: Neck supple. No JVD present.   Cardiovascular: Normal rate, regular rhythm and normal heart sounds.  Exam reveals no gallop and no friction rub.    No murmur heard.  Pulmonary/Chest: Effort normal and breath sounds normal. No respiratory distress. She exhibits no tenderness.   Decreased at bases bilaterally     Abdominal: Soft. Bowel sounds are normal. She exhibits no distension. There is no guarding.   Musculoskeletal: She exhibits no edema or tenderness.   Lymphadenopathy:     She has no cervical adenopathy.   Neurological: She is alert and oriented to person, place, and time. No cranial nerve deficit.   Skin: Skin is warm and dry. She is not diaphoretic. No erythema. No pallor.   Psychiatric: She has a normal mood and affect. Her behavior is normal.   Nursing note and vitals reviewed.      Fluids    Intake/Output Summary (Last 24 hours) at 02/03/19 1619  Last data filed at 02/03/19 0400   Gross per 24 hour   Intake              240 ml   Output                0 ml   Net              240 ml       Laboratory  Recent Labs      02/01/19   0400  02/02/19   0425  02/03/19   0002   WBC  10.3  10.2  9.8   RBC  3.47*  3.32*  3.02*   HEMOGLOBIN  10.7*  10.3*  9.5*   HEMATOCRIT  33.0*  31.7*  29.1*   MCV  95.1  95.5  96.4   MCH  30.8  31.0  31.5   MCHC  32.4*  32.5*  32.6*   RDW  40.7  41.1  41.0   PLATELETCT  366  370  368   MPV  9.1  8.8*  8.7*     Recent Labs      02/01/19   0400  02/02/19   0425  02/03/19   0002   SODIUM  133*  134*  128*   POTASSIUM  4.6  4.5  4.2   CHLORIDE  105  104  100   CO2  24  24  23   GLUCOSE  92  96  116*   BUN  11  10  11   CREATININE  0.54  0.53  0.56   CALCIUM  8.4*  8.4*  8.1*                   Imaging  IR-CVC PORT PLACEMENT > AGE 5   Final Result      1. Ultrasound and fluoroscopic guided placement of a right internal jugular single lumen Bard PowerPort venous access device.      2. The port may be  used immediately as clinically indicated. Flushes per protocol.      3. The skin staples and suture should be removed in 10-12 days. This can be performed in the radiology department on any weekday without a prior appointment if desired.      MR-BRAIN-WITH & W/O   Final Result      1.  There is no intracranial metastasis.   2.  Mild cerebral atrophy.   3.  Mild chronic microvascular ischemic disease.      CT-ABDOMEN-PELVIS WITH   Final Result      No evidence of metastatic disease is identified.      Status post cholecystectomy.      Pancreatic calcifications likely represent sequelae of chronic pancreatitis.      Atherosclerotic plaque.      Nonvisualization of the appendix, limiting evaluation.      Left lower lobe lobulated masslike density with surrounding patchy opacities, better evaluated on the prior CT chest. Volume loss is again noted on the left.           Assessment/Plan  * Non-small cell lung cancer (HCC)   Assessment & Plan    Bilateral upper lobe lung masses noted on CT, suspcious for lung cancer  Pulmonology consulted - s/p bronch with Dr Lynne  CT abdomen pelvis without evidence of malignancy  MRI brain without mets  1/22 bronchoscopy with brush biopsy done  Pathology showing poorly differentiated NSCC  chemoradiation started 1/29  [facility] cannot accommodate chemo until 2/7/19 (delay of schedule by 2 days), as her only place she can current receive chemo is either inpatient or [facility] -  Appointment held but also pending insurance organization         Cough   Assessment & Plan    Likely due to initiation of chemoradiation and irritation of tumor  Tessalon, hycodan       Anxiety   Assessment & Plan    Secondary to possible cancer  Low dose xanax PRN       Severe protein-calorie malnutrition (HCC)   Assessment & Plan    Nutrition consulted       Tobacco abuse   Assessment & Plan    Encouraged smoking cessation           VTE prophylaxis: scds

## 2019-02-05 ENCOUNTER — HOSPITAL ENCOUNTER (OUTPATIENT)
Dept: RADIATION ONCOLOGY | Facility: MEDICAL CENTER | Age: 54
End: 2019-02-05

## 2019-02-05 LAB
ANION GAP SERPL CALC-SCNC: 5 MMOL/L (ref 0–11.9)
BASOPHILS # BLD AUTO: 0.2 % (ref 0–1.8)
BASOPHILS # BLD: 0.02 K/UL (ref 0–0.12)
BUN SERPL-MCNC: 8 MG/DL (ref 8–22)
CALCIUM SERPL-MCNC: 8.2 MG/DL (ref 8.5–10.5)
CHLORIDE SERPL-SCNC: 100 MMOL/L (ref 96–112)
CO2 SERPL-SCNC: 25 MMOL/L (ref 20–33)
CREAT SERPL-MCNC: 0.5 MG/DL (ref 0.5–1.4)
EOSINOPHIL # BLD AUTO: 0.1 K/UL (ref 0–0.51)
EOSINOPHIL NFR BLD: 1.1 % (ref 0–6.9)
ERYTHROCYTE [DISTWIDTH] IN BLOOD BY AUTOMATED COUNT: 39.6 FL (ref 35.9–50)
GLUCOSE SERPL-MCNC: 107 MG/DL (ref 65–99)
HCT VFR BLD AUTO: 28.2 % (ref 37–47)
HGB BLD-MCNC: 9.3 G/DL (ref 12–16)
IMM GRANULOCYTES # BLD AUTO: 0.06 K/UL (ref 0–0.11)
IMM GRANULOCYTES NFR BLD AUTO: 0.7 % (ref 0–0.9)
LYMPHOCYTES # BLD AUTO: 0.62 K/UL (ref 1–4.8)
LYMPHOCYTES NFR BLD: 6.9 % (ref 22–41)
MCH RBC QN AUTO: 31 PG (ref 27–33)
MCHC RBC AUTO-ENTMCNC: 33 G/DL (ref 33.6–35)
MCV RBC AUTO: 94 FL (ref 81.4–97.8)
MONOCYTES # BLD AUTO: 1.25 K/UL (ref 0–0.85)
MONOCYTES NFR BLD AUTO: 14 % (ref 0–13.4)
NEUTROPHILS # BLD AUTO: 6.91 K/UL (ref 2–7.15)
NEUTROPHILS NFR BLD: 77.1 % (ref 44–72)
NRBC # BLD AUTO: 0 K/UL
NRBC BLD-RTO: 0 /100 WBC
PLATELET # BLD AUTO: 399 K/UL (ref 164–446)
PMV BLD AUTO: 8.5 FL (ref 9–12.9)
POTASSIUM SERPL-SCNC: 4.2 MMOL/L (ref 3.6–5.5)
RBC # BLD AUTO: 3 M/UL (ref 4.2–5.4)
SODIUM SERPL-SCNC: 130 MMOL/L (ref 135–145)
WBC # BLD AUTO: 9 K/UL (ref 4.8–10.8)

## 2019-02-05 PROCEDURE — 99232 SBSQ HOSP IP/OBS MODERATE 35: CPT | Performed by: HOSPITALIST

## 2019-02-05 PROCEDURE — 77387 GUIDANCE FOR RADJ TX DLVR: CPT | Performed by: RADIOLOGY

## 2019-02-05 PROCEDURE — A9270 NON-COVERED ITEM OR SERVICE: HCPCS | Performed by: HOSPITALIST

## 2019-02-05 PROCEDURE — 700111 HCHG RX REV CODE 636 W/ 250 OVERRIDE (IP): Performed by: HOSPITALIST

## 2019-02-05 PROCEDURE — 700102 HCHG RX REV CODE 250 W/ 637 OVERRIDE(OP): Performed by: HOSPITALIST

## 2019-02-05 PROCEDURE — 700102 HCHG RX REV CODE 250 W/ 637 OVERRIDE(OP): Performed by: INTERNAL MEDICINE

## 2019-02-05 PROCEDURE — 770004 HCHG ROOM/CARE - ONCOLOGY PRIVATE *

## 2019-02-05 PROCEDURE — 77412 RADIATION TX DELIVERY LVL 3: CPT | Performed by: RADIOLOGY

## 2019-02-05 PROCEDURE — 85025 COMPLETE CBC W/AUTO DIFF WBC: CPT

## 2019-02-05 PROCEDURE — A9270 NON-COVERED ITEM OR SERVICE: HCPCS | Performed by: INTERNAL MEDICINE

## 2019-02-05 PROCEDURE — 80048 BASIC METABOLIC PNL TOTAL CA: CPT

## 2019-02-05 PROCEDURE — 99232 SBSQ HOSP IP/OBS MODERATE 35: CPT | Performed by: INTERNAL MEDICINE

## 2019-02-05 PROCEDURE — 77014 PR CT GUIDANCE PLACEMENT RAD THERAPY FIELDS: CPT | Mod: 26 | Performed by: RADIOLOGY

## 2019-02-05 RX ORDER — PROCHLORPERAZINE MALEATE 10 MG
10 TABLET ORAL EVERY 6 HOURS PRN
Status: CANCELLED | OUTPATIENT
Start: 2019-02-05

## 2019-02-05 RX ORDER — 0.9 % SODIUM CHLORIDE 0.9 %
VIAL (ML) INJECTION PRN
Status: CANCELLED | OUTPATIENT
Start: 2019-02-05

## 2019-02-05 RX ORDER — ONDANSETRON 8 MG/1
8 TABLET, ORALLY DISINTEGRATING ORAL
Status: CANCELLED | OUTPATIENT
Start: 2019-02-05

## 2019-02-05 RX ORDER — ACETAMINOPHEN 325 MG/1
650 TABLET ORAL EVERY 6 HOURS PRN
Status: DISCONTINUED | OUTPATIENT
Start: 2019-02-05 | End: 2019-02-13 | Stop reason: HOSPADM

## 2019-02-05 RX ORDER — DEXAMETHASONE SODIUM PHOSPHATE 4 MG/ML
12 INJECTION, SOLUTION INTRA-ARTICULAR; INTRALESIONAL; INTRAMUSCULAR; INTRAVENOUS; SOFT TISSUE ONCE
Status: COMPLETED | OUTPATIENT
Start: 2019-02-05 | End: 2019-02-06

## 2019-02-05 RX ORDER — ONDANSETRON 2 MG/ML
4 INJECTION INTRAMUSCULAR; INTRAVENOUS
Status: CANCELLED | OUTPATIENT
Start: 2019-02-05

## 2019-02-05 RX ORDER — DIPHENHYDRAMINE HYDROCHLORIDE 50 MG/ML
25 INJECTION INTRAMUSCULAR; INTRAVENOUS ONCE
Status: COMPLETED | OUTPATIENT
Start: 2019-02-05 | End: 2019-02-06

## 2019-02-05 RX ORDER — SODIUM CHLORIDE 9 MG/ML
INJECTION, SOLUTION INTRAVENOUS CONTINUOUS
Status: CANCELLED | OUTPATIENT
Start: 2019-02-05

## 2019-02-05 RX ORDER — ONDANSETRON 2 MG/ML
8 INJECTION INTRAMUSCULAR; INTRAVENOUS ONCE
Status: COMPLETED | OUTPATIENT
Start: 2019-02-05 | End: 2019-02-06

## 2019-02-05 RX ORDER — 0.9 % SODIUM CHLORIDE 0.9 %
5 VIAL (ML) INJECTION PRN
Status: CANCELLED | OUTPATIENT
Start: 2019-02-05

## 2019-02-05 RX ORDER — ONDANSETRON 2 MG/ML
8 INJECTION INTRAMUSCULAR; INTRAVENOUS ONCE
Status: CANCELLED | OUTPATIENT
Start: 2019-02-05

## 2019-02-05 RX ADMIN — ONDANSETRON 4 MG: 4 TABLET, ORALLY DISINTEGRATING ORAL at 22:20

## 2019-02-05 RX ADMIN — ENOXAPARIN SODIUM 40 MG: 100 INJECTION SUBCUTANEOUS at 05:31

## 2019-02-05 RX ADMIN — SENNOSIDES AND DOCUSATE SODIUM 2 TABLET: 8.6; 5 TABLET ORAL at 18:02

## 2019-02-05 RX ADMIN — HYDROCODONE BITARTRATE AND HOMATROPINE METHYLBROMIDE 5 ML: 5; 1.5 SOLUTION ORAL at 22:20

## 2019-02-05 RX ADMIN — ACETAMINOPHEN 650 MG: 325 TABLET, FILM COATED ORAL at 05:38

## 2019-02-05 RX ADMIN — HYDROCODONE BITARTRATE AND HOMATROPINE METHYLBROMIDE 5 ML: 5; 1.5 SOLUTION ORAL at 14:58

## 2019-02-05 RX ADMIN — NICOTINE 21 MG: 21 PATCH, EXTENDED RELEASE TRANSDERMAL at 05:30

## 2019-02-05 RX ADMIN — LEVOFLOXACIN 750 MG: 750 TABLET, FILM COATED ORAL at 05:30

## 2019-02-05 RX ADMIN — DIAZEPAM 2.5 MG: 5 TABLET ORAL at 22:16

## 2019-02-05 RX ADMIN — ONDANSETRON 4 MG: 4 TABLET, ORALLY DISINTEGRATING ORAL at 05:38

## 2019-02-05 RX ADMIN — HYDROCODONE BITARTRATE AND HOMATROPINE METHYLBROMIDE 5 ML: 5; 1.5 SOLUTION ORAL at 05:38

## 2019-02-05 RX ADMIN — BUDESONIDE AND FORMOTEROL FUMARATE DIHYDRATE 2 PUFF: 160; 4.5 AEROSOL RESPIRATORY (INHALATION) at 17:59

## 2019-02-05 RX ADMIN — ACETAMINOPHEN 650 MG: 325 TABLET, FILM COATED ORAL at 16:43

## 2019-02-05 RX ADMIN — ONDANSETRON 4 MG: 4 TABLET, ORALLY DISINTEGRATING ORAL at 14:58

## 2019-02-05 RX ADMIN — BUDESONIDE AND FORMOTEROL FUMARATE DIHYDRATE 2 PUFF: 160; 4.5 AEROSOL RESPIRATORY (INHALATION) at 05:38

## 2019-02-05 RX ADMIN — SENNOSIDES AND DOCUSATE SODIUM 2 TABLET: 8.6; 5 TABLET ORAL at 05:31

## 2019-02-05 ASSESSMENT — ENCOUNTER SYMPTOMS
MUSCULOSKELETAL NEGATIVE: 1
BLURRED VISION: 0
NAUSEA: 0
COUGH: 1
WEAKNESS: 0
NECK PAIN: 0
DEPRESSION: 0
EYES NEGATIVE: 1
CHILLS: 0
HEARTBURN: 0
DIZZINESS: 0
SHORTNESS OF BREATH: 1
SHORTNESS OF BREATH: 0
SINUS PAIN: 0
PALPITATIONS: 0
WEAKNESS: 1
SPUTUM PRODUCTION: 0
HEADACHES: 0
MYALGIAS: 0
HEMOPTYSIS: 0
GASTROINTESTINAL NEGATIVE: 1
CARDIOVASCULAR NEGATIVE: 1
NERVOUS/ANXIOUS: 1
FEVER: 1
VOMITING: 0

## 2019-02-05 NOTE — CARE PLAN
Problem: Communication  Goal: The ability to communicate needs accurately and effectively will improve  Outcome: PROGRESSING AS EXPECTED    Intervention: [place] patient and significant other/support system to call light to alert staff of needs  Patient educated to call for assistance or change in condition.  Patient verbalizes understanding.  Call light within reach. Patient calling appropriately.      Problem: Safety  Goal: Will remain free from falls  Outcome: PROGRESSING AS EXPECTED  Patient steady, up self, and calling for assistance as necessary.

## 2019-02-05 NOTE — PROGRESS NOTES
"Pharmacy Chemotherapy Calculation:    Patient Name: Amy Maki  DX: NSCLC    Cycle 2  Previous treatment = 1/29/19    Regimen: weekly PACLItaxel/CARBOplatin + XRT  PACLItaxel (Taxol) 45-50 mg/m2 IV over 60 min on day 1  CARBOplatin AUC 2 IV over 30 min on day 1  Weekly x 7 weeks with concurrent radiation therapy  NCCN Guidelines for NSCLC V.9.2017  Delia CP, et al - J Clin Oncol. 2005 Sep 1;23(84):4623-16. Epub 2005 Aug 8.     Allergies:Percocet [oxycodone-acetaminophen]  /72   Pulse 93   Temp 36.6 °C (97.8 °F) (Oral)   Resp 18   Ht 1.753 m (5' 9.02\")   Wt 54.5 kg (120 lb 2.4 oz)   LMP  (LMP Unknown)   SpO2 98%   Breastfeeding? No   BMI 17.73 kg/m²   Body surface area is 1.63 meters squared.     All lab results within treatment parameters.     PACLItaxel (Taxol) 45 mg/m2  X 1.63m2 = 73.3mg   <5% difference, ok to treat with final dose = 73.4mg IV     CARBOplatin AUC 2 (78 + 25) = 206mg    <5% difference, ok to treat with final dose = 206mg IV    ANA Larios, Pharm.D.      2/6/19Addendum: Chemotherapy held 2/5/19 due to fever. Per Dr. Castle, ok to proceed with chemotherapy 2/6/19. RN aware.   "

## 2019-02-05 NOTE — PROGRESS NOTES
Oncology/Hematology Progress Note               Author: Edgar Castle Date & Time created: 2/5/2019  9:14 AM     Non-small cell lung cancer    Interval History:  2/5/19- had low grade fever . She is on empiric levaquin. CXR no obvious pneumonia.tolerating chemo xrt ok      Review of Systems:  Review of Systems   Constitutional: Positive for malaise/fatigue.   HENT: Negative.    Eyes: Negative.    Respiratory: Positive for cough and shortness of breath.    Cardiovascular: Negative.    Gastrointestinal: Negative.    Genitourinary: Negative.    Musculoskeletal: Negative.    Skin: Negative.    Neurological: Positive for weakness.   Endo/Heme/Allergies: Negative.    Psychiatric/Behavioral: The patient is nervous/anxious.        Physical Exam:  Physical Exam   Constitutional: She is oriented to person, place, and time. She appears well-developed.   HENT:   Head: Normocephalic.   Eyes: Pupils are equal, round, and reactive to light. Conjunctivae are normal.   Cardiovascular: Normal rate, regular rhythm and normal heart sounds.    Pulmonary/Chest: Effort normal and breath sounds normal. No respiratory distress.   Abdominal: Soft. Bowel sounds are normal. She exhibits no distension. There is no tenderness.   Musculoskeletal: She exhibits no edema.   Neurological: She is alert and oriented to person, place, and time. No cranial nerve deficit.   Skin: No erythema.   Psychiatric: She has a normal mood and affect. Her behavior is normal. Judgment and thought content normal.       Labs:          Recent Labs      02/03/19   0002  02/04/19   0100  02/05/19   0055   SODIUM  128*  129*  130*   POTASSIUM  4.2  4.3  4.2   CHLORIDE  100  99  100   CO2  23  26  25   BUN  11  9  8   CREATININE  0.56  0.53  0.50   CALCIUM  8.1*  8.7  8.2*     Recent Labs      02/03/19   0002  02/04/19   0100  02/05/19   0055   GLUCOSE  116*  116*  107*     Recent Labs      02/03/19   0002  02/04/19   0100  02/05/19   0055   RBC  3.02*  3.13*  3.00*    HEMOGLOBIN  9.5*  9.8*  9.3*   HEMATOCRIT  29.1*  29.7*  28.2*   PLATELETCT  368  418  399     Recent Labs      19   0002  19   01019   0055   WBC  9.8  9.2  9.0   NEUTSPOLYS  82.50*  75.80*  77.10*   LYMPHOCYTES  7.60*  9.00*  6.90*   MONOCYTES  7.00  11.40  14.00*   EOSINOPHILS  0.60  1.00  1.10   BASOPHILS  0.60  0.40  0.20     Recent Labs      19   0002  19   0100  19   0055   SODIUM  128*  129*  130*   POTASSIUM  4.2  4.3  4.2   CHLORIDE  100  99  100   CO2  23  26  25   GLUCOSE  116*  116*  107*   BUN  11  9  8   CREATININE  0.56  0.53  0.50   CALCIUM  8.1*  8.7  8.2*     Hemodynamics:  Temp (24hrs), Av.3 °C (99.2 °F), Min:36.3 °C (97.4 °F), Max:38.4 °C (101.2 °F)  Temperature: 36.3 °C (97.4 °F)  Pulse  Av.3  Min: 62  Max: 108   Blood Pressure: (!) 90/58 (taken manually)     Respiratory:    Respiration: 18, Pulse Oximetry: 94 %     Work Of Breathing / Effort: Moderate  RUL Breath Sounds: Clear, RML Breath Sounds: Diminished, RLL Breath Sounds: Diminished, MIREYA Breath Sounds: Clear, LLL Breath Sounds: Diminished  Fluids:  No intake or output data in the 24 hours ending 19 1400  Weight: 55.2 kg (121 lb 11.1 oz)  GI/Nutrition:  Orders Placed This Encounter   Procedures   • Diet Order Regular     Standing Status:   Standing     Number of Occurrences:   1     Order Specific Question:   Diet:     Answer:   Regular [1]     Medical Decision Making, by Problem:  Active Hospital Problems    Diagnosis   • *Non-small cell lung cancer (HCC) [C34.90]   • 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 Gauthier and felt to be stage IIIb T3 N3 M0.  His plan was weekly carbo Taxol concurrently with radiation to be followed by immunotherapy  infinzi.  Due fot week 2 of carbo taxol. Labs are appropriate for treatment today.she had low grae fever , on empiric levaquin. She always has some risk of developing postobstrucitve pneumonia . Ok to do chemo from risk benefit standpoint . Monitor close for any worsening infection in which case she will need aggressive Abx .  If she is getting DC d, pls make f/u with Dr Gauthier prior to her next dose next, Tuesday       Moderate complexity/moderate risk for mortality morbidity/drug toxicity monitoring/discussed with nursing and primary team  Please note that this dictation was created using voice recognition software. I have made every reasonable attempt to correct obvious errors, but I expect that there are errors of grammar and possibly context that I did not discover before finalizing the note  Quality-Core Measures   Reviewed items::  Radiology images reviewed, Labs reviewed and Medications reviewed

## 2019-02-05 NOTE — PROGRESS NOTES
Received report from day RN and assumed care of patient at 1900.  Pt A/Ox4, up self.  Patient febrile overnight (asymptomatic) with tmax 100.6 at 0444; cooling measures in place and workup done yesterday and empiric antibiotics administered as ordered.  Patient reported mild rib pain this AM from coughing.  Tyenol and cough medicines administered per MAR.  Family at bedside, anxious.  Plan of care reviewed, patient board updated, safety precautions in place, and patient calling appropriately.

## 2019-02-05 NOTE — PROGRESS NOTES
"Pharmacy Chemotherapy Calculations Verification:    Patient Name: Flaca Maki   DX: NSCLC    Regimen: weekly taxol/carboplatin + XRT  Paclitaxel (taxol) 45-50mg/m2 IV over 60 min on day 1  Carboplatin AUC 2 IV over 30 min on day 1  Weekly x 7 weeks with concurrent radiation therapy  NCCN Guidelines for NSCLC V.9.2017  Delia VALLADARES, et al - J Clin Oncol. 2005 Sep 1;23(11):5105-92. Epub 2005 Aug 8.    /72   Pulse 93   Temp 36.6 °C (97.8 °F) (Oral)   Resp 18   Ht 1.753 m (5' 9.02\")   Wt 54.5 kg (120 lb 2.4 oz)   LMP  (LMP Unknown)   SpO2 98%   Breastfeeding? No   BMI 17.73 kg/m²  Body surface area is 1.63 meters squared.    Labs 02/05/19:    ANC ~ 6900   PLT = 399k    Hgb = 9.3    SCr = 0.5 mg/dL   CrCl ~ 78 ml/min (min SCr 0.7 mg/dL used)     Labs 01/27/19:  AST/ALT/AP = 16/16/63      Tbili = 0.5      Drug Order   (Drug name, dose, route, IV Fluid & volume, frequency, number of doses) Cycle 2      Previous treatment: C1 = 01/29/19     Medication = Paclitaxel (Taxol)  Base Dose = 45mg/m²   Calc Dose: Base Dose x 1.63 m² = 73.3 mg  Final Dose = 73.4 mg  Route = IV  Fluid & Volume =  mL  Admin Duration = Over 1 hour          <5% difference, ok to treat with final dose   Medication = Carboplatin (Paraplatin)  Base Dose = AUC 2  Calc Dose: Base Dose x (78 mL/min  + 25) = 206 mg  Final Dose = 206 mg  Route = IV  Fluid & Volume =  mL  Admin Duration = Over 30 minutes          <5% difference, ok to treat with final dose     By my signature below, I confirm this process was performed independently with the BSA and all final chemotherapy dosing calculations congruent. I have reviewed the above chemotherapy order and that my calculation of the final dose and BSA (when applicable) corroborate those calculations of the  pharmacist. Discrepancies of 5% or greater in the written dose have been addressed and documented within the [facility] Progress notes.    Ernst Moss, PharmD, BCOP          "

## 2019-02-05 NOTE — PROGRESS NOTES
Patient received treatment in Radiation Therapy. Patient received treatment number 6 of 30 planned.

## 2019-02-05 NOTE — PROGRESS NOTES
Hospital Medicine Daily Progress Note    Date of Service  1/29/2019    Chief Complaint  53 y.o. female admitted 1/20/2019 with cough since April 2018, multiple round of antibiotics with only transient improvement.  CT chest with lung mass.    Hospital Course    Patient seen by pulmonology as IR recommended bronchial biopsy given central location of mass.  EBUS initially planned but Dr Lynne feels traditional biopsy able to gather necessary tissue for diagnosis.      Interval Problem Update  1/21/19 Patient anxious and tearful of possible diagnosis.  Family comforting patient and requesting small dose medication to allow patient to calm down.  Xanax 0.25 mg ordered PRN.  NPO at [facility] for bronchoscopy 11am tomorrow.  1/29/19 Patient had first radiation and expecting to start chemo this afternoon.  She is hoping to discharge home tomorrow but is agreeable to make sure all is authorized given her pending insurance status.  She has continued radiation and will need to figure out where she will receive chemo.  1/30 Patient doing well with chemo from yesterday, tolerating radiation.  She will require oxygen at home as she is requiring 3L with any exertion to maintain saturations.  [facility] cannot accommodate chemo appointment until 2/7 - will need to verify with oncology if this is okay for a 2 day delay in treatment, if so plan to dc home early tomorrow am as patient has agreed to use credit care to secure oxygen payment until insurance starts.  1/31 Patient developed cough overnight as is likely related to her initiation of chemo/rads and irritation of tumor.  Hycodan and tessalon started.  She is having significant rib irritiation right side with cough.  She did not sleep last night due to cough and pain.  I ordered valium qhs tonight for muscle relaxant and mild sedative (2.5mg and may repeat dose x 1 qhs).  CXR tomorrow if cough remains same.  2/1 Patient feeling well today, cough has improved significantly.  She is  "tolerating radiation without pain.  Still issue with coordination with insurance \"probable\".  Financial assistance is supposed to be coming to bedside and discuss with patient.  2/2 Patient states she is doing okay, today is her birthday and she has had many visitors wishing her well.  Cough is still present but not as severe each day.     2/3 Patient with friend and out of hospital room for a brief period of time.  See on bottom floor and given directions on how to get outside for some clean air.  She denies any significant changes.  Anticipate chemo again on Tuesday unless finances/insurance figured out for dc home safely to receive needed treatments.  2/4 Patient with temperature elevation of 100.1 overnight, pan cultured but abx not started.  She had fever this afternoon of 101.2, cultures still pending from collection overnight, cooling measures underway.  CXR done today showing no evidence of infection but given her cough and possible post obstructive pneumonia - will start empiric Levaquin.  She is supposed to have cycle 2 chemo tomorrow, will have oncology evaluate her to see if she is appropriate vs waiting a few more days.  Despite fever, patient states she does feel better today compared to past 2 days regarding pain in ribs and cough.    Consultants/Specialty  Pulm - Smith  onc - Steve/Corrine    Code Status  full    Disposition  Home when appropriate    Review of Systems  Review of Systems   Constitutional: Positive for fever and weight loss. Negative for chills.   HENT: Negative for congestion and sore throat.    Eyes: Negative for blurred vision and photophobia.   Respiratory: Positive for cough. Negative for shortness of breath.    Cardiovascular: Positive for chest pain (right sided rib pain). Negative for claudication and leg swelling.   Gastrointestinal: Negative for abdominal pain, constipation, diarrhea, heartburn, nausea and vomiting.   Genitourinary: Negative for dysuria and hematuria. "   Musculoskeletal: Negative for joint pain and myalgias.   Skin: Negative for itching and rash.   Neurological: Positive for weakness. Negative for dizziness, sensory change, speech change and headaches.   Psychiatric/Behavioral: Negative for depression. The patient is nervous/anxious. The patient does not have insomnia.         Physical Exam  Temp:  [36.5 °C (97.7 °F)-38.4 °C (101.2 °F)] 38.4 °C (101.2 °F)  Pulse:  [] 101  Resp:  [16-20] 16  BP: ()/(49-64) 106/49  SpO2:  [90 %-98 %] 90 %    Physical Exam   Constitutional: She is oriented to person, place, and time. She appears well-developed and well-nourished. No distress.   HENT:   Head: Normocephalic and atraumatic.   Eyes: Conjunctivae are normal. No scleral icterus.   Neck: Neck supple. No JVD present.   Cardiovascular: Normal rate, regular rhythm and normal heart sounds.  Exam reveals no gallop and no friction rub.    No murmur heard.  Pulmonary/Chest: Effort normal and breath sounds normal. No respiratory distress. She exhibits no tenderness.   Decreased at bases bilaterally     Abdominal: Soft. Bowel sounds are normal. She exhibits no distension. There is tenderness. There is no guarding.   Musculoskeletal: She exhibits no edema or tenderness.   Lymphadenopathy:     She has no cervical adenopathy.   Neurological: She is alert and oriented to person, place, and time. No cranial nerve deficit.   Skin: Skin is warm and dry. She is not diaphoretic. No erythema. No pallor.   Psychiatric: She has a normal mood and affect. Her behavior is normal.   Nursing note and vitals reviewed.      Fluids    Intake/Output Summary (Last 24 hours) at 02/04/19 1731  Last data filed at 02/04/19 1600   Gross per 24 hour   Intake              577 ml   Output                0 ml   Net              577 ml       Laboratory  Recent Labs      02/02/19   0425  02/03/19   0002  02/04/19   0100   WBC  10.2  9.8  9.2   RBC  3.32*  3.02*  3.13*   HEMOGLOBIN  10.3*  9.5*  9.8*    HEMATOCRIT  31.7*  29.1*  29.7*   MCV  95.5  96.4  94.9   MCH  31.0  31.5  31.3   MCHC  32.5*  32.6*  33.0*   RDW  41.1  41.0  39.6   PLATELETCT  370  368  418   MPV  8.8*  8.7*  8.4*     Recent Labs      02/02/19   0425  02/03/19   0002  02/04/19   0100   SODIUM  134*  128*  129*   POTASSIUM  4.5  4.2  4.3   CHLORIDE  104  100  99   CO2  24  23  26   GLUCOSE  96  116*  116*   BUN  10  11  9   CREATININE  0.53  0.56  0.53   CALCIUM  8.4*  8.1*  8.7                   Imaging  DX-CHEST-PORTABLE (1 VIEW)   Final Result      Left-sided volume loss with left midlung and basilar opacities which could be seen secondary to consolidation or mass lesion.      IR-CVC PORT PLACEMENT > AGE 5   Final Result      1. Ultrasound and fluoroscopic guided placement of a right internal jugular single lumen Bard PowerPort venous access device.      2. The port may be used immediately as clinically indicated. Flushes per protocol.      3. The skin staples and suture should be removed in 10-12 days. This can be performed in the radiology department on any weekday without a prior appointment if desired.      MR-BRAIN-WITH & W/O   Final Result      1.  There is no intracranial metastasis.   2.  Mild cerebral atrophy.   3.  Mild chronic microvascular ischemic disease.      CT-ABDOMEN-PELVIS WITH   Final Result      No evidence of metastatic disease is identified.      Status post cholecystectomy.      Pancreatic calcifications likely represent sequelae of chronic pancreatitis.      Atherosclerotic plaque.      Nonvisualization of the appendix, limiting evaluation.      Left lower lobe lobulated masslike density with surrounding patchy opacities, better evaluated on the prior CT chest. Volume loss is again noted on the left.           Assessment/Plan  * Non-small cell lung cancer (HCC)   Assessment & Plan    Bilateral upper lobe lung masses noted on CT, suspcious for lung cancer  Pulmonology consulted - s/p bronch with Dr Lynne  CT abdomen  pelvis without evidence of malignancy  MRI brain without mets  1/22 bronchoscopy with brush biopsy done  Pathology showing poorly differentiated NSCC  chemoradiation started 1/29  [facility] cannot accommodate chemo until 2/7/19 (delay of schedule by 2 days), as her only place she can current receive chemo is either inpatient or [facility] -  Appointment held but also pending insurance organization         Fever   Assessment & Plan    Likely d/t adenoca lung  Blood, urine and sputum cultures collected - no growth reported a/o yet  Empiric levaquin, immunocompromised and supposed to have chemo 2/5  UA unremarkable, CXR consistent with known malignancy - no overt consolidation       Cough   Assessment & Plan    Likely due to initiation of chemoradiation and irritation of tumor  Tessalon, hycodan       Anxiety   Assessment & Plan    Secondary to possible cancer  Low dose xanax PRN       Severe protein-calorie malnutrition (HCC)   Assessment & Plan    Nutrition consulted       Tobacco abuse   Assessment & Plan    Encouraged smoking cessation           VTE prophylaxis: scds

## 2019-02-05 NOTE — PROGRESS NOTES
Received report from [facility], assumed care of pt 0700.  Pt has already been down to daily radiation.   Pt hypotensive, this has been ongoing. RN rechecked after report of BP of 77/51 by CNA. Manual recheck was 90/58, pt is asymptomatic.   Reviewed POC with pt, she is scheduled to have chemo today. Pt in agreement.  Port patent with + blood return, dressing CDI.  Friend at bedside.   All needs met at this time.

## 2019-02-06 ENCOUNTER — HOSPITAL ENCOUNTER (OUTPATIENT)
Dept: RADIATION ONCOLOGY | Facility: MEDICAL CENTER | Age: 54
End: 2019-02-06

## 2019-02-06 ENCOUNTER — APPOINTMENT (OUTPATIENT)
Dept: RADIOLOGY | Facility: MEDICAL CENTER | Age: 54
DRG: 981 | End: 2019-02-06
Attending: INTERNAL MEDICINE
Payer: MEDICAID

## 2019-02-06 LAB
ANION GAP SERPL CALC-SCNC: 7 MMOL/L (ref 0–11.9)
BACTERIA UR CULT: NORMAL
BASOPHILS # BLD AUTO: 0.1 % (ref 0–1.8)
BASOPHILS # BLD: 0.01 K/UL (ref 0–0.12)
BUN SERPL-MCNC: 9 MG/DL (ref 8–22)
CALCIUM SERPL-MCNC: 8.1 MG/DL (ref 8.5–10.5)
CHLORIDE SERPL-SCNC: 99 MMOL/L (ref 96–112)
CO2 SERPL-SCNC: 24 MMOL/L (ref 20–33)
CREAT SERPL-MCNC: 0.55 MG/DL (ref 0.5–1.4)
CRP SERPL HS-MCNC: 23.43 MG/DL (ref 0–0.75)
EOSINOPHIL # BLD AUTO: 0.06 K/UL (ref 0–0.51)
EOSINOPHIL NFR BLD: 0.6 % (ref 0–6.9)
ERYTHROCYTE [DISTWIDTH] IN BLOOD BY AUTOMATED COUNT: 39.9 FL (ref 35.9–50)
ERYTHROCYTE [SEDIMENTATION RATE] IN BLOOD BY WESTERGREN METHOD: 117 MM/HOUR (ref 0–30)
FLUAV RNA SPEC QL NAA+PROBE: NEGATIVE
FLUBV RNA SPEC QL NAA+PROBE: NEGATIVE
GLUCOSE SERPL-MCNC: 107 MG/DL (ref 65–99)
HCT VFR BLD AUTO: 27 % (ref 37–47)
HGB BLD-MCNC: 9 G/DL (ref 12–16)
IMM GRANULOCYTES # BLD AUTO: 0.07 K/UL (ref 0–0.11)
IMM GRANULOCYTES NFR BLD AUTO: 0.7 % (ref 0–0.9)
LYMPHOCYTES # BLD AUTO: 0.89 K/UL (ref 1–4.8)
LYMPHOCYTES NFR BLD: 9.1 % (ref 22–41)
MCH RBC QN AUTO: 31.5 PG (ref 27–33)
MCHC RBC AUTO-ENTMCNC: 33.3 G/DL (ref 33.6–35)
MCV RBC AUTO: 94.4 FL (ref 81.4–97.8)
MONOCYTES # BLD AUTO: 1.37 K/UL (ref 0–0.85)
MONOCYTES NFR BLD AUTO: 14 % (ref 0–13.4)
NEUTROPHILS # BLD AUTO: 7.36 K/UL (ref 2–7.15)
NEUTROPHILS NFR BLD: 75.5 % (ref 44–72)
NRBC # BLD AUTO: 0 K/UL
NRBC BLD-RTO: 0 /100 WBC
PLATELET # BLD AUTO: 418 K/UL (ref 164–446)
PMV BLD AUTO: 8.3 FL (ref 9–12.9)
POTASSIUM SERPL-SCNC: 4.1 MMOL/L (ref 3.6–5.5)
PROCALCITONIN SERPL-MCNC: 0.26 NG/ML
RBC # BLD AUTO: 2.86 M/UL (ref 4.2–5.4)
SIGNIFICANT IND 70042: NORMAL
SITE SITE: NORMAL
SODIUM SERPL-SCNC: 130 MMOL/L (ref 135–145)
SOURCE SOURCE: NORMAL
WBC # BLD AUTO: 9.8 K/UL (ref 4.8–10.8)

## 2019-02-06 PROCEDURE — A9270 NON-COVERED ITEM OR SERVICE: HCPCS | Performed by: HOSPITALIST

## 2019-02-06 PROCEDURE — 700111 HCHG RX REV CODE 636 W/ 250 OVERRIDE (IP): Performed by: INTERNAL MEDICINE

## 2019-02-06 PROCEDURE — 87502 INFLUENZA DNA AMP PROBE: CPT

## 2019-02-06 PROCEDURE — 700102 HCHG RX REV CODE 250 W/ 637 OVERRIDE(OP): Performed by: HOSPITALIST

## 2019-02-06 PROCEDURE — 84145 PROCALCITONIN (PCT): CPT

## 2019-02-06 PROCEDURE — 80048 BASIC METABOLIC PNL TOTAL CA: CPT

## 2019-02-06 PROCEDURE — 700111 HCHG RX REV CODE 636 W/ 250 OVERRIDE (IP): Performed by: HOSPITALIST

## 2019-02-06 PROCEDURE — 77412 RADIATION TX DELIVERY LVL 3: CPT | Performed by: RADIOLOGY

## 2019-02-06 PROCEDURE — 700105 HCHG RX REV CODE 258: Performed by: INTERNAL MEDICINE

## 2019-02-06 PROCEDURE — 86140 C-REACTIVE PROTEIN: CPT

## 2019-02-06 PROCEDURE — 700102 HCHG RX REV CODE 250 W/ 637 OVERRIDE(OP): Performed by: INTERNAL MEDICINE

## 2019-02-06 PROCEDURE — A9270 NON-COVERED ITEM OR SERVICE: HCPCS | Performed by: INTERNAL MEDICINE

## 2019-02-06 PROCEDURE — 77387 GUIDANCE FOR RADJ TX DLVR: CPT | Performed by: RADIOLOGY

## 2019-02-06 PROCEDURE — 85652 RBC SED RATE AUTOMATED: CPT

## 2019-02-06 PROCEDURE — 99232 SBSQ HOSP IP/OBS MODERATE 35: CPT | Performed by: HOSPITALIST

## 2019-02-06 PROCEDURE — 770004 HCHG ROOM/CARE - ONCOLOGY PRIVATE *

## 2019-02-06 PROCEDURE — 85025 COMPLETE CBC W/AUTO DIFF WBC: CPT

## 2019-02-06 PROCEDURE — 71045 X-RAY EXAM CHEST 1 VIEW: CPT

## 2019-02-06 PROCEDURE — 77014 PR CT GUIDANCE PLACEMENT RAD THERAPY FIELDS: CPT | Mod: 26 | Performed by: RADIOLOGY

## 2019-02-06 RX ADMIN — HYDROCODONE BITARTRATE AND HOMATROPINE METHYLBROMIDE 5 ML: 5; 1.5 SOLUTION ORAL at 17:38

## 2019-02-06 RX ADMIN — DEXAMETHASONE SODIUM PHOSPHATE 12 MG: 4 INJECTION, SOLUTION INTRAMUSCULAR; INTRAVENOUS at 11:29

## 2019-02-06 RX ADMIN — ONDANSETRON 8 MG: 2 INJECTION INTRAMUSCULAR; INTRAVENOUS at 11:28

## 2019-02-06 RX ADMIN — ONDANSETRON 4 MG: 4 TABLET, ORALLY DISINTEGRATING ORAL at 05:15

## 2019-02-06 RX ADMIN — BUDESONIDE AND FORMOTEROL FUMARATE DIHYDRATE 2 PUFF: 160; 4.5 AEROSOL RESPIRATORY (INHALATION) at 17:38

## 2019-02-06 RX ADMIN — BUDESONIDE AND FORMOTEROL FUMARATE DIHYDRATE 2 PUFF: 160; 4.5 AEROSOL RESPIRATORY (INHALATION) at 05:15

## 2019-02-06 RX ADMIN — NICOTINE 21 MG: 21 PATCH, EXTENDED RELEASE TRANSDERMAL at 05:16

## 2019-02-06 RX ADMIN — ONDANSETRON 4 MG: 4 TABLET, ORALLY DISINTEGRATING ORAL at 17:37

## 2019-02-06 RX ADMIN — CARBOPLATIN 206 MG: 10 INJECTION, SOLUTION INTRAVENOUS at 13:58

## 2019-02-06 RX ADMIN — DIPHENHYDRAMINE HYDROCHLORIDE 25 MG: 50 INJECTION, SOLUTION INTRAMUSCULAR; INTRAVENOUS at 11:29

## 2019-02-06 RX ADMIN — ENOXAPARIN SODIUM 40 MG: 100 INJECTION SUBCUTANEOUS at 05:16

## 2019-02-06 RX ADMIN — HYDROCODONE BITARTRATE AND HOMATROPINE METHYLBROMIDE 5 ML: 5; 1.5 SOLUTION ORAL at 05:15

## 2019-02-06 RX ADMIN — FAMOTIDINE 20 MG: 10 INJECTION INTRAVENOUS at 11:29

## 2019-02-06 RX ADMIN — SENNOSIDES AND DOCUSATE SODIUM 2 TABLET: 8.6; 5 TABLET ORAL at 05:16

## 2019-02-06 RX ADMIN — ONDANSETRON 4 MG: 4 TABLET, ORALLY DISINTEGRATING ORAL at 21:53

## 2019-02-06 RX ADMIN — HYDROCODONE BITARTRATE AND HOMATROPINE METHYLBROMIDE 5 ML: 5; 1.5 SOLUTION ORAL at 22:44

## 2019-02-06 RX ADMIN — LEVOFLOXACIN 750 MG: 750 TABLET, FILM COATED ORAL at 05:15

## 2019-02-06 RX ADMIN — DIAZEPAM 2.5 MG: 5 TABLET ORAL at 21:55

## 2019-02-06 RX ADMIN — PACLITAXEL 73.4 MG: 300 INJECTION, SOLUTION INTRAVENOUS at 12:18

## 2019-02-06 ASSESSMENT — ENCOUNTER SYMPTOMS
DIZZINESS: 0
COUGH: 1
BLURRED VISION: 0
NAUSEA: 0
DEPRESSION: 0
MUSCULOSKELETAL NEGATIVE: 1
SORE THROAT: 0
MYALGIAS: 0
CARDIOVASCULAR NEGATIVE: 1
SPUTUM PRODUCTION: 0
HEMOPTYSIS: 0
GASTROINTESTINAL NEGATIVE: 1
EYES NEGATIVE: 1
NECK PAIN: 0
CHILLS: 0
FEVER: 1
HEARTBURN: 0
WEAKNESS: 1
NERVOUS/ANXIOUS: 1
ORTHOPNEA: 0
HEADACHES: 0
SHORTNESS OF BREATH: 1

## 2019-02-06 NOTE — PROGRESS NOTES
Chemotherapy Verification - PRIMARY RN  Cycle # 2 Day # 1      Height = 175.3 cm Weight = 54.5 kg  BSA = 1.63 m2       Medication: Paclitaxel  Dose: 45 mg/m2  Calculated Dose: 73.35 mg (ordered=73.35 mg)                             (In mg/m2, AUC, mg/kg)     Medication: Carboplatin  Dose: AUC=2  Calculated Dose: 206.255 mg (thwqxze=873 mg)                            (In mg/m2, AUC, mg/kg)    Carboplatin calculation: (((140-55)*[id number])*(0.70+(1 *0.15))/(0.7*72)+25)*2 * ((1 =1)+(1 =2)) = 206.255    I confirm this process was performed independently with the BSA and all final chemotherapy dosing calculations congruent.  Any discrepancies of 5% or greater have been addressed with the chemotherapy pharmacist. The resolution of the discrepancy has been documented in the EPIC progress notes.

## 2019-02-06 NOTE — PROGRESS NOTES
Hospital Medicine Daily Progress Note    Date of Service  1/29/2019    Chief Complaint  53 y.o. female admitted 1/20/2019 with cough since April 2018, multiple round of antibiotics with only transient improvement.  CT chest with lung mass.    Hospital Course    Patient seen by pulmonology as IR recommended bronchial biopsy given central location of mass.  EBUS initially planned but Dr Lynne feels traditional biopsy able to gather necessary tissue for diagnosis.      Interval Problem Update  Patient is resting in bed, she continued having fevers and cough, had mild low blood pressure today, she denies any dizziness or lightheadedness, she is alert oriented follows commands., does not look septic, patient is tolerating diet and having good fluid intake, infectious workup reviewed and is within normal limits, will check influenza since she continued having fever and cough, continue Levaquin for now, will recheck pro-calcitonin in the morning.  Discussed with patient and family.  Denies any focal weakness, numbness tingling.    Consultants/Specialty  Pulm - Smith  onc - Wilson/Corrine    Code Status  full    Disposition  Home when appropriate    Review of Systems  Review of Systems   Constitutional: Positive for fever. Negative for chills.   HENT: Negative for congestion and sinus pain.    Eyes: Negative for blurred vision.   Respiratory: Positive for cough. Negative for hemoptysis, sputum production and shortness of breath.    Cardiovascular: Positive for chest pain (right sided rib pain). Negative for palpitations.   Gastrointestinal: Negative for heartburn, nausea and vomiting.   Genitourinary: Negative for dysuria and urgency.   Musculoskeletal: Negative for myalgias and neck pain.   Skin: Negative for rash.   Neurological: Negative for dizziness, weakness and headaches.   Psychiatric/Behavioral: Negative for depression. The patient is nervous/anxious.         Physical Exam  Temp:  [36.3 °C (97.4 °F)-38.3 °C (101 °F)]  38.3 °C (101 °F)  Pulse:  [] 102  Resp:  [18-20] 20  BP: ()/(51-72) 95/65  SpO2:  [93 %-98 %] 94 %    Physical Exam   Constitutional: She is oriented to person, place, and time. She appears well-developed and well-nourished. No distress.   HENT:   Head: Normocephalic and atraumatic.   Mouth/Throat: No oropharyngeal exudate.   Eyes: Conjunctivae are normal. No scleral icterus.   Neck: Neck supple.   Cardiovascular: Normal rate, regular rhythm and normal heart sounds.  Exam reveals no gallop and no friction rub.    Pulmonary/Chest: Effort normal and breath sounds normal. No respiratory distress. She has no wheezes.   Decreased at bases bilaterally     Abdominal: Soft. Bowel sounds are normal. She exhibits no distension. There is no tenderness. There is no rebound.   Musculoskeletal: Normal range of motion. She exhibits no edema.   Lymphadenopathy:     She has no cervical adenopathy.   Neurological: She is alert and oriented to person, place, and time. She exhibits normal muscle tone.   Skin: Skin is warm and dry.   Psychiatric: She has a normal mood and affect. Her behavior is normal.   Nursing note and vitals reviewed.      Fluids    Intake/Output Summary (Last 24 hours) at 02/05/19 1722  Last data filed at 02/04/19 2020   Gross per 24 hour   Intake              118 ml   Output                0 ml   Net              118 ml       Laboratory  Recent Labs      02/03/19   0002  02/04/19   0100  02/05/19   0055   WBC  9.8  9.2  9.0   RBC  3.02*  3.13*  3.00*   HEMOGLOBIN  9.5*  9.8*  9.3*   HEMATOCRIT  29.1*  29.7*  28.2*   MCV  96.4  94.9  94.0   MCH  31.5  31.3  31.0   MCHC  32.6*  33.0*  33.0*   RDW  41.0  39.6  39.6   PLATELETCT  368  418  399   MPV  8.7*  8.4*  8.5*     Recent Labs      02/03/19   0002  02/04/19   0100  02/05/19   0055   SODIUM  128*  129*  130*   POTASSIUM  4.2  4.3  4.2   CHLORIDE  100  99  100   CO2  23  26  25   GLUCOSE  116*  116*  107*   BUN  11  9  8   CREATININE  0.56  0.53  0.50    CALCIUM  8.1*  8.7  8.2*                   Imaging  DX-CHEST-PORTABLE (1 VIEW)   Final Result      Left-sided volume loss with left midlung and basilar opacities which could be seen secondary to consolidation or mass lesion.      IR-CVC PORT PLACEMENT > AGE 5   Final Result      1. Ultrasound and fluoroscopic guided placement of a right internal jugular single lumen Bard PowerPort venous access device.      2. The port may be used immediately as clinically indicated. Flushes per protocol.      3. The skin staples and suture should be removed in 10-12 days. This can be performed in the radiology department on any weekday without a prior appointment if desired.      MR-BRAIN-WITH & W/O   Final Result      1.  There is no intracranial metastasis.   2.  Mild cerebral atrophy.   3.  Mild chronic microvascular ischemic disease.      CT-ABDOMEN-PELVIS WITH   Final Result      No evidence of metastatic disease is identified.      Status post cholecystectomy.      Pancreatic calcifications likely represent sequelae of chronic pancreatitis.      Atherosclerotic plaque.      Nonvisualization of the appendix, limiting evaluation.      Left lower lobe lobulated masslike density with surrounding patchy opacities, better evaluated on the prior CT chest. Volume loss is again noted on the left.           Assessment/Plan  * Non-small cell lung cancer (HCC)   Assessment & Plan    Bilateral upper lobe lung masses noted on CT, suspcious for lung cancer  Pulmonology consulted - s/p bronch with Dr Lynne  CT abdomen pelvis without evidence of malignancy  MRI brain without mets  1/22 bronchoscopy with brush biopsy done  Pathology showing poorly differentiated NSCC  chemoradiation started 1/29  Continue chemotherapy as planned by oncology.  Daily labs.         Fever   Assessment & Plan    Likely d/t adenoca lung  Continue empiric levaquin, immunocompromised and supposed to have chemo 3/5  UA negative, chest x-ray stable, will culture  pending, she continued having cough will check for influenza.  Start incentive spirometry     Cough   Assessment & Plan    Could be due to initiation of chemoradiation and irritation of tumor  Tessalon, hycodan,  Patient continue having fevers, had chest x-ray that did not show any acute findings, pro calcitonin is low, will check for influenza.       Anxiety   Assessment & Plan    Secondary to possible cancer  Low dose xanax PRN.       COPD with asthma (HCC)   Assessment & Plan    At baseline, not an acute exacerbation, continue respiratory care per protocol     Severe protein-calorie malnutrition (HCC)   Assessment & Plan    Nutrition consulted  Body mass index is 17.73 kg/m².         Tobacco abuse   Assessment & Plan    Needs to quit          VTE prophylaxis: Lovenox

## 2019-02-06 NOTE — PROGRESS NOTES
Spoke with Dr. Castle regarding patients latest vitals. We are going to hold off on chemo tonight.   Chest x-ray ordered for early morning.

## 2019-02-06 NOTE — CARE PLAN
Problem: Communication  Goal: The ability to communicate needs accurately and effectively will improve  Outcome: PROGRESSING AS EXPECTED  Discussed POC with pt and friend, all questions answered     Problem: Safety  Goal: Will remain free from injury  Outcome: PROGRESSING AS EXPECTED  Pt up self, calls for assistance if needed, friend at bedside

## 2019-02-06 NOTE — PROGRESS NOTES
Chemotherapy Verification - SECONDARY RN       Height = 175.3 cm  Weight = 54.5 kg  BSA = 1.63      Medication: Taxol  Dose: 45 mg / m2  Calculated Dose:73.45 mg , Order dose 73.4 mg                             (In mg/m2, AUC, mg/kg)      Carboplatin calculation (if applicable):  (((140-55)*[id number])*(0.70+(1 *0.15))/(0.7*72)+25)*2 * ((1 =1)+(1 =2)) = 206.255    I confirm that this process was performed independently.

## 2019-02-06 NOTE — PROGRESS NOTES
Oncology/Hematology Progress Note               Author: VIN Benitez PGY 2 (Corrine) Date & Time created: 2/6/2019  10:46 AM     Non-small cell lung cancer    Interval History:  -2/5/19- had low grade fever . She is on empiric levaquin. CXR no obvious pneumonia.tolerating chemo xrt ok  -2/6/2019 - 24hr Tmax 38.3 yesterday afternoon.  Systolic BP for the past 24hrs .  Patient feeling well.  She continues to have chronic dry cough.  Will start chemotherapy today.    Review of Systems:  Review of Systems   Constitutional: Positive for malaise/fatigue.   HENT: Negative.    Eyes: Negative.    Respiratory: Positive for cough and shortness of breath.    Cardiovascular: Negative.    Gastrointestinal: Negative.    Genitourinary: Negative.    Musculoskeletal: Negative.    Skin: Negative.    Neurological: Positive for weakness.   Endo/Heme/Allergies: Negative.    Psychiatric/Behavioral: The patient is nervous/anxious.        Physical Exam:  Physical Exam   Constitutional: She is oriented to person, place, and time. She appears well-developed.   HENT:   Head: Normocephalic.   Eyes: Pupils are equal, round, and reactive to light. Conjunctivae are normal.   Cardiovascular: Normal rate, regular rhythm and normal heart sounds.    Pulmonary/Chest: Effort normal and breath sounds normal. No respiratory distress.   Abdominal: Soft. Bowel sounds are normal. She exhibits no distension. There is no tenderness.   Musculoskeletal: She exhibits no edema.   Neurological: She is alert and oriented to person, place, and time. No cranial nerve deficit.   Skin: No erythema.   Psychiatric: She has a normal mood and affect. Her behavior is normal. Judgment and thought content normal.       Labs:          Recent Labs      02/04/19   0100  02/05/19   0055  02/06/19   0040   SODIUM  129*  130*  130*   POTASSIUM  4.3  4.2  4.1   CHLORIDE  99  100  99   CO2  26  25  24   BUN  9  8  9   CREATININE  0.53  0.50  0.55   CALCIUM  8.7  8.2*  8.1*     Recent  Labs      195  19   0040   GLUCOSE  116*  107*  107*     Recent Labs      19   004   RBC  3.13*  3.00*  2.86*   HEMOGLOBIN  9.8*  9.3*  9.0*   HEMATOCRIT  29.7*  28.2*  27.0*   PLATELETCT  418  399  418     Recent Labs      19   WBC  9.2  9.0  9.8   NEUTSPOLYS  75.80*  77.10*  75.50*   LYMPHOCYTES  9.00*  6.90*  9.10*   MONOCYTES  11.40  14.00*  14.00*   EOSINOPHILS  1.00  1.10  0.60   BASOPHILS  0.40  0.20  0.10     Recent Labs      19   SODIUM  129*  130*  130*   POTASSIUM  4.3  4.2  4.1   CHLORIDE  99  100  99   CO2  26  25  24   GLUCOSE  116*  107*  107*   BUN  9  8  9   CREATININE  0.53  0.50  0.55   CALCIUM  8.7  8.2*  8.1*     Hemodynamics:  Temp (24hrs), Av.6 °C (99.7 °F), Min:36.6 °C (97.8 °F), Max:38.3 °C (101 °F)  Temperature: 36.7 °C (98.1 °F)  Pulse  Av.9  Min: 62  Max: 108   Blood Pressure: (!) 96/62     Respiratory:    Respiration: 19, Pulse Oximetry: 98 %     Work Of Breathing / Effort: Moderate  RUL Breath Sounds: Clear, RML Breath Sounds: Diminished, RLL Breath Sounds: Diminished, MIREYA Breath Sounds: Clear, LLL Breath Sounds: Diminished  Fluids:  No intake or output data in the 24 hours ending 19 1400  Weight: 54.5 kg (120 lb 2.4 oz)  GI/Nutrition:  Orders Placed This Encounter   Procedures   • Diet Order Regular     Standing Status:   Standing     Number of Occurrences:   1     Order Specific Question:   Diet:     Answer:   Regular [1]     Medical Decision Making, by Problem:  Active Hospital Problems    Diagnosis   • *Non-small cell lung cancer (HCC) [C34.90]   • COPD with asthma (HCC) [J44.9]   • Anxiety [F41.9]   • Tobacco abuse [Z72.0]   • Severe protein-calorie malnutrition (HCC) [E43]     Past surgical history, past social history, past medical history unchanged reviewed  Plan:    #1 oncology: Non-small cell  lung cancer evaluated by Dr. Gauthire and felt to be stage IIIb T3 N3 M0.  His plan was weekly carbo Taxol concurrently with radiation to be followed by immunotherapy megha.  Due fot week 2 of carbo taxol. Labs are appropriate for treatment today.she had low grade fever , on empiric levaquin. She always has some risk of developing postobstrucitve pneumonia . Ok to do chemo from risk benefit standpoint . Monitor close for any worsening infection in which case she will need aggressive Abx .  If she is getting DC d, pls make f/u with Dr Gauthier prior to her next dose next, Tuesday.  Chemotherapy started 2/6/2019.      Moderate complexity/moderate risk for mortality morbidity/drug toxicity monitoring/discussed with nursing and primary team  Please note that this dictation was created using voice recognition software. I have made every reasonable attempt to correct obvious errors, but I expect that there are errors of grammar and possibly context that I did not discover before finalizing the note  Quality-Core Measures   Reviewed items::  Radiology images reviewed, Labs reviewed and Medications reviewed

## 2019-02-06 NOTE — CARE PLAN
Problem: Bowel/Gastric:  Goal: Normal bowel function is maintained or improved  Outcome: PROGRESSING AS EXPECTED  No BM today. Camila administered per MAR and prune juice given. Pt did not wish other interventions at this time.     Problem: Respiratory:  Goal: Respiratory status will improve    Intervention: Educate and encourage incentive spirometry usage  IS provided at bedside. PT able to demonstrate correct use and is able to pull 1500. Education given and encouraged pt to use 10x/hr while awake.

## 2019-02-06 NOTE — PROGRESS NOTES
A/ox4, up self, 2L o2 tolerating well.   Pt is hypotensive - ongoing, MD aware, asymptomatic.   L port flushed + blood return running TKO.   Pt takes 2.5mg Diazepam PRN nightly for sleep.   Give Zofran and cough syrup together to relieve nausea.  Pt has been intermittently spiking temperatures, workup completed.   Rounding in place.    Pt receiving daily radiation - received 6 of 30 today.  Holding chemotherapy until tomorrow, per Dr. Castle.   Chest xray ordered for AM.

## 2019-02-06 NOTE — PROGRESS NOTES
Pt was transported to the department for radiation tx 7 of 30. After tx she will be transported back to her room, she will come back tomorrow for treatment at 7:20am.

## 2019-02-07 ENCOUNTER — HOSPITAL ENCOUNTER (OUTPATIENT)
Dept: RADIATION ONCOLOGY | Facility: MEDICAL CENTER | Age: 54
End: 2019-02-07

## 2019-02-07 ENCOUNTER — APPOINTMENT (OUTPATIENT)
Dept: ONCOLOGY | Facility: MEDICAL CENTER | Age: 54
End: 2019-02-07
Attending: INTERNAL MEDICINE
Payer: MEDICAID

## 2019-02-07 LAB
ANION GAP SERPL CALC-SCNC: 8 MMOL/L (ref 0–11.9)
BASOPHILS # BLD AUTO: 0.2 % (ref 0–1.8)
BASOPHILS # BLD: 0.01 K/UL (ref 0–0.12)
BUN SERPL-MCNC: 9 MG/DL (ref 8–22)
CALCIUM SERPL-MCNC: 8.2 MG/DL (ref 8.5–10.5)
CHLORIDE SERPL-SCNC: 103 MMOL/L (ref 96–112)
CO2 SERPL-SCNC: 25 MMOL/L (ref 20–33)
CREAT SERPL-MCNC: 0.43 MG/DL (ref 0.5–1.4)
EOSINOPHIL # BLD AUTO: 0 K/UL (ref 0–0.51)
EOSINOPHIL NFR BLD: 0 % (ref 0–6.9)
ERYTHROCYTE [DISTWIDTH] IN BLOOD BY AUTOMATED COUNT: 40.3 FL (ref 35.9–50)
GLUCOSE SERPL-MCNC: 142 MG/DL (ref 65–99)
HCT VFR BLD AUTO: 28 % (ref 37–47)
HGB BLD-MCNC: 9.3 G/DL (ref 12–16)
IMM GRANULOCYTES # BLD AUTO: 0.04 K/UL (ref 0–0.11)
IMM GRANULOCYTES NFR BLD AUTO: 0.7 % (ref 0–0.9)
LYMPHOCYTES # BLD AUTO: 0.27 K/UL (ref 1–4.8)
LYMPHOCYTES NFR BLD: 4.6 % (ref 22–41)
MCH RBC QN AUTO: 31.7 PG (ref 27–33)
MCHC RBC AUTO-ENTMCNC: 33.2 G/DL (ref 33.6–35)
MCV RBC AUTO: 95.6 FL (ref 81.4–97.8)
MONOCYTES # BLD AUTO: 0.33 K/UL (ref 0–0.85)
MONOCYTES NFR BLD AUTO: 5.6 % (ref 0–13.4)
NEUTROPHILS # BLD AUTO: 5.22 K/UL (ref 2–7.15)
NEUTROPHILS NFR BLD: 88.9 % (ref 44–72)
NRBC # BLD AUTO: 0 K/UL
NRBC BLD-RTO: 0 /100 WBC
PLATELET # BLD AUTO: 453 K/UL (ref 164–446)
PMV BLD AUTO: 8.5 FL (ref 9–12.9)
POTASSIUM SERPL-SCNC: 4.1 MMOL/L (ref 3.6–5.5)
PROCALCITONIN SERPL-MCNC: 0.34 NG/ML
RBC # BLD AUTO: 2.93 M/UL (ref 4.2–5.4)
SODIUM SERPL-SCNC: 136 MMOL/L (ref 135–145)
WBC # BLD AUTO: 5.9 K/UL (ref 4.8–10.8)

## 2019-02-07 PROCEDURE — 77300 RADIATION THERAPY DOSE PLAN: CPT | Mod: 26 | Performed by: RADIOLOGY

## 2019-02-07 PROCEDURE — 700102 HCHG RX REV CODE 250 W/ 637 OVERRIDE(OP): Performed by: INTERNAL MEDICINE

## 2019-02-07 PROCEDURE — 77301 RADIOTHERAPY DOSE PLAN IMRT: CPT | Mod: 26 | Performed by: RADIOLOGY

## 2019-02-07 PROCEDURE — A9270 NON-COVERED ITEM OR SERVICE: HCPCS | Performed by: HOSPITALIST

## 2019-02-07 PROCEDURE — 77300 RADIATION THERAPY DOSE PLAN: CPT | Performed by: RADIOLOGY

## 2019-02-07 PROCEDURE — 85025 COMPLETE CBC W/AUTO DIFF WBC: CPT

## 2019-02-07 PROCEDURE — 77301 RADIOTHERAPY DOSE PLAN IMRT: CPT | Performed by: RADIOLOGY

## 2019-02-07 PROCEDURE — 84145 PROCALCITONIN (PCT): CPT

## 2019-02-07 PROCEDURE — 77336 RADIATION PHYSICS CONSULT: CPT | Performed by: RADIOLOGY

## 2019-02-07 PROCEDURE — 77338 DESIGN MLC DEVICE FOR IMRT: CPT | Performed by: RADIOLOGY

## 2019-02-07 PROCEDURE — 99232 SBSQ HOSP IP/OBS MODERATE 35: CPT | Performed by: HOSPITALIST

## 2019-02-07 PROCEDURE — 80048 BASIC METABOLIC PNL TOTAL CA: CPT

## 2019-02-07 PROCEDURE — 77412 RADIATION TX DELIVERY LVL 3: CPT | Performed by: RADIOLOGY

## 2019-02-07 PROCEDURE — 700102 HCHG RX REV CODE 250 W/ 637 OVERRIDE(OP): Performed by: HOSPITALIST

## 2019-02-07 PROCEDURE — 770004 HCHG ROOM/CARE - ONCOLOGY PRIVATE *

## 2019-02-07 PROCEDURE — 77338 DESIGN MLC DEVICE FOR IMRT: CPT | Mod: 26 | Performed by: RADIOLOGY

## 2019-02-07 PROCEDURE — 700101 HCHG RX REV CODE 250: Performed by: HOSPITALIST

## 2019-02-07 PROCEDURE — A9270 NON-COVERED ITEM OR SERVICE: HCPCS | Performed by: INTERNAL MEDICINE

## 2019-02-07 PROCEDURE — 77387 GUIDANCE FOR RADJ TX DLVR: CPT | Performed by: RADIOLOGY

## 2019-02-07 PROCEDURE — 700111 HCHG RX REV CODE 636 W/ 250 OVERRIDE (IP): Performed by: HOSPITALIST

## 2019-02-07 RX ORDER — LIDOCAINE 50 MG/G
1 PATCH TOPICAL EVERY 24 HOURS
Status: DISCONTINUED | OUTPATIENT
Start: 2019-02-07 | End: 2019-02-13 | Stop reason: HOSPADM

## 2019-02-07 RX ORDER — CYCLOBENZAPRINE HCL 10 MG
5 TABLET ORAL 3 TIMES DAILY PRN
Status: DISCONTINUED | OUTPATIENT
Start: 2019-02-07 | End: 2019-02-13 | Stop reason: HOSPADM

## 2019-02-07 RX ADMIN — HYDROCODONE BITARTRATE AND HOMATROPINE METHYLBROMIDE 5 ML: 5; 1.5 SOLUTION ORAL at 06:22

## 2019-02-07 RX ADMIN — LEVOFLOXACIN 750 MG: 750 TABLET, FILM COATED ORAL at 06:23

## 2019-02-07 RX ADMIN — ONDANSETRON 4 MG: 4 TABLET, ORALLY DISINTEGRATING ORAL at 12:29

## 2019-02-07 RX ADMIN — SENNOSIDES AND DOCUSATE SODIUM 2 TABLET: 8.6; 5 TABLET ORAL at 06:23

## 2019-02-07 RX ADMIN — HYDROCODONE BITARTRATE AND HOMATROPINE METHYLBROMIDE 5 ML: 5; 1.5 SOLUTION ORAL at 12:29

## 2019-02-07 RX ADMIN — BUDESONIDE AND FORMOTEROL FUMARATE DIHYDRATE 2 PUFF: 160; 4.5 AEROSOL RESPIRATORY (INHALATION) at 17:11

## 2019-02-07 RX ADMIN — SENNOSIDES AND DOCUSATE SODIUM 2 TABLET: 8.6; 5 TABLET ORAL at 17:12

## 2019-02-07 RX ADMIN — IBUPROFEN 600 MG: 400 TABLET ORAL at 17:11

## 2019-02-07 RX ADMIN — ONDANSETRON 4 MG: 4 TABLET, ORALLY DISINTEGRATING ORAL at 21:43

## 2019-02-07 RX ADMIN — DIAZEPAM 2.5 MG: 5 TABLET ORAL at 21:44

## 2019-02-07 RX ADMIN — BUDESONIDE AND FORMOTEROL FUMARATE DIHYDRATE 2 PUFF: 160; 4.5 AEROSOL RESPIRATORY (INHALATION) at 06:22

## 2019-02-07 RX ADMIN — ONDANSETRON 4 MG: 4 TABLET, ORALLY DISINTEGRATING ORAL at 06:22

## 2019-02-07 RX ADMIN — LIDOCAINE 1 PATCH: 50 PATCH TOPICAL at 18:32

## 2019-02-07 RX ADMIN — NICOTINE 21 MG: 21 PATCH, EXTENDED RELEASE TRANSDERMAL at 06:23

## 2019-02-07 RX ADMIN — HYDROCODONE BITARTRATE AND HOMATROPINE METHYLBROMIDE 5 ML: 5; 1.5 SOLUTION ORAL at 21:44

## 2019-02-07 RX ADMIN — ENOXAPARIN SODIUM 40 MG: 100 INJECTION SUBCUTANEOUS at 06:23

## 2019-02-07 ASSESSMENT — ENCOUNTER SYMPTOMS
DIZZINESS: 0
NAUSEA: 0
ORTHOPNEA: 0
HEMOPTYSIS: 0
NERVOUS/ANXIOUS: 1
DEPRESSION: 0
EYES NEGATIVE: 1
MUSCULOSKELETAL NEGATIVE: 1
FEVER: 0
SORE THROAT: 0
PALPITATIONS: 0
MYALGIAS: 0
CHILLS: 0
NECK PAIN: 0
WEAKNESS: 0
HEADACHES: 0
SPUTUM PRODUCTION: 0
SHORTNESS OF BREATH: 1
CARDIOVASCULAR NEGATIVE: 1
HEARTBURN: 0
BLURRED VISION: 0
GASTROINTESTINAL NEGATIVE: 1
COUGH: 1

## 2019-02-07 NOTE — PROGRESS NOTES
Assumed care at 0700.   Received bedside report from noc RN.   Pt A/Ox4. Pt denied pain.   Dry cough, declined intervention at this time.  Pt assessed, labs and notes reviewed.   POC discussed; pt agreeable to goals.    Pt board updated and pt informed of phone ext's, and hourly rounding.   Bed alarm not in use, low risk. Pt is up self and steady.   Chemo precautions in place since chemo finished yesterday.   Pt needs are met at this time. Call light and phone within reach.

## 2019-02-07 NOTE — PROGRESS NOTES
Oncology/Hematology Progress Note               Author: VIN Benitez PGY 2 (Corrine) Date & Time created: 2/7/2019  8:44 AM     Non-small cell lung cancer    Interval History:  -2/5/19- had low grade fever . She is on empiric levaquin. CXR no obvious pneumonia.tolerating chemo xrt ok  -2/6/2019 - 24hr Tmax 38.3 yesterday afternoon.  Systolic BP for the past 24hrs .  Patient feeling well.  She continues to have chronic dry cough.  Will start chemotherapy today.  -2/7/2019 - Patient feeling great today.  Overnight, afebrile.  Systolic BP range .  No symptoms from carbo/taxol yesterday.  Receiving radiation therapy today.      Review of Systems:  Review of Systems   Constitutional: Negative for malaise/fatigue.   HENT: Negative.    Eyes: Negative.    Respiratory: Positive for cough and shortness of breath.    Cardiovascular: Negative.    Gastrointestinal: Negative.    Genitourinary: Negative.    Musculoskeletal: Negative.    Skin: Negative.    Neurological: Negative for weakness.   Endo/Heme/Allergies: Negative.    Psychiatric/Behavioral: The patient is nervous/anxious.        Physical Exam:  Physical Exam   Constitutional: She is oriented to person, place, and time. She appears well-developed.   HENT:   Head: Normocephalic.   Eyes: Pupils are equal, round, and reactive to light. Conjunctivae are normal.   Cardiovascular: Normal rate, regular rhythm and normal heart sounds.    Pulmonary/Chest: Effort normal and breath sounds normal. No respiratory distress.   Abdominal: Soft. Bowel sounds are normal. She exhibits no distension. There is no tenderness.   Musculoskeletal: She exhibits no edema.   Neurological: She is alert and oriented to person, place, and time. No cranial nerve deficit.   Skin: No erythema.   Psychiatric: She has a normal mood and affect. Her behavior is normal. Judgment and thought content normal.       Labs:          Recent Labs      02/05/19   0055  02/06/19   0040  02/07/19   0045   SODIUM   130*  130*  136   POTASSIUM  4.2  4.1  4.1   CHLORIDE  100  99  103   CO2  25  24  25   BUN  8  9  9   CREATININE  0.50  0.55  0.43*   CALCIUM  8.2*  8.1*  8.2*     Recent Labs      19   GLUCOSE  107*  107*  142*     Recent Labs      19   RBC  3.00*  2.86*  2.93*   HEMOGLOBIN  9.3*  9.0*  9.3*   HEMATOCRIT  28.2*  27.0*  28.0*   PLATELETCT  399  418  453*     Recent Labs      19   WBC  9.0  9.8  5.9   NEUTSPOLYS  77.10*  75.50*  88.90*   LYMPHOCYTES  6.90*  9.10*  4.60*   MONOCYTES  14.00*  14.00*  5.60   EOSINOPHILS  1.10  0.60  0.00   BASOPHILS  0.20  0.10  0.20     Recent Labs      19   SODIUM  130*  130*  136   POTASSIUM  4.2  4.1  4.1   CHLORIDE  100  99  103   CO2  25  24  25   GLUCOSE  107*  107*  142*   BUN  8  9  9   CREATININE  0.50  0.55  0.43*   CALCIUM  8.2*  8.1*  8.2*     Hemodynamics:  Temp (24hrs), Av.7 °C (98 °F), Min:36.4 °C (97.5 °F), Max:37.3 °C (99.1 °F)  Temperature: 36.5 °C (97.7 °F)  Pulse  Av.7  Min: 62  Max: 108   Blood Pressure: 116/69     Respiratory:    Respiration: 20, Pulse Oximetry: 97 %     Work Of Breathing / Effort: Moderate  RUL Breath Sounds: Clear, RML Breath Sounds: Diminished, RLL Breath Sounds: Diminished, MIREYA Breath Sounds: Clear, LLL Breath Sounds: Diminished  Fluids:  No intake or output data in the 24 hours ending 19 1400  Weight: 54.4 kg (119 lb 14.9 oz)  GI/Nutrition:  Orders Placed This Encounter   Procedures   • Diet Order Regular     Standing Status:   Standing     Number of Occurrences:   1     Order Specific Question:   Diet:     Answer:   Regular [1]     Medical Decision Making, by Problem:  Active Hospital Problems    Diagnosis   • *Non-small cell lung cancer (HCC) [C34.90]   • COPD with asthma (HCC) [J44.9]   • Anxiety [F41.9]   • Tobacco abuse [Z72.0]   •  Severe protein-calorie malnutrition (HCC) [E43]     Past surgical history, past social history, past medical history unchanged reviewed  Plan:    #1 oncology: Non-small cell lung cancer evaluated by Dr. Gauthier and felt to be stage IIIb T3 N3 M0.  His plan was weekly carbo Taxol concurrently with radiation to be followed by immunotherapy infinzi.  Due fot week 2 of carbo taxol. Labs are appropriate for treatment today.she had low grade fever , on empiric levaquin. She always has some risk of developing postobstrucitve pneumonia . Ok to do chemo from risk benefit standpoint . Monitor close for any worsening infection in which case she will need aggressive Abx .  If she is getting DC d, pls make f/u with Dr Gauthier prior to her next dose next, Tuesday.  Chemotherapy started 2/6/2019.      Moderate complexity/moderate risk for mortality morbidity/drug toxicity monitoring/discussed with nursing and primary team  Please note that this dictation was created using voice recognition software. I have made every reasonable attempt to correct obvious errors, but I expect that there are errors of grammar and possibly context that I did not discover before finalizing the note  Quality-Core Measures   Reviewed items::  Radiology images reviewed, Labs reviewed and Medications reviewed

## 2019-02-07 NOTE — CARE PLAN
Problem: Safety  Goal: Will remain free from falls  Bed locked in lowest position. Call light within reach. Gait assessed; steady.  Hourly rounding in place.     Problem: Bowel/Gastric:  Goal: Normal bowel function is maintained or improved  Outcome: PROGRESSING AS EXPECTED  Patient reports LBM 2/6

## 2019-02-07 NOTE — PROGRESS NOTES
Pt was transported to the department for radiation tx 8 of 30. After tx she will be transported back to her room, she will come back tomorrow for treatment at 7:20am.

## 2019-02-07 NOTE — PROGRESS NOTES
Pt alert and oriented x 4. Denies pain or nausea. Up self with a steady gait. Port to R chest flushing well with positive blood return. Fluids infusing at TKO. Pt tolerated taxol and carbo infusion without difficulty. Complains of cough. Given cough syrup and zofran x 1 with positive results. Pt resting comfortably at this time. Call light within reach. Hourly rounding in place.

## 2019-02-07 NOTE — PROGRESS NOTES
[facility] Daily Progress Note    Date of Service  1/29/2019    Chief Complaint  53 y.o. female admitted 1/20/2019 with cough since April 2018, multiple round of antibiotics with only transient improvement.  CT chest with lung mass.    Hospital Course    Patient seen by pulmonology as IR recommended bronchial biopsy given central location of mass.  EBUS initially planned but Dr Lynne feels traditional biopsy able to gather necessary tissue for diagnosis.      Interval Problem Update  Patient is been ambulating today, denies any shortness of breath dizziness with ambulation, she continued having mild fever, her pro-calcitonin is mildly elevated today, she is at high risk for developing postobstructive pneumonia, will continue on oral Levaquin for now we will recheck pro-calcitonin in a.m. and reassess need for IV antibiotics, continue using incentive spirometry, discussed with patient and family.  Blood pressure is borderline low but patient is asymptomatic, will continue monitoring.      Consultants/Specialty  Pulm - Smith  onc - Steve/Corrine    Code Status  full    Disposition  Home when appropriate    Review of Systems  Review of Systems   Constitutional: Positive for fever. Negative for chills.   HENT: Negative for congestion and sore throat.    Eyes: Negative for blurred vision.   Respiratory: Positive for cough. Negative for hemoptysis and sputum production.    Cardiovascular: Positive for chest pain (right sided rib pain). Negative for orthopnea.   Gastrointestinal: Negative for heartburn and nausea.   Genitourinary: Negative for dysuria and urgency.   Musculoskeletal: Negative for myalgias and neck pain.   Skin: Negative for rash.   Neurological: Negative for dizziness and headaches.   Psychiatric/Behavioral: Negative for depression. The patient is nervous/anxious.         Physical Exam  Temp:  [36.7 °C (98 °F)-38.2 °C (100.7 °F)] 36.7 °C (98 °F)  Pulse:  [78-96] 83  Resp:  [16-20] 16  BP:  ()/(56-68) 98/61  SpO2:  [92 %-98 %] 97 %    Physical Exam   Constitutional: She is oriented to person, place, and time. She appears well-developed and well-nourished. No distress.   HENT:   Head: Normocephalic and atraumatic.   Mouth/Throat: No oropharyngeal exudate.   Eyes: Conjunctivae are normal. No scleral icterus.   Neck: Normal range of motion. Neck supple.   Cardiovascular: Normal rate, regular rhythm and normal heart sounds.  Exam reveals no gallop and no friction rub.    Pulmonary/Chest: Effort normal and breath sounds normal. No stridor. No respiratory distress. She has no wheezes. She has no rales.   Decreased at bases bilaterally     Abdominal: Soft. Bowel sounds are normal. She exhibits no distension. There is no rebound.   Musculoskeletal: Normal range of motion. She exhibits no edema.   Neurological: She is alert and oriented to person, place, and time. She exhibits normal muscle tone.   Skin: Skin is warm and dry.   Psychiatric: She has a normal mood and affect. Her behavior is normal.   Nursing note and vitals reviewed.      Fluids    Intake/Output Summary (Last 24 hours) at 02/06/19 1815  Last data filed at 02/06/19 1739   Gross per 24 hour   Intake             1088 ml   Output                0 ml   Net             1088 ml       Laboratory  Recent Labs      02/04/19   0100  02/05/19   0055  02/06/19   0040   WBC  9.2  9.0  9.8   RBC  3.13*  3.00*  2.86*   HEMOGLOBIN  9.8*  9.3*  9.0*   HEMATOCRIT  29.7*  28.2*  27.0*   MCV  94.9  94.0  94.4   MCH  31.3  31.0  31.5   MCHC  33.0*  33.0*  33.3*   RDW  39.6  39.6  39.9   PLATELETCT  418  399  418   MPV  8.4*  8.5*  8.3*     Recent Labs      02/04/19   0100  02/05/19   0055  02/06/19   0040   SODIUM  129*  130*  130*   POTASSIUM  4.3  4.2  4.1   CHLORIDE  99  100  99   CO2  26  25  24   GLUCOSE  116*  107*  107*   BUN  9  8  9   CREATININE  0.53  0.50  0.55   CALCIUM  8.7  8.2*  8.1*                   Imaging  DX-CHEST-LIMITED (1 VIEW)   Final  Result         1.  Hazy left midlung and lower lobe opacity, compatible with infiltrates, stable.   2.  Asymmetric hyperexpansion of the right lung with decreased volume of the left lung. Associated stable leftward shift of the mediastinum is noted.      DX-CHEST-PORTABLE (1 VIEW)   Final Result      Left-sided volume loss with left midlung and basilar opacities which could be seen secondary to consolidation or mass lesion.      IR-CVC PORT PLACEMENT > AGE 5   Final Result      1. Ultrasound and fluoroscopic guided placement of a right internal jugular single lumen Bard PowerPort venous access device.      2. The port may be used immediately as clinically indicated. Flushes per protocol.      3. The skin staples and suture should be removed in 10-12 days. This can be performed in the radiology department on any weekday without a prior appointment if desired.      MR-BRAIN-WITH & W/O   Final Result      1.  There is no intracranial metastasis.   2.  Mild cerebral atrophy.   3.  Mild chronic microvascular ischemic disease.      CT-ABDOMEN-PELVIS WITH   Final Result      No evidence of metastatic disease is identified.      Status post cholecystectomy.      Pancreatic calcifications likely represent sequelae of chronic pancreatitis.      Atherosclerotic plaque.      Nonvisualization of the appendix, limiting evaluation.      Left lower lobe lobulated masslike density with surrounding patchy opacities, better evaluated on the prior CT chest. Volume loss is again noted on the left.           Assessment/Plan  * Non-small cell lung cancer (HCC)   Assessment & Plan    Bilateral upper lobe lung masses noted on CT, suspcious for lung cancer  Pulmonology consulted - s/p bronch with Dr Lynne  CT abdomen pelvis without evidence of malignancy  MRI brain without mets  1/22 bronchoscopy with brush biopsy done  Pathology showing poorly differentiated NSCC  To start chemo today as per oncology carbo/taxol.         Fever   Assessment  & Plan    Likely d/t adenoca lung  Continue empiric levaquin, immunocompromised and supposed to have chemo 4/5  UA negative, chest x-ray stable, continue incentive spirometry  Patient is at risk of postobstructive pneumonia, pro-calcitonin mildly elevated today, will recheck in the morning if continued trending up probably had to change antibiotics to Unasyn versus Zosyn.     Cough   Assessment & Plan    Could be due to initiation of chemoradiation and irritation of tumor  Tessalon, hycodan,  Patient continue having fevers, had chest x-ray that did not show any acute findings, pro calcitonin mildly increased today, influenza negative.  Discussed with patient and will recheck her pro-calcitonin in the morning she is at high risk of developing postobstructive pneumonia if pro-calcitonin continue trending up may need to change antibiotics to IV Unasyn/Zosyn     Anxiety   Assessment & Plan    Secondary to possible cancer  Low dose xanax PRN.       COPD with asthma (HCC)   Assessment & Plan    At baseline, not an acute exacerbation, continue respiratory care per protocol  Will need ambulation test before discharge for oxygen at home     Severe protein-calorie malnutrition (HCC)   Assessment & Plan    Nutrition consulted  Body mass index is 17.73 kg/m².         Tobacco abuse   Assessment & Plan    Needs to quit          VTE prophylaxis: Lovenox

## 2019-02-07 NOTE — PROGRESS NOTES
Report received from Day RN and assumed care at 1915. Patient is A&O x4, resting in bed. Family present at bedside. Patient ambulates with steady gait, upself. 3L oxygen. PRN Hydrocodone-homatropine and prn zofran given for cough. S/p chemotherapy today. Monitoring BP and temp.     Patient reports *right rib cage pain, denies intervention.     Right port intact and flushes with + blood return; TKO.     POC discussed. All needs met at this time. Call light within reach. Bed locked, in lowest position. Hourly rounding in place.

## 2019-02-07 NOTE — DISCHARGE PLANNING
LSW spoke with Ino Finney ([facility] resource advocate ex:[id number]) about patient's financial options with her insurance still pending. Ino stated that patient may be eligible for financial assistance if her insurance ultimately is denied, but believes patient will be eligible for the month of February. If patient's insurance does go through, treatments will be back billed to Medicaid. Ino stated that family can contact him if there are any additional questions.     LSW informed patient about conversation with Ino and provided contact number for any specific questions.

## 2019-02-08 ENCOUNTER — HOSPITAL ENCOUNTER (OUTPATIENT)
Dept: RADIATION ONCOLOGY | Facility: MEDICAL CENTER | Age: 54
End: 2019-02-08

## 2019-02-08 LAB
ANION GAP SERPL CALC-SCNC: 6 MMOL/L (ref 0–11.9)
BASOPHILS # BLD AUTO: 0.4 % (ref 0–1.8)
BASOPHILS # BLD: 0.03 K/UL (ref 0–0.12)
BUN SERPL-MCNC: 15 MG/DL (ref 8–22)
CALCIUM SERPL-MCNC: 8.3 MG/DL (ref 8.5–10.5)
CHLORIDE SERPL-SCNC: 106 MMOL/L (ref 96–112)
CO2 SERPL-SCNC: 26 MMOL/L (ref 20–33)
CREAT SERPL-MCNC: 0.59 MG/DL (ref 0.5–1.4)
EOSINOPHIL # BLD AUTO: 0.06 K/UL (ref 0–0.51)
EOSINOPHIL NFR BLD: 0.9 % (ref 0–6.9)
ERYTHROCYTE [DISTWIDTH] IN BLOOD BY AUTOMATED COUNT: 41.1 FL (ref 35.9–50)
GLUCOSE SERPL-MCNC: 93 MG/DL (ref 65–99)
HCT VFR BLD AUTO: 29.1 % (ref 37–47)
HGB BLD-MCNC: 9.4 G/DL (ref 12–16)
IMM GRANULOCYTES # BLD AUTO: 0.06 K/UL (ref 0–0.11)
IMM GRANULOCYTES NFR BLD AUTO: 0.9 % (ref 0–0.9)
LYMPHOCYTES # BLD AUTO: 0.61 K/UL (ref 1–4.8)
LYMPHOCYTES NFR BLD: 8.7 % (ref 22–41)
MCH RBC QN AUTO: 31.1 PG (ref 27–33)
MCHC RBC AUTO-ENTMCNC: 32.3 G/DL (ref 33.6–35)
MCV RBC AUTO: 96.4 FL (ref 81.4–97.8)
MONOCYTES # BLD AUTO: 0.49 K/UL (ref 0–0.85)
MONOCYTES NFR BLD AUTO: 7 % (ref 0–13.4)
NEUTROPHILS # BLD AUTO: 5.76 K/UL (ref 2–7.15)
NEUTROPHILS NFR BLD: 82.1 % (ref 44–72)
NRBC # BLD AUTO: 0 K/UL
NRBC BLD-RTO: 0 /100 WBC
PLATELET # BLD AUTO: 452 K/UL (ref 164–446)
PMV BLD AUTO: 8.3 FL (ref 9–12.9)
POTASSIUM SERPL-SCNC: 4.2 MMOL/L (ref 3.6–5.5)
RBC # BLD AUTO: 3.02 M/UL (ref 4.2–5.4)
SODIUM SERPL-SCNC: 138 MMOL/L (ref 135–145)
WBC # BLD AUTO: 7 K/UL (ref 4.8–10.8)

## 2019-02-08 PROCEDURE — 99232 SBSQ HOSP IP/OBS MODERATE 35: CPT | Mod: GC | Performed by: INTERNAL MEDICINE

## 2019-02-08 PROCEDURE — 80048 BASIC METABOLIC PNL TOTAL CA: CPT

## 2019-02-08 PROCEDURE — A9270 NON-COVERED ITEM OR SERVICE: HCPCS | Performed by: HOSPITALIST

## 2019-02-08 PROCEDURE — 700101 HCHG RX REV CODE 250: Performed by: HOSPITALIST

## 2019-02-08 PROCEDURE — 85025 COMPLETE CBC W/AUTO DIFF WBC: CPT

## 2019-02-08 PROCEDURE — 700102 HCHG RX REV CODE 250 W/ 637 OVERRIDE(OP): Performed by: INTERNAL MEDICINE

## 2019-02-08 PROCEDURE — 77014 PR CT GUIDANCE PLACEMENT RAD THERAPY FIELDS: CPT | Mod: 26 | Performed by: RADIOLOGY

## 2019-02-08 PROCEDURE — 77412 RADIATION TX DELIVERY LVL 3: CPT | Performed by: RADIOLOGY

## 2019-02-08 PROCEDURE — 77387 GUIDANCE FOR RADJ TX DLVR: CPT | Performed by: RADIOLOGY

## 2019-02-08 PROCEDURE — 770004 HCHG ROOM/CARE - ONCOLOGY PRIVATE *

## 2019-02-08 PROCEDURE — 700102 HCHG RX REV CODE 250 W/ 637 OVERRIDE(OP): Performed by: HOSPITALIST

## 2019-02-08 PROCEDURE — A9270 NON-COVERED ITEM OR SERVICE: HCPCS | Performed by: INTERNAL MEDICINE

## 2019-02-08 PROCEDURE — 700111 HCHG RX REV CODE 636 W/ 250 OVERRIDE (IP): Performed by: HOSPITALIST

## 2019-02-08 PROCEDURE — 99232 SBSQ HOSP IP/OBS MODERATE 35: CPT | Performed by: HOSPITALIST

## 2019-02-08 RX ADMIN — IBUPROFEN 600 MG: 400 TABLET ORAL at 20:57

## 2019-02-08 RX ADMIN — SENNOSIDES AND DOCUSATE SODIUM 2 TABLET: 8.6; 5 TABLET ORAL at 16:52

## 2019-02-08 RX ADMIN — DIAZEPAM 2.5 MG: 5 TABLET ORAL at 20:53

## 2019-02-08 RX ADMIN — IBUPROFEN 600 MG: 400 TABLET ORAL at 08:23

## 2019-02-08 RX ADMIN — HYDROCODONE BITARTRATE AND HOMATROPINE METHYLBROMIDE 5 ML: 5; 1.5 SOLUTION ORAL at 10:18

## 2019-02-08 RX ADMIN — ONDANSETRON 4 MG: 4 TABLET, ORALLY DISINTEGRATING ORAL at 10:18

## 2019-02-08 RX ADMIN — ONDANSETRON 4 MG: 4 TABLET, ORALLY DISINTEGRATING ORAL at 20:51

## 2019-02-08 RX ADMIN — LEVOFLOXACIN 750 MG: 750 TABLET, FILM COATED ORAL at 05:55

## 2019-02-08 RX ADMIN — HYDROCODONE BITARTRATE AND HOMATROPINE METHYLBROMIDE 5 ML: 5; 1.5 SOLUTION ORAL at 20:52

## 2019-02-08 RX ADMIN — BUDESONIDE AND FORMOTEROL FUMARATE DIHYDRATE 2 PUFF: 160; 4.5 AEROSOL RESPIRATORY (INHALATION) at 16:52

## 2019-02-08 RX ADMIN — BUDESONIDE AND FORMOTEROL FUMARATE DIHYDRATE 2 PUFF: 160; 4.5 AEROSOL RESPIRATORY (INHALATION) at 05:54

## 2019-02-08 RX ADMIN — SENNOSIDES AND DOCUSATE SODIUM 2 TABLET: 8.6; 5 TABLET ORAL at 05:54

## 2019-02-08 RX ADMIN — NICOTINE 21 MG: 21 PATCH, EXTENDED RELEASE TRANSDERMAL at 05:55

## 2019-02-08 RX ADMIN — ENOXAPARIN SODIUM 40 MG: 100 INJECTION SUBCUTANEOUS at 05:55

## 2019-02-08 RX ADMIN — LIDOCAINE 1 PATCH: 50 PATCH TOPICAL at 16:52

## 2019-02-08 ASSESSMENT — ENCOUNTER SYMPTOMS
GASTROINTESTINAL NEGATIVE: 1
NERVOUS/ANXIOUS: 1
HEADACHES: 0
MYALGIAS: 0
NAUSEA: 0
DIZZINESS: 0
FEVER: 0
COUGH: 1
EYES NEGATIVE: 1
MUSCULOSKELETAL NEGATIVE: 1
HEMOPTYSIS: 0
SORE THROAT: 0
CARDIOVASCULAR NEGATIVE: 1
HEARTBURN: 0
SHORTNESS OF BREATH: 1
WEAKNESS: 0
BLURRED VISION: 0
PALPITATIONS: 0
DEPRESSION: 0
BACK PAIN: 0
SPUTUM PRODUCTION: 0
ORTHOPNEA: 0

## 2019-02-08 ASSESSMENT — LIFESTYLE VARIABLES: SUBSTANCE_ABUSE: 0

## 2019-02-08 NOTE — PROGRESS NOTES
Oncology/Hematology Progress Note               Author: VIN Benitez PGY 2 (Corrine) Date & Time created: 2/8/2019  12:54 PM     Non-small cell lung cancer    Interval History:  -2/5/19- had low grade fever . She is on empiric levaquin. CXR no obvious pneumonia.tolerating chemo xrt ok  -2/6/2019 - 24hr Tmax 38.3 yesterday afternoon.  Systolic BP for the past 24hrs .  Patient feeling well.  She continues to have chronic dry cough.  Will start chemotherapy today.  -2/7/2019 - Patient feeling great today.  Overnight, afebrile.  Systolic BP range .  No symptoms from carbo/taxol yesterday.  Receiving radiation therapy today.  -2/8/2019 -overnight, no fever.  Blood pressure improved.  Yesterday she was able to ambulate without difficulty.  Patient continues to be in good spirits today.  She notes slight exacerbation in her right chest wall discomfort.  Primary team has been addressing with lidocaine patch cough control.  This seems to be helping.     Review of Systems:  Review of Systems   Constitutional: Negative for malaise/fatigue.   HENT: Negative.    Eyes: Negative.    Respiratory: Positive for cough and shortness of breath.         Chest wall pain from coughing   Cardiovascular: Negative.    Gastrointestinal: Negative.    Genitourinary: Negative.    Musculoskeletal: Negative.    Skin: Negative.    Neurological: Negative for weakness.   Endo/Heme/Allergies: Negative.    Psychiatric/Behavioral: The patient is nervous/anxious.        Physical Exam:  Physical Exam   Constitutional: She is oriented to person, place, and time. She appears well-developed.   HENT:   Head: Normocephalic.   Eyes: Pupils are equal, round, and reactive to light. Conjunctivae are normal.   Cardiovascular: Normal rate, regular rhythm and normal heart sounds.    Pulmonary/Chest: Effort normal and breath sounds normal. No respiratory distress.   Abdominal: Soft. Bowel sounds are normal. She exhibits no distension. There is no tenderness.    Musculoskeletal: She exhibits no edema.   Neurological: She is alert and oriented to person, place, and time. No cranial nerve deficit.   Skin: No erythema.   Psychiatric: She has a normal mood and affect. Her behavior is normal. Judgment and thought content normal.       Labs:          Recent Labs      19   SODIUM  130*  136  138   POTASSIUM  4.1  4.1  4.2   CHLORIDE  99  103  106   CO2  24  25  26   BUN  9  9  15   CREATININE  0.55  0.43*  0.59   CALCIUM  8.1*  8.2*  8.3*     Recent Labs      19   GLUCOSE  107*  142*  93     Recent Labs      19   RBC  2.86*  2.93*  3.02*   HEMOGLOBIN  9.0*  9.3*  9.4*   HEMATOCRIT  27.0*  28.0*  29.1*   PLATELETCT  418  453*  452*     Recent Labs      19   WBC  9.8  5.9  7.0   NEUTSPOLYS  75.50*  88.90*  82.10*   LYMPHOCYTES  9.10*  4.60*  8.70*   MONOCYTES  14.00*  5.60  7.00   EOSINOPHILS  0.60  0.00  0.90   BASOPHILS  0.10  0.20  0.40     Recent Labs      19   SODIUM  130*  136  138   POTASSIUM  4.1  4.1  4.2   CHLORIDE  99  103  106   CO2  24  25  26   GLUCOSE  107*  142*  93   BUN  9  9  15   CREATININE  0.55  0.43*  0.59   CALCIUM  8.1*  8.2*  8.3*     Hemodynamics:  Temp (24hrs), Av.7 °C (98.1 °F), Min:36.6 °C (97.8 °F), Max:36.9 °C (98.4 °F)  Temperature: 36.7 °C (98.1 °F)  Pulse  Av.5  Min: 62  Max: 108   Blood Pressure: 109/67     Respiratory:    Respiration: 18, Pulse Oximetry: 96 %     Work Of Breathing / Effort: Accessory Muscle Use  RUL Breath Sounds: Clear, RML Breath Sounds: Clear, RLL Breath Sounds: Clear, MIREYA Breath Sounds: Clear, LLL Breath Sounds: Diminished  Fluids:  No intake or output data in the 24 hours ending 19 1400  Weight: 55.7 kg (122 lb 12.7 oz)  GI/Nutrition:  Orders Placed This Encounter   Procedures    • Diet Order Regular     Standing Status:   Standing     Number of Occurrences:   1     Order Specific Question:   Diet:     Answer:   Regular [1]     Medical Decision Making, by Problem:  Active Hospital Problems    Diagnosis   • *Non-small cell lung cancer (HCC) [C34.90]   • COPD with asthma (HCC) [J44.9]   • Anxiety [F41.9]   • Tobacco abuse [Z72.0]   • Severe protein-calorie malnutrition (HCC) [E43]     Past surgical history, past social history, past medical history unchanged reviewed    Plan:  #1 oncology: Non-small cell lung cancer evaluated by Dr. Gauthier and felt to be stage IIIb T3 N3 M0.  His plan was weekly carbo Taxol concurrently with radiation to be followed by immunotherapy infinzi.  Due fot week 2 of carbo taxol. Labs are appropriate for treatment today.she had low grade fever , on empiric levaquin. She always has some risk of developing postobstrucitve pneumonia . Ok to do chemo from risk benefit standpoint . Monitor close for any worsening infection in which case she will need aggressive Abx .  If she is getting DC d, pls make f/u with Dr Gauthier prior to her next dose next, Tuesday.  Chemotherapy started 2/6/2019.      Moderate complexity/moderate risk for mortality morbidity/drug toxicity monitoring/discussed with nursing and primary team  Please note that this dictation was created using voice recognition software. I have made every reasonable attempt to correct obvious errors, but I expect that there are errors of grammar and possibly context that I did not discover before finalizing the note  Quality-Core Measures   Reviewed items::  Radiology images reviewed, Labs reviewed and Medications reviewed

## 2019-02-08 NOTE — PROGRESS NOTES
Patient received treatment in Radiation Therapy. Patient received treatment number 9 of 30 planned.

## 2019-02-08 NOTE — PROGRESS NOTES
Pt alert and oriented x 4. Down to XRT for 9/30 treatments. Tolerated well. Returned to unit with complaints of pain to her R ribs of a 6/10. Ibuprofen given and heat applied. Pt up self with a steady gait. Family at the bedside and assisting with care. Requiring O2 at 3L. Dyspnea on exertion and some accessory muscle use noted. Pt resting comfortably at this time. Call light within reach. Hourly rounding in place.

## 2019-02-08 NOTE — PROGRESS NOTES
Report received from Day RN and assumed care at 1915. Patient is A&O x4, resting in bed. Family present at bedside. Patient ambulates with steady gait, upself. 3L oxygen.     PRN Hydrocodone-homatropine and prn zofran given for cough.      Patient reports right rib cage pain, denies intervention; Lidocaine patch in place.      Right port intact and flushes with + blood return; TKO.      POC discussed. All needs met at this time. Call light within reach. Bed locked, in lowest position. Hourly rounding in place

## 2019-02-08 NOTE — CARE PLAN
Problem: Bowel/Gastric:  Goal: Normal bowel function is maintained or improved  Outcome: PROGRESSING SLOWER THAN EXPECTED  Pt with no bowel movement for two days. Declined pharmacologic measures for bowel movement. Requested prune juice.     Problem: Pain Management  Goal: Pain level will decrease to patient's comfort goal  Outcome: PROGRESSING AS EXPECTED  Pt with complaints of rib pain which has been well controlled with ibuprofen, heat and lidocaine patches. Frequent pain assessment in place.

## 2019-02-08 NOTE — PROGRESS NOTES
[facility] Medicine Daily Progress Note    Date of Service  1/29/2019    Chief Complaint  53 y.o. female admitted 1/20/2019 with cough since April 2018, multiple round of antibiotics with only transient improvement.  CT chest with lung mass.    Hospital Course    Patient seen by pulmonology as IR recommended bronchial biopsy given central location of mass.  EBUS initially planned but Dr Lynne feels traditional biopsy able to gather necessary tissue for diagnosis.      Interval Problem Update  Patient is resting in bed, feels better only with right sided chest pain, no more fever, ambulating, no N/V.     Consultants/Specialty  Pulm - Smith  onc - Wilson/Corrine    Code Status  full    Disposition  Home when appropriate    Review of Systems  Review of Systems   Constitutional: Negative for chills and fever.   HENT: Negative for congestion and sore throat.    Eyes: Negative for blurred vision.   Respiratory: Positive for cough. Negative for hemoptysis and sputum production.    Cardiovascular: Positive for chest pain (right sided rib pain). Negative for palpitations and orthopnea.   Gastrointestinal: Negative for heartburn and nausea.   Genitourinary: Negative for dysuria and urgency.   Musculoskeletal: Negative for myalgias and neck pain.   Skin: Negative for rash.   Neurological: Negative for dizziness and headaches.   Psychiatric/Behavioral: Negative for depression. The patient is nervous/anxious.         Physical Exam  Temp:  [36.3 °C (97.4 °F)-36.9 °C (98.4 °F)] 36.9 °C (98.4 °F)  Pulse:  [70-86] 81  Resp:  [20-22] 22  BP: ()/(59-73) 100/62  SpO2:  [95 %-98 %] 97 %    Physical Exam   Constitutional: She is oriented to person, place, and time. She appears well-developed and well-nourished. No distress.   HENT:   Head: Normocephalic and atraumatic.   Mouth/Throat: No oropharyngeal exudate.   Eyes: Conjunctivae are normal. No scleral icterus.   Neck: Normal range of motion. Neck supple.   Cardiovascular: Normal  rate, regular rhythm and normal heart sounds.  Exam reveals no gallop and no friction rub.    Pulmonary/Chest: Effort normal and breath sounds normal. No stridor. No respiratory distress. She has no wheezes. She has no rales.   Decreased at bases bilaterally  Right sided tenderness.      Abdominal: Soft. Bowel sounds are normal. She exhibits no distension. There is no tenderness.   Musculoskeletal: Normal range of motion. She exhibits no edema.   Neurological: She is alert and oriented to person, place, and time. She exhibits normal muscle tone.   Skin: Skin is warm and dry.   Psychiatric: She has a normal mood and affect. Her behavior is normal.   Nursing note and vitals reviewed.      Fluids    Intake/Output Summary (Last 24 hours) at 02/07/19 1853  Last data filed at 02/07/19 0400   Gross per 24 hour   Intake              240 ml   Output                0 ml   Net              240 ml       Laboratory  Recent Labs      02/05/19   0055  02/06/19   0040  02/07/19   0045   WBC  9.0  9.8  5.9   RBC  3.00*  2.86*  2.93*   HEMOGLOBIN  9.3*  9.0*  9.3*   HEMATOCRIT  28.2*  27.0*  28.0*   MCV  94.0  94.4  95.6   MCH  31.0  31.5  31.7   MCHC  33.0*  33.3*  33.2*   RDW  39.6  39.9  40.3   PLATELETCT  399  418  453*   MPV  8.5*  8.3*  8.5*     Recent Labs      02/05/19   0055  02/06/19   0040  02/07/19   0045   SODIUM  130*  130*  136   POTASSIUM  4.2  4.1  4.1   CHLORIDE  100  99  103   CO2  25  24  25   GLUCOSE  107*  107*  142*   BUN  8  9  9   CREATININE  0.50  0.55  0.43*   CALCIUM  8.2*  8.1*  8.2*                   Imaging  DX-CHEST-LIMITED (1 VIEW)   Final Result         1.  Hazy left midlung and lower lobe opacity, compatible with infiltrates, stable.   2.  Asymmetric hyperexpansion of the right lung with decreased volume of the left lung. Associated stable leftward shift of the mediastinum is noted.      DX-CHEST-PORTABLE (1 VIEW)   Final Result      Left-sided volume loss with left midlung and basilar opacities  which could be seen secondary to consolidation or mass lesion.      IR-CVC PORT PLACEMENT > AGE 5   Final Result      1. Ultrasound and fluoroscopic guided placement of a right internal jugular single lumen Bard PowerPort venous access device.      2. The port may be used immediately as clinically indicated. Flushes per protocol.      3. The skin staples and suture should be removed in 10-12 days. This can be performed in the radiology department on any weekday without a prior appointment if desired.      MR-BRAIN-WITH & W/O   Final Result      1.  There is no intracranial metastasis.   2.  Mild cerebral atrophy.   3.  Mild chronic microvascular ischemic disease.      CT-ABDOMEN-PELVIS WITH   Final Result      No evidence of metastatic disease is identified.      Status post cholecystectomy.      Pancreatic calcifications likely represent sequelae of chronic pancreatitis.      Atherosclerotic plaque.      Nonvisualization of the appendix, limiting evaluation.      Left lower lobe lobulated masslike density with surrounding patchy opacities, better evaluated on the prior CT chest. Volume loss is again noted on the left.           Assessment/Plan  * Non-small cell lung cancer (HCC)   Assessment & Plan    Bilateral upper lobe lung masses noted on CT, suspcious for lung cancer  Pulmonology consulted - s/p bronch with Dr Lynne  CT abdomen pelvis without evidence of malignancy  MRI brain without mets  1/22 bronchoscopy with brush biopsy done  Pathology showing poorly differentiated NSCC  Started on chemo as per oncology carbo/taxol.         Fever   Assessment & Plan    Likely d/t adenoca lung  Continue empiric levaquin, immunocompromised and supposed to have chemo 4/5  UA negative, chest x-ray stable, continue incentive spirometry  Patient is at risk of postobstructive pneumonia, pro-calcitonin mildly elevated tno more fever, will continue monitoring.      Cough   Assessment & Plan    Could be due to initiation of  chemoradiation and irritation of tumor  Tessalon, hycodan,  Patient continue having fevers, had chest x-ray that did not show any acute findings, pro calcitonin mildly increased today, influenza negative.  No more fever, procalcitonin mildly increased, clinically feeling much better ,continue motoring.      Anxiety   Assessment & Plan    Secondary to possible cancer  Low dose xanax PRN.       COPD with asthma (HCC)   Assessment & Plan    At baseline, not an acute exacerbation, continue respiratory care per protocol   ambulation test before discharge for oxygen at home     Severe protein-calorie malnutrition (HCC)   Assessment & Plan    Nutrition consulted  Body mass index is 17.73 kg/m².         Tobacco abuse   Assessment & Plan    Needs to quit          VTE prophylaxis: Lovenox

## 2019-02-08 NOTE — CARE PLAN
Problem: Pain Management  Goal: Pain level will decrease to patient's comfort goal  Lidocaine patch to right rib cage.

## 2019-02-09 LAB
ANION GAP SERPL CALC-SCNC: 6 MMOL/L (ref 0–11.9)
BACTERIA BLD CULT: NORMAL
BACTERIA BLD CULT: NORMAL
BASOPHILS # BLD AUTO: 0 % (ref 0–1.8)
BASOPHILS # BLD: 0 K/UL (ref 0–0.12)
BUN SERPL-MCNC: 9 MG/DL (ref 8–22)
CALCIUM SERPL-MCNC: 9 MG/DL (ref 8.5–10.5)
CHLORIDE SERPL-SCNC: 106 MMOL/L (ref 96–112)
CO2 SERPL-SCNC: 26 MMOL/L (ref 20–33)
CREAT SERPL-MCNC: 0.45 MG/DL (ref 0.5–1.4)
EOSINOPHIL # BLD AUTO: 0.08 K/UL (ref 0–0.51)
EOSINOPHIL NFR BLD: 2 % (ref 0–6.9)
ERYTHROCYTE [DISTWIDTH] IN BLOOD BY AUTOMATED COUNT: 41.2 FL (ref 35.9–50)
GLUCOSE SERPL-MCNC: 81 MG/DL (ref 65–99)
HCT VFR BLD AUTO: 29.9 % (ref 37–47)
HGB BLD-MCNC: 9.6 G/DL (ref 12–16)
IMM GRANULOCYTES # BLD AUTO: 0.02 K/UL (ref 0–0.11)
IMM GRANULOCYTES NFR BLD AUTO: 0.5 % (ref 0–0.9)
LYMPHOCYTES # BLD AUTO: 0.68 K/UL (ref 1–4.8)
LYMPHOCYTES NFR BLD: 17.3 % (ref 22–41)
MCH RBC QN AUTO: 30.9 PG (ref 27–33)
MCHC RBC AUTO-ENTMCNC: 32.1 G/DL (ref 33.6–35)
MCV RBC AUTO: 96.1 FL (ref 81.4–97.8)
MONOCYTES # BLD AUTO: 0.42 K/UL (ref 0–0.85)
MONOCYTES NFR BLD AUTO: 10.7 % (ref 0–13.4)
NEUTROPHILS # BLD AUTO: 2.74 K/UL (ref 2–7.15)
NEUTROPHILS NFR BLD: 69.5 % (ref 44–72)
NRBC # BLD AUTO: 0 K/UL
NRBC BLD-RTO: 0 /100 WBC
PLATELET # BLD AUTO: 423 K/UL (ref 164–446)
PMV BLD AUTO: 8.2 FL (ref 9–12.9)
POTASSIUM SERPL-SCNC: 4.3 MMOL/L (ref 3.6–5.5)
RBC # BLD AUTO: 3.11 M/UL (ref 4.2–5.4)
SIGNIFICANT IND 70042: NORMAL
SIGNIFICANT IND 70042: NORMAL
SITE SITE: NORMAL
SITE SITE: NORMAL
SODIUM SERPL-SCNC: 138 MMOL/L (ref 135–145)
SOURCE SOURCE: NORMAL
SOURCE SOURCE: NORMAL
WBC # BLD AUTO: 3.9 K/UL (ref 4.8–10.8)

## 2019-02-09 PROCEDURE — A9270 NON-COVERED ITEM OR SERVICE: HCPCS | Performed by: INTERNAL MEDICINE

## 2019-02-09 PROCEDURE — 700102 HCHG RX REV CODE 250 W/ 637 OVERRIDE(OP): Performed by: HOSPITALIST

## 2019-02-09 PROCEDURE — 700102 HCHG RX REV CODE 250 W/ 637 OVERRIDE(OP): Performed by: INTERNAL MEDICINE

## 2019-02-09 PROCEDURE — 700111 HCHG RX REV CODE 636 W/ 250 OVERRIDE (IP): Performed by: HOSPITALIST

## 2019-02-09 PROCEDURE — 80048 BASIC METABOLIC PNL TOTAL CA: CPT

## 2019-02-09 PROCEDURE — A9270 NON-COVERED ITEM OR SERVICE: HCPCS | Performed by: HOSPITALIST

## 2019-02-09 PROCEDURE — 85025 COMPLETE CBC W/AUTO DIFF WBC: CPT

## 2019-02-09 PROCEDURE — 770004 HCHG ROOM/CARE - ONCOLOGY PRIVATE *

## 2019-02-09 PROCEDURE — 99232 SBSQ HOSP IP/OBS MODERATE 35: CPT | Performed by: HOSPITALIST

## 2019-02-09 PROCEDURE — 700101 HCHG RX REV CODE 250: Performed by: HOSPITALIST

## 2019-02-09 RX ADMIN — ONDANSETRON 4 MG: 4 TABLET, ORALLY DISINTEGRATING ORAL at 05:13

## 2019-02-09 RX ADMIN — ENOXAPARIN SODIUM 40 MG: 100 INJECTION SUBCUTANEOUS at 05:14

## 2019-02-09 RX ADMIN — ONDANSETRON 4 MG: 4 TABLET, ORALLY DISINTEGRATING ORAL at 18:42

## 2019-02-09 RX ADMIN — ONDANSETRON 4 MG: 4 TABLET, ORALLY DISINTEGRATING ORAL at 12:32

## 2019-02-09 RX ADMIN — IBUPROFEN 600 MG: 400 TABLET ORAL at 21:37

## 2019-02-09 RX ADMIN — BUDESONIDE AND FORMOTEROL FUMARATE DIHYDRATE 2 PUFF: 160; 4.5 AEROSOL RESPIRATORY (INHALATION) at 05:13

## 2019-02-09 RX ADMIN — BUDESONIDE AND FORMOTEROL FUMARATE DIHYDRATE 2 PUFF: 160; 4.5 AEROSOL RESPIRATORY (INHALATION) at 18:38

## 2019-02-09 RX ADMIN — LEVOFLOXACIN 750 MG: 750 TABLET, FILM COATED ORAL at 05:13

## 2019-02-09 RX ADMIN — NICOTINE 21 MG: 21 PATCH, EXTENDED RELEASE TRANSDERMAL at 05:13

## 2019-02-09 RX ADMIN — HYDROCODONE BITARTRATE AND HOMATROPINE METHYLBROMIDE 5 ML: 5; 1.5 SOLUTION ORAL at 18:38

## 2019-02-09 RX ADMIN — LIDOCAINE 1 PATCH: 50 PATCH TOPICAL at 18:38

## 2019-02-09 RX ADMIN — HYDROCODONE BITARTRATE AND HOMATROPINE METHYLBROMIDE 5 ML: 5; 1.5 SOLUTION ORAL at 12:32

## 2019-02-09 RX ADMIN — HYDROCODONE BITARTRATE AND HOMATROPINE METHYLBROMIDE 5 ML: 5; 1.5 SOLUTION ORAL at 05:12

## 2019-02-09 ASSESSMENT — ENCOUNTER SYMPTOMS
SORE THROAT: 0
BACK PAIN: 0
MYALGIAS: 0
DOUBLE VISION: 0
DEPRESSION: 0
PALPITATIONS: 0
NAUSEA: 0
DIZZINESS: 0
VOMITING: 0
FEVER: 0
COUGH: 1
SPUTUM PRODUCTION: 0
HEMOPTYSIS: 0
HEARTBURN: 0
CLAUDICATION: 0
HEADACHES: 0

## 2019-02-09 ASSESSMENT — LIFESTYLE VARIABLES
SUBSTANCE_ABUSE: 0
DO YOU DRINK ALCOHOL: NO

## 2019-02-09 NOTE — CARE PLAN
Problem: Bowel/Gastric:  Goal: Will not experience complications related to bowel motility  Outcome: PROGRESSING AS EXPECTED  Bowel protocol given per MAR   PT and family educated about diet, fluid intake and activity to promote bowel function   Bathroom opportunities scheduled and/or offered

## 2019-02-09 NOTE — CARE PLAN
Problem: Communication  Goal: The ability to communicate needs accurately and effectively will improve  Patient able to make needs known. All needs met at this time.   Discussed POC for NOC and a later port change time.

## 2019-02-09 NOTE — PROGRESS NOTES
Hospital Medicine Daily Progress Note    Date of Service  1/29/2019    Chief Complaint  53 y.o. female admitted 1/20/2019 with cough since April 2018, multiple round of antibiotics with only transient improvement.  CT chest with lung mass.    Hospital Course    Patient seen by pulmonology as IR recommended bronchial biopsy given central location of mass.  EBUS initially planned but Dr Lynne feels traditional biopsy able to gather necessary tissue for diagnosis.      Interval Problem Update  Patient resting in bed, states that her chest pain is improved with lidocaine patch, no more fevers, denies any chills diaphoresis, she is feeling much better, she is ambulating and tolerating diet.  No new complaints    Consultants/Specialty  Pulm - Smith  onc - Wilson/Corrine    Code Status  full    Disposition  Home when appropriate    Review of Systems  Review of Systems   Constitutional: Negative for fever.   HENT: Negative for congestion and sore throat.    Eyes: Negative for blurred vision.   Respiratory: Positive for cough. Negative for hemoptysis and sputum production.    Cardiovascular: Positive for chest pain (right sided rib pain, improved). Negative for palpitations and orthopnea.   Gastrointestinal: Negative for heartburn and nausea.   Genitourinary: Negative for dysuria and urgency.   Musculoskeletal: Negative for back pain and myalgias.   Skin: Negative for rash.   Neurological: Negative for dizziness and headaches.   Psychiatric/Behavioral: Negative for depression and substance abuse.        Physical Exam  Temp:  [36.4 °C (97.6 °F)-36.8 °C (98.3 °F)] 36.4 °C (97.6 °F)  Pulse:  [72-93] 93  Resp:  [18-20] 18  BP: (109-130)/(64-70) 110/66  SpO2:  [94 %-99 %] 99 %    Physical Exam   Constitutional: She is oriented to person, place, and time. She appears well-developed and well-nourished. No distress.   HENT:   Head: Normocephalic and atraumatic.   Mouth/Throat: No oropharyngeal exudate.   Eyes: Conjunctivae are normal.  No scleral icterus.   Neck: Normal range of motion. Neck supple. No JVD present.   Cardiovascular: Normal rate, regular rhythm and normal heart sounds.  Exam reveals no gallop and no friction rub.    Pulmonary/Chest: Effort normal and breath sounds normal. No stridor. No respiratory distress. She has no wheezes.   Decreased at bases bilaterally  Right sided tenderness.      Abdominal: Soft. Bowel sounds are normal. She exhibits no distension. There is no tenderness.   Musculoskeletal: Normal range of motion. She exhibits no edema.   Neurological: She is alert and oriented to person, place, and time. She exhibits normal muscle tone.   Skin: Skin is warm and dry.   Psychiatric: She has a normal mood and affect. Her behavior is normal.   Nursing note and vitals reviewed.      Fluids    Intake/Output Summary (Last 24 hours) at 02/08/19 1902  Last data filed at 02/08/19 1654   Gross per 24 hour   Intake              743 ml   Output                0 ml   Net              743 ml       Laboratory  Recent Labs      02/06/19   0040  02/07/19   0045  02/08/19   0128   WBC  9.8  5.9  7.0   RBC  2.86*  2.93*  3.02*   HEMOGLOBIN  9.0*  9.3*  9.4*   HEMATOCRIT  27.0*  28.0*  29.1*   MCV  94.4  95.6  96.4   MCH  31.5  31.7  31.1   MCHC  33.3*  33.2*  32.3*   RDW  39.9  40.3  41.1   PLATELETCT  418  453*  452*   MPV  8.3*  8.5*  8.3*     Recent Labs      02/06/19   0040  02/07/19   0045  02/08/19   0128   SODIUM  130*  136  138   POTASSIUM  4.1  4.1  4.2   CHLORIDE  99  103  106   CO2  24  25  26   GLUCOSE  107*  142*  93   BUN  9  9  15   CREATININE  0.55  0.43*  0.59   CALCIUM  8.1*  8.2*  8.3*                   Imaging  DX-CHEST-LIMITED (1 VIEW)   Final Result         1.  Hazy left midlung and lower lobe opacity, compatible with infiltrates, stable.   2.  Asymmetric hyperexpansion of the right lung with decreased volume of the left lung. Associated stable leftward shift of the mediastinum is noted.      DX-CHEST-PORTABLE (1 VIEW)    Final Result      Left-sided volume loss with left midlung and basilar opacities which could be seen secondary to consolidation or mass lesion.      IR-CVC PORT PLACEMENT > AGE 5   Final Result      1. Ultrasound and fluoroscopic guided placement of a right internal jugular single lumen Bard PowerPort venous access device.      2. The port may be used immediately as clinically indicated. Flushes per protocol.      3. The skin staples and suture should be removed in 10-12 days. This can be performed in the radiology department on any weekday without a prior appointment if desired.      MR-BRAIN-WITH & W/O   Final Result      1.  There is no intracranial metastasis.   2.  Mild cerebral atrophy.   3.  Mild chronic microvascular ischemic disease.      CT-ABDOMEN-PELVIS WITH   Final Result      No evidence of metastatic disease is identified.      Status post cholecystectomy.      Pancreatic calcifications likely represent sequelae of chronic pancreatitis.      Atherosclerotic plaque.      Nonvisualization of the appendix, limiting evaluation.      Left lower lobe lobulated masslike density with surrounding patchy opacities, better evaluated on the prior CT chest. Volume loss is again noted on the left.           Assessment/Plan  * Non-small cell lung cancer (HCC)   Assessment & Plan    Bilateral upper lobe lung masses noted on CT, suspcious for lung cancer  Pulmonology consulted - s/p bronch with Dr Lynne  CT abdomen pelvis without evidence of malignancy  MRI brain without mets  1/22 bronchoscopy with brush biopsy done  Pathology showing poorly differentiated NSCC  Started on chemo as per oncology        Fever   Assessment & Plan    Likely d/t adenoca lung  Continue empiric levaquin, immunocompromised and supposed to have chemo 4/5  UA negative, chest x-ray stable, continue incentive spirometry  Patient is at risk of postobstructive pneumonia, fever has resolved, will complete 7 days of Levaquin     Cough   Assessment  & Plan    Could be due to initiation of chemoradiation and irritation of tumor  Tessalon, hycodan,  Number fevers, will complete 7 days of Levaquin due to high risk for postobstructive pneumonia.     Anxiety   Assessment & Plan    Secondary to possible cancer  Low dose xanax PRN.       COPD with asthma (HCC)   Assessment & Plan    At baseline, not an acute exacerbation, continue respiratory care per protocol   ambulation test before discharge for oxygen at home.     Severe protein-calorie malnutrition (HCC)   Assessment & Plan    Nutrition consulted  Body mass index is 17.73 kg/m².         Tobacco abuse   Assessment & Plan    Needs to quit          VTE prophylaxis: Lovenox

## 2019-02-09 NOTE — PROGRESS NOTES
[facility] Medicine Daily Progress Note    Date of Service  1/29/2019    Chief Complaint  53 y.o. female admitted 1/20/2019 with cough since April 2018, multiple round of antibiotics with only transient improvement.  CT chest with lung mass.    Hospital Course    Patient seen by pulmonology as IR recommended bronchial biopsy given central location of mass.  EBUS initially planned but Dr Lynne feels traditional biopsy able to gather necessary tissue for diagnosis.      Interval Problem Update  Patient is resting in bed, no new complains, not in distress. No more fever.     Consultants/Specialty  Pulm - Smith  onc - Wilson/Corrine    Code Status  full    Disposition  Home when appropriate    Review of Systems  Review of Systems   Constitutional: Negative for fever.   HENT: Negative for congestion and sore throat.    Eyes: Negative for double vision.   Respiratory: Positive for cough. Negative for hemoptysis and sputum production.    Cardiovascular: Positive for chest pain (right sided rib pain, improved). Negative for palpitations and claudication.   Gastrointestinal: Negative for heartburn, nausea and vomiting.   Genitourinary: Negative for dysuria and frequency.   Musculoskeletal: Negative for back pain and myalgias.   Skin: Negative for rash.   Neurological: Negative for dizziness and headaches.   Psychiatric/Behavioral: Negative for depression and substance abuse.        Physical Exam  Temp:  [36.4 °C (97.6 °F)-37.7 °C (99.8 °F)] 37 °C (98.6 °F)  Pulse:  [] 100  Resp:  [18] 18  BP: (100-130)/(60-77) 100/60  SpO2:  [96 %-99 %] 98 %    Physical Exam   Constitutional: She is oriented to person, place, and time. She appears well-developed and well-nourished. No distress.   HENT:   Head: Normocephalic and atraumatic.   Mouth/Throat: No oropharyngeal exudate.   Eyes: Conjunctivae are normal. No scleral icterus.   Neck: Normal range of motion. Neck supple. No JVD present.   Cardiovascular: Normal rate, regular rhythm  and normal heart sounds.  Exam reveals no gallop and no friction rub.    Pulmonary/Chest: Effort normal and breath sounds normal. No stridor. No respiratory distress. She has no wheezes. She has no rales.   Decreased at bases bilaterally  Right sided tenderness.      Abdominal: Soft. Bowel sounds are normal. She exhibits no distension. There is no tenderness. There is no rebound.   Musculoskeletal: Normal range of motion. She exhibits no edema.   Neurological: She is alert and oriented to person, place, and time. She exhibits normal muscle tone.   Skin: Skin is warm and dry.   Psychiatric: She has a normal mood and affect. Her behavior is normal.   Nursing note and vitals reviewed.      Fluids    Intake/Output Summary (Last 24 hours) at 02/09/19 1525  Last data filed at 02/09/19 1500   Gross per 24 hour   Intake             1023 ml   Output                0 ml   Net             1023 ml       Laboratory  Recent Labs      02/07/19   0045  02/08/19   0128  02/09/19   0507   WBC  5.9  7.0  3.9*   RBC  2.93*  3.02*  3.11*   HEMOGLOBIN  9.3*  9.4*  9.6*   HEMATOCRIT  28.0*  29.1*  29.9*   MCV  95.6  96.4  96.1   MCH  31.7  31.1  30.9   MCHC  33.2*  32.3*  32.1*   RDW  40.3  41.1  41.2   PLATELETCT  453*  452*  423   MPV  8.5*  8.3*  8.2*     Recent Labs      02/07/19   0045  02/08/19   0128  02/09/19   0507   SODIUM  136  138  138   POTASSIUM  4.1  4.2  4.3   CHLORIDE  103  106  106   CO2  25  26  26   GLUCOSE  142*  93  81   BUN  9  15  9   CREATININE  0.43*  0.59  0.45*   CALCIUM  8.2*  8.3*  9.0                   Imaging  DX-CHEST-LIMITED (1 VIEW)   Final Result         1.  Hazy left midlung and lower lobe opacity, compatible with infiltrates, stable.   2.  Asymmetric hyperexpansion of the right lung with decreased volume of the left lung. Associated stable leftward shift of the mediastinum is noted.      DX-CHEST-PORTABLE (1 VIEW)   Final Result      Left-sided volume loss with left midlung and basilar opacities which  could be seen secondary to consolidation or mass lesion.      IR-CVC PORT PLACEMENT > AGE 5   Final Result      1. Ultrasound and fluoroscopic guided placement of a right internal jugular single lumen Bard PowerPort venous access device.      2. The port may be used immediately as clinically indicated. Flushes per protocol.      3. The skin staples and suture should be removed in 10-12 days. This can be performed in the radiology department on any weekday without a prior appointment if desired.      MR-BRAIN-WITH & W/O   Final Result      1.  There is no intracranial metastasis.   2.  Mild cerebral atrophy.   3.  Mild chronic microvascular ischemic disease.      CT-ABDOMEN-PELVIS WITH   Final Result      No evidence of metastatic disease is identified.      Status post cholecystectomy.      Pancreatic calcifications likely represent sequelae of chronic pancreatitis.      Atherosclerotic plaque.      Nonvisualization of the appendix, limiting evaluation.      Left lower lobe lobulated masslike density with surrounding patchy opacities, better evaluated on the prior CT chest. Volume loss is again noted on the left.           Assessment/Plan  * Non-small cell lung cancer (HCC)   Assessment & Plan    Bilateral upper lobe lung masses noted on CT, suspcious for lung cancer  Pulmonology consulted - s/p bronch with Dr Lynne  CT abdomen pelvis without evidence of malignancy  MRI brain without mets  1/22 bronchoscopy with brush biopsy done  Pathology showing poorly differentiated NSCC  Started on chemo as per oncology        Fever   Assessment & Plan    Likely d/t adenoca lung  Continue empiric levaquin, immunocompromised and supposed to have chemo 4/5  UA negative, chest x-ray stable, continue incentive spirometry  Patient is at risk of postobstructive pneumonia, fever has resolved, will complete 7 days of Levaquin on 2/10     Cough   Assessment & Plan    Could be due to initiation of chemoradiation and irritation of  tumor  Tessalon, hycodan,  Number fevers, will complete 7 days of Levaquin due to high risk for postobstructive pneumonia until 2/10     Anxiety   Assessment & Plan    Secondary to possible cancer  Low dose xanax PRN.       COPD with asthma (HCC)   Assessment & Plan    At baseline, not an acute exacerbation, continue respiratory care per protocol  Will need o2 at home.      Severe protein-calorie malnutrition (HCC)   Assessment & Plan    Nutrition consulted  Body mass index is 17.73 kg/m².         Tobacco abuse   Assessment & Plan    Needs to quit          VTE prophylaxis: Lovenox

## 2019-02-09 NOTE — PROGRESS NOTES
Report received from Day RN and assumed care at 1915. Patient is A&O x4, resting in bed. Family present at bedside. Patient ambulates with steady gait, upself.   3L oxygen.      PRN Hydrocodone-homatropine and prn zofran given for cough.      Patient reports right rib cage pain, denies intervention; Lidocaine patch in place.      Right port intact and flushes with + blood return; TKO. Patient requesting port needle to be changed later in morning.      POC discussed. All needs met at this time. Call light within reach. Bed locked, in lowest position. Hourly rounding in place

## 2019-02-09 NOTE — PROGRESS NOTES
Oximetry assessment of patient while ambulating. Pt on 3L via NC desat to 83%. Had to stop patient, encourage deep breathing, resat to 95, continued ambulating.

## 2019-02-09 NOTE — FACE TO FACE
"Face to Face Note  -  Durable Medical Equipment    Myles Flannery M.D. - NPI: [id number]  I certify that this patient is under my care and that they had a durable medical equipment(DME)face to face encounter by myself that meets the physician DME face-to-face encounter requirements with this patient on:    Date of encounter:   Patient:                    MRN:                       YOB: 2020  Amy Maki  [id number]  1965     The encounter with the patient was in whole, or in part, for the following medical condition, which is the primary reason for durable medical equipment:  Other - smoker probably COPD now with lung cancer    I certify that, based on my findings, the following durable medical equipment is medically necessary:  Oxygen.    HOME O2 Saturation Measurements:(Values must be present for Home Oxygen orders)  Room air sat at rest: 93  Room air sat with amb: 87  With liters of O2: 3, O2 sat at rest with O2: 96  With Liters of O2: 3, O2 sat with amb with O2 : 94  Is the patient mobile?: Yes    My Clinical findings support the need for the above equipment due to:  Hypoxia    Supporting Symptoms: The patient requires supplemental oxygen, as the following interventions have been tried with limited or no improvement: \"Incentive spirometry    "

## 2019-02-10 LAB
ANION GAP SERPL CALC-SCNC: 5 MMOL/L (ref 0–11.9)
BASOPHILS # BLD AUTO: 0.2 % (ref 0–1.8)
BASOPHILS # BLD: 0.01 K/UL (ref 0–0.12)
BUN SERPL-MCNC: 10 MG/DL (ref 8–22)
CALCIUM SERPL-MCNC: 8.7 MG/DL (ref 8.5–10.5)
CHLORIDE SERPL-SCNC: 104 MMOL/L (ref 96–112)
CO2 SERPL-SCNC: 26 MMOL/L (ref 20–33)
CREAT SERPL-MCNC: 0.6 MG/DL (ref 0.5–1.4)
EOSINOPHIL # BLD AUTO: 0.08 K/UL (ref 0–0.51)
EOSINOPHIL NFR BLD: 1.6 % (ref 0–6.9)
ERYTHROCYTE [DISTWIDTH] IN BLOOD BY AUTOMATED COUNT: 41.2 FL (ref 35.9–50)
GLUCOSE SERPL-MCNC: 97 MG/DL (ref 65–99)
HCT VFR BLD AUTO: 27.5 % (ref 37–47)
HGB BLD-MCNC: 8.8 G/DL (ref 12–16)
IMM GRANULOCYTES # BLD AUTO: 0.02 K/UL (ref 0–0.11)
IMM GRANULOCYTES NFR BLD AUTO: 0.4 % (ref 0–0.9)
LYMPHOCYTES # BLD AUTO: 0.68 K/UL (ref 1–4.8)
LYMPHOCYTES NFR BLD: 13.7 % (ref 22–41)
MCH RBC QN AUTO: 31 PG (ref 27–33)
MCHC RBC AUTO-ENTMCNC: 32 G/DL (ref 33.6–35)
MCV RBC AUTO: 96.8 FL (ref 81.4–97.8)
MONOCYTES # BLD AUTO: 0.39 K/UL (ref 0–0.85)
MONOCYTES NFR BLD AUTO: 7.8 % (ref 0–13.4)
NEUTROPHILS # BLD AUTO: 3.8 K/UL (ref 2–7.15)
NEUTROPHILS NFR BLD: 76.3 % (ref 44–72)
NRBC # BLD AUTO: 0 K/UL
NRBC BLD-RTO: 0 /100 WBC
PLATELET # BLD AUTO: 402 K/UL (ref 164–446)
PMV BLD AUTO: 8.4 FL (ref 9–12.9)
POTASSIUM SERPL-SCNC: 4.2 MMOL/L (ref 3.6–5.5)
RBC # BLD AUTO: 2.84 M/UL (ref 4.2–5.4)
SODIUM SERPL-SCNC: 135 MMOL/L (ref 135–145)
WBC # BLD AUTO: 5 K/UL (ref 4.8–10.8)

## 2019-02-10 PROCEDURE — 700102 HCHG RX REV CODE 250 W/ 637 OVERRIDE(OP): Performed by: HOSPITALIST

## 2019-02-10 PROCEDURE — A9270 NON-COVERED ITEM OR SERVICE: HCPCS | Performed by: INTERNAL MEDICINE

## 2019-02-10 PROCEDURE — 700102 HCHG RX REV CODE 250 W/ 637 OVERRIDE(OP): Performed by: INTERNAL MEDICINE

## 2019-02-10 PROCEDURE — 700111 HCHG RX REV CODE 636 W/ 250 OVERRIDE (IP): Performed by: HOSPITALIST

## 2019-02-10 PROCEDURE — 85025 COMPLETE CBC W/AUTO DIFF WBC: CPT

## 2019-02-10 PROCEDURE — 770004 HCHG ROOM/CARE - ONCOLOGY PRIVATE *

## 2019-02-10 PROCEDURE — 700101 HCHG RX REV CODE 250: Performed by: HOSPITALIST

## 2019-02-10 PROCEDURE — 99232 SBSQ HOSP IP/OBS MODERATE 35: CPT | Performed by: HOSPITALIST

## 2019-02-10 PROCEDURE — A9270 NON-COVERED ITEM OR SERVICE: HCPCS | Performed by: HOSPITALIST

## 2019-02-10 PROCEDURE — 80048 BASIC METABOLIC PNL TOTAL CA: CPT

## 2019-02-10 RX ORDER — BENZONATATE 100 MG/1
100 CAPSULE ORAL 3 TIMES DAILY
Status: DISCONTINUED | OUTPATIENT
Start: 2019-02-10 | End: 2019-02-12

## 2019-02-10 RX ORDER — FAMOTIDINE 20 MG/1
20 TABLET, FILM COATED ORAL 2 TIMES DAILY
Status: DISCONTINUED | OUTPATIENT
Start: 2019-02-10 | End: 2019-02-13 | Stop reason: HOSPADM

## 2019-02-10 RX ADMIN — FAMOTIDINE 20 MG: 20 TABLET ORAL at 12:50

## 2019-02-10 RX ADMIN — NICOTINE 21 MG: 21 PATCH, EXTENDED RELEASE TRANSDERMAL at 05:49

## 2019-02-10 RX ADMIN — BUDESONIDE AND FORMOTEROL FUMARATE DIHYDRATE 2 PUFF: 160; 4.5 AEROSOL RESPIRATORY (INHALATION) at 18:34

## 2019-02-10 RX ADMIN — LIDOCAINE 1 PATCH: 50 PATCH TOPICAL at 18:35

## 2019-02-10 RX ADMIN — HYDROCODONE BITARTRATE AND HOMATROPINE METHYLBROMIDE 5 ML: 5; 1.5 SOLUTION ORAL at 12:50

## 2019-02-10 RX ADMIN — ONDANSETRON 4 MG: 2 INJECTION INTRAMUSCULAR; INTRAVENOUS at 08:38

## 2019-02-10 RX ADMIN — BENZONATATE 100 MG: 100 CAPSULE ORAL at 12:49

## 2019-02-10 RX ADMIN — LEVOFLOXACIN 750 MG: 750 TABLET, FILM COATED ORAL at 05:49

## 2019-02-10 RX ADMIN — BENZONATATE 100 MG: 100 CAPSULE ORAL at 18:35

## 2019-02-10 RX ADMIN — ONDANSETRON 4 MG: 4 TABLET, ORALLY DISINTEGRATING ORAL at 12:50

## 2019-02-10 RX ADMIN — SENNOSIDES AND DOCUSATE SODIUM 2 TABLET: 8.6; 5 TABLET ORAL at 05:49

## 2019-02-10 RX ADMIN — FAMOTIDINE 20 MG: 20 TABLET ORAL at 18:35

## 2019-02-10 RX ADMIN — ENOXAPARIN SODIUM 40 MG: 100 INJECTION SUBCUTANEOUS at 05:49

## 2019-02-10 ASSESSMENT — ENCOUNTER SYMPTOMS
DEPRESSION: 0
SPUTUM PRODUCTION: 0
TINGLING: 0
ORTHOPNEA: 0
SORE THROAT: 0
NECK PAIN: 0
DIZZINESS: 0
NAUSEA: 0
COUGH: 1
HEARTBURN: 0
MYALGIAS: 0
PALPITATIONS: 0
DOUBLE VISION: 0

## 2019-02-10 ASSESSMENT — LIFESTYLE VARIABLES: DO YOU DRINK ALCOHOL: NO

## 2019-02-10 NOTE — PROGRESS NOTES
Hospital Medicine Daily Progress Note    Date of Service  1/29/2019    Chief Complaint  53 y.o. female admitted 1/20/2019 with cough since April 2018, multiple round of antibiotics with only transient improvement.  CT chest with lung mass.    Hospital Course    Patient seen by pulmonology as IR recommended bronchial biopsy given central location of mass.  EBUS initially planned but Dr Lynne feels traditional biopsy able to gather necessary tissue for diagnosis.      Interval Problem Update  Patient is resting in bed, no new complains, not in distress, no fever or chills, feeling much baudilio, tolerating diet.     Consultants/Specialty  Pulm - Smith  onc - Wilson/Corrine    Code Status  full    Disposition  Home when appropriate    Review of Systems  Review of Systems   HENT: Negative for congestion and sore throat.    Eyes: Negative for double vision.   Respiratory: Positive for cough. Negative for sputum production.    Cardiovascular: Positive for chest pain (right sided rib pain, improved). Negative for palpitations and orthopnea.   Gastrointestinal: Negative for heartburn and nausea.   Genitourinary: Negative for frequency and urgency.   Musculoskeletal: Negative for myalgias and neck pain.   Skin: Negative for rash.   Neurological: Negative for dizziness and tingling.   Psychiatric/Behavioral: Negative for depression and suicidal ideas.        Physical Exam  Temp:  [36.3 °C (97.4 °F)-37 °C (98.6 °F)] 36.9 °C (98.5 °F)  Pulse:  [78-91] 90  Resp:  [18] 18  BP: (102-125)/(66-75) 121/71  SpO2:  [90 %-96 %] 90 %    Physical Exam   Constitutional: She is oriented to person, place, and time. She appears well-developed and well-nourished. No distress.   HENT:   Head: Normocephalic and atraumatic.   Mouth/Throat: No oropharyngeal exudate.   Eyes: Conjunctivae are normal. No scleral icterus.   Neck: Normal range of motion. Neck supple. No JVD present.   Cardiovascular: Normal rate, regular rhythm and normal heart sounds.   Exam reveals no gallop and no friction rub.    Pulmonary/Chest: Effort normal and breath sounds normal. No respiratory distress. She has no rales.   Decreased at bases bilaterally  Right sided tenderness.      Abdominal: Soft. Bowel sounds are normal. She exhibits no distension. There is no rebound.   Musculoskeletal: Normal range of motion. She exhibits no edema.   Neurological: She is alert and oriented to person, place, and time. She exhibits normal muscle tone.   Skin: Skin is warm and dry.   Psychiatric: She has a normal mood and affect. Her behavior is normal.   Nursing note and vitals reviewed.      Fluids    Intake/Output Summary (Last 24 hours) at 02/10/19 1453  Last data filed at 02/10/19 1400   Gross per 24 hour   Intake             1080 ml   Output                0 ml   Net             1080 ml       Laboratory  Recent Labs      02/08/19   0128  02/09/19   0507  02/10/19   0100   WBC  7.0  3.9*  5.0   RBC  3.02*  3.11*  2.84*   HEMOGLOBIN  9.4*  9.6*  8.8*   HEMATOCRIT  29.1*  29.9*  27.5*   MCV  96.4  96.1  96.8   MCH  31.1  30.9  31.0   MCHC  32.3*  32.1*  32.0*   RDW  41.1  41.2  41.2   PLATELETCT  452*  423  402   MPV  8.3*  8.2*  8.4*     Recent Labs      02/08/19   0128  02/09/19   0507  02/10/19   0100   SODIUM  138  138  135   POTASSIUM  4.2  4.3  4.2   CHLORIDE  106  106  104   CO2  26  26  26   GLUCOSE  93  81  97   BUN  15  9  10   CREATININE  0.59  0.45*  0.60   CALCIUM  8.3*  9.0  8.7                   Imaging  DX-CHEST-LIMITED (1 VIEW)   Final Result         1.  Hazy left midlung and lower lobe opacity, compatible with infiltrates, stable.   2.  Asymmetric hyperexpansion of the right lung with decreased volume of the left lung. Associated stable leftward shift of the mediastinum is noted.      DX-CHEST-PORTABLE (1 VIEW)   Final Result      Left-sided volume loss with left midlung and basilar opacities which could be seen secondary to consolidation or mass lesion.      IR-CVC PORT PLACEMENT >  AGE 5   Final Result      1. Ultrasound and fluoroscopic guided placement of a right internal jugular single lumen Bard PowerPort venous access device.      2. The port may be used immediately as clinically indicated. Flushes per protocol.      3. The skin staples and suture should be removed in 10-12 days. This can be performed in the radiology department on any weekday without a prior appointment if desired.      MR-BRAIN-WITH & W/O   Final Result      1.  There is no intracranial metastasis.   2.  Mild cerebral atrophy.   3.  Mild chronic microvascular ischemic disease.      CT-ABDOMEN-PELVIS WITH   Final Result      No evidence of metastatic disease is identified.      Status post cholecystectomy.      Pancreatic calcifications likely represent sequelae of chronic pancreatitis.      Atherosclerotic plaque.      Nonvisualization of the appendix, limiting evaluation.      Left lower lobe lobulated masslike density with surrounding patchy opacities, better evaluated on the prior CT chest. Volume loss is again noted on the left.           Assessment/Plan  * Non-small cell lung cancer (HCC)   Assessment & Plan    Bilateral upper lobe lung masses noted on CT, suspcious for lung cancer  Pulmonology consulted - s/p bronch with Dr Lynne  CT abdomen pelvis without evidence of malignancy  MRI brain without mets  1/22 bronchoscopy with brush biopsy done  Pathology showing poorly differentiated NSCC  Started on chemo as per oncology next dose tuesday       Fever   Assessment & Plan    Likely d/t adenoca lung  Continue empiric levaquin, immunocompromised and supposed to have chemo 4/5  UA negative, chest x-ray stable, continue incentive spirometry  Patient is at risk of postobstructive pneumonia, fever has resolved, will complete 7 days of Levaquin on 2/10.     Cough   Assessment & Plan    Could be due to initiation of chemoradiation and irritation of tumor  Tessalon, hycodan,  No more fever, high risk for postobstructive  pneumonia completing on   2/10     Anxiety   Assessment & Plan    Secondary to possible cancer  Low dose xanax PRN.       COPD with asthma (HCC)   Assessment & Plan    At baseline, not an acute exacerbation, continue respiratory care per protocol  Will need o2 at home.      Severe protein-calorie malnutrition (HCC)   Assessment & Plan    Nutrition consulted  Body mass index is 17.73 kg/m².         Tobacco abuse   Assessment & Plan    Needs to quit          VTE prophylaxis: Lovenox

## 2019-02-10 NOTE — PROGRESS NOTES
Pt A&O x4. VSS. 3L NC. Lung sounds diminished/ crackles. Scheduled/PRN breathing txs. 6/10 pain to rib cage exacerbated by coughing. Motrin given as ordered. guaifenesin PRN. Up ad urbano. family @ bedside. Port running TKO. Fall precautions in place. Will continue to monitor throughout shift.

## 2019-02-10 NOTE — PROGRESS NOTES
Received report from Noc RN, assumed care of PT at 0700. PT A&Ox 4, Port running TKO, NC @ 3L, family sleeping at bedside, discussed plan of care for this shift, no unmet needs at this time.  Pain managed with meds per MAR. PT up SBA, safety precautions in place. Call light and personal possessions within reach, hourly rounding in place.

## 2019-02-10 NOTE — CARE PLAN
Problem: Pain Management  Goal: Pain level will decrease to patient's comfort goal  Outcome: PROGRESSING AS EXPECTED  Pain 6/10 to rib cage. Motrin given as ordered. guaifenesin to assist with cough which exacerbates pain. Encourage pt to call for pain management before pain increases. Hourly rounding    Problem: Respiratory:  Goal: Respiratory status will improve  Outcome: PROGRESSING AS EXPECTED  3L nc oxygen. Scheduled breathing txs. Reposition as needed. Hourly rounded

## 2019-02-11 ENCOUNTER — HOSPITAL ENCOUNTER (OUTPATIENT)
Dept: RADIATION ONCOLOGY | Facility: MEDICAL CENTER | Age: 54
End: 2019-02-11

## 2019-02-11 LAB
ANION GAP SERPL CALC-SCNC: 6 MMOL/L (ref 0–11.9)
BASOPHILS # BLD AUTO: 0.6 % (ref 0–1.8)
BASOPHILS # BLD: 0.03 K/UL (ref 0–0.12)
BUN SERPL-MCNC: 8 MG/DL (ref 8–22)
CALCIUM SERPL-MCNC: 8.8 MG/DL (ref 8.5–10.5)
CHLORIDE SERPL-SCNC: 104 MMOL/L (ref 96–112)
CO2 SERPL-SCNC: 26 MMOL/L (ref 20–33)
CREAT SERPL-MCNC: 0.52 MG/DL (ref 0.5–1.4)
EOSINOPHIL # BLD AUTO: 0.06 K/UL (ref 0–0.51)
EOSINOPHIL NFR BLD: 1.2 % (ref 0–6.9)
ERYTHROCYTE [DISTWIDTH] IN BLOOD BY AUTOMATED COUNT: 41.2 FL (ref 35.9–50)
GLUCOSE SERPL-MCNC: 99 MG/DL (ref 65–99)
HCT VFR BLD AUTO: 28.6 % (ref 37–47)
HGB BLD-MCNC: 9.2 G/DL (ref 12–16)
IMM GRANULOCYTES # BLD AUTO: 0.05 K/UL (ref 0–0.11)
IMM GRANULOCYTES NFR BLD AUTO: 1 % (ref 0–0.9)
LYMPHOCYTES # BLD AUTO: 0.84 K/UL (ref 1–4.8)
LYMPHOCYTES NFR BLD: 17.1 % (ref 22–41)
MCH RBC QN AUTO: 30.9 PG (ref 27–33)
MCHC RBC AUTO-ENTMCNC: 32.2 G/DL (ref 33.6–35)
MCV RBC AUTO: 96 FL (ref 81.4–97.8)
MONOCYTES # BLD AUTO: 0.52 K/UL (ref 0–0.85)
MONOCYTES NFR BLD AUTO: 10.6 % (ref 0–13.4)
NEUTROPHILS # BLD AUTO: 3.41 K/UL (ref 2–7.15)
NEUTROPHILS NFR BLD: 69.5 % (ref 44–72)
NRBC # BLD AUTO: 0 K/UL
NRBC BLD-RTO: 0 /100 WBC
PLATELET # BLD AUTO: 438 K/UL (ref 164–446)
PMV BLD AUTO: 8.1 FL (ref 9–12.9)
POTASSIUM SERPL-SCNC: 4.1 MMOL/L (ref 3.6–5.5)
RBC # BLD AUTO: 2.98 M/UL (ref 4.2–5.4)
SODIUM SERPL-SCNC: 136 MMOL/L (ref 135–145)
WBC # BLD AUTO: 4.9 K/UL (ref 4.8–10.8)

## 2019-02-11 PROCEDURE — 99232 SBSQ HOSP IP/OBS MODERATE 35: CPT | Performed by: HOSPITALIST

## 2019-02-11 PROCEDURE — 700102 HCHG RX REV CODE 250 W/ 637 OVERRIDE(OP): Performed by: HOSPITALIST

## 2019-02-11 PROCEDURE — A9270 NON-COVERED ITEM OR SERVICE: HCPCS | Performed by: INTERNAL MEDICINE

## 2019-02-11 PROCEDURE — 77014 PR CT GUIDANCE PLACEMENT RAD THERAPY FIELDS: CPT | Mod: 26 | Performed by: RADIOLOGY

## 2019-02-11 PROCEDURE — 700111 HCHG RX REV CODE 636 W/ 250 OVERRIDE (IP): Performed by: HOSPITALIST

## 2019-02-11 PROCEDURE — A9270 NON-COVERED ITEM OR SERVICE: HCPCS | Performed by: HOSPITALIST

## 2019-02-11 PROCEDURE — 85025 COMPLETE CBC W/AUTO DIFF WBC: CPT

## 2019-02-11 PROCEDURE — 80048 BASIC METABOLIC PNL TOTAL CA: CPT

## 2019-02-11 PROCEDURE — 700102 HCHG RX REV CODE 250 W/ 637 OVERRIDE(OP): Performed by: INTERNAL MEDICINE

## 2019-02-11 PROCEDURE — 770004 HCHG ROOM/CARE - ONCOLOGY PRIVATE *

## 2019-02-11 PROCEDURE — 700101 HCHG RX REV CODE 250: Performed by: HOSPITALIST

## 2019-02-11 PROCEDURE — 77387 GUIDANCE FOR RADJ TX DLVR: CPT | Performed by: RADIOLOGY

## 2019-02-11 PROCEDURE — 77412 RADIATION TX DELIVERY LVL 3: CPT | Performed by: RADIOLOGY

## 2019-02-11 PROCEDURE — 77427 RADIATION TX MANAGEMENT X5: CPT | Performed by: RADIOLOGY

## 2019-02-11 RX ADMIN — FAMOTIDINE 20 MG: 20 TABLET ORAL at 18:54

## 2019-02-11 RX ADMIN — LIDOCAINE 1 PATCH: 50 PATCH TOPICAL at 18:54

## 2019-02-11 RX ADMIN — BENZONATATE 100 MG: 100 CAPSULE ORAL at 06:31

## 2019-02-11 RX ADMIN — ONDANSETRON 4 MG: 4 TABLET, ORALLY DISINTEGRATING ORAL at 21:33

## 2019-02-11 RX ADMIN — ONDANSETRON 4 MG: 4 TABLET, ORALLY DISINTEGRATING ORAL at 00:33

## 2019-02-11 RX ADMIN — ENOXAPARIN SODIUM 40 MG: 100 INJECTION SUBCUTANEOUS at 06:31

## 2019-02-11 RX ADMIN — HYDROCODONE BITARTRATE AND HOMATROPINE METHYLBROMIDE 5 ML: 5; 1.5 SOLUTION ORAL at 11:27

## 2019-02-11 RX ADMIN — BUDESONIDE AND FORMOTEROL FUMARATE DIHYDRATE 2 PUFF: 160; 4.5 AEROSOL RESPIRATORY (INHALATION) at 11:26

## 2019-02-11 RX ADMIN — HYDROCODONE BITARTRATE AND HOMATROPINE METHYLBROMIDE 5 ML: 5; 1.5 SOLUTION ORAL at 00:33

## 2019-02-11 RX ADMIN — SENNOSIDES AND DOCUSATE SODIUM 2 TABLET: 8.6; 5 TABLET ORAL at 06:32

## 2019-02-11 RX ADMIN — BUDESONIDE AND FORMOTEROL FUMARATE DIHYDRATE 2 PUFF: 160; 4.5 AEROSOL RESPIRATORY (INHALATION) at 18:58

## 2019-02-11 RX ADMIN — NICOTINE 21 MG: 21 PATCH, EXTENDED RELEASE TRANSDERMAL at 06:32

## 2019-02-11 RX ADMIN — BENZONATATE 100 MG: 100 CAPSULE ORAL at 18:54

## 2019-02-11 RX ADMIN — ONDANSETRON 4 MG: 4 TABLET, ORALLY DISINTEGRATING ORAL at 11:27

## 2019-02-11 RX ADMIN — HYDROCODONE BITARTRATE AND HOMATROPINE METHYLBROMIDE 5 ML: 5; 1.5 SOLUTION ORAL at 21:33

## 2019-02-11 RX ADMIN — FAMOTIDINE 20 MG: 20 TABLET ORAL at 06:31

## 2019-02-11 RX ADMIN — BENZONATATE 100 MG: 100 CAPSULE ORAL at 11:27

## 2019-02-11 ASSESSMENT — ENCOUNTER SYMPTOMS
FEVER: 0
DEPRESSION: 0
HEMOPTYSIS: 0
NECK PAIN: 0
DIZZINESS: 0
NAUSEA: 0
ORTHOPNEA: 0
COUGH: 1
PALPITATIONS: 0
MYALGIAS: 0
TINGLING: 0
HEARTBURN: 0
DOUBLE VISION: 0

## 2019-02-11 ASSESSMENT — LIFESTYLE VARIABLES: DO YOU DRINK ALCOHOL: NO

## 2019-02-11 NOTE — DISCHARGE PLANNING
Anticipated Discharge Disposition: Home    Action: LSW spoke with patient and friend at bedside. LSW informed patient that she is medically cleared and that LSW would like to help facilitate d/c. Patient agreed to call her son/daughter to get payment arranged for 02 and LSW will have new 02 orders sent to DME company.     Barriers to Discharge: 02 delivery    Plan: LSW to f/u with DME, follow for d/c needs

## 2019-02-11 NOTE — DISCHARGE PLANNING
Agency/Facility Name: [facility]  Spoke To: intake  Outcome: Pt is accepted. The only barrier is Pt's family member needs to provide a CC for payment. Informed RAVI Cain (SANDYW) notified.

## 2019-02-11 NOTE — PROGRESS NOTES
Received report from Noc RN, assumed care of PT at 0715. PT A&Ox 4, just returned from radiation therapy, Port running TKO, NC @ 3 L, discussed plan of care for this shift, no unmet needs at this time.  Pain managed with meds per MAR. PT up self, safety precautions in place. Call light and personal possessions within reach. Hourly rounding in place.     Barriers to discharge: waiting on medicaid approval

## 2019-02-11 NOTE — CARE PLAN
Problem: Communication  Goal: The ability to communicate needs accurately and effectively will improve    Intervention: Educate patient and significant other/support system about the plan of care, procedures, treatments, medications and allow for questions  POC discussed with patient/family-all questions answered at this time.       Problem: Infection  Goal: Will remain free from infection    Intervention: Assess signs and symptoms of infection  Patient assessed for s/s of infection.       Problem: Bowel/Gastric:  Goal: Normal bowel function is maintained or improved    Intervention: Educate patient and significant other/support system about diet, fluid intake, medications and activity to promote bowel function  Patient educated on activities to promote bowel function.

## 2019-02-11 NOTE — PROGRESS NOTES
Assumed pt care @ 1900, report received from day-shift RN.   Patient assessed, notes and labs reviewed.   Pt A&Ox4, pt up w/SBA+FWW, Port patent-running NS@TKO, pt on 3L via N/C.   POC discussed-all questions answered at this time. Family at bedside, call light and personal belongings w/i reach, Q2 rounding in place.

## 2019-02-12 ENCOUNTER — HOSPITAL ENCOUNTER (OUTPATIENT)
Dept: RADIATION ONCOLOGY | Facility: MEDICAL CENTER | Age: 54
End: 2019-02-12

## 2019-02-12 DIAGNOSIS — C34.90 NON-SMALL CELL LUNG CANCER, UNSPECIFIED LATERALITY (HCC): ICD-10-CM

## 2019-02-12 LAB
ALBUMIN SERPL BCP-MCNC: 2.7 G/DL (ref 3.2–4.9)
ALBUMIN/GLOB SERPL: 1 G/DL
ALP SERPL-CCNC: 90 U/L (ref 30–99)
ALT SERPL-CCNC: 20 U/L (ref 2–50)
ANION GAP SERPL CALC-SCNC: 5 MMOL/L (ref 0–11.9)
ANION GAP SERPL CALC-SCNC: 5 MMOL/L (ref 0–11.9)
AST SERPL-CCNC: 18 U/L (ref 12–45)
BASOPHILS # BLD AUTO: 0.8 % (ref 0–1.8)
BASOPHILS # BLD: 0.03 K/UL (ref 0–0.12)
BILIRUB SERPL-MCNC: 0.2 MG/DL (ref 0.1–1.5)
BUN SERPL-MCNC: 8 MG/DL (ref 8–22)
BUN SERPL-MCNC: 8 MG/DL (ref 8–22)
CALCIUM SERPL-MCNC: 8.9 MG/DL (ref 8.5–10.5)
CALCIUM SERPL-MCNC: 8.9 MG/DL (ref 8.5–10.5)
CHLORIDE SERPL-SCNC: 104 MMOL/L (ref 96–112)
CHLORIDE SERPL-SCNC: 104 MMOL/L (ref 96–112)
CO2 SERPL-SCNC: 28 MMOL/L (ref 20–33)
CO2 SERPL-SCNC: 28 MMOL/L (ref 20–33)
CREAT SERPL-MCNC: 0.53 MG/DL (ref 0.5–1.4)
CREAT SERPL-MCNC: 0.53 MG/DL (ref 0.5–1.4)
EOSINOPHIL # BLD AUTO: 0.05 K/UL (ref 0–0.51)
EOSINOPHIL NFR BLD: 1.4 % (ref 0–6.9)
ERYTHROCYTE [DISTWIDTH] IN BLOOD BY AUTOMATED COUNT: 40.7 FL (ref 35.9–50)
GLOBULIN SER CALC-MCNC: 2.6 G/DL (ref 1.9–3.5)
GLUCOSE SERPL-MCNC: 93 MG/DL (ref 65–99)
GLUCOSE SERPL-MCNC: 93 MG/DL (ref 65–99)
HCT VFR BLD AUTO: 28.1 % (ref 37–47)
HGB BLD-MCNC: 9 G/DL (ref 12–16)
IMM GRANULOCYTES # BLD AUTO: 0.04 K/UL (ref 0–0.11)
IMM GRANULOCYTES NFR BLD AUTO: 1.1 % (ref 0–0.9)
LYMPHOCYTES # BLD AUTO: 0.66 K/UL (ref 1–4.8)
LYMPHOCYTES NFR BLD: 18.3 % (ref 22–41)
MAGNESIUM SERPL-MCNC: 1.9 MG/DL (ref 1.5–2.5)
MCH RBC QN AUTO: 30.7 PG (ref 27–33)
MCHC RBC AUTO-ENTMCNC: 32 G/DL (ref 33.6–35)
MCV RBC AUTO: 95.9 FL (ref 81.4–97.8)
MONOCYTES # BLD AUTO: 0.73 K/UL (ref 0–0.85)
MONOCYTES NFR BLD AUTO: 20.2 % (ref 0–13.4)
NEUTROPHILS # BLD AUTO: 2.1 K/UL (ref 2–7.15)
NEUTROPHILS NFR BLD: 58.2 % (ref 44–72)
NRBC # BLD AUTO: 0 K/UL
NRBC BLD-RTO: 0 /100 WBC
PLATELET # BLD AUTO: 427 K/UL (ref 164–446)
PMV BLD AUTO: 8.3 FL (ref 9–12.9)
POTASSIUM SERPL-SCNC: 4.2 MMOL/L (ref 3.6–5.5)
POTASSIUM SERPL-SCNC: 4.2 MMOL/L (ref 3.6–5.5)
PROT SERPL-MCNC: 5.3 G/DL (ref 6–8.2)
RBC # BLD AUTO: 2.93 M/UL (ref 4.2–5.4)
SODIUM SERPL-SCNC: 137 MMOL/L (ref 135–145)
SODIUM SERPL-SCNC: 137 MMOL/L (ref 135–145)
WBC # BLD AUTO: 3.6 K/UL (ref 4.8–10.8)

## 2019-02-12 PROCEDURE — 77301 RADIOTHERAPY DOSE PLAN IMRT: CPT | Mod: 26 | Performed by: RADIOLOGY

## 2019-02-12 PROCEDURE — 83735 ASSAY OF MAGNESIUM: CPT

## 2019-02-12 PROCEDURE — 700111 HCHG RX REV CODE 636 W/ 250 OVERRIDE (IP): Performed by: HOSPITALIST

## 2019-02-12 PROCEDURE — A9270 NON-COVERED ITEM OR SERVICE: HCPCS | Performed by: HOSPITALIST

## 2019-02-12 PROCEDURE — 85025 COMPLETE CBC W/AUTO DIFF WBC: CPT

## 2019-02-12 PROCEDURE — 700111 HCHG RX REV CODE 636 W/ 250 OVERRIDE (IP): Performed by: INTERNAL MEDICINE

## 2019-02-12 PROCEDURE — 770004 HCHG ROOM/CARE - ONCOLOGY PRIVATE *

## 2019-02-12 PROCEDURE — 700102 HCHG RX REV CODE 250 W/ 637 OVERRIDE(OP): Performed by: HOSPITALIST

## 2019-02-12 PROCEDURE — 77280 THER RAD SIMULAJ FIELD SMPL: CPT | Performed by: RADIOLOGY

## 2019-02-12 PROCEDURE — 77300 RADIATION THERAPY DOSE PLAN: CPT | Mod: 26 | Performed by: RADIOLOGY

## 2019-02-12 PROCEDURE — 77338 DESIGN MLC DEVICE FOR IMRT: CPT | Performed by: RADIOLOGY

## 2019-02-12 PROCEDURE — 77301 RADIOTHERAPY DOSE PLAN IMRT: CPT | Performed by: RADIOLOGY

## 2019-02-12 PROCEDURE — 700102 HCHG RX REV CODE 250 W/ 637 OVERRIDE(OP): Performed by: INTERNAL MEDICINE

## 2019-02-12 PROCEDURE — 77338 DESIGN MLC DEVICE FOR IMRT: CPT | Mod: 26 | Performed by: RADIOLOGY

## 2019-02-12 PROCEDURE — 77386 HCHG IMRT DELIVERY COMPLEX: CPT | Performed by: RADIOLOGY

## 2019-02-12 PROCEDURE — 77290 THER RAD SIMULAJ FIELD CPLX: CPT | Performed by: RADIOLOGY

## 2019-02-12 PROCEDURE — 77300 RADIATION THERAPY DOSE PLAN: CPT | Performed by: RADIOLOGY

## 2019-02-12 PROCEDURE — 700105 HCHG RX REV CODE 258: Performed by: INTERNAL MEDICINE

## 2019-02-12 PROCEDURE — A9270 NON-COVERED ITEM OR SERVICE: HCPCS | Performed by: INTERNAL MEDICINE

## 2019-02-12 PROCEDURE — 80053 COMPREHEN METABOLIC PANEL: CPT

## 2019-02-12 PROCEDURE — 99232 SBSQ HOSP IP/OBS MODERATE 35: CPT | Performed by: INTERNAL MEDICINE

## 2019-02-12 PROCEDURE — 700101 HCHG RX REV CODE 250: Performed by: HOSPITALIST

## 2019-02-12 RX ORDER — ONDANSETRON 2 MG/ML
8 INJECTION INTRAMUSCULAR; INTRAVENOUS ONCE
Status: CANCELLED | OUTPATIENT
Start: 2019-02-12

## 2019-02-12 RX ORDER — ONDANSETRON 2 MG/ML
8 INJECTION INTRAMUSCULAR; INTRAVENOUS ONCE
Status: COMPLETED | OUTPATIENT
Start: 2019-02-12 | End: 2019-02-12

## 2019-02-12 RX ORDER — ONDANSETRON 2 MG/ML
4 INJECTION INTRAMUSCULAR; INTRAVENOUS
Status: CANCELLED | OUTPATIENT
Start: 2019-02-12

## 2019-02-12 RX ORDER — BENZONATATE 100 MG/1
100 CAPSULE ORAL 3 TIMES DAILY PRN
Status: DISCONTINUED | OUTPATIENT
Start: 2019-02-12 | End: 2019-02-13 | Stop reason: HOSPADM

## 2019-02-12 RX ORDER — PROCHLORPERAZINE MALEATE 10 MG
10 TABLET ORAL EVERY 6 HOURS PRN
Status: CANCELLED | OUTPATIENT
Start: 2019-02-12

## 2019-02-12 RX ORDER — SODIUM CHLORIDE 9 MG/ML
INJECTION, SOLUTION INTRAVENOUS CONTINUOUS
Status: CANCELLED | OUTPATIENT
Start: 2019-02-12

## 2019-02-12 RX ORDER — 0.9 % SODIUM CHLORIDE 0.9 %
VIAL (ML) INJECTION PRN
Status: CANCELLED | OUTPATIENT
Start: 2019-02-12

## 2019-02-12 RX ORDER — ONDANSETRON 8 MG/1
8 TABLET, ORALLY DISINTEGRATING ORAL
Status: CANCELLED | OUTPATIENT
Start: 2019-02-12

## 2019-02-12 RX ORDER — 0.9 % SODIUM CHLORIDE 0.9 %
5 VIAL (ML) INJECTION PRN
Status: CANCELLED | OUTPATIENT
Start: 2019-02-12

## 2019-02-12 RX ADMIN — CARBOPLATIN 202 MG: 10 INJECTION, SOLUTION INTRAVENOUS at 16:10

## 2019-02-12 RX ADMIN — ENOXAPARIN SODIUM 40 MG: 100 INJECTION SUBCUTANEOUS at 05:37

## 2019-02-12 RX ADMIN — DIPHENHYDRAMINE HYDROCHLORIDE 25 MG: 50 INJECTION INTRAMUSCULAR; INTRAVENOUS at 13:34

## 2019-02-12 RX ADMIN — FAMOTIDINE 20 MG: 20 TABLET ORAL at 16:20

## 2019-02-12 RX ADMIN — LIDOCAINE 1 PATCH: 50 PATCH TOPICAL at 20:50

## 2019-02-12 RX ADMIN — NICOTINE 21 MG: 21 PATCH, EXTENDED RELEASE TRANSDERMAL at 05:37

## 2019-02-12 RX ADMIN — ONDANSETRON 8 MG: 2 INJECTION INTRAMUSCULAR; INTRAVENOUS at 12:41

## 2019-02-12 RX ADMIN — FAMOTIDINE 20 MG: 20 TABLET ORAL at 05:37

## 2019-02-12 RX ADMIN — BUDESONIDE AND FORMOTEROL FUMARATE DIHYDRATE 2 PUFF: 160; 4.5 AEROSOL RESPIRATORY (INHALATION) at 16:21

## 2019-02-12 RX ADMIN — DEXAMETHASONE SODIUM PHOSPHATE 12 MG: 4 INJECTION, SOLUTION INTRAMUSCULAR; INTRAVENOUS at 12:53

## 2019-02-12 RX ADMIN — BENZONATATE 100 MG: 100 CAPSULE ORAL at 05:36

## 2019-02-12 RX ADMIN — HYDROCODONE BITARTRATE AND HOMATROPINE METHYLBROMIDE 5 ML: 5; 1.5 SOLUTION ORAL at 12:45

## 2019-02-12 RX ADMIN — IBUPROFEN 600 MG: 400 TABLET ORAL at 20:58

## 2019-02-12 RX ADMIN — BENZONATATE 100 MG: 100 CAPSULE ORAL at 12:41

## 2019-02-12 RX ADMIN — PACLITAXEL 72.5 MG: 300 INJECTION, SOLUTION INTRAVENOUS at 14:53

## 2019-02-12 RX ADMIN — BUDESONIDE AND FORMOTEROL FUMARATE DIHYDRATE 2 PUFF: 160; 4.5 AEROSOL RESPIRATORY (INHALATION) at 05:37

## 2019-02-12 RX ADMIN — SENNOSIDES AND DOCUSATE SODIUM 2 TABLET: 8.6; 5 TABLET ORAL at 05:37

## 2019-02-12 RX ADMIN — FAMOTIDINE 20 MG: 10 INJECTION INTRAVENOUS at 12:44

## 2019-02-12 RX ADMIN — HYDROCODONE BITARTRATE AND HOMATROPINE METHYLBROMIDE 5 ML: 5; 1.5 SOLUTION ORAL at 16:23

## 2019-02-12 ASSESSMENT — ENCOUNTER SYMPTOMS
VOMITING: 0
ABDOMINAL PAIN: 0
GASTROINTESTINAL NEGATIVE: 1
MYALGIAS: 0
SORE THROAT: 0
CONSTIPATION: 0
CARDIOVASCULAR NEGATIVE: 1
DIARRHEA: 0
COUGH: 0
MUSCULOSKELETAL NEGATIVE: 1
INSOMNIA: 0
WEAKNESS: 0
SPUTUM PRODUCTION: 0
DIZZINESS: 0
PHOTOPHOBIA: 0
WEAKNESS: 1
WEIGHT LOSS: 0
NERVOUS/ANXIOUS: 0
DEPRESSION: 0
HEADACHES: 0
FEVER: 0
CHILLS: 0
SPEECH CHANGE: 0
NAUSEA: 0
CLAUDICATION: 0
SENSORY CHANGE: 0
HEARTBURN: 0
BLURRED VISION: 0
HEMOPTYSIS: 0
EYES NEGATIVE: 1
SHORTNESS OF BREATH: 0

## 2019-02-12 NOTE — PROGRESS NOTES
"Pharmacy Chemotherapy Verification    Patient Name: Amy Maki  DX: NSCLC    Cycle 3  Previous treatment = Cycle 2 February 6, 2019    Regimen: weekly PACLItaxel/CARBOplatin + XRT  PACLItaxel (Taxol) 45-50 mg/m2 IV over 60 min on day 1  CARBOplatin AUC 2 IV over 30 min on day 1  Weekly x 7 weeks with concurrent radiation therapy  NCCN Guidelines for NSCLC V.9.2017  Delia CP, et al - J Clin Oncol. 2005 Sep 1;23(30):7993-76. Epub 2005 Aug 8.     Allergies:Percocet [oxycodone-acetaminophen]  /74   Pulse 89   Temp 36.6 °C (97.9 °F) (Temporal)   Resp 18   Ht 1.753 m (5' 9.02\")   Wt 53.3 kg (117 lb 8.1 oz)   LMP  (LMP Unknown)   SpO2 94%   Breastfeeding? No   BMI 17.34 kg/m²   Body surface area is 1.61 meters squared.     Labs 2/12/19  ANC 2100 Plt 427k Hgb 9   SCr 0.53 CrCl ~76.4 mL/min AST/ALT/AP = 18/20/90 Tbili = 0.2    PACLItaxel (Taxol) 45 mg/m2  X 1.61 m2 = 72.45 mg   <5% difference, ok to treat with final dose = 72.5 mg IV     CARBOplatin AUC 2 (76+ 25) = 202.8 mg    <5% difference, ok to treat with final dose = 202 mg IV    Shruthi Juarez, PharmD, BCOP        "

## 2019-02-12 NOTE — CARE PLAN
Problem: Safety  Goal: Will remain free from falls    Intervention: Assess risk factors for falls  Fall precautions in place  Pt up self and steady      Problem: Knowledge Deficit  Goal: Knowledge of the prescribed therapeutic regimen will improve    Intervention: Discuss information regarding therpeutic regimen and document in education  Plan is radiation number 11 of 30 this morning   Then discharge home.    oyxgen already delivered and in pt's room.

## 2019-02-12 NOTE — PROGRESS NOTES
Oncology/Hematology Progress Note               Author: Hussein To Date & Time created: 2/12/2019  12:22 PM     Non-small cell lung cancer    Interval History:  No major complaints.  She is due for week #3 of carbo and Taxol today.      Review of Systems:  Review of Systems   Constitutional: Positive for malaise/fatigue.   HENT: Negative.    Eyes: Negative.    Respiratory: Negative for cough, hemoptysis, sputum production and shortness of breath.    Cardiovascular: Negative.    Gastrointestinal: Negative.    Genitourinary: Negative.    Musculoskeletal: Negative.    Skin: Negative.    Neurological: Positive for weakness.   Endo/Heme/Allergies: Negative.    Psychiatric/Behavioral: The patient is not nervous/anxious.        Physical Exam:  Physical Exam   Constitutional: She is oriented to person, place, and time. She appears well-developed.   HENT:   Head: Normocephalic.   Eyes: Pupils are equal, round, and reactive to light. Conjunctivae are normal.   Cardiovascular: Normal rate, regular rhythm and normal heart sounds.    Pulmonary/Chest: Effort normal. No respiratory distress. She has rales.   Abdominal: Soft. Bowel sounds are normal. She exhibits no distension. There is no tenderness.   Musculoskeletal: She exhibits no edema.   Neurological: She is alert and oriented to person, place, and time. No cranial nerve deficit.   Skin: No erythema.   Psychiatric: She has a normal mood and affect. Her behavior is normal. Judgment and thought content normal.       Labs:          Recent Labs      02/10/19   0100  02/11/19   0043  02/12/19   0118   SODIUM  135  136  137  137   POTASSIUM  4.2  4.1  4.2  4.2   CHLORIDE  104  104  104  104   CO2  26  26  28  28   BUN  10  8  8  8   CREATININE  0.60  0.52  0.53  0.53   MAGNESIUM   --    --   1.9   CALCIUM  8.7  8.8  8.9  8.9     Recent Labs      02/10/19   0100  02/11/19   0043  02/12/19   0118   ALTSGPT   --    --   20   ASTSGOT   --    --   18   ALKPHOSPHAT   --    --    90   TBILIRUBIN   --    --   0.2   GLUCOSE  97  99  93  93     Recent Labs      02/10/19   0100  02/11/19   0043  19   0118   RBC  2.84*  2.98*  2.93*   HEMOGLOBIN  8.8*  9.2*  9.0*   HEMATOCRIT  27.5*  28.6*  28.1*   PLATELETCT  402  438  427     Recent Labs      02/10/19   0100  02/11/19   0043  19   0118   WBC  5.0  4.9  3.6*   NEUTSPOLYS  76.30*  69.50  58.20   LYMPHOCYTES  13.70*  17.10*  18.30*   MONOCYTES  7.80  10.60  20.20*   EOSINOPHILS  1.60  1.20  1.40   BASOPHILS  0.20  0.60  0.80   ASTSGOT   --    --   18   ALTSGPT   --    --   20   ALKPHOSPHAT   --    --   90   TBILIRUBIN   --    --   0.2     Recent Labs      02/10/19   0100  02/11/19   0043  02/12/19   011   SODIUM  135  136  137  137   POTASSIUM  4.2  4.1  4.2  4.2   CHLORIDE  104  104  104  104   CO2  26  26  28  28   GLUCOSE  97  99  93  93   BUN  10  8  8  8   CREATININE  0.60  0.52  0.53  0.53   CALCIUM  8.7  8.8  8.9  8.9     Hemodynamics:  Temp (24hrs), Av.8 °C (98.2 °F), Min:36.6 °C (97.9 °F), Max:36.9 °C (98.5 °F)  Temperature: 36.6 °C (97.9 °F)  Pulse  Av.7  Min: 62  Max: 108   Blood Pressure: 115/74     Respiratory:    Respiration: 18, Pulse Oximetry: 94 %     Work Of Breathing / Effort: Mild  RUL Breath Sounds: Diminished, RML Breath Sounds: Diminished, RLL Breath Sounds: Diminished, MIREYA Breath Sounds: Diminished, LLL Breath Sounds: Diminished  Fluids:  No intake or output data in the 24 hours ending 19 1400  Weight: 53.5 kg (117 lb 15.1 oz)  GI/Nutrition:  Orders Placed This Encounter   Procedures   • Diet Order Regular     Standing Status:   Standing     Number of Occurrences:   1     Order Specific Question:   Diet:     Answer:   Regular [1]     Medical Decision Making, by Problem:  Active Hospital Problems    Diagnosis   • *Non-small cell lung cancer (HCC) [C34.90]   • COPD with asthma (HCC) [J44.9]   • Anxiety [F41.9]   • Tobacco abuse [Z72.0]   • Severe protein-calorie malnutrition (HCC) [E43]      Past surgical history, past social history, past medical history unchanged reviewed  Plan:    #1 oncology: Non-small cell lung cancer evaluated by Dr. Gauthier and felt to be stage IIIb T3 N3 M0.  His plan was weekly carbo Taxol concurrently with radiation to be followed by immunotherapy infinzi.    She is due for week #3 of carboplatin and Taxol today.  Subsequently the patient will be discharged home, she will continue on weekly carbo and Taxol as an outpatient.  She will keep following with Dr. Conteh for daily radiation therapy  -She is having issues arranging PET/CT scan as an outpatient.  For now I told the patient to finished with concurrent chemo RT she can have follow-up PET/CT scan after finishing with therapy.  -Likely she is going home tomorrow      Quality-Core Measures   Reviewed items::  Radiology images reviewed, Labs reviewed and Medications reviewed

## 2019-02-12 NOTE — PROGRESS NOTES
Chemotherapy Verification - SECONDARY RN   Cycle 3 day 1      Height = 175.3 cm  Weight = 53.5 kg  BSA = 1.61 m2       Medication: taxol  Dose: 45 mg/m2  Calculated Dose: 72.45 mg ordered= 72.5 mg                             (In mg/m2, AUC, mg/kg)     Medication: carboplatin  Dose: AUC=2  Calculated Dose: 202.8 mg ordered= 202 mg                             (In mg/m2, AUC, mg/kg)      Carboplatin calculation (if applicable):  (2*(76.4+25)) = 202.8    I confirm that this process was performed independently.

## 2019-02-12 NOTE — PROGRESS NOTES
Hospital Medicine Daily Progress Note    Date of Service  1/29/2019    Chief Complaint  53 y.o. female admitted 1/20/2019 with cough since April 2018, multiple round of antibiotics with only transient improvement.  CT chest with lung mass.    Hospital Course    Patient seen by pulmonology as IR recommended bronchial biopsy given central location of mass.  EBUS initially planned but Dr Lynne feels traditional biopsy able to gather necessary tissue for diagnosis.      Interval Problem Update  Patient is resting in bed, no new complaints, no fever chills, she feels much better, tolerating diet.    Consultants/Specialty  Pulm - Smith  onc - Wilson/Corrine    Code Status  full    Disposition  Home when appropriate    Review of Systems  Review of Systems   Constitutional: Negative for fever.   HENT: Negative for congestion.    Eyes: Negative for double vision.   Respiratory: Positive for cough. Negative for hemoptysis.    Cardiovascular: Positive for chest pain (right sided rib pain, improved). Negative for palpitations and orthopnea.   Gastrointestinal: Negative for heartburn and nausea.   Genitourinary: Negative for frequency and urgency.   Musculoskeletal: Negative for myalgias and neck pain.   Skin: Negative for rash.   Neurological: Negative for dizziness and tingling.   Psychiatric/Behavioral: Negative for depression and suicidal ideas.        Physical Exam  Temp:  [36.4 °C (97.5 °F)-36.8 °C (98.3 °F)] 36.7 °C (98.1 °F)  Pulse:  [72-93] 78  Resp:  [16-17] 16  BP: ()/(63-80) 120/80  SpO2:  [97 %-100 %] 99 %    Physical Exam   Constitutional: She is oriented to person, place, and time. She appears well-developed and well-nourished. No distress.   HENT:   Head: Normocephalic and atraumatic.   Mouth/Throat: No oropharyngeal exudate.   Eyes: Conjunctivae are normal. No scleral icterus.   Neck: Normal range of motion. Neck supple. No JVD present.   Cardiovascular: Normal rate, regular rhythm and normal heart sounds.   Exam reveals no gallop and no friction rub.    Pulmonary/Chest: Effort normal and breath sounds normal. No respiratory distress. She has no rales.   Decreased at bases bilaterally  Right sided tenderness.      Abdominal: Soft. Bowel sounds are normal. She exhibits no distension. There is no rebound.   Musculoskeletal: Normal range of motion. She exhibits no edema.   Neurological: She is alert and oriented to person, place, and time. She exhibits normal muscle tone.   Skin: Skin is warm and dry.   Psychiatric: She has a normal mood and affect. Her behavior is normal.   Nursing note and vitals reviewed.      Fluids    Intake/Output Summary (Last 24 hours) at 02/11/19 1846  Last data filed at 02/11/19 1200   Gross per 24 hour   Intake              480 ml   Output                0 ml   Net              480 ml       Laboratory  Recent Labs      02/09/19   0507  02/10/19   0100  02/11/19   0043   WBC  3.9*  5.0  4.9   RBC  3.11*  2.84*  2.98*   HEMOGLOBIN  9.6*  8.8*  9.2*   HEMATOCRIT  29.9*  27.5*  28.6*   MCV  96.1  96.8  96.0   MCH  30.9  31.0  30.9   MCHC  32.1*  32.0*  32.2*   RDW  41.2  41.2  41.2   PLATELETCT  423  402  438   MPV  8.2*  8.4*  8.1*     Recent Labs      02/09/19   0507  02/10/19   0100  02/11/19   0043   SODIUM  138  135  136   POTASSIUM  4.3  4.2  4.1   CHLORIDE  106  104  104   CO2  26  26  26   GLUCOSE  81  97  99   BUN  9  10  8   CREATININE  0.45*  0.60  0.52   CALCIUM  9.0  8.7  8.8                   Imaging  DX-CHEST-LIMITED (1 VIEW)   Final Result         1.  Hazy left midlung and lower lobe opacity, compatible with infiltrates, stable.   2.  Asymmetric hyperexpansion of the right lung with decreased volume of the left lung. Associated stable leftward shift of the mediastinum is noted.      DX-CHEST-PORTABLE (1 VIEW)   Final Result      Left-sided volume loss with left midlung and basilar opacities which could be seen secondary to consolidation or mass lesion.      IR-CVC PORT PLACEMENT >  AGE 5   Final Result      1. Ultrasound and fluoroscopic guided placement of a right internal jugular single lumen Bard PowerPort venous access device.      2. The port may be used immediately as clinically indicated. Flushes per protocol.      3. The skin staples and suture should be removed in 10-12 days. This can be performed in the radiology department on any weekday without a prior appointment if desired.      MR-BRAIN-WITH & W/O   Final Result      1.  There is no intracranial metastasis.   2.  Mild cerebral atrophy.   3.  Mild chronic microvascular ischemic disease.      CT-ABDOMEN-PELVIS WITH   Final Result      No evidence of metastatic disease is identified.      Status post cholecystectomy.      Pancreatic calcifications likely represent sequelae of chronic pancreatitis.      Atherosclerotic plaque.      Nonvisualization of the appendix, limiting evaluation.      Left lower lobe lobulated masslike density with surrounding patchy opacities, better evaluated on the prior CT chest. Volume loss is again noted on the left.           Assessment/Plan  * Non-small cell lung cancer (HCC)   Assessment & Plan    Bilateral upper lobe lung masses noted on CT, suspcious for lung cancer  Pulmonology consulted - s/p bronch with Dr Lynne  CT abdomen pelvis without evidence of malignancy  MRI brain without mets  1/22 bronchoscopy with brush biopsy done  Pathology showing poorly differentiated NSCC  Started on chemo as per oncology next dose tuesday       Fever   Assessment & Plan    Likely d/t adenoca lung  Continue empiric levaquin, immunocompromised and supposed to have chemo 4/5  UA negative, chest x-ray stable, continue incentive spirometry  Patient is at risk of postobstructive pneumonia, fever has resolved, completed 7 days of Levaquin on 2/10.     Cough   Assessment & Plan    Could be due to initiation of chemoradiation and irritation of tumor  Tessalon, hycodan,  No more fever, high risk for postobstructive  pneumonia completed antibiotics on on   2/10     Anxiety   Assessment & Plan    Secondary to possible cancer  Low dose xanax PRN.       COPD with asthma (HCC)   Assessment & Plan    At baseline, not an acute exacerbation, continue respiratory care per protocol  Will need o2 at home.   Getting arranged by      Severe protein-calorie malnutrition (HCC)   Assessment & Plan    Nutrition consulted  Body mass index is 17.73 kg/m².         Tobacco abuse   Assessment & Plan    Needs to quit          VTE prophylaxis: Lovenox

## 2019-02-12 NOTE — PROGRESS NOTES
"Pharmacy Chemotherapy Calculations Verification:    Patient Name: Flaca Maki   DX: NSCLC    Protocol: weekly taxol/carboplatin + XRT    *Dosing Reference*  Paclitaxel (taxol) 45-50mg/m2 IV over 60 min on day 1  Carboplatin AUC 2 IV over 30 min on day 1  Weekly x 7 weeks with concurrent radiation therapy  NCCN Guidelines for NSCLC V.9.2017  Delia VALLADARES, et al.  J Clin Oncol. 2005 Sep 1;23(25):4587-75. Epub 2005 Aug 8.    /74   Pulse 89   Temp 36.6 °C (97.9 °F) (Temporal)   Resp 18   Ht 1.753 m (5' 9.02\")   Wt 53.5 kg (117 lb 15.1 oz)   LMP  (LMP Unknown)   SpO2 94%   Breastfeeding? No   BMI 17.41 kg/m²  Body surface area is 1.61 meters squared.    Labs 02/12/19:    ANC ~ 2100   PLT = 427k    Hgb = 9    SCr = 0.53 mg/dL   CrCl ~ 77 ml/min (min SCr 0.7 mg/dL used)  AST/ALT/AP = 18/20/90      Tbili = 0.2 K = 4.2  Mg = 1.9      Drug Order   (Drug name, dose, route, IV Fluid & volume, frequency, number of doses) Cycle 3     Previous treatment: C2 = 02/05/19     Medication = Paclitaxel (Taxol)  Base Dose = 45mg/m²   Calc Dose: Base Dose x 1.61 m² = 72.45 mg  Final Dose = 72.5 mg  Route = IV  Fluid & Volume =  mL  Admin Duration = Over 1 hour          <5% difference, ok to treat with final dose   Medication = Carboplatin (Paraplatin)  Base Dose = AUC 2  Calc Dose: Base Dose x (77 mL/min  + 25) = 204 mg  Final Dose = 202 mg  Route = IV  Fluid & Volume =  mL  Admin Duration = Over 30 minutes          <5% difference, ok to treat with final dose     By my signature below, I confirm this process was performed independently with the BSA and all final chemotherapy dosing calculations congruent. I have reviewed the above chemotherapy order and that my calculation of the final dose and BSA (when applicable) corroborate those calculations of the  pharmacist. Discrepancies of 5% or greater in the written dose have been addressed and documented within the [facility] Progress notes.    Ernst Moss, " PharmD, BCOP

## 2019-02-12 NOTE — PROGRESS NOTES
Assumed pt care - bedside report received.    Pt is aaox4 - anxious.  Vs stable.  3L 02/productive cough with starting brown tar looking.  Up self and steady.  Voids without difficulty.  Good po intake - no bm - refused prn's - prune juice with butter requested.  Port patent/fluid/chemotherapy today.  Pet scan canceled per oncology and per dr. Conteh.  Plan is d/c after radiation tomorrow.  Pt makes needs known - will continue to round.

## 2019-02-12 NOTE — CARE PLAN
Problem: Communication  Goal: The ability to communicate needs accurately and effectively will improve  Outcome: PROGRESSING AS EXPECTED  Pt calls appropriately for assistance. Call light within reach.     Problem: Discharge Barriers/Planning  Goal: Patient's continuum of care needs will be met  Outcome: PROGRESSING AS EXPECTED  Educated pt on expectations for discharge. All questions answered at this time.

## 2019-02-12 NOTE — PROGRESS NOTES
"/69   Pulse 95   Temp 36.9 °C (98.5 °F) (Temporal)   Resp 16   Ht 1.753 m (5' 9.02\")   Wt 53.3 kg (117 lb 8.1 oz)   LMP  (LMP Unknown)   SpO2 97%   Breastfeeding? No   BMI 17.34 kg/m²     Pt is AOX4. Denies SOB, chest pain, n/v. Pain is 4/10- medicated per MAR. Port is patent with positive blood return. Bed is locked in lowest position. Family at bedside.   "

## 2019-02-12 NOTE — PROGRESS NOTES
Chemotherapy Verification - PRIMARY RN  Cycle 3 day 1      Height = 175.3cm  Weight = 53.5kg  BSA = 1.61m2       Medication: carboplatin  Dose: AUC 2    Calculated Dose: 202.8mg   Ordered dose 202mg                          (In mg/m2, AUC, mg/kg)     Medication: taxol  Dose: 45mg/m2    Calculated Dose: 72.45mg  Ordered dose 72.5mg                             (In mg/m2, AUC, mg/kg)        Carboplatin calculation (if applicable):  (2*(76.4+25))=202.8      I confirm this process was performed independently with the BSA and all final chemotherapy dosing calculations congruent.  Any discrepancies of 5% or greater have been addressed with the chemotherapy pharmacist. The resolution of the discrepancy has been documented in the EPIC progress notes.

## 2019-02-13 ENCOUNTER — HOSPITAL ENCOUNTER (OUTPATIENT)
Dept: RADIATION ONCOLOGY | Facility: MEDICAL CENTER | Age: 54
End: 2019-02-13

## 2019-02-13 ENCOUNTER — APPOINTMENT (OUTPATIENT)
Dept: RADIOLOGY | Facility: MEDICAL CENTER | Age: 54
End: 2019-02-13
Attending: RADIOLOGY
Payer: MEDICAID

## 2019-02-13 VITALS
HEIGHT: 69 IN | SYSTOLIC BLOOD PRESSURE: 120 MMHG | HEART RATE: 87 BPM | TEMPERATURE: 98.6 F | OXYGEN SATURATION: 97 % | BODY MASS INDEX: 17.63 KG/M2 | DIASTOLIC BLOOD PRESSURE: 73 MMHG | RESPIRATION RATE: 18 BRPM | WEIGHT: 119.05 LBS

## 2019-02-13 LAB
ANION GAP SERPL CALC-SCNC: 4 MMOL/L (ref 0–11.9)
BASOPHILS # BLD AUTO: 0.3 % (ref 0–1.8)
BASOPHILS # BLD: 0.01 K/UL (ref 0–0.12)
BUN SERPL-MCNC: 11 MG/DL (ref 8–22)
CALCIUM SERPL-MCNC: 8.5 MG/DL (ref 8.5–10.5)
CHLORIDE SERPL-SCNC: 103 MMOL/L (ref 96–112)
CO2 SERPL-SCNC: 28 MMOL/L (ref 20–33)
CREAT SERPL-MCNC: 0.51 MG/DL (ref 0.5–1.4)
EOSINOPHIL # BLD AUTO: 0 K/UL (ref 0–0.51)
EOSINOPHIL NFR BLD: 0 % (ref 0–6.9)
ERYTHROCYTE [DISTWIDTH] IN BLOOD BY AUTOMATED COUNT: 40.2 FL (ref 35.9–50)
GLUCOSE SERPL-MCNC: 162 MG/DL (ref 65–99)
HCT VFR BLD AUTO: 27.8 % (ref 37–47)
HGB BLD-MCNC: 9.1 G/DL (ref 12–16)
IMM GRANULOCYTES # BLD AUTO: 0.06 K/UL (ref 0–0.11)
IMM GRANULOCYTES NFR BLD AUTO: 1.5 % (ref 0–0.9)
LYMPHOCYTES # BLD AUTO: 0.29 K/UL (ref 1–4.8)
LYMPHOCYTES NFR BLD: 7.5 % (ref 22–41)
MCH RBC QN AUTO: 31.2 PG (ref 27–33)
MCHC RBC AUTO-ENTMCNC: 32.7 G/DL (ref 33.6–35)
MCV RBC AUTO: 95.2 FL (ref 81.4–97.8)
MONOCYTES # BLD AUTO: 0.35 K/UL (ref 0–0.85)
MONOCYTES NFR BLD AUTO: 9 % (ref 0–13.4)
NEUTROPHILS # BLD AUTO: 3.17 K/UL (ref 2–7.15)
NEUTROPHILS NFR BLD: 81.7 % (ref 44–72)
NRBC # BLD AUTO: 0 K/UL
NRBC BLD-RTO: 0 /100 WBC
PLATELET # BLD AUTO: 418 K/UL (ref 164–446)
PMV BLD AUTO: 8.4 FL (ref 9–12.9)
POTASSIUM SERPL-SCNC: 4.2 MMOL/L (ref 3.6–5.5)
RBC # BLD AUTO: 2.92 M/UL (ref 4.2–5.4)
SODIUM SERPL-SCNC: 135 MMOL/L (ref 135–145)
WBC # BLD AUTO: 3.9 K/UL (ref 4.8–10.8)

## 2019-02-13 PROCEDURE — A9270 NON-COVERED ITEM OR SERVICE: HCPCS | Performed by: HOSPITALIST

## 2019-02-13 PROCEDURE — 700111 HCHG RX REV CODE 636 W/ 250 OVERRIDE (IP): Performed by: INTERNAL MEDICINE

## 2019-02-13 PROCEDURE — 700102 HCHG RX REV CODE 250 W/ 637 OVERRIDE(OP): Performed by: HOSPITALIST

## 2019-02-13 PROCEDURE — 80048 BASIC METABOLIC PNL TOTAL CA: CPT

## 2019-02-13 PROCEDURE — A9270 NON-COVERED ITEM OR SERVICE: HCPCS | Performed by: INTERNAL MEDICINE

## 2019-02-13 PROCEDURE — 77280 THER RAD SIMULAJ FIELD SMPL: CPT | Performed by: RADIOLOGY

## 2019-02-13 PROCEDURE — 700111 HCHG RX REV CODE 636 W/ 250 OVERRIDE (IP): Performed by: HOSPITALIST

## 2019-02-13 PROCEDURE — 99239 HOSP IP/OBS DSCHRG MGMT >30: CPT | Performed by: INTERNAL MEDICINE

## 2019-02-13 PROCEDURE — 85025 COMPLETE CBC W/AUTO DIFF WBC: CPT

## 2019-02-13 PROCEDURE — 700102 HCHG RX REV CODE 250 W/ 637 OVERRIDE(OP): Performed by: INTERNAL MEDICINE

## 2019-02-13 PROCEDURE — 77386 HCHG IMRT DELIVERY COMPLEX: CPT | Performed by: RADIOLOGY

## 2019-02-13 RX ORDER — ALPRAZOLAM 0.25 MG/1
0.25 TABLET ORAL EVERY 8 HOURS PRN
Qty: 30 TAB | Refills: 0 | Status: SHIPPED | OUTPATIENT
Start: 2019-02-13 | End: 2019-02-27

## 2019-02-13 RX ORDER — ONDANSETRON 4 MG/1
4 TABLET, ORALLY DISINTEGRATING ORAL EVERY 4 HOURS PRN
Qty: 60 TAB | Refills: 0 | Status: SHIPPED | OUTPATIENT
Start: 2019-02-13 | End: 2020-09-21

## 2019-02-13 RX ORDER — BUDESONIDE AND FORMOTEROL FUMARATE DIHYDRATE 160; 4.5 UG/1; UG/1
2 AEROSOL RESPIRATORY (INHALATION) 2 TIMES DAILY
Qty: 1 INHALER | Refills: 2 | Status: SHIPPED | OUTPATIENT
Start: 2019-02-13

## 2019-02-13 RX ORDER — ONDANSETRON 4 MG/1
4 TABLET, ORALLY DISINTEGRATING ORAL EVERY 4 HOURS PRN
Qty: 10 TAB | Refills: 0 | Status: SHIPPED | OUTPATIENT
Start: 2019-02-13 | End: 2019-02-13

## 2019-02-13 RX ORDER — NICOTINE 21 MG/24HR
1 PATCH, TRANSDERMAL 24 HOURS TRANSDERMAL EVERY 24 HOURS
Qty: 30 PATCH | Refills: 3 | Status: SHIPPED | OUTPATIENT
Start: 2019-02-13 | End: 2019-03-12

## 2019-02-13 RX ORDER — ONDANSETRON 4 MG/1
4 TABLET, ORALLY DISINTEGRATING ORAL EVERY 4 HOURS PRN
Qty: 60 TAB | Refills: 0 | Status: SHIPPED | OUTPATIENT
Start: 2019-02-13 | End: 2019-02-13

## 2019-02-13 RX ADMIN — ENOXAPARIN SODIUM 40 MG: 100 INJECTION SUBCUTANEOUS at 04:52

## 2019-02-13 RX ADMIN — HYDROCODONE BITARTRATE AND HOMATROPINE METHYLBROMIDE 5 ML: 5; 1.5 SOLUTION ORAL at 09:36

## 2019-02-13 RX ADMIN — NICOTINE 21 MG: 21 PATCH, EXTENDED RELEASE TRANSDERMAL at 04:52

## 2019-02-13 RX ADMIN — ONDANSETRON 4 MG: 2 INJECTION INTRAMUSCULAR; INTRAVENOUS at 07:57

## 2019-02-13 RX ADMIN — FAMOTIDINE 20 MG: 20 TABLET ORAL at 04:53

## 2019-02-13 RX ADMIN — BUDESONIDE AND FORMOTEROL FUMARATE DIHYDRATE 2 PUFF: 160; 4.5 AEROSOL RESPIRATORY (INHALATION) at 04:53

## 2019-02-13 RX ADMIN — Medication 500 UNITS: at 12:37

## 2019-02-13 RX ADMIN — SENNOSIDES AND DOCUSATE SODIUM 2 TABLET: 8.6; 5 TABLET ORAL at 04:53

## 2019-02-13 RX ADMIN — MAGNESIUM HYDROXIDE 30 ML: 400 SUSPENSION ORAL at 06:35

## 2019-02-13 ASSESSMENT — ENCOUNTER SYMPTOMS
EYES NEGATIVE: 1
WEAKNESS: 1
SHORTNESS OF BREATH: 0
COUGH: 0
HEMOPTYSIS: 0
MUSCULOSKELETAL NEGATIVE: 1
CARDIOVASCULAR NEGATIVE: 1
GASTROINTESTINAL NEGATIVE: 1
NERVOUS/ANXIOUS: 0
SPUTUM PRODUCTION: 0

## 2019-02-13 NOTE — PROGRESS NOTES
Assumed pt care - bedside report received.    Pt is aaox4 - anxious.  Vs stable.  3L 02/productive cough with starting brown tar looking.  Up self and steady.  Voids without difficulty.  Nausea/vomiting this morning - zofran given with good relief.  Good po intake - small bm this afternoon.  Port patent/tko - hep locked this afternoon and needed d/c with band aid placed.  Plan is d/c after radiation today.  Discharge instructions given - rx's in hand.  All questions answered.  Pt awaiting her ride to home.    Pt makes needs known - will continue to round.

## 2019-02-13 NOTE — PROGRESS NOTES
Hospital Medicine Daily Progress Note    Date of Service  1/29/2019    Chief Complaint  53 y.o. female admitted 1/20/2019 with cough since April 2018, multiple round of antibiotics with only transient improvement.  CT chest with lung mass.    Hospital Course    Patient seen by pulmonology as IR recommended bronchial biopsy given central location of mass. Pathology from bronch confirmed lung adenocarcinoma. She has started chemo and radiation and has tolerated both well.      Interval Problem Update  1/21/19 Patient anxious and tearful of possible diagnosis.  Family comforting patient and requesting small dose medication to allow patient to calm down.  Xanax 0.25 mg ordered PRN.  NPO at [facility] [place] for bronchoscopy 11am tomorrow.  1/29/19 Patient had first radiation and expecting to start chemo this afternoon.  She is hoping to discharge home tomorrow but is agreeable to make sure all is authorized given her pending insurance status.  She has continued radiation and will need to figure out where she will receive chemo.  1/30 Patient doing well with chemo from yesterday, tolerating radiation.  She will require oxygen at home as she is requiring 3L with any exertion to maintain saturations.  [facility] cannot accommodate chemo appointment until 2/7 - will need to verify with oncology if this is okay for a 2 day delay in treatment, if so plan to dc home early tomorrow am as patient has agreed to use credit care to secure oxygen payment until insurance starts.  1/31 Patient developed cough overnight as is likely related to her initiation of chemo/rads and irritation of tumor.  Hycodan and tessalon started.  She is having significant rib irritiation right side with cough.  She did not sleep last night due to cough and pain.  I ordered valium qhs tonight for muscle relaxant and mild sedative (2.5mg and may repeat dose x 1 qhs).  CXR tomorrow if cough remains same.  2/1 Patient feeling well today, cough has improved significantly.  " She is tolerating radiation without pain.  Still issue with coordination with insurance \"probable\".  Financial assistance is supposed to be coming to bedside and discuss with patient.  2/2 Patient states she is doing okay, today is her birthday and she has had many visitors wishing her well.  Cough is still present but not as severe each day.     2/3 Patient with friend and out of hospital room for a brief period of time.  See on bottom floor and given directions on how to get outside for some clean air.  She denies any significant changes.  Anticipate chemo again on Tuesday unless finances/insurance figured out for dc home safely to receive needed treatments.  2/4 Patient with temperature elevation of 100.1 overnight, pan cultured but abx not started.  She had fever this afternoon of 101.2, cultures still pending from collection overnight, cooling measures underway.  CXR done today showing no evidence of infection but given her cough and possible post obstructive pneumonia - will start empiric Levaquin.  She is supposed to have cycle 2 chemo tomorrow, will have oncology evaluate her to see if she is appropriate vs waiting a few more days.  Despite fever, patient states she does feel better today compared to past 2 days regarding pain in ribs and cough.  2/12 Patient feeling very well, pneumonia has resolved along with fevers and completion of antibiotics.  She will receive chemotherapy today and anticipated dc home tomorrow.  Home oxygen tank at bedside.    Consultants/Specialty  Pulm - s/o  onc - [place]    Code Status  full    Disposition  Home tomorrow    Review of Systems  Review of Systems   Constitutional: Negative for chills, fever and weight loss.   HENT: Negative for congestion and sore throat.    Eyes: Negative for blurred vision and photophobia.   Respiratory: Negative for cough and shortness of breath.    Cardiovascular: Negative for chest pain, claudication and leg swelling.   Gastrointestinal: " Negative for abdominal pain, constipation, diarrhea, heartburn, nausea and vomiting.   Genitourinary: Negative for dysuria and hematuria.   Musculoskeletal: Negative for joint pain and myalgias.   Skin: Negative for itching and rash.   Neurological: Negative for dizziness, sensory change, speech change, weakness and headaches.   Psychiatric/Behavioral: Negative for depression. The patient is not nervous/anxious and does not have insomnia.         Physical Exam  Temp:  [36.6 °C (97.8 °F)-37.1 °C (98.7 °F)] 36.6 °C (97.8 °F)  Pulse:  [69-95] 90  Resp:  [16-18] 18  BP: (108-121)/(68-76) 111/68  SpO2:  [94 %-97 %] 94 %    Physical Exam   Constitutional: She is oriented to person, place, and time. She appears well-developed and well-nourished. No distress.   HENT:   Head: Normocephalic and atraumatic.   Eyes: Conjunctivae are normal. No scleral icterus.   Neck: Neck supple. No JVD present.   Cardiovascular: Normal rate, regular rhythm and normal heart sounds.  Exam reveals no gallop and no friction rub.    No murmur heard.  Pulmonary/Chest: Effort normal and breath sounds normal. No respiratory distress. She exhibits no tenderness.   Abdominal: Soft. Bowel sounds are normal. She exhibits no distension. There is no tenderness. There is no guarding.   Musculoskeletal: She exhibits no edema or tenderness.   Lymphadenopathy:     She has no cervical adenopathy.   Neurological: She is alert and oriented to person, place, and time. No cranial nerve deficit.   Skin: Skin is warm and dry. She is not diaphoretic. No erythema. No pallor.   Psychiatric: She has a normal mood and affect. Her behavior is normal.   Nursing note and vitals reviewed.      Fluids    Intake/Output Summary (Last 24 hours) at 02/12/19 2043  Last data filed at 02/12/19 1900   Gross per 24 hour   Intake             1540 ml   Output                0 ml   Net             1540 ml       Laboratory  Recent Labs      02/10/19   0100  02/11/19   0043  02/12/19   0118    WBC  5.0  4.9  3.6*   RBC  2.84*  2.98*  2.93*   HEMOGLOBIN  8.8*  9.2*  9.0*   HEMATOCRIT  27.5*  28.6*  28.1*   MCV  96.8  96.0  95.9   MCH  31.0  30.9  30.7   MCHC  32.0*  32.2*  32.0*   RDW  41.2  41.2  40.7   PLATELETCT  402  438  427   MPV  8.4*  8.1*  8.3*     Recent Labs      02/10/19   0100  02/11/19   0043  02/12/19   0118   SODIUM  135  136  137  137   POTASSIUM  4.2  4.1  4.2  4.2   CHLORIDE  104  104  104  104   CO2  26  26  28  28   GLUCOSE  97  99  93  93   BUN  10  8  8  8   CREATININE  0.60  0.52  0.53  0.53   CALCIUM  8.7  8.8  8.9  8.9                   Imaging  DX-CHEST-LIMITED (1 VIEW)   Final Result         1.  Hazy left midlung and lower lobe opacity, compatible with infiltrates, stable.   2.  Asymmetric hyperexpansion of the right lung with decreased volume of the left lung. Associated stable leftward shift of the mediastinum is noted.      DX-CHEST-PORTABLE (1 VIEW)   Final Result      Left-sided volume loss with left midlung and basilar opacities which could be seen secondary to consolidation or mass lesion.      IR-CVC PORT PLACEMENT > AGE 5   Final Result      1. Ultrasound and fluoroscopic guided placement of a right internal jugular single lumen Bard PowerPort venous access device.      2. The port may be used immediately as clinically indicated. Flushes per protocol.      3. The skin staples and suture should be removed in 10-12 days. This can be performed in the radiology department on any weekday without a prior appointment if desired.      MR-BRAIN-WITH & W/O   Final Result      1.  There is no intracranial metastasis.   2.  Mild cerebral atrophy.   3.  Mild chronic microvascular ischemic disease.      CT-ABDOMEN-PELVIS WITH   Final Result      No evidence of metastatic disease is identified.      Status post cholecystectomy.      Pancreatic calcifications likely represent sequelae of chronic pancreatitis.      Atherosclerotic plaque.      Nonvisualization of the appendix,  limiting evaluation.      Left lower lobe lobulated masslike density with surrounding patchy opacities, better evaluated on the prior CT chest. Volume loss is again noted on the left.           Assessment/Plan  * Non-small cell lung cancer (HCC)   Assessment & Plan    Bilateral upper lobe lung masses noted on CT, suspcious for lung cancer  Pulmonology consulted - s/p bronch with Dr Lynne  CT abdomen pelvis without evidence of malignancy  MRI brain without mets  1/22 bronchoscopy with brush biopsy done  Pathology showing poorly differentiated NSCC  Started on weekly chemotherapy - tues inpatient, thursdays outpatient       Fever   Assessment & Plan    Likely d/t adenoca lung  Continue empiric levaquin, immunocompromised and supposed to have chemo 4/5  UA negative, chest x-ray stable, continue incentive spirometry  Patient is at risk of postobstructive pneumonia, fever has resolved, completed 7 days of Levaquin on 2/10.     Cough   Assessment & Plan    Could be due to initiation of chemoradiation and irritation of tumor  Tessalon, hycodan,  No more fever, high risk for postobstructive pneumonia completed antibiotics on on   2/10     Anxiety   Assessment & Plan    Secondary to possible cancer  Low dose xanax PRN.       COPD with asthma (HCC)   Assessment & Plan    At baseline, not an acute exacerbation, continue respiratory care per protocol  Will need o2 at home.   Getting arranged by      Severe protein-calorie malnutrition (HCC)   Assessment & Plan    Nutrition consulted  Body mass index is 17.73 kg/m².         Tobacco abuse   Assessment & Plan    Needs to quit          VTE prophylaxis: scds

## 2019-02-13 NOTE — PROGRESS NOTES
Oncology/Hematology Progress Note               Author: Hussein To Date & Time created: 2/13/2019  1:49 PM     Non-small cell lung cancer    Interval History:  She is tolerating concurrent chemoradiation therapy fairly well.  Now she is ready to leave.  She denies worsening S OB, chest pain.  There is no fevers.  The patient feels well      Review of Systems:  Review of Systems   Constitutional: Positive for malaise/fatigue.   HENT: Negative.    Eyes: Negative.    Respiratory: Negative for cough, hemoptysis, sputum production and shortness of breath.    Cardiovascular: Negative.    Gastrointestinal: Negative.    Genitourinary: Negative.    Musculoskeletal: Negative.    Skin: Negative.    Neurological: Positive for weakness.   Endo/Heme/Allergies: Negative.    Psychiatric/Behavioral: The patient is not nervous/anxious.        Physical Exam:  Physical Exam   Constitutional: She is oriented to person, place, and time. She appears well-developed.   HENT:   Head: Normocephalic.   Eyes: Pupils are equal, round, and reactive to light. Conjunctivae are normal.   Cardiovascular: Normal rate, regular rhythm and normal heart sounds.    Pulmonary/Chest: Effort normal. No respiratory distress. She has rales.   Abdominal: Soft. Bowel sounds are normal. She exhibits no distension. There is no tenderness.   Musculoskeletal: She exhibits no edema.   Neurological: She is alert and oriented to person, place, and time. No cranial nerve deficit.   Skin: No erythema.   Psychiatric: She has a normal mood and affect. Her behavior is normal. Judgment and thought content normal.       Labs:          Recent Labs      02/11/19 0043  02/12/19   0118  02/13/19   0007   SODIUM  136  137  137  135   POTASSIUM  4.1  4.2  4.2  4.2   CHLORIDE  104  104  104  103   CO2  26  28  28  28   BUN  8  8  8  11   CREATININE  0.52  0.53  0.53  0.51   MAGNESIUM   --   1.9   --    CALCIUM  8.8  8.9  8.9  8.5     Recent Labs      02/11/19 0043   19   ALTSGPT   --   20   --    ASTSGOT   --   18   --    ALKPHOSPHAT   --   90   --    TBILIRUBIN   --   0.2   --    GLUCOSE  99  93  93  162*     Recent Labs      19   RBC  2.98*  2.93*  2.92*   HEMOGLOBIN  9.2*  9.0*  9.1*   HEMATOCRIT  28.6*  28.1*  27.8*   PLATELETCT  438  427  418     Recent Labs      19   WBC  4.9  3.6*  3.9*   NEUTSPOLYS  69.50  58.20  81.70*   LYMPHOCYTES  17.10*  18.30*  7.50*   MONOCYTES  10.60  20.20*  9.00   EOSINOPHILS  1.20  1.40  0.00   BASOPHILS  0.60  0.80  0.30   ASTSGOT   --   18   --    ALTSGPT   --   20   --    ALKPHOSPHAT   --   90   --    TBILIRUBIN   --   0.2   --      Recent Labs      19   SODIUM  136  137  137  135   POTASSIUM  4.1  4.2  4.2  4.2   CHLORIDE  104  104  104  103   CO2  26  28  28  28   GLUCOSE  99  93  93  162*   BUN  8  8  8  11   CREATININE  0.52  0.53  0.53  0.51   CALCIUM  8.8  8.9  8.9  8.5     Hemodynamics:  Temp (24hrs), Av °C (98.6 °F), Min:36.6 °C (97.8 °F), Max:37.4 °C (99.3 °F)  Temperature: (P) 37 °C (98.6 °F)  Pulse  Av.7  Min: 62  Max: 108   Blood Pressure: (P) 120/73     Respiratory:    Respiration: (P) 18, Pulse Oximetry: (P) 97 %     Work Of Breathing / Effort: (P) Mild  RUL Breath Sounds: (P) Clear, RML Breath Sounds: (P) Clear, RLL Breath Sounds: (P) Diminished, MIREYA Breath Sounds: (P) Diminished, LLL Breath Sounds: (P) Diminished  Fluids:  No intake or output data in the 24 hours ending 19 1400  Weight: 54 kg (119 lb 0.8 oz)  GI/Nutrition:  Orders Placed This Encounter   Procedures   • Diet Order Regular     Standing Status:   Standing     Number of Occurrences:   1     Order Specific Question:   Diet:     Answer:   Regular [1]     Medical Decision Making, by Problem:  Active Hospital Problems    Diagnosis   • *Non-small cell lung cancer (HCC)  [C34.90]   • 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 Gauthier and felt to be stage IIIb T3 N3 M0.  His plan was weekly carbo Taxol concurrently with radiation to be followed by immunotherapy infinzi.  -Week 3 of carboplatin and Taxol given on 2/12/2019    The patient will be discharged today.  She does have an upcoming appointment with Dr. Gauthier tomorrow at 845.  She is scheduled for outpatient carbotaxol next week on Thursday.  She will keep following with Dr. Conteh below for radiation therapy  -She is going home today      Quality-Core Measures   Reviewed items::  Radiology images reviewed, Labs reviewed and Medications reviewed

## 2019-02-13 NOTE — CARE PLAN
Problem: Communication  Goal: The ability to communicate needs accurately and effectively will improve  Outcome: PROGRESSING AS EXPECTED  Pt calls appropriately for assistance. Call light within reach. Hourly rounding in place.     Problem: Infection  Goal: Will remain free from infection  Outcome: PROGRESSING AS EXPECTED  Pt has right chest port. Assess for signs and symptoms of infection.

## 2019-02-13 NOTE — DISCHARGE INSTRUCTIONS
Discharge Instructions    Discharged to home by car with relative. Discharged via wheelchair, hospital escort: Yes.  Special equipment needed: Oxygen    Be sure to schedule a follow-up appointment with your primary care doctor or any specialists as instructed.     Discharge Plan:    Discharge home with oxygen   Continue with daily radiation appointments as scheduled.   Continue with [facility] chemotherapy appointments.   Follow up with Dr Gauthier in 1-2 weeks.    Diet Plan: Discussed  Regular diet  Activity Level: Discussed  Smoking Cessation Offered: Patient Counseled  Confirmed Follow up Appointment: Appointment Scheduled  Confirmed Symptoms Management: Discussed  Medication Reconciliation Updated: Yes  Pneumococcal Vaccine Administered/Refused: Not given - Patient refused pneumococcal vaccine  Influenza Vaccine Indication: Patient Refuses  Influenza Vaccine Given - only chart on this line when given: Influenza Vaccine Given (See MAR)    I understand that a diet low in cholesterol, fat, and sodium is recommended for good health. Unless I have been given specific instructions below for another diet, I accept this instruction as my diet prescription.   Other diet: regular    Special Instructions: None    · Is patient discharged on Warfarin / Coumadin?   No       Lung Cancer  Lung cancer occurs when abnormal cells in the lung grow out of control and form a mass (tumor). There are several types of lung cancer. The two most common types are:  · Non-small cell. In this type of lung cancer, abnormal cells are larger and grow more slowly than those of small cell lung cancer.  · Small cell. In this type of lung cancer, abnormal cells are smaller than those of non-small cell lung cancer. Small cell lung cancer gets worse faster than non-small cell lung cancer.  What are the causes?  The leading cause of lung cancer is smoking tobacco. The second leading cause is radon exposure.  What increases the risk?  · Smoking  tobacco.  · Exposure to secondhand tobacco smoke.  · Exposure to radon gas.  · Exposure to asbestos.  · Exposure to arsenic in drinking water.  · Air pollution.  · Family or personal history of lung cancer.  · Lung radiation therapy.  · Being older than 65 years.  What are the signs or symptoms?  In the early stages, symptoms may not be present. As the cancer progresses, symptoms may include:  · A lasting cough, possibly with blood.  · Fatigue.  · Unexplained weight loss.  · Shortness of breath.  · Wheezing.  · Chest pain.  · Loss of appetite.  Symptoms of advanced lung cancer include:  · Hoarseness.  · Bone or joint pain.  · Weakness.  · Nail problems.  · Face or arm swelling.  · Paralysis of the face.  · Drooping eyelids.  How is this diagnosed?  Lung cancer can be identified with a physical exam and with tests such as:  · A chest X-ray.  · A CT scan.  · Blood tests.  · A biopsy.  After a diagnosis is made, you will have more tests to determine the stage of the cancer. The stages of non-small cell lung cancer are:  · Stage 0, also called carcinoma in situ. At this stage, abnormal cells are found in the inner lining of your lung or lungs.  · Stage I. At this stage, abnormal cells have grown into a tumor that is no larger than 5 cm across. The cancer has entered the deeper lung tissue but has not yet entered the lymph nodes or other parts of the body.  · Stage II. At this stage, the tumor is 7 cm across or smaller and has entered nearby lymph nodes. Or, the tumor is 5 cm across or smaller and has invaded surrounding tissue but is not found in nearby lymph nodes. There may be more than one tumor present.  · Stage III. At this stage, the tumor may be any size. There may be more than one tumor in the lungs. The cancer cells have spread to the lymph nodes and possibly to other organs.  · Stage IV. At this stage, there are tumors in both lungs and the cancer has spread to other areas of the body.  The stages of small  cell lung cancer are:  · Limited. At this stage, the cancer is found only on one side of the chest.  · Extensive. At this stage, the cancer is in the lungs and in tissues on the other side of the chest. The cancer has spread to other organs or is found in the fluid between the layers of your lungs.  How is this treated?  Depending on the type and stage of your lung cancer, you may be treated with:  · Surgery. This is done to remove a tumor.  · Radiation therapy. This treatment destroys cancer cells using X-rays or other types of radiation.  · Chemotherapy. This treatment uses medicines to destroy cancer cells.  · Targeted therapy. This treatment aims to destroy only cancer cells instead of all cells as other therapies do.  You may also have a combination of treatments.  Follow these instructions at home:  · Do not use any tobacco products. This includes cigarettes, chewing tobacco, and electronic cigarettes. If you need help quitting, ask your health care provider.  · Take medicines only as directed by your health care provider.  · Eat a healthy diet. Work with a dietitian to make sure you are getting the nutrition you need.  · Consider joining a support group or seeking counseling to help you cope with the stress of having lung cancer.  · Let your cancer specialist (oncologist) know if you are admitted to the hospital.  · Keep all follow-up visits as directed by your health care provider. This is important.  Contact a health care provider if:  · You lose weight without trying.  · You have a persistent cough and wheezing.  · You feel short of breath.  · You tire easily.  · You experience bone or joint pain.  · You have difficulty swallowing.  · You feel hoarse or notice your voice changing.  · Your pain medicine is not helping.  Get help right away if:  · You cough up blood.  · You have new breathing problems.  · You develop chest pain.  · You develop swelling in:  ¨ One or both ankles or legs.  ¨ Your face, neck,  or arms.  · You are confused.  · You experience paralysis in your face or a drooping eyelid.  This information is not intended to replace advice given to you by your health care provider. Make sure you discuss any questions you have with your health care provider.  Document Released: 03/26/2002 Document Revised: 05/25/2017 Document Reviewed: 04/23/2015  [employer] Interactive Patient Education © 2017 [employer]        Depression / Suicide Risk    As you are discharged from this [facility] facility, it is important to learn how to keep safe from harming yourself.    Recognize the warning signs:  · Abrupt changes in personality, positive or negative- including increase in energy   · Giving away possessions  · Change in eating patterns- significant weight changes-  positive or negative  · Change in sleeping patterns- unable to sleep or sleeping all the time   · Unwillingness or inability to communicate  · Depression  · Unusual sadness, discouragement and loneliness  · Talk of wanting to die  · Neglect of personal appearance   · Rebelliousness- reckless behavior  · Withdrawal from people/activities they love  · Confusion- inability to concentrate     If you or a loved one observes any of these behaviors or has concerns about self-harm, here's what you can do:  · Talk about it- your feelings and reasons for harming yourself  · Remove any means that you might use to hurt yourself (examples: pills, rope, extension cords, firearm)  · Get professional help from the community (Mental Health, Substance Abuse, psychological counseling)  · Do not be alone:Call your Safe Contact- someone whom you trust who will be there for you.  · Call your local CRISIS HOTLINE 133-4749 or 154-472-8364  · Call your local Children's Mobile Crisis Response Team [place] (353) 380-5258 or www.Sulmaq  · Call the toll free National Suicide Prevention Hotlines   · National Suicide Prevention Lifeline 794-337-LIYY (5413)  · National  [facility] 800-SUICIDE (128-1535)

## 2019-02-13 NOTE — CARE PLAN
Problem: Safety  Goal: Will remain free from falls    Intervention: Assess risk factors for falls  Fall precautions in place  Pt up self and steady      Problem: Bowel/Gastric:  Goal: Will not experience complications related to bowel motility    Intervention: Assess baseline bowel pattern  Last bm 2/10 - pt refusing prn's but willing this morning to take suppository only after she returns from radiation        Problem: Knowledge Deficit  Goal: Knowledge of disease process/condition, treatment plan, diagnostic tests, and medications will improve  Needs bm  - will give suppository per pt request after radiation  Plan for d/c home today  Goal: Knowledge of the prescribed therapeutic regimen will improve    Intervention: Discuss information regarding therpeutic regimen and document in education  Radiation 12 of 30 today

## 2019-02-13 NOTE — PROGRESS NOTES
Patient sitting up in bed with emesis in bag. Measured and discarded emesis. Nausea medication offered by ALICE Ward. Friend present in room conversing with patient. Personal items within reach. Non-skid socks on.

## 2019-02-13 NOTE — PROGRESS NOTES
"/68   Pulse 90   Temp 36.6 °C (97.8 °F) (Oral)   Resp 18   Ht 1.753 m (5' 9.02\")   Wt 53.5 kg (117 lb 15.1 oz)   LMP  (LMP Unknown)   SpO2 90%   Breastfeeding? No   BMI 17.41 kg/m²     Pt is AOX4. Denies SOB, chest pain, n/v. Pain is 4/10 -medicated with Ibuprofin. Pt is using 3 L O2 via nasal cannula. Port is patent with positive blood return.  Family at bedside. POC discussed. Bed is locked in lowest position.   "

## 2019-02-13 NOTE — PROGRESS NOTES
Pt was transported to the department by the floor for radiation tx 12 of 30. After tx pt spoke with Dr Conteh and then was transported back to her room, she will resum tx tomorrow morning.

## 2019-02-13 NOTE — PROGRESS NOTES
Informed  that pt's rx's need to be sent to [facility] in [facility].  rx's resent.  Informed pt.

## 2019-02-14 ENCOUNTER — HOSPITAL ENCOUNTER (OUTPATIENT)
Dept: RADIATION ONCOLOGY | Facility: MEDICAL CENTER | Age: 54
End: 2019-02-28
Attending: RADIOLOGY
Payer: MEDICAID

## 2019-02-14 ENCOUNTER — HOSPITAL ENCOUNTER (OUTPATIENT)
Dept: RADIATION ONCOLOGY | Facility: MEDICAL CENTER | Age: 54
End: 2019-02-14

## 2019-02-14 ENCOUNTER — APPOINTMENT (OUTPATIENT)
Dept: ONCOLOGY | Facility: MEDICAL CENTER | Age: 54
End: 2019-02-14
Attending: INTERNAL MEDICINE
Payer: MEDICAID

## 2019-02-14 PROCEDURE — 77014 PR CT GUIDANCE PLACEMENT RAD THERAPY FIELDS: CPT | Mod: 26 | Performed by: RADIOLOGY

## 2019-02-14 PROCEDURE — 77336 RADIATION PHYSICS CONSULT: CPT | Performed by: RADIOLOGY

## 2019-02-14 PROCEDURE — 77386 HCHG IMRT DELIVERY COMPLEX: CPT | Performed by: RADIOLOGY

## 2019-02-14 NOTE — DISCHARGE SUMMARY
Discharge Summary    CHIEF COMPLAINT ON ADMISSION  No chief complaint on file.      Reason for Admission  new lung mass     Admission Date  1/20/2019    CODE STATUS  Full Code    HPI & HOSPITAL COURSE  This is a 54 y.o. female here with newly discovered lung mass.  She was seen in outside hospital and transferred here for further evaluation.  She underwent biopsy via bronchoscopy with pulmonology and tissue diagnosis consistent with poorly differentiated non-small cell carcinoma of the lung.  She was seen by oncology, radiation oncology and was started on radiation therapy as well as chemotherapy.  She had complication with a pneumonia likely related to aspiration and has since completed antibiotics and is improved.  She also issues with anxiety and was started on a very low dose Xanax with good results.  She has had some mild complaints of nausea which is very responsive to Zofran.  She also is requiring oxygen for maintaining adequate saturation of 3 L and this is been ordered upon discharge.  She has appointments for radiation as well as chemotherapy at the outpatient infusion center.  At this time she is stable for discharge and will follow up with Dr. Gauthier in approximately 1 week.  Her next chemotherapy is scheduled in the infusion center on Thursday.  Her next radiation appointment is tomorrow morning.    Therefore, she is discharged in good and stable condition to home with close outpatient follow-up.    The patient met 2-midnight criteria for an inpatient stay at the time of discharge.    Discharge Date  2/13/2019    FOLLOW UP ITEMS POST DISCHARGE  Dr. Gauthier-2/14/2019  Establish with primary care physician after insurance is authorized    DISCHARGE DIAGNOSES  Principal Problem:    Non-small cell lung cancer (HCC) POA: Unknown  Active Problems:    Tobacco abuse POA: Unknown    Severe protein-calorie malnutrition (HCC) POA: Unknown    COPD with asthma (HCC) POA: Unknown      Overview: Symbicort, NPPB     Anxiety POA: Unknown    Cough POA: Unknown    Fever POA: Unknown  Resolved Problems:    Leukocytosis POA: Unknown      FOLLOW UP  Future Appointments  Date Time Provider Department Center   2/14/2019 7:45 AM RADIATION THERAPY RADT None   2/15/2019 7:45 AM RADIATION THERAPY RADT None   2/19/2019 7:45 AM RADIATION THERAPY RADT None   2/20/2019 7:45 AM RADIATION THERAPY RADT None   2/21/2019 7:45 AM RADIATION THERAPY RADT None   2/21/2019 8:00 AM RN 5 ON [facility]   2/22/2019 7:45 AM RADIATION THERAPY RADT None   2/25/2019 7:45 AM RADIATION THERAPY RADT None   2/26/2019 7:45 AM RADIATION THERAPY RADT None   2/27/2019 7:45 AM RADIATION THERAPY RADT None   2/27/2019 8:30 AM [address] 1 W75K 75 [facility]   2/28/2019 7:45 AM RADIATION THERAPY RADT None   [facility]   3/1/2019 7:45 AM RADIATION THERAPY RADT None   3/4/2019 7:45 AM RADIATION THERAPY RADT None   3/5/2019 7:45 AM RADIATION THERAPY RADT None   3/6/2019 7:45 AM RADIATION THERAPY RADT None   3/7/2019 7:45 AM RADIATION THERAPY RADT None   3/7/2019 8:30 AM RN 1 [facility]   3/8/2019 7:45 AM RADIATION THERAPY RADT None   3/11/2019 7:45 AM RADIATION THERAPY RADT None   3/12/2019 7:45 AM RADIATION THERAPY RADT None     [address] [address] [phone]    Please call to setup a new Primary Care Provider appointment, [facility] has a sliding fee scale thank you     Franck Gauthier  [address] [place] [address] [phone]    On 2/14/2019  at 845 am, check in at clinic following radiation at [facility].      MEDICATIONS ON DISCHARGE     Medication List      START taking these medications      Instructions   ALPRAZolam 0.25 MG Tabs  Commonly known as:  XANAX   Take 1 Tab by mouth every 8 hours as needed for Anxiety or Sleep for up to 14 days.  Dose:  0.25 mg     budesonide-formoterol 160-4.5 MCG/ACT Aero  Commonly known as:  SYMBICORT   Inhale 2 Puffs by mouth 2 Times a  Day.  Dose:  2 Puff     HYDROcodone-homatropine 5-1.5 mg/5 mL 5-1.5 MG/5ML Syrp   Take 5 mL by mouth every four hours as needed for up to 7 days.  Dose:  5 mL     nicotine 21 MG/24HR Pt24  Commonly known as:  NICODERM   Apply 1 Patch to skin as directed every 24 hours.  Dose:  1 Patch     ondansetron 4 MG Tbdp  Commonly known as:  ZOFRAN ODT   Take 1 Tab by mouth every four hours as needed for Nausea (give PO if no IV route available).  Dose:  4 mg        CONTINUE taking these medications      Instructions   ibuprofen 200 MG Tabs  Commonly known as:  MOTRIN   Take 200 mg by mouth every 6 hours as needed.  Dose:  200 mg            Allergies  Allergies   Allergen Reactions   • Percocet [Oxycodone-Acetaminophen] Hives       DIET  Orders Placed This Encounter   Procedures   • Diet Order Regular     Standing Status:   Standing     Number of Occurrences:   1     Order Specific Question:   Diet:     Answer:   Regular [1]       ACTIVITY  As tolerated.  Weight bearing as tolerated    CONSULTATIONS  Franck Gauthier-oncology  [facility] Karen-Love - pulmonology  Ana Conteh - radiation oncology     PROCEDURES  Bronchoscopy with brush biopsy-1/22/2019-Dr. Lynne  Right IJ port placement-Dr. Knight-1/28/2019    LABORATORY  Lab Results   Component Value Date    SODIUM 135 02/13/2019    POTASSIUM 4.2 02/13/2019    CHLORIDE 103 02/13/2019    CO2 28 02/13/2019    GLUCOSE 162 (H) 02/13/2019    BUN 11 02/13/2019    CREATININE 0.51 02/13/2019        Lab Results   Component Value Date    WBC 3.9 (L) 02/13/2019    HEMOGLOBIN 9.1 (L) 02/13/2019    HEMATOCRIT 27.8 (L) 02/13/2019    PLATELETCT 418 02/13/2019        Total time of the discharge process exceeds 35 minutes.

## 2019-02-19 ENCOUNTER — HOSPITAL ENCOUNTER (OUTPATIENT)
Dept: RADIATION ONCOLOGY | Facility: MEDICAL CENTER | Age: 54
End: 2019-02-19

## 2019-02-19 PROCEDURE — 77014 PR CT GUIDANCE PLACEMENT RAD THERAPY FIELDS: CPT | Mod: 26 | Performed by: RADIOLOGY

## 2019-02-19 PROCEDURE — 77386 HCHG IMRT DELIVERY COMPLEX: CPT | Performed by: RADIOLOGY

## 2019-02-20 ENCOUNTER — DOCUMENTATION (OUTPATIENT)
Dept: HEMATOLOGY ONCOLOGY | Facility: MEDICAL CENTER | Age: 54
End: 2019-02-20

## 2019-02-20 ENCOUNTER — HOSPITAL ENCOUNTER (OUTPATIENT)
Dept: RADIATION ONCOLOGY | Facility: MEDICAL CENTER | Age: 54
End: 2019-02-20

## 2019-02-20 PROCEDURE — 77386 HCHG IMRT DELIVERY COMPLEX: CPT | Performed by: RADIOLOGY

## 2019-02-20 PROCEDURE — 77014 PR CT GUIDANCE PLACEMENT RAD THERAPY FIELDS: CPT | Mod: 26 | Performed by: RADIOLOGY

## 2019-02-20 PROCEDURE — 77427 RADIATION TX MANAGEMENT X5: CPT | Performed by: RADIOLOGY

## 2019-02-21 ENCOUNTER — HOSPITAL ENCOUNTER (OUTPATIENT)
Dept: RADIATION ONCOLOGY | Facility: MEDICAL CENTER | Age: 54
End: 2019-02-21

## 2019-02-21 ENCOUNTER — OUTPATIENT INFUSION SERVICES (OUTPATIENT)
Dept: ONCOLOGY | Facility: MEDICAL CENTER | Age: 54
End: 2019-02-21
Attending: RADIOLOGY
Payer: MEDICAID

## 2019-02-21 VITALS
OXYGEN SATURATION: 99 % | BODY MASS INDEX: 19.35 KG/M2 | WEIGHT: 120.37 LBS | HEART RATE: 87 BPM | DIASTOLIC BLOOD PRESSURE: 76 MMHG | TEMPERATURE: 97.9 F | HEIGHT: 66 IN | RESPIRATION RATE: 18 BRPM | SYSTOLIC BLOOD PRESSURE: 132 MMHG

## 2019-02-21 DIAGNOSIS — C34.90 NON-SMALL CELL LUNG CANCER, UNSPECIFIED LATERALITY (HCC): ICD-10-CM

## 2019-02-21 DIAGNOSIS — K59.00 CONSTIPATION, UNSPECIFIED CONSTIPATION TYPE: ICD-10-CM

## 2019-02-21 LAB
ANION GAP SERPL CALC-SCNC: 5 MMOL/L (ref 0–11.9)
BASOPHILS # BLD AUTO: 0.7 % (ref 0–1.8)
BASOPHILS # BLD: 0.03 K/UL (ref 0–0.12)
BUN SERPL-MCNC: 17 MG/DL (ref 8–22)
CALCIUM SERPL-MCNC: 8.7 MG/DL (ref 8.5–10.5)
CHLORIDE SERPL-SCNC: 103 MMOL/L (ref 96–112)
CO2 SERPL-SCNC: 24 MMOL/L (ref 20–33)
CREAT SERPL-MCNC: 0.52 MG/DL (ref 0.5–1.4)
EOSINOPHIL # BLD AUTO: 0.01 K/UL (ref 0–0.51)
EOSINOPHIL NFR BLD: 0.2 % (ref 0–6.9)
ERYTHROCYTE [DISTWIDTH] IN BLOOD BY AUTOMATED COUNT: 45.1 FL (ref 35.9–50)
GLUCOSE SERPL-MCNC: 97 MG/DL (ref 65–99)
HCT VFR BLD AUTO: 32.2 % (ref 37–47)
HGB BLD-MCNC: 10.6 G/DL (ref 12–16)
IMM GRANULOCYTES # BLD AUTO: 0.02 K/UL (ref 0–0.11)
IMM GRANULOCYTES NFR BLD AUTO: 0.4 % (ref 0–0.9)
LYMPHOCYTES # BLD AUTO: 0.35 K/UL (ref 1–4.8)
LYMPHOCYTES NFR BLD: 7.7 % (ref 22–41)
MCH RBC QN AUTO: 31.7 PG (ref 27–33)
MCHC RBC AUTO-ENTMCNC: 32.9 G/DL (ref 33.6–35)
MCV RBC AUTO: 96.4 FL (ref 81.4–97.8)
MONOCYTES # BLD AUTO: 0.97 K/UL (ref 0–0.85)
MONOCYTES NFR BLD AUTO: 21.3 % (ref 0–13.4)
NEUTROPHILS # BLD AUTO: 3.18 K/UL (ref 2–7.15)
NEUTROPHILS NFR BLD: 69.7 % (ref 44–72)
NRBC # BLD AUTO: 0 K/UL
NRBC BLD-RTO: 0 /100 WBC
PLATELET # BLD AUTO: 317 K/UL (ref 164–446)
PMV BLD AUTO: 8.3 FL (ref 9–12.9)
POTASSIUM SERPL-SCNC: 3.9 MMOL/L (ref 3.6–5.5)
RBC # BLD AUTO: 3.34 M/UL (ref 4.2–5.4)
SODIUM SERPL-SCNC: 132 MMOL/L (ref 135–145)
WBC # BLD AUTO: 4.6 K/UL (ref 4.8–10.8)

## 2019-02-21 PROCEDURE — 304540 HCHG NITRO SET VENT 2ND TUB

## 2019-02-21 PROCEDURE — A4212 NON CORING NEEDLE OR STYLET: HCPCS

## 2019-02-21 PROCEDURE — 77014 PR CT GUIDANCE PLACEMENT RAD THERAPY FIELDS: CPT | Mod: 26 | Performed by: RADIOLOGY

## 2019-02-21 PROCEDURE — 96413 CHEMO IV INFUSION 1 HR: CPT

## 2019-02-21 PROCEDURE — 700102 HCHG RX REV CODE 250 W/ 637 OVERRIDE(OP): Performed by: INTERNAL MEDICINE

## 2019-02-21 PROCEDURE — 80048 BASIC METABOLIC PNL TOTAL CA: CPT

## 2019-02-21 PROCEDURE — 700105 HCHG RX REV CODE 258

## 2019-02-21 PROCEDURE — 96417 CHEMO IV INFUS EACH ADDL SEQ: CPT

## 2019-02-21 PROCEDURE — 85025 COMPLETE CBC W/AUTO DIFF WBC: CPT

## 2019-02-21 PROCEDURE — 700105 HCHG RX REV CODE 258: Performed by: INTERNAL MEDICINE

## 2019-02-21 PROCEDURE — 77386 HCHG IMRT DELIVERY COMPLEX: CPT | Performed by: RADIOLOGY

## 2019-02-21 PROCEDURE — 700111 HCHG RX REV CODE 636 W/ 250 OVERRIDE (IP): Performed by: INTERNAL MEDICINE

## 2019-02-21 PROCEDURE — 96375 TX/PRO/DX INJ NEW DRUG ADDON: CPT

## 2019-02-21 PROCEDURE — 700111 HCHG RX REV CODE 636 W/ 250 OVERRIDE (IP)

## 2019-02-21 PROCEDURE — A9270 NON-COVERED ITEM OR SERVICE: HCPCS | Performed by: INTERNAL MEDICINE

## 2019-02-21 RX ORDER — PROCHLORPERAZINE MALEATE 10 MG
10 TABLET ORAL EVERY 6 HOURS PRN
Status: CANCELLED | OUTPATIENT
Start: 2019-02-21

## 2019-02-21 RX ORDER — 0.9 % SODIUM CHLORIDE 0.9 %
5 VIAL (ML) INJECTION PRN
Status: CANCELLED | OUTPATIENT
Start: 2019-02-21

## 2019-02-21 RX ORDER — ONDANSETRON 2 MG/ML
8 INJECTION INTRAMUSCULAR; INTRAVENOUS ONCE
Status: CANCELLED | OUTPATIENT
Start: 2019-02-21

## 2019-02-21 RX ORDER — LIDOCAINE HYDROCHLORIDE 10 MG/ML
INJECTION, SOLUTION EPIDURAL; INFILTRATION; INTRACAUDAL; PERINEURAL
Status: COMPLETED
Start: 2019-02-21 | End: 2019-02-21

## 2019-02-21 RX ORDER — 0.9 % SODIUM CHLORIDE 0.9 %
VIAL (ML) INJECTION PRN
Status: CANCELLED | OUTPATIENT
Start: 2019-02-21

## 2019-02-21 RX ORDER — ONDANSETRON 8 MG/1
8 TABLET, ORALLY DISINTEGRATING ORAL
Status: CANCELLED | OUTPATIENT
Start: 2019-02-21

## 2019-02-21 RX ORDER — SODIUM CHLORIDE 9 MG/ML
INJECTION, SOLUTION INTRAVENOUS CONTINUOUS
Status: CANCELLED | OUTPATIENT
Start: 2019-02-21

## 2019-02-21 RX ORDER — HYDROCODONE BITARTRATE AND ACETAMINOPHEN 5; 325 MG/1; MG/1
1-2 TABLET ORAL EVERY 4 HOURS PRN
COMMUNITY
End: 2019-03-26

## 2019-02-21 RX ORDER — FAMOTIDINE 20 MG/1
20 TABLET, FILM COATED ORAL 2 TIMES DAILY
COMMUNITY
End: 2019-03-12 | Stop reason: SDUPTHER

## 2019-02-21 RX ORDER — ONDANSETRON 2 MG/ML
4 INJECTION INTRAMUSCULAR; INTRAVENOUS
Status: CANCELLED | OUTPATIENT
Start: 2019-02-21

## 2019-02-21 RX ORDER — ONDANSETRON 2 MG/ML
8 INJECTION INTRAMUSCULAR; INTRAVENOUS ONCE
Status: COMPLETED | OUTPATIENT
Start: 2019-02-21 | End: 2019-02-21

## 2019-02-21 RX ADMIN — DIPHENHYDRAMINE HYDROCHLORIDE 25 MG: 50 INJECTION INTRAMUSCULAR; INTRAVENOUS at 09:35

## 2019-02-21 RX ADMIN — HEPARIN 500 UNITS: 100 SYRINGE at 12:10

## 2019-02-21 RX ADMIN — FAMOTIDINE 20 MG: 10 INJECTION INTRAVENOUS at 09:00

## 2019-02-21 RX ADMIN — CARBOPLATIN 206 MG: 10 INJECTION, SOLUTION INTRAVENOUS at 11:35

## 2019-02-21 RX ADMIN — LIDOCAINE HYDROCHLORIDE 2 ML: 10 INJECTION, SOLUTION EPIDURAL; INFILTRATION; INTRACAUDAL; PERINEURAL at 08:00

## 2019-02-21 RX ADMIN — DEXAMETHASONE SODIUM PHOSPHATE 12 MG: 4 INJECTION, SOLUTION INTRAMUSCULAR; INTRAVENOUS at 09:20

## 2019-02-21 RX ADMIN — MAGNESIUM HYDROXIDE 30 ML: 400 SUSPENSION ORAL at 08:35

## 2019-02-21 RX ADMIN — PACLITAXEL 71.6 MG: 300 INJECTION, SOLUTION INTRAVENOUS at 10:10

## 2019-02-21 RX ADMIN — ONDANSETRON 8 MG: 2 INJECTION, SOLUTION INTRAMUSCULAR; INTRAVENOUS at 09:10

## 2019-02-21 NOTE — PROGRESS NOTES
Chemotherapy Verification - SECONDARY RN       Height = 66 in  Weight = 54.6 kg  BSA = 1.59 m2       Medication: Paclitaxel  Dose: 45 mg/m2  Calculated Dose: 71.6 mg                             (In mg/m2, AUC, mg/kg)     Medication: Carboplatin  Dose: AUC 2  Calculated Dose: 206.5 mg                             (In mg/m2, AUC, mg/kg)    Carboplatin calculation (if applicable):  (((140-55)*[id number])*(0.70+(1 *0.15))/(0.7*72)+25)*2 * ((1 =1)+(1 =2)) = 206.542    I confirm that this process was performed independently.

## 2019-02-21 NOTE — PROGRESS NOTES
"Pharmacy Chemotherapy Calculations Verification:    Patient Name: Flaca Maki   DX: NSCLC    Protocol: weekly taxol/carboplatin + XRT    *Dosing Reference*  Paclitaxel (taxol) 45-50mg/m2 IV over 60 min on day 1  Carboplatin AUC 2 IV over 30 min on day 1  Weekly x 7 weeks with concurrent radiation therapy  NCCN Guidelines for NSCLC V.9.2017  Delia VALLADARES, et al.  J Clin Oncol. 2005 Sep 1;23(27):5966-69. Epub 2005 Aug 8.    Allergies: Percocet [oxycodone-acetaminophen]  /76   Pulse 87   Temp 36.6 °C (97.9 °F) (Temporal)   Resp 18   Ht 1.676 m (5' 6\")   Wt 54.6 kg (120 lb 5.9 oz)   LMP  (LMP Unknown)   SpO2 99%   BMI 19.43 kg/m²  Body surface area is 1.59 meters squared.    Labs 2/21/19  ANC~ 3180 Plt = 317k   Hgb = 10.6     SCr = 0.52 mg/dL CrCl ~ 78.2 mL/min (minimum SCr of 0.7)     Labs 2/12/19  LFT's = WNLs  TBili = 0.2      Drug Order   (Drug name, dose, route, IV Fluid & volume, frequency, number of doses) Cycle 4     Previous treatment: C3 on 2/12/19     Medication = Paclitaxel (Taxol)  Base Dose = 45 mg/m²   Calc Dose: Base Dose x 1.59 m² = 71.55 mg  Final Dose = 71.6 mg  Route = IV  Fluid & Volume =  mL  Admin Duration = Over 1 hour          <5% difference, ok to treat with final dose   Medication = Carboplatin (Paraplatin)  Base Dose = AUC 2  Calc Dose: Base Dose x (78.2 mL/min  + 25) = 206.4 mg  Final Dose = 206 mg  Route = IV  Fluid & Volume =  mL  Admin Duration = Over 30 minutes          <5% difference, ok to treat with final dose     By my signature below, I confirm this process was performed independently with the BSA and all final chemotherapy dosing calculations congruent. I have reviewed the above chemotherapy order and that my calculation of the final dose and BSA (when applicable) corroborate those calculations of the  pharmacist. Discrepancies of 5% or greater in the written dose have been addressed and documented within the [facility] Progress notes.    Yony Woo, " PharmD

## 2019-02-21 NOTE — PROGRESS NOTES
Chemotherapy Verification - PRIMARY RN      Height = 66 in  Weight = 120 lb  BSA = 1.59 m2       Medication: Taxol  Dose: 45 mg/m2  Calculated Dose: 71.55 mg                             (In mg/m2, AUC, mg/kg)     Medication: Carboplatin  Dose: 2 AUC  Calculated Dose:  206.542 mg                             (In mg/m2, AUC, mg/kg)    Carboplatin calculation (if applicable): (((140-55)*[id number])*(0.70+(1 *0.15))/(0.7*72)+25)*2 * ((1 =1)+(1 =2)) = 206.542 mg      I confirm this process was performed independently with the BSA and all final chemotherapy dosing calculations congruent.  Any discrepancies of 5% or greater have been addressed with the chemotherapy pharmacist. The resolution of the discrepancy has been documented in the EPIC progress notes.

## 2019-02-21 NOTE — PROGRESS NOTES
"Pharmacy Chemotherapy Verification    Patient Name: Amy Maki  DX: NSCLC    Cycle 4  Previous treatment = 2/12/19    Regimen: weekly PACLItaxel/CARBOplatin + XRT  PACLItaxel (Taxol) 45-50 mg/m2 IV over 60 min on day 1  CARBOplatin AUC 2 IV over 30 min on day 1  Weekly x 7 weeks with concurrent radiation therapy  NCCN Guidelines for NSCLC V.9.2017  Delia CP, et al - J Clin Oncol. 2005 Sep 1;23(53):6812-17. Epub 2005 Aug 8.     Allergies:Percocet [oxycodone-acetaminophen]  /76   Pulse 87   Temp 36.6 °C (97.9 °F) (Temporal)   Resp 18   Ht 1.676 m (5' 6\")   Wt 54.6 kg (120 lb 5.9 oz)   LMP  (LMP Unknown)   SpO2 99%   BMI 19.43 kg/m²   Body surface area is 1.59 meters squared.     ANC~ 3180 Plt = 317k   Hgb = 10.6     SCr = 0.52mg/dL CrCl ~ 78mL/min     2/12/19: LFT's = WNL TBili = 0.2       PACLItaxel (Taxol) 45 mg/m2  X 1.59 m2 = 71.55 mg   <5% difference, ok to treat with final dose = 71.6 mg IV     CARBOplatin AUC 2 (78 + 25) = 206 mg    <5% difference, ok to treat with final dose = 206 mg IV      Sobeida Baker, PharmD, BCOP          "

## 2019-02-21 NOTE — PROGRESS NOTES
Mrs Maki into Infusions Services for Cycle 4 of Taxol / Carbo with RTX  for Lung Cancer. Pt denied having any new or acute complaints today, reports tolerating past treatments well. Port accessed in a sterile manner, had + blood return, flushed briskly. Pt given Chemotherapy as prescribed, tolerated well, denied having any complaints during or after infusion. MOM given for constipation. Port had + blood return after, flushed per [facility] policy, de-accessed, needle intact, insertion site covered with sterile gauze and paper tape. Discharge home with friend to self care in NAD. Appointment confirm for next treatment.

## 2019-02-22 ENCOUNTER — HOSPITAL ENCOUNTER (OUTPATIENT)
Dept: RADIATION ONCOLOGY | Facility: MEDICAL CENTER | Age: 54
End: 2019-02-22

## 2019-02-22 PROCEDURE — 77014 PR CT GUIDANCE PLACEMENT RAD THERAPY FIELDS: CPT | Mod: 26 | Performed by: RADIOLOGY

## 2019-02-22 PROCEDURE — 77386 HCHG IMRT DELIVERY COMPLEX: CPT | Performed by: RADIOLOGY

## 2019-02-25 ENCOUNTER — HOSPITAL ENCOUNTER (OUTPATIENT)
Dept: RADIATION ONCOLOGY | Facility: MEDICAL CENTER | Age: 54
End: 2019-02-25

## 2019-02-25 PROCEDURE — 77301 RADIOTHERAPY DOSE PLAN IMRT: CPT | Mod: 26 | Performed by: RADIOLOGY

## 2019-02-25 PROCEDURE — 77336 RADIATION PHYSICS CONSULT: CPT | Performed by: RADIOLOGY

## 2019-02-25 PROCEDURE — 77386 HCHG IMRT DELIVERY COMPLEX: CPT | Performed by: RADIOLOGY

## 2019-02-25 PROCEDURE — 77300 RADIATION THERAPY DOSE PLAN: CPT | Performed by: RADIOLOGY

## 2019-02-25 PROCEDURE — 77301 RADIOTHERAPY DOSE PLAN IMRT: CPT | Performed by: RADIOLOGY

## 2019-02-25 PROCEDURE — 77293 RESPIRATOR MOTION MGMT SIMUL: CPT | Performed by: RADIOLOGY

## 2019-02-25 PROCEDURE — 77338 DESIGN MLC DEVICE FOR IMRT: CPT | Mod: 26 | Performed by: RADIOLOGY

## 2019-02-25 PROCEDURE — 77338 DESIGN MLC DEVICE FOR IMRT: CPT | Performed by: RADIOLOGY

## 2019-02-25 PROCEDURE — 77300 RADIATION THERAPY DOSE PLAN: CPT | Mod: 26 | Performed by: RADIOLOGY

## 2019-02-25 PROCEDURE — 77293 RESPIRATOR MOTION MGMT SIMUL: CPT | Mod: 26 | Performed by: RADIOLOGY

## 2019-02-25 PROCEDURE — 77290 THER RAD SIMULAJ FIELD CPLX: CPT | Performed by: RADIOLOGY

## 2019-02-25 NOTE — PROGRESS NOTES
"Nutrition Services: RD Consultation  Met with pt following radiation therapy to assess current intake, appetite, and nutritional status. Pt appears thin.  Pt reports she is \"aways eating\". She reports her appetite and intake are great, she denied any painful swallowing. She did report constipation, she stated prune juice does not help, stated she has a BM every 3 days, and sued to have daily BM's before her diagnosis. Pt reports she dies not drink much water. She denied any recent weigth changes. Pt did not express any further nutrition-related questions or concerns at this time.     Assessment:  - Primary Dx: Non-Small Cell Lung Cancer  - PMHx: Severe PCM, COPD  - Height: 5' 6\"   -   Weight: 119#   -   BMI: 19.4 (WNL classification)    Plan/Recommendations:  1. Provided and discussed handout regarding general nutrition guidelines as well as nutritional recommendations for patient's with Lung Cancer, including tips/tricks should side effects of tx occur.   2. Discussed importance of PO intake/ nutrition with increased protein/kcal needs 2' to the hypermetabolic effects of cancer in order to maintain weight and preserve lean body mass.   3. Provided and went over food lists including healthy snack ideas as well as foods high in protein.   4. Discussed nutrition tips to alleviate constipation.   5. Encouraged Hydration      Pt reports understanding and was receptive. RD following and to make further recommendations as indicated.     "

## 2019-02-26 ENCOUNTER — HOSPITAL ENCOUNTER (OUTPATIENT)
Dept: RADIATION ONCOLOGY | Facility: MEDICAL CENTER | Age: 54
End: 2019-02-26

## 2019-02-26 PROCEDURE — 77386 HCHG IMRT DELIVERY COMPLEX: CPT | Performed by: RADIOLOGY

## 2019-02-26 PROCEDURE — 77280 THER RAD SIMULAJ FIELD SMPL: CPT | Performed by: RADIOLOGY

## 2019-02-27 ENCOUNTER — APPOINTMENT (OUTPATIENT)
Dept: RADIOLOGY | Facility: MEDICAL CENTER | Age: 54
End: 2019-02-27
Attending: RADIOLOGY
Payer: MEDICAID

## 2019-02-27 PROCEDURE — 77386 HCHG IMRT DELIVERY COMPLEX: CPT | Performed by: RADIOLOGY

## 2019-02-27 PROCEDURE — 77427 RADIATION TX MANAGEMENT X5: CPT | Performed by: RADIOLOGY

## 2019-02-27 PROCEDURE — 77014 PR CT GUIDANCE PLACEMENT RAD THERAPY FIELDS: CPT | Mod: 26 | Performed by: RADIOLOGY

## 2019-02-27 RX ORDER — ONDANSETRON 2 MG/ML
4 INJECTION INTRAMUSCULAR; INTRAVENOUS
Status: CANCELLED | OUTPATIENT
Start: 2019-02-28

## 2019-02-27 RX ORDER — ONDANSETRON 8 MG/1
8 TABLET, ORALLY DISINTEGRATING ORAL
Status: CANCELLED | OUTPATIENT
Start: 2019-02-28

## 2019-02-27 RX ORDER — 0.9 % SODIUM CHLORIDE 0.9 %
VIAL (ML) INJECTION PRN
Status: CANCELLED | OUTPATIENT
Start: 2019-02-28

## 2019-02-27 RX ORDER — 0.9 % SODIUM CHLORIDE 0.9 %
5 VIAL (ML) INJECTION PRN
Status: CANCELLED | OUTPATIENT
Start: 2019-02-28

## 2019-02-27 RX ORDER — SODIUM CHLORIDE 9 MG/ML
INJECTION, SOLUTION INTRAVENOUS CONTINUOUS
Status: CANCELLED | OUTPATIENT
Start: 2019-02-28

## 2019-02-27 RX ORDER — ONDANSETRON 2 MG/ML
8 INJECTION INTRAMUSCULAR; INTRAVENOUS ONCE
Status: CANCELLED | OUTPATIENT
Start: 2019-02-28

## 2019-02-27 RX ORDER — PROCHLORPERAZINE MALEATE 10 MG
10 TABLET ORAL EVERY 6 HOURS PRN
Status: CANCELLED | OUTPATIENT
Start: 2019-02-28

## 2019-02-28 ENCOUNTER — DOCUMENTATION (OUTPATIENT)
Dept: HEMATOLOGY ONCOLOGY | Facility: MEDICAL CENTER | Age: 54
End: 2019-02-28

## 2019-02-28 ENCOUNTER — HOSPITAL ENCOUNTER (OUTPATIENT)
Dept: RADIATION ONCOLOGY | Facility: MEDICAL CENTER | Age: 54
End: 2019-02-28

## 2019-02-28 ENCOUNTER — OUTPATIENT INFUSION SERVICES (OUTPATIENT)
Dept: ONCOLOGY | Facility: MEDICAL CENTER | Age: 54
End: 2019-02-28
Attending: RADIOLOGY
Payer: MEDICAID

## 2019-02-28 VITALS
HEART RATE: 88 BPM | TEMPERATURE: 98 F | WEIGHT: 120.81 LBS | DIASTOLIC BLOOD PRESSURE: 86 MMHG | SYSTOLIC BLOOD PRESSURE: 137 MMHG | RESPIRATION RATE: 18 BRPM | HEIGHT: 66 IN | OXYGEN SATURATION: 98 % | BODY MASS INDEX: 19.42 KG/M2

## 2019-02-28 DIAGNOSIS — C34.90 NON-SMALL CELL LUNG CANCER, UNSPECIFIED LATERALITY (HCC): ICD-10-CM

## 2019-02-28 LAB
ALBUMIN SERPL BCP-MCNC: 3.4 G/DL (ref 3.2–4.9)
ALBUMIN/GLOB SERPL: 1.2 G/DL
ALP SERPL-CCNC: 78 U/L (ref 30–99)
ALT SERPL-CCNC: 14 U/L (ref 2–50)
ANION GAP SERPL CALC-SCNC: 8 MMOL/L (ref 0–11.9)
AST SERPL-CCNC: 12 U/L (ref 12–45)
BASOPHILS # BLD AUTO: 0.3 % (ref 0–1.8)
BASOPHILS # BLD: 0.02 K/UL (ref 0–0.12)
BILIRUB SERPL-MCNC: 0.4 MG/DL (ref 0.1–1.5)
BUN SERPL-MCNC: 15 MG/DL (ref 8–22)
CALCIUM SERPL-MCNC: 8.9 MG/DL (ref 8.5–10.5)
CHLORIDE SERPL-SCNC: 103 MMOL/L (ref 96–112)
CO2 SERPL-SCNC: 24 MMOL/L (ref 20–33)
CREAT SERPL-MCNC: 0.51 MG/DL (ref 0.5–1.4)
EOSINOPHIL # BLD AUTO: 0.04 K/UL (ref 0–0.51)
EOSINOPHIL NFR BLD: 0.7 % (ref 0–6.9)
ERYTHROCYTE [DISTWIDTH] IN BLOOD BY AUTOMATED COUNT: 49.9 FL (ref 35.9–50)
GLOBULIN SER CALC-MCNC: 2.8 G/DL (ref 1.9–3.5)
GLUCOSE SERPL-MCNC: 112 MG/DL (ref 65–99)
HCT VFR BLD AUTO: 31.4 % (ref 37–47)
HGB BLD-MCNC: 10.3 G/DL (ref 12–16)
IMM GRANULOCYTES # BLD AUTO: 0.03 K/UL (ref 0–0.11)
IMM GRANULOCYTES NFR BLD AUTO: 0.5 % (ref 0–0.9)
LYMPHOCYTES # BLD AUTO: 0.4 K/UL (ref 1–4.8)
LYMPHOCYTES NFR BLD: 7 % (ref 22–41)
MCH RBC QN AUTO: 31.5 PG (ref 27–33)
MCHC RBC AUTO-ENTMCNC: 32.8 G/DL (ref 33.6–35)
MCV RBC AUTO: 96 FL (ref 81.4–97.8)
MONOCYTES # BLD AUTO: 1.02 K/UL (ref 0–0.85)
MONOCYTES NFR BLD AUTO: 17.8 % (ref 0–13.4)
NEUTROPHILS # BLD AUTO: 4.21 K/UL (ref 2–7.15)
NEUTROPHILS NFR BLD: 73.7 % (ref 44–72)
NRBC # BLD AUTO: 0 K/UL
NRBC BLD-RTO: 0 /100 WBC
PLATELET # BLD AUTO: 241 K/UL (ref 164–446)
PMV BLD AUTO: 8.6 FL (ref 9–12.9)
POTASSIUM SERPL-SCNC: 3.9 MMOL/L (ref 3.6–5.5)
PROT SERPL-MCNC: 6.2 G/DL (ref 6–8.2)
RBC # BLD AUTO: 3.27 M/UL (ref 4.2–5.4)
SODIUM SERPL-SCNC: 135 MMOL/L (ref 135–145)
WBC # BLD AUTO: 5.7 K/UL (ref 4.8–10.8)

## 2019-02-28 PROCEDURE — 304540 HCHG NITRO SET VENT 2ND TUB

## 2019-02-28 PROCEDURE — 80053 COMPREHEN METABOLIC PANEL: CPT

## 2019-02-28 PROCEDURE — 700111 HCHG RX REV CODE 636 W/ 250 OVERRIDE (IP)

## 2019-02-28 PROCEDURE — 96417 CHEMO IV INFUS EACH ADDL SEQ: CPT

## 2019-02-28 PROCEDURE — 96413 CHEMO IV INFUSION 1 HR: CPT

## 2019-02-28 PROCEDURE — 85025 COMPLETE CBC W/AUTO DIFF WBC: CPT

## 2019-02-28 PROCEDURE — 700111 HCHG RX REV CODE 636 W/ 250 OVERRIDE (IP): Performed by: INTERNAL MEDICINE

## 2019-02-28 PROCEDURE — 77386 HCHG IMRT DELIVERY COMPLEX: CPT | Performed by: RADIOLOGY

## 2019-02-28 PROCEDURE — 700105 HCHG RX REV CODE 258

## 2019-02-28 PROCEDURE — 77014 PR CT GUIDANCE PLACEMENT RAD THERAPY FIELDS: CPT | Mod: 26 | Performed by: RADIOLOGY

## 2019-02-28 PROCEDURE — 700105 HCHG RX REV CODE 258: Performed by: INTERNAL MEDICINE

## 2019-02-28 PROCEDURE — 96375 TX/PRO/DX INJ NEW DRUG ADDON: CPT

## 2019-02-28 RX ORDER — AMOXICILLIN 500 MG/1
500 CAPSULE ORAL 3 TIMES DAILY
COMMUNITY
End: 2019-03-07

## 2019-02-28 RX ORDER — LIDOCAINE HYDROCHLORIDE 10 MG/ML
INJECTION, SOLUTION EPIDURAL; INFILTRATION; INTRACAUDAL; PERINEURAL
Status: COMPLETED
Start: 2019-02-28 | End: 2019-02-28

## 2019-02-28 RX ORDER — ONDANSETRON 2 MG/ML
8 INJECTION INTRAMUSCULAR; INTRAVENOUS ONCE
Status: COMPLETED | OUTPATIENT
Start: 2019-02-28 | End: 2019-02-28

## 2019-02-28 RX ORDER — POLYETHYLENE GLYCOL 3350 17 G/17G
17 POWDER, FOR SOLUTION ORAL DAILY
COMMUNITY
End: 2019-03-26

## 2019-02-28 RX ADMIN — FAMOTIDINE 20 MG: 10 INJECTION INTRAVENOUS at 09:07

## 2019-02-28 RX ADMIN — DIPHENHYDRAMINE HYDROCHLORIDE 25 MG: 50 INJECTION INTRAMUSCULAR; INTRAVENOUS at 09:28

## 2019-02-28 RX ADMIN — ONDANSETRON 8 MG: 2 INJECTION, SOLUTION INTRAMUSCULAR; INTRAVENOUS at 09:07

## 2019-02-28 RX ADMIN — PACLITAXEL 72 MG: 300 INJECTION, SOLUTION INTRAVENOUS at 09:53

## 2019-02-28 RX ADMIN — CARBOPLATIN 208 MG: 10 INJECTION, SOLUTION INTRAVENOUS at 11:11

## 2019-02-28 RX ADMIN — DEXAMETHASONE SODIUM PHOSPHATE 12 MG: 4 INJECTION, SOLUTION INTRAMUSCULAR; INTRAVENOUS at 09:07

## 2019-02-28 RX ADMIN — HEPARIN 500 UNITS: 100 SYRINGE at 12:05

## 2019-02-28 RX ADMIN — LIDOCAINE HYDROCHLORIDE 2 ML: 10 INJECTION, SOLUTION EPIDURAL; INFILTRATION; INTRACAUDAL; PERINEURAL at 08:09

## 2019-02-28 NOTE — PROGRESS NOTES
"Pharmacy Chemotherapy Calculations Verification:    Patient Name: Flaca Maki   DX: NSCLC    Protocol: weekly taxol/carboplatin + XRT    *Dosing Reference*  Paclitaxel (taxol) 45-50mg/m2 IV over 60 min on day 1  Carboplatin AUC 2 IV over 30 min on day 1  Weekly x 7 weeks with concurrent radiation therapy  NCCN Guidelines for NSCLC V.9.2017  Delia VALLADARES, et al.  J Clin Oncol. 2005 Sep 1;23(52):2997-66. Epub 2005 Aug 8.    Allergies: Percocet [oxycodone-acetaminophen]  /86   Pulse 88   Temp 36.7 °C (98 °F) (Temporal)   Resp 18   Ht 1.68 m (5' 6.14\")   Wt 54.8 kg (120 lb 13 oz)   LMP  (LMP Unknown)   SpO2 98%   BMI 19.42 kg/m²  Body surface area is 1.6 meters squared.    LABS 2/28/2019  ANC~ 4210 Plt = 241 k   Hgb = 10.3     SCr = 0.51 mg/dL CrCl ~ 78.4 mL/min (minimum SCr of 0.7)   LFT's = WNLs  TBili = 0.4      Drug Order   (Drug name, dose, route, IV Fluid & volume, frequency, number of doses) Cycle 5     Previous treatment: C4 on 2/21/19     Medication = Paclitaxel (Taxol)  Base Dose = 45 mg/m²   Calc Dose: Base Dose x Body surface area is 1.6 meters squared. m² = 72 mg  Final Dose = 72 mg  Route = IV  Fluid & Volume =  mL  Admin Duration = Over 1 hour          <5% difference, ok to treat with final dose   Medication = Carboplatin (Paraplatin)  Base Dose = AUC 2  Calc Dose: Base Dose x (78.4 mL/min  + 25) = 207 mg  Final Dose = 208 mg  Route = IV  Fluid & Volume =  mL  Admin Duration = Over 30 minutes          <5% difference, ok to treat with final dose     By my signature below, I confirm this process was performed independently with the BSA and all final chemotherapy dosing calculations congruent. I have reviewed the above chemotherapy order and that my calculation of the final dose and BSA (when applicable) corroborate those calculations of the  pharmacist. Discrepancies of 5% or greater in the written dose have been addressed and documented within the EPIC Progress " notes.    SHOSHANA Adan, PharmD

## 2019-02-28 NOTE — PROGRESS NOTES
"Pharmacy Chemotherapy Verification    Dx: NSCLC    Cycle 5  Previous treatment = 2/21/19    Regimen: weekly PACLItaxel/CARBOplatin + XRT  *Dosing Reference*  PACLItaxel (Taxol) 45-50 mg/m2 IV over 60 min on day 1  CARBOplatin AUC 2 IV over 30 min on day 1  Weekly x 7 weeks with concurrent radiation therapy  NCCN Guidelines for NSCLC V.9.2017  Delia VALLADARES, et al - J Clin Oncol. 2005 Sep 1;23(59):4297-56. Epub 2005 Aug 8.     Allergies:Percocet [oxycodone-acetaminophen]  /86   Pulse 88   Temp 36.7 °C (98 °F) (Temporal)   Resp 18   Ht 1.68 m (5' 6.14\")   Wt 54.8 kg (120 lb 13 oz)   LMP  (LMP Unknown)   SpO2 98%   BMI 19.42 kg/m²   Body surface area is 1.6 meters squared.     Labs 2/28/19:  ANC~ 4210   Plt = 241 k     Hgb = 10.3  SCr = 0.51 mg/dL  CrCl ~ 79 mL/min (min SCr 0.7 used)  AST/ALT/AP = 12/14/78 TBili = 0.4       PACLItaxel (Taxol) 45 mg/m2  x 1.6 m2 = 72 mg   <5% difference, ok to treat with final dose = 72 mg IV     CARBOplatin (Paraplatin) AUC 2 (79 ml/min + 25) = 208 mg    <5% difference, ok to treat with final dose = 208 mg IV      Tamanna Stacy, PharmD  "

## 2019-02-28 NOTE — PROGRESS NOTES
Pt scheduled for paclitaxel/carboplatin C5. Arrived ambulatory, independent; accompanied by guest. Pt denied pain/discomfort, s/sx infection, or other health changes. Lidocaine bleb given to port site per pt request. R-chest port accessed using sterile technique; easily flushed, brisk blood return. Labs drawn per orders; results w/i treatment parameters. Premeds given per orders. Infusions completed w/o adverse events. Port flushed, brisk blood return; hep locked, de-accessed. Treatment plan reviewed; pt verbalized knowledge of next appt; denied any unmet needs. Pt discharged ambulatory, independent, NAD.

## 2019-02-28 NOTE — PROGRESS NOTES
Chemotherapy Verification - SECONDARY RN      Height = 66.14 in  Weight = 120 lb  BSA = 1.6 m2       Medication: Taxol  Dose: 45 mg/m2  Calculated Dose: 72 mg                             (In mg/m2, AUC, mg/kg)     Medication: Carboplatin  Dose: 2 AUC  Calculated Dose:  207.115 mg                             (In mg/m2, AUC, mg/kg)    Carboplatin calculation (if applicable):(((140-55)*[id number])*(0.70+(1 *0.15))/(0.7*72)+25)*2 * ((1 =1)+(1 =2)) = 207.115 mg       I confirm this process was performed independently with the BSA and all final chemotherapy dosing calculations congruent.  Any discrepancies of 5% or greater have been addressed with the chemotherapy pharmacist. The resolution of the discrepancy has been documented in the EPIC progress notes.

## 2019-02-28 NOTE — PROGRESS NOTES
Chemotherapy Verification - PRIMARY RN      Height = 168 cm  Weight = 54.8 kg  BSA = 1.6 m2       Medication: paclitaxel  Dose: 45 mg/m2  Calculated Dose: 72 mg                             (In mg/m2, AUC, mg/kg)     Medication: carboplatin  Dose: target AUC 2  Calculated Dose: 208 mg                             (In mg/m2, AUC, mg/kg)      Carboplatin calculation (if applicable):  (2*(79+25)) = 208      I confirm this process was performed independently with the BSA and all final chemotherapy dosing calculations congruent.  Any discrepancies of 5% or greater have been addressed with the chemotherapy pharmacist. The resolution of the discrepancy has been documented in the EPIC progress notes.

## 2019-02-28 NOTE — PROGRESS NOTES
"Nutrition Services: Update  Met with pt during chemo infusion to follow-up on current nutrition status. Note weight Is stable/ up 2# at 120# today. Patient reports she has been eating well, all throughout the day and has been adding protein to all meals and snacks as discussed. She reports constipation has been controlled with Miralax ans she reports she no longer has any GI distress. She reports feeling \"great.' She denied any nutrition related questions or concerns. Encouraged Hydration.     RD following  "

## 2019-03-01 ENCOUNTER — HOSPITAL ENCOUNTER (OUTPATIENT)
Dept: RADIATION ONCOLOGY | Facility: MEDICAL CENTER | Age: 54
End: 2019-03-31
Attending: RADIOLOGY
Payer: MEDICAID

## 2019-03-01 ENCOUNTER — HOSPITAL ENCOUNTER (OUTPATIENT)
Dept: RADIATION ONCOLOGY | Facility: MEDICAL CENTER | Age: 54
End: 2019-03-01

## 2019-03-01 PROCEDURE — 77386 HCHG IMRT DELIVERY COMPLEX: CPT | Performed by: RADIOLOGY

## 2019-03-01 PROCEDURE — 77014 PR CT GUIDANCE PLACEMENT RAD THERAPY FIELDS: CPT | Mod: 26 | Performed by: RADIOLOGY

## 2019-03-04 ENCOUNTER — HOSPITAL ENCOUNTER (OUTPATIENT)
Dept: RADIATION ONCOLOGY | Facility: MEDICAL CENTER | Age: 54
End: 2019-03-04

## 2019-03-04 ENCOUNTER — TELEPHONE (OUTPATIENT)
Dept: PULMONOLOGY | Facility: HOSPICE | Age: 54
End: 2019-03-04

## 2019-03-04 PROCEDURE — 77386 HCHG IMRT DELIVERY COMPLEX: CPT | Performed by: RADIOLOGY

## 2019-03-04 PROCEDURE — 77014 PR CT GUIDANCE PLACEMENT RAD THERAPY FIELDS: CPT | Mod: 26 | Performed by: RADIOLOGY

## 2019-03-04 PROCEDURE — 77336 RADIATION PHYSICS CONSULT: CPT | Performed by: RADIOLOGY

## 2019-03-05 ENCOUNTER — HOSPITAL ENCOUNTER (OUTPATIENT)
Dept: RADIATION ONCOLOGY | Facility: MEDICAL CENTER | Age: 54
End: 2019-03-05

## 2019-03-05 PROCEDURE — 77014 PR CT GUIDANCE PLACEMENT RAD THERAPY FIELDS: CPT | Mod: 26 | Performed by: RADIOLOGY

## 2019-03-05 PROCEDURE — 77386 HCHG IMRT DELIVERY COMPLEX: CPT | Performed by: RADIOLOGY

## 2019-03-05 NOTE — TELEPHONE ENCOUNTER
Patient called wanting a humidifier for her 02. We have not seen her in clinic but she was seen by [facility]. Hernandez in the [facility]. She is establishing care with a new PCP this Friday and I suggested she ask them first. If not then to have her call back and I could try and help her

## 2019-03-06 PROCEDURE — 77386 HCHG IMRT DELIVERY COMPLEX: CPT | Performed by: RADIOLOGY

## 2019-03-06 PROCEDURE — 77014 PR CT GUIDANCE PLACEMENT RAD THERAPY FIELDS: CPT | Mod: 26 | Performed by: RADIOLOGY

## 2019-03-06 PROCEDURE — 77427 RADIATION TX MANAGEMENT X5: CPT | Performed by: RADIOLOGY

## 2019-03-06 NOTE — PROGRESS NOTES
"Pharmacy Chemotherapy Verification    Dx: NSCLC    Cycle 6  Previous treatment = 2/28/19    Regimen: weekly PACLItaxel/CARBOplatin + XRT  *Dosing Reference*  PACLItaxel (Taxol) 45-50 mg/m2 IV over 60 min on day 1  CARBOplatin AUC 2 IV over 30 min on day 1  Weekly x 7 weeks with concurrent radiation therapy  NCCN Guidelines for NSCLC V.9.2017  Delia CP, et al - J Clin Oncol. 2005 Sep 1;23(68):7064-19. Epub 2005 Aug 8.     Allergies:Percocet [oxycodone-acetaminophen]  /81   Pulse 90   Temp 36.2 °C (97.1 °F) (Temporal)   Resp 20   Ht 1.689 m (5' 6.5\")   Wt 56.4 kg (124 lb 5.4 oz)   LMP  (LMP Unknown)   SpO2 98%   BMI 19.77 kg/m²   Body surface area is 1.63 meters squared.     All labs within treatment plan parameters. (min SCr 0.7 used for CrCl calculation)      PACLItaxel (Taxol) 45 mg/m2  x 1.63 m2 = 73.4 mg   <5% difference, ok to treat with final dose = 73.4 mg IV     CARBOplatin (Paraplatin) AUC 2 (81 ml/min + 25) = 212 mg    <5% difference, ok to treat with final dose = 212 mg IV      Tamanna Stacy, PharmD  "

## 2019-03-07 ENCOUNTER — OUTPATIENT INFUSION SERVICES (OUTPATIENT)
Dept: ONCOLOGY | Facility: MEDICAL CENTER | Age: 54
End: 2019-03-07
Attending: RADIOLOGY
Payer: MEDICAID

## 2019-03-07 ENCOUNTER — HOSPITAL ENCOUNTER (OUTPATIENT)
Dept: RADIATION ONCOLOGY | Facility: MEDICAL CENTER | Age: 54
End: 2019-03-07

## 2019-03-07 VITALS
HEART RATE: 90 BPM | SYSTOLIC BLOOD PRESSURE: 146 MMHG | BODY MASS INDEX: 19.52 KG/M2 | TEMPERATURE: 97.1 F | HEIGHT: 67 IN | OXYGEN SATURATION: 98 % | WEIGHT: 124.34 LBS | RESPIRATION RATE: 20 BRPM | DIASTOLIC BLOOD PRESSURE: 81 MMHG

## 2019-03-07 DIAGNOSIS — C34.90 NON-SMALL CELL LUNG CANCER, UNSPECIFIED LATERALITY (HCC): ICD-10-CM

## 2019-03-07 LAB
ANION GAP SERPL CALC-SCNC: 6 MMOL/L (ref 0–11.9)
BASOPHILS # BLD AUTO: 0.6 % (ref 0–1.8)
BASOPHILS # BLD: 0.03 K/UL (ref 0–0.12)
BUN SERPL-MCNC: 17 MG/DL (ref 8–22)
CALCIUM SERPL-MCNC: 8.6 MG/DL (ref 8.5–10.5)
CHLORIDE SERPL-SCNC: 106 MMOL/L (ref 96–112)
CO2 SERPL-SCNC: 24 MMOL/L (ref 20–33)
CREAT SERPL-MCNC: 0.5 MG/DL (ref 0.5–1.4)
EOSINOPHIL # BLD AUTO: 0.06 K/UL (ref 0–0.51)
EOSINOPHIL NFR BLD: 1.2 % (ref 0–6.9)
ERYTHROCYTE [DISTWIDTH] IN BLOOD BY AUTOMATED COUNT: 55.6 FL (ref 35.9–50)
GLUCOSE SERPL-MCNC: 91 MG/DL (ref 65–99)
HCT VFR BLD AUTO: 32.3 % (ref 37–47)
HGB BLD-MCNC: 10.6 G/DL (ref 12–16)
IMM GRANULOCYTES # BLD AUTO: 0.04 K/UL (ref 0–0.11)
IMM GRANULOCYTES NFR BLD AUTO: 0.8 % (ref 0–0.9)
LYMPHOCYTES # BLD AUTO: 0.39 K/UL (ref 1–4.8)
LYMPHOCYTES NFR BLD: 8.1 % (ref 22–41)
MCH RBC QN AUTO: 31.8 PG (ref 27–33)
MCHC RBC AUTO-ENTMCNC: 32.8 G/DL (ref 33.6–35)
MCV RBC AUTO: 97 FL (ref 81.4–97.8)
MONOCYTES # BLD AUTO: 0.81 K/UL (ref 0–0.85)
MONOCYTES NFR BLD AUTO: 16.8 % (ref 0–13.4)
NEUTROPHILS # BLD AUTO: 3.5 K/UL (ref 2–7.15)
NEUTROPHILS NFR BLD: 72.5 % (ref 44–72)
NRBC # BLD AUTO: 0 K/UL
NRBC BLD-RTO: 0 /100 WBC
PLATELET # BLD AUTO: 203 K/UL (ref 164–446)
PMV BLD AUTO: 8.3 FL (ref 9–12.9)
POTASSIUM SERPL-SCNC: 4 MMOL/L (ref 3.6–5.5)
RBC # BLD AUTO: 3.33 M/UL (ref 4.2–5.4)
SODIUM SERPL-SCNC: 136 MMOL/L (ref 135–145)
WBC # BLD AUTO: 4.8 K/UL (ref 4.8–10.8)

## 2019-03-07 PROCEDURE — 96413 CHEMO IV INFUSION 1 HR: CPT

## 2019-03-07 PROCEDURE — 700105 HCHG RX REV CODE 258

## 2019-03-07 PROCEDURE — 77386 HCHG IMRT DELIVERY COMPLEX: CPT | Performed by: RADIOLOGY

## 2019-03-07 PROCEDURE — 700111 HCHG RX REV CODE 636 W/ 250 OVERRIDE (IP)

## 2019-03-07 PROCEDURE — 96375 TX/PRO/DX INJ NEW DRUG ADDON: CPT

## 2019-03-07 PROCEDURE — 96417 CHEMO IV INFUS EACH ADDL SEQ: CPT

## 2019-03-07 PROCEDURE — 96374 THER/PROPH/DIAG INJ IV PUSH: CPT

## 2019-03-07 PROCEDURE — 85025 COMPLETE CBC W/AUTO DIFF WBC: CPT

## 2019-03-07 PROCEDURE — A4212 NON CORING NEEDLE OR STYLET: HCPCS

## 2019-03-07 PROCEDURE — 80048 BASIC METABOLIC PNL TOTAL CA: CPT

## 2019-03-07 PROCEDURE — 304540 HCHG NITRO SET VENT 2ND TUB

## 2019-03-07 PROCEDURE — 700111 HCHG RX REV CODE 636 W/ 250 OVERRIDE (IP): Performed by: INTERNAL MEDICINE

## 2019-03-07 PROCEDURE — 700105 HCHG RX REV CODE 258: Performed by: INTERNAL MEDICINE

## 2019-03-07 PROCEDURE — 77014 PR CT GUIDANCE PLACEMENT RAD THERAPY FIELDS: CPT | Mod: 26 | Performed by: RADIOLOGY

## 2019-03-07 RX ORDER — PROCHLORPERAZINE MALEATE 10 MG
10 TABLET ORAL EVERY 6 HOURS PRN
Status: CANCELLED | OUTPATIENT
Start: 2019-03-07

## 2019-03-07 RX ORDER — 0.9 % SODIUM CHLORIDE 0.9 %
VIAL (ML) INJECTION PRN
Status: CANCELLED | OUTPATIENT
Start: 2019-03-07

## 2019-03-07 RX ORDER — ONDANSETRON 2 MG/ML
4 INJECTION INTRAMUSCULAR; INTRAVENOUS
Status: CANCELLED | OUTPATIENT
Start: 2019-03-07

## 2019-03-07 RX ORDER — SODIUM CHLORIDE 9 MG/ML
INJECTION, SOLUTION INTRAVENOUS CONTINUOUS
Status: CANCELLED | OUTPATIENT
Start: 2019-03-07

## 2019-03-07 RX ORDER — ONDANSETRON 2 MG/ML
8 INJECTION INTRAMUSCULAR; INTRAVENOUS ONCE
Status: COMPLETED | OUTPATIENT
Start: 2019-03-07 | End: 2019-03-07

## 2019-03-07 RX ORDER — LIDOCAINE HYDROCHLORIDE 10 MG/ML
INJECTION, SOLUTION EPIDURAL; INFILTRATION; INTRACAUDAL; PERINEURAL
Status: COMPLETED
Start: 2019-03-07 | End: 2019-03-07

## 2019-03-07 RX ORDER — 0.9 % SODIUM CHLORIDE 0.9 %
5 VIAL (ML) INJECTION PRN
Status: CANCELLED | OUTPATIENT
Start: 2019-03-07

## 2019-03-07 RX ORDER — SODIUM CHLORIDE 9 MG/ML
INJECTION, SOLUTION INTRAVENOUS CONTINUOUS
Status: DISCONTINUED | OUTPATIENT
Start: 2019-03-07 | End: 2019-03-07 | Stop reason: HOSPADM

## 2019-03-07 RX ORDER — ONDANSETRON 2 MG/ML
8 INJECTION INTRAMUSCULAR; INTRAVENOUS ONCE
Status: CANCELLED | OUTPATIENT
Start: 2019-03-07

## 2019-03-07 RX ORDER — ONDANSETRON 8 MG/1
8 TABLET, ORALLY DISINTEGRATING ORAL
Status: CANCELLED | OUTPATIENT
Start: 2019-03-07

## 2019-03-07 RX ADMIN — CARBOPLATIN 212 MG: 10 INJECTION, SOLUTION INTRAVENOUS at 11:15

## 2019-03-07 RX ADMIN — PACLITAXEL 73.4 MG: 300 INJECTION, SOLUTION INTRAVENOUS at 10:08

## 2019-03-07 RX ADMIN — LIDOCAINE HYDROCHLORIDE 2 ML: 10 INJECTION, SOLUTION EPIDURAL; INFILTRATION; INTRACAUDAL; PERINEURAL at 08:40

## 2019-03-07 RX ADMIN — SODIUM CHLORIDE: 9 INJECTION, SOLUTION INTRAVENOUS at 09:20

## 2019-03-07 RX ADMIN — HEPARIN 500 UNITS: 100 SYRINGE at 11:59

## 2019-03-07 RX ADMIN — DEXAMETHASONE SODIUM PHOSPHATE 12 MG: 4 INJECTION, SOLUTION INTRAMUSCULAR; INTRAVENOUS at 09:20

## 2019-03-07 RX ADMIN — DIPHENHYDRAMINE HYDROCHLORIDE 25 MG: 50 INJECTION INTRAMUSCULAR; INTRAVENOUS at 09:42

## 2019-03-07 RX ADMIN — FAMOTIDINE 20 MG: 10 INJECTION INTRAVENOUS at 09:20

## 2019-03-07 RX ADMIN — ONDANSETRON 8 MG: 2 INJECTION, SOLUTION INTRAMUSCULAR; INTRAVENOUS at 09:20

## 2019-03-07 NOTE — PROGRESS NOTES
"Pharmacy Chemotherapy Calculations Verification:    Patient Name: Flaca Maki   DX: NSCLC    Protocol: weekly taxol/carboplatin + XRT    *Dosing Reference*  Paclitaxel (taxol) 45-50mg/m2 IV over 60 min on day 1  Carboplatin AUC 2 IV over 30 min on day 1  Weekly x 7 weeks with concurrent radiation therapy  NCCN Guidelines for NSCLC V.9.2017  Delia VALLADARES, et al.  J Clin Oncol. 2005 Sep 1;23():4377-39. Epub 2005 Aug 8.    Allergies: Percocet [oxycodone-acetaminophen]  /81   Pulse 90   Temp 36.2 °C (97.1 °F) (Temporal)   Resp 20   Ht 1.689 m (5' 6.5\")   Wt 56.4 kg (124 lb 5.4 oz)   LMP  (LMP Unknown)   SpO2 98%   BMI 19.77 kg/m²  Body surface area is 1.63 meters squared.    LABS 3/7/2019  ANC~ 3500 Plt = 203 k   Hgb = 10.6     SCr = 0.5 mg/dL CrCl ~ 80.7 mL/min (minimum SCr of 0.7)     LABS 2/28/2019  LFT's = wnl  TBili = 0.4      Drug Order   (Drug name, dose, route, IV Fluid & volume, frequency, number of doses) Cycle 6     Previous treatment: C5 on 2/28/2019     Medication = Paclitaxel (Taxol)  Base Dose = 45 mg/m²   Calc Dose: Base Dose x Body surface area is 1.63 meters squared. m² = 73.4 mg  Final Dose = 73.4 mg  Route = IV  Fluid & Volume =  mL  Admin Duration = Over 1 hour          <5% difference, ok to treat with final dose   Medication = Carboplatin (Paraplatin)  Base Dose = AUC 2  Calc Dose: Base Dose x (80.7 mL/min  + 25) = 211.7 mg  Final Dose = 212 mg  Route = IV  Fluid & Volume =  mL  Admin Duration = Over 30 minutes          <5% difference, ok to treat with final dose     By my signature below, I confirm this process was performed independently with the BSA and all final chemotherapy dosing calculations congruent. I have reviewed the above chemotherapy order and that my calculation of the final dose and BSA (when applicable) corroborate those calculations of the  pharmacist. Discrepancies of 5% or greater in the written dose have been addressed and documented within " the EPIC Progress notes.    SHOSHANA Adan, CanD

## 2019-03-07 NOTE — PROGRESS NOTES
Chemotherapy Verification - PRIMARY RN    D1C6    Height = 168.9 cm  Weight = 56.4 kg  BSA = 1.63 m2 - creat 0.50, using min creat 0.7 for dosing      Medication: Paclitaxel (Taxol)  Dose: 45 mg/m2  Calculated Dose: 73.35 mg                                 Medication: Carboplatin  Dose: AUC 2  Calculated Dose: 211.702 mg                                 Carboplatin calculation (if applicable):    (((140-55)*[id number])*(0.70+(1 *0.15))/(0.7*72)+25)*2 * ((1 =1)+(1 =2)) = 211.702 mg      I confirm this process was performed independently with the BSA and all final chemotherapy dosing calculations congruent.  Any discrepancies of 5% or greater have been addressed with the chemotherapy pharmacist. The resolution of the discrepancy has been documented in the EPIC progress notes.

## 2019-03-07 NOTE — PROGRESS NOTES
Pt returns for C6 weekly Carbo/Taxol for lung cancer. Pt with R chest port, pt requesting lidocaine to numb site. Port site numbed per pt request and accessed in sterile field. Port flushed, brisk blood return observed. Labs drawn as ordered. Results reviewed and WNL for chemo to proceed. Premeds given and chemo infused per MAR. Pt chelsie well. Line flushed clear. Port flushed and heparin instilled, needle removed/tip intact. Gauze dressing applied. Pt knows to return next week for final cycle. Discharged home under care of sister in no apparent distress.

## 2019-03-07 NOTE — PROGRESS NOTES
Chemotherapy Verification - SECONDARY RN       Height = 66.5 in  Weight = 56.4 kg  BSA = 1.62 m2       Medication: Paclitaxel  Dose: 45 mg/m2  Calculated Dose: 72.9 mg                             (In mg/m2, AUC, mg/kg)     Medication: Carboplatin  Dose: AUC 2  Calculated Dose: 211.7 mg                             (In mg/m2, AUC, mg/kg)    Carboplatin calculation (if applicable):  (((140-55)*[id number])*(0.70+(1 *0.15))/(0.7*72)+25)*2 * ((1 =1)+(1 =2)) = 211.702    I confirm that this process was performed independently.

## 2019-03-08 ENCOUNTER — HOSPITAL ENCOUNTER (OUTPATIENT)
Dept: RADIATION ONCOLOGY | Facility: MEDICAL CENTER | Age: 54
End: 2019-03-08

## 2019-03-08 PROCEDURE — 77386 HCHG IMRT DELIVERY COMPLEX: CPT | Performed by: RADIOLOGY

## 2019-03-08 PROCEDURE — 77014 PR CT GUIDANCE PLACEMENT RAD THERAPY FIELDS: CPT | Mod: 26 | Performed by: RADIOLOGY

## 2019-03-08 NOTE — ADDENDUM NOTE
Encounter addended by: Claire Crisostomo R.N. on: 3/7/2019  5:11 PM<BR>    Actions taken: Flowsheet accepted

## 2019-03-11 ENCOUNTER — HOSPITAL ENCOUNTER (OUTPATIENT)
Dept: RADIATION ONCOLOGY | Facility: MEDICAL CENTER | Age: 54
End: 2019-03-11

## 2019-03-11 PROCEDURE — 77014 PR CT GUIDANCE PLACEMENT RAD THERAPY FIELDS: CPT | Mod: 26 | Performed by: RADIOLOGY

## 2019-03-11 PROCEDURE — 77386 HCHG IMRT DELIVERY COMPLEX: CPT | Performed by: RADIOLOGY

## 2019-03-11 PROCEDURE — 77336 RADIATION PHYSICS CONSULT: CPT | Performed by: RADIOLOGY

## 2019-03-12 ENCOUNTER — APPOINTMENT (OUTPATIENT)
Dept: RADIATION ONCOLOGY | Facility: MEDICAL CENTER | Age: 54
End: 2019-03-12
Payer: MEDICAID

## 2019-03-12 ENCOUNTER — HOSPITAL ENCOUNTER (OUTPATIENT)
Dept: RADIATION ONCOLOGY | Facility: MEDICAL CENTER | Age: 54
End: 2019-03-12

## 2019-03-12 PROCEDURE — 77386 HCHG IMRT DELIVERY COMPLEX: CPT | Performed by: RADIOLOGY

## 2019-03-12 PROCEDURE — 77014 PR CT GUIDANCE PLACEMENT RAD THERAPY FIELDS: CPT | Mod: 26 | Performed by: RADIOLOGY

## 2019-03-12 RX ORDER — SODIUM CHLORIDE 9 MG/ML
INJECTION, SOLUTION INTRAVENOUS CONTINUOUS
Status: CANCELLED | OUTPATIENT
Start: 2019-03-14

## 2019-03-12 RX ORDER — ONDANSETRON 8 MG/1
8 TABLET, ORALLY DISINTEGRATING ORAL
Status: CANCELLED | OUTPATIENT
Start: 2019-03-14

## 2019-03-12 RX ORDER — 0.9 % SODIUM CHLORIDE 0.9 %
VIAL (ML) INJECTION PRN
Status: CANCELLED | OUTPATIENT
Start: 2019-03-14

## 2019-03-12 RX ORDER — PROCHLORPERAZINE MALEATE 10 MG
10 TABLET ORAL EVERY 6 HOURS PRN
Status: CANCELLED | OUTPATIENT
Start: 2019-03-14

## 2019-03-12 RX ORDER — 0.9 % SODIUM CHLORIDE 0.9 %
5 VIAL (ML) INJECTION PRN
Status: CANCELLED | OUTPATIENT
Start: 2019-03-14

## 2019-03-12 RX ORDER — ONDANSETRON 2 MG/ML
8 INJECTION INTRAMUSCULAR; INTRAVENOUS ONCE
Status: CANCELLED | OUTPATIENT
Start: 2019-03-14

## 2019-03-12 RX ORDER — ONDANSETRON 2 MG/ML
4 INJECTION INTRAMUSCULAR; INTRAVENOUS
Status: CANCELLED | OUTPATIENT
Start: 2019-03-14

## 2019-03-13 PROCEDURE — 77014 PR CT GUIDANCE PLACEMENT RAD THERAPY FIELDS: CPT | Mod: 26 | Performed by: RADIOLOGY

## 2019-03-13 PROCEDURE — 77386 HCHG IMRT DELIVERY COMPLEX: CPT | Performed by: RADIOLOGY

## 2019-03-13 PROCEDURE — 77427 RADIATION TX MANAGEMENT X5: CPT | Performed by: RADIOLOGY

## 2019-03-13 NOTE — PROGRESS NOTES
"Pharmacy Chemotherapy Verification    Dx: NSCLC    Cycle 7  Previous treatment = 3/7/19    Regimen: weekly PACLItaxel/CARBOplatin + XRT  *Dosing Reference*  PACLItaxel (Taxol) 45-50 mg/m2 IV over 60 min on day 1  CARBOplatin AUC 2 IV over 30 min on day 1  Weekly x 7 weeks with concurrent radiation therapy  NCCN Guidelines for NSCLC V.9.2017  Delia CP, et al - J Clin Oncol. 2005 Sep 1;23(14):2247-97. Epub 2005 Aug 8.     Allergies:Percocet [oxycodone-acetaminophen]  /73   Pulse 90   Temp 37 °C (98.6 °F) (Temporal)   Resp 18   Ht 1.689 m (5' 6.5\")   Wt 56.4 kg (124 lb 5.4 oz)   LMP  (LMP Unknown)   SpO2 97%   BMI 19.77 kg/m²   Body surface area is 1.63 meters squared.     All labs within treatment plan parameters. (min SCr 0.7 used for CrCl calculation)      PACLItaxel (Taxol) 45 mg/m2  x 1.63 m2 = 73.4 mg   <5% difference, ok to treat with final dose = 73.4 mg IV     CARBOplatin (Paraplatin) AUC 2 (81 ml/min + 25) = 212 mg    <5% difference, ok to treat with final dose = 212 mg IV      Tamanna Stacy, PharmD  "

## 2019-03-14 ENCOUNTER — OUTPATIENT INFUSION SERVICES (OUTPATIENT)
Dept: ONCOLOGY | Facility: MEDICAL CENTER | Age: 54
End: 2019-03-14
Attending: RADIOLOGY
Payer: MEDICAID

## 2019-03-14 ENCOUNTER — DOCUMENTATION (OUTPATIENT)
Dept: HEMATOLOGY ONCOLOGY | Facility: MEDICAL CENTER | Age: 54
End: 2019-03-14

## 2019-03-14 VITALS
SYSTOLIC BLOOD PRESSURE: 121 MMHG | TEMPERATURE: 98.2 F | BODY MASS INDEX: 19.52 KG/M2 | WEIGHT: 124.34 LBS | HEART RATE: 97 BPM | HEIGHT: 67 IN | OXYGEN SATURATION: 96 % | DIASTOLIC BLOOD PRESSURE: 72 MMHG | RESPIRATION RATE: 20 BRPM

## 2019-03-14 DIAGNOSIS — C34.90 NON-SMALL CELL LUNG CANCER, UNSPECIFIED LATERALITY (HCC): ICD-10-CM

## 2019-03-14 LAB
ALBUMIN SERPL BCP-MCNC: 3.6 G/DL (ref 3.2–4.9)
ALBUMIN/GLOB SERPL: 1.2 G/DL
ALP SERPL-CCNC: 65 U/L (ref 30–99)
ALT SERPL-CCNC: 22 U/L (ref 2–50)
ANION GAP SERPL CALC-SCNC: 5 MMOL/L (ref 0–11.9)
AST SERPL-CCNC: 21 U/L (ref 12–45)
BASOPHILS # BLD AUTO: 0.5 % (ref 0–1.8)
BASOPHILS # BLD: 0.02 K/UL (ref 0–0.12)
BILIRUB SERPL-MCNC: 0.4 MG/DL (ref 0.1–1.5)
BUN SERPL-MCNC: 14 MG/DL (ref 8–22)
CALCIUM SERPL-MCNC: 8.9 MG/DL (ref 8.5–10.5)
CHLORIDE SERPL-SCNC: 109 MMOL/L (ref 96–112)
CO2 SERPL-SCNC: 23 MMOL/L (ref 20–33)
CREAT SERPL-MCNC: 0.55 MG/DL (ref 0.5–1.4)
EOSINOPHIL # BLD AUTO: 0.04 K/UL (ref 0–0.51)
EOSINOPHIL NFR BLD: 1.1 % (ref 0–6.9)
ERYTHROCYTE [DISTWIDTH] IN BLOOD BY AUTOMATED COUNT: 56.8 FL (ref 35.9–50)
GLOBULIN SER CALC-MCNC: 2.9 G/DL (ref 1.9–3.5)
GLUCOSE SERPL-MCNC: 101 MG/DL (ref 65–99)
HCT VFR BLD AUTO: 31.3 % (ref 37–47)
HGB BLD-MCNC: 10.3 G/DL (ref 12–16)
IMM GRANULOCYTES # BLD AUTO: 0.01 K/UL (ref 0–0.11)
IMM GRANULOCYTES NFR BLD AUTO: 0.3 % (ref 0–0.9)
LYMPHOCYTES # BLD AUTO: 0.33 K/UL (ref 1–4.8)
LYMPHOCYTES NFR BLD: 8.9 % (ref 22–41)
MCH RBC QN AUTO: 31.8 PG (ref 27–33)
MCHC RBC AUTO-ENTMCNC: 32.9 G/DL (ref 33.6–35)
MCV RBC AUTO: 96.6 FL (ref 81.4–97.8)
MONOCYTES # BLD AUTO: 0.58 K/UL (ref 0–0.85)
MONOCYTES NFR BLD AUTO: 15.7 % (ref 0–13.4)
NEUTROPHILS # BLD AUTO: 2.72 K/UL (ref 2–7.15)
NEUTROPHILS NFR BLD: 73.5 % (ref 44–72)
NRBC # BLD AUTO: 0 K/UL
NRBC BLD-RTO: 0 /100 WBC
PLATELET # BLD AUTO: 219 K/UL (ref 164–446)
PMV BLD AUTO: 8.3 FL (ref 9–12.9)
POTASSIUM SERPL-SCNC: 3.7 MMOL/L (ref 3.6–5.5)
PROT SERPL-MCNC: 6.5 G/DL (ref 6–8.2)
RBC # BLD AUTO: 3.24 M/UL (ref 4.2–5.4)
SODIUM SERPL-SCNC: 137 MMOL/L (ref 135–145)
WBC # BLD AUTO: 3.7 K/UL (ref 4.8–10.8)

## 2019-03-14 PROCEDURE — 700111 HCHG RX REV CODE 636 W/ 250 OVERRIDE (IP)

## 2019-03-14 PROCEDURE — A4212 NON CORING NEEDLE OR STYLET: HCPCS

## 2019-03-14 PROCEDURE — 304540 HCHG NITRO SET VENT 2ND TUB

## 2019-03-14 PROCEDURE — 96413 CHEMO IV INFUSION 1 HR: CPT

## 2019-03-14 PROCEDURE — 96375 TX/PRO/DX INJ NEW DRUG ADDON: CPT

## 2019-03-14 PROCEDURE — 96417 CHEMO IV INFUS EACH ADDL SEQ: CPT

## 2019-03-14 PROCEDURE — 700105 HCHG RX REV CODE 258

## 2019-03-14 PROCEDURE — 700105 HCHG RX REV CODE 258: Performed by: INTERNAL MEDICINE

## 2019-03-14 PROCEDURE — 700111 HCHG RX REV CODE 636 W/ 250 OVERRIDE (IP): Performed by: INTERNAL MEDICINE

## 2019-03-14 PROCEDURE — 96376 TX/PRO/DX INJ SAME DRUG ADON: CPT

## 2019-03-14 PROCEDURE — 80053 COMPREHEN METABOLIC PANEL: CPT

## 2019-03-14 PROCEDURE — 85025 COMPLETE CBC W/AUTO DIFF WBC: CPT

## 2019-03-14 RX ORDER — ONDANSETRON 2 MG/ML
4 INJECTION INTRAMUSCULAR; INTRAVENOUS
Status: DISCONTINUED | OUTPATIENT
Start: 2019-03-14 | End: 2019-03-14 | Stop reason: HOSPADM

## 2019-03-14 RX ORDER — ONDANSETRON 2 MG/ML
8 INJECTION INTRAMUSCULAR; INTRAVENOUS ONCE
Status: COMPLETED | OUTPATIENT
Start: 2019-03-14 | End: 2019-03-14

## 2019-03-14 RX ORDER — ONDANSETRON 2 MG/ML
INJECTION INTRAMUSCULAR; INTRAVENOUS
Status: COMPLETED
Start: 2019-03-14 | End: 2019-03-14

## 2019-03-14 RX ORDER — SODIUM CHLORIDE 9 MG/ML
500 INJECTION, SOLUTION INTRAVENOUS ONCE
Status: COMPLETED | OUTPATIENT
Start: 2019-03-14 | End: 2019-03-14

## 2019-03-14 RX ORDER — DIPHENHYDRAMINE HYDROCHLORIDE 50 MG/ML
25 INJECTION INTRAMUSCULAR; INTRAVENOUS ONCE
Status: COMPLETED | OUTPATIENT
Start: 2019-03-14 | End: 2019-03-14

## 2019-03-14 RX ORDER — DIPHENHYDRAMINE HYDROCHLORIDE 50 MG/ML
INJECTION INTRAMUSCULAR; INTRAVENOUS
Status: COMPLETED
Start: 2019-03-14 | End: 2019-03-14

## 2019-03-14 RX ADMIN — SODIUM CHLORIDE 500 ML: 9 INJECTION, SOLUTION INTRAVENOUS at 13:05

## 2019-03-14 RX ADMIN — DIPHENHYDRAMINE HYDROCHLORIDE 25 MG: 50 INJECTION INTRAMUSCULAR; INTRAVENOUS at 13:01

## 2019-03-14 RX ADMIN — PACLITAXEL 73.4 MG: 300 INJECTION, SOLUTION INTRAVENOUS at 11:05

## 2019-03-14 RX ADMIN — ONDANSETRON 4 MG: 2 INJECTION, SOLUTION INTRAMUSCULAR; INTRAVENOUS at 12:55

## 2019-03-14 RX ADMIN — FAMOTIDINE 20 MG: 10 INJECTION INTRAVENOUS at 09:55

## 2019-03-14 RX ADMIN — DEXAMETHASONE SODIUM PHOSPHATE 12 MG: 4 INJECTION, SOLUTION INTRAMUSCULAR; INTRAVENOUS at 10:10

## 2019-03-14 RX ADMIN — ONDANSETRON 8 MG: 2 INJECTION, SOLUTION INTRAMUSCULAR; INTRAVENOUS at 10:05

## 2019-03-14 RX ADMIN — DIPHENHYDRAMINE HYDROCHLORIDE 25 MG: 50 INJECTION INTRAMUSCULAR; INTRAVENOUS at 10:25

## 2019-03-14 RX ADMIN — HEPARIN 500 UNITS: 100 SYRINGE at 14:15

## 2019-03-14 RX ADMIN — CARBOPLATIN 212 MG: 10 INJECTION, SOLUTION INTRAVENOUS at 12:10

## 2019-03-14 NOTE — PROGRESS NOTES
Mrs Maki into Infusions Services for Cycle 7 and last tx of this regiment of Taxol / Carbo with RTX ( radiation completed 3/13/2019)  for Lung Cancer. Pt denied having any new or acute complaints today, reports tolerating past treatments well. Port accessed in a sterile manner, had + blood return, flushed briskly. Pt given Chemotherapy as prescribed, Taxol well tolerated. Did have a reaction to Carboplatin, with tachycardia, SOB, nauseas and severe redness of face arms and neck.  cc bolus given also additional benadryl IV and zofran. No further problems noted or reported after Carboplatin reaction ( see prior note ). Port had + blood return after, flushed per [facility] policy, de-accessed, needle intact, insertion site covered with sterile gauze and paper tape. Discharge home with family to self care in NAD. Appointment confirm for port flush.

## 2019-03-14 NOTE — PROGRESS NOTES
Chemotherapy Verification - SECONDARY RN       Height = 66.5 inches  Weight = 56.4 kg  BSA = 1.62 m2       Medication:  Paclitaxel (Taxol)  Dose: 45 mg/m2  Calculated Dose: 72.9 mg                             (In mg/m2, AUC, mg/kg)     Medication: Carboplatin  Dose: AUC 2  Calculated Dose: 211.494 mg                             (In mg/m2, AUC, mg/kg)      Carboplatin calculation (if applicable):  (2*(80.747+25)) = 211.494    I confirm that this process was performed independently.

## 2019-03-14 NOTE — PROGRESS NOTES
Chemotherapy Verification - PRIMARY RN      Height = 66.5 in  Weight = 124 lb  BSA = 1.63 m2       Medication: Taxol  Dose: 45 mg/m2  Calculated Dose: 73.35 mg                             (In mg/m2, AUC, mg/kg)     Medication: Carboplatin  Dose: 2 AUC  Calculated Dose:  211.702 mg                             (In mg/m2, AUC, mg/kg)    Carboplatin calculation (if applicable): (((140-55)*[id number])*(0.70+(1 *0.15))/(0.7*72)+25)*2 * ((1 =1)+(1 =2)) = 211.702 mg      I confirm this process was performed independently with the BSA and all final chemotherapy dosing calculations congruent.  Any discrepancies of 5% or greater have been addressed with the chemotherapy pharmacist. The resolution of the discrepancy has been documented in the EPIC progress notes.

## 2019-03-14 NOTE — PROGRESS NOTES
"Pharmacy Chemotherapy Verification    Patient Name: Amy Maki   DX: NSCLC    Protocol: weekly CARBOplatin/PACLItaxel + XRT    *Dosing Reference*  PACLItaxel (Taxol) 45-50 mg/m2 IV over 60 min on day 1  CARBOplatin AUC 2 IV over 30 min on day 1  Weekly x 7 weeks with concurrent radiation therapy  NCCN Guidelines for NSCLC V.9.2017  Delia VALLADARES, et al.  J Clin Oncol. 2005 Sep 1;23(98):2282-13. Epub 2005 Aug 8.    Allergies: Percocet [oxycodone-acetaminophen]  /73   Pulse 90   Temp 37 °C (98.6 °F) (Temporal)   Resp 18   Ht 1.689 m (5' 6.5\")   Wt 56.4 kg (124 lb 5.4 oz)   LMP  (LMP Unknown)   SpO2 97%   BMI 19.77 kg/m²  Body surface area is 1.63 meters squared.    Labs 3/14/19  ANC~2720 Plt = 219 k   Hgb = 10.3     SCr = 0.55 mg/dL CrCl ~ 80.7 mL/min (minimum SCr of 0.7)   AST/ALT/AP = 21/22/65 Tbili = 0.4     Drug Order   (Drug name, dose, route, IV Fluid & volume, frequency, number of doses) Cycle 7     Previous treatment: C6 on 3/7/2019     Medication = PACLItaxel (Taxol)  Base Dose = 45 mg/m²   Calc Dose: Base Dose x 1.63 m² = 73.35 mg  Final Dose = 73.4 mg  Route = IV  Fluid & Volume =  mL  Admin Duration = Over 60 minutes          <5% difference, ok to treat with final dose   Medication = CARBOplatin (Paraplatin)  Base Dose = AUC 2  Calc Dose: Base Dose x (80.7 mL/min  + 25) = 211.4 mg  Final Dose = 212 mg  Route = IV  Fluid & Volume =  mL  Admin Duration = Over 30 minutes          <5% difference, ok to treat with final dose     By my signature below, I confirm this process was performed independently with the BSA and all final chemotherapy dosing calculations congruent. I have reviewed the above chemotherapy order and that my calculation of the final dose and BSA (when applicable) corroborate those calculations of the  pharmacist. Discrepancies of 5% or greater in the written dose have been addressed and documented within the [facility] Progress notes.    Shruthi Juarez, PharmD, " BCOP

## 2019-03-14 NOTE — PROGRESS NOTES
Nutrition Services: IFC Update  I met with patient today chairside during chemo appointment.  Patients weight today is up to 124 pounds from 120 pounds last month. BMI remains in a healthy range at 20. Patient acknowledges a great appetite and that she is always hungry and constantly grazing.  She reports no GI symptoms other than heartburn which she is taking pepcid and tums for with good results.   She drinks boost once per day and includes high protein items like protein bars and eggs regularly.  She does report eating a lot of highly salted items such as pretzels, V8 Juice and cup of noodle. We discussed effects of a high sodium diet including elevated blood pressure. I encouraged fruits, vegetables and high protein items with meals and snacks.  Patient with no nutrition related questions or concerns at time of visit.     Given weight stability/increase, RD will sign off at this point.  I encouraged patient to contact us should questions or concerns arise.  RD available PRN.  [phone]

## 2019-03-14 NOTE — PROGRESS NOTES
Assumed care of patient during Kay's RN break. Patient stated that she was feeling warm. Patient started to have difficulty breathing and increase heart rate. Carboplatin stopped at 1245. Vitals signs taken. Zofran given per MAR. Dr. Gauthier notified regarding Carboplatin reaction. Orders received to give Benadryl, 500 cc NS Bolus, and to not re-challenge Carboplatin. Report given to Kay JENKINS.

## 2019-03-29 ENCOUNTER — HOSPITAL ENCOUNTER (OUTPATIENT)
Dept: RADIOLOGY | Facility: MEDICAL CENTER | Age: 54
End: 2019-03-29
Attending: INTERNAL MEDICINE
Payer: MEDICAID

## 2019-03-29 DIAGNOSIS — C34.00 MALIGNANT NEOPLASM OF MAIN BRONCHUS, UNSPECIFIED LATERALITY (HCC): ICD-10-CM

## 2019-03-29 DIAGNOSIS — C34.82 MALIGNANT NEOPLASM OF OVERLAPPING SITES OF LEFT LUNG (HCC): ICD-10-CM

## 2019-03-29 PROCEDURE — 700117 HCHG RX CONTRAST REV CODE 255: Performed by: INTERNAL MEDICINE

## 2019-03-29 PROCEDURE — 74160 CT ABDOMEN W/CONTRAST: CPT

## 2019-03-29 RX ADMIN — IOHEXOL 50 ML: 240 INJECTION, SOLUTION INTRATHECAL; INTRAVASCULAR; INTRAVENOUS; ORAL at 09:55

## 2019-03-29 RX ADMIN — IOHEXOL 100 ML: 350 INJECTION, SOLUTION INTRAVENOUS at 11:32

## 2019-04-11 ENCOUNTER — OUTPATIENT INFUSION SERVICES (OUTPATIENT)
Dept: ONCOLOGY | Facility: MEDICAL CENTER | Age: 54
End: 2019-04-11
Attending: INTERNAL MEDICINE
Payer: MEDICAID

## 2019-04-11 VITALS
HEIGHT: 67 IN | DIASTOLIC BLOOD PRESSURE: 75 MMHG | HEART RATE: 105 BPM | OXYGEN SATURATION: 93 % | TEMPERATURE: 97.7 F | BODY MASS INDEX: 19.93 KG/M2 | SYSTOLIC BLOOD PRESSURE: 126 MMHG | RESPIRATION RATE: 18 BRPM | WEIGHT: 126.98 LBS

## 2019-04-11 DIAGNOSIS — C34.90 NON-SMALL CELL LUNG CANCER, UNSPECIFIED LATERALITY (HCC): ICD-10-CM

## 2019-04-11 PROCEDURE — A4212 NON CORING NEEDLE OR STYLET: HCPCS

## 2019-04-11 PROCEDURE — 700111 HCHG RX REV CODE 636 W/ 250 OVERRIDE (IP): Performed by: INTERNAL MEDICINE

## 2019-04-11 PROCEDURE — 700101 HCHG RX REV CODE 250: Performed by: INTERNAL MEDICINE

## 2019-04-11 PROCEDURE — 36591 DRAW BLOOD OFF VENOUS DEVICE: CPT

## 2019-04-11 PROCEDURE — 96523 IRRIG DRUG DELIVERY DEVICE: CPT

## 2019-04-11 RX ORDER — LIDOCAINE HYDROCHLORIDE 10 MG/ML
INJECTION, SOLUTION EPIDURAL; INFILTRATION; INTRACAUDAL; PERINEURAL
Status: DISCONTINUED
Start: 2019-04-11 | End: 2019-04-11 | Stop reason: HOSPADM

## 2019-04-11 RX ORDER — 0.9 % SODIUM CHLORIDE 0.9 %
20 VIAL (ML) INJECTION PRN
Status: CANCELLED | OUTPATIENT
Start: 2019-04-11

## 2019-04-11 RX ADMIN — HEPARIN 500 UNITS: 100 SYRINGE at 09:12

## 2019-04-11 RX ADMIN — LIDOCAINE HYDROCHLORIDE 0.5 ML: 10 INJECTION, SOLUTION EPIDURAL; INFILTRATION; INTRACAUDAL; PERINEURAL at 09:00

## 2019-04-11 NOTE — PROGRESS NOTES
Pt scheduled for port flush. Arrived ambulatory, independent; accompanied by family. Pt denied pain/discomfort, s/sx infection, or other health changes. Lidocaine bleb given to port site per pt request. R-chest port accessed using sterile technique; easily flushed, brisk blood return; hep locked, de-accessed. Pt denied any unmet needs; discharged ambulatory, independent, NAD. Message sent to schedulers for appt needs. Unfortunately, realized CBC tube drawn rather than CMP only after pt had left.

## 2019-04-26 ENCOUNTER — HOSPITAL ENCOUNTER (OUTPATIENT)
Dept: RADIATION ONCOLOGY | Facility: MEDICAL CENTER | Age: 54
End: 2019-04-30
Attending: RADIOLOGY
Payer: MEDICAID

## 2019-04-26 VITALS
WEIGHT: 127.1 LBS | BODY MASS INDEX: 20.43 KG/M2 | HEART RATE: 70 BPM | TEMPERATURE: 97.7 F | SYSTOLIC BLOOD PRESSURE: 139 MMHG | DIASTOLIC BLOOD PRESSURE: 87 MMHG | OXYGEN SATURATION: 93 % | HEIGHT: 66 IN

## 2019-04-26 PROCEDURE — 99212 OFFICE O/P EST SF 10 MIN: CPT | Performed by: RADIOLOGY

## 2019-04-26 ASSESSMENT — PAIN SCALES - GENERAL: PAINLEVEL: NO PAIN

## 2019-04-26 NOTE — NON-PROVIDER
"Patient was seen today in clinic with Dr. Conteh for Follow up.  Vitals signs and weight were obtained and pain assessment was completed.  Allergies and medications were reviewed with the patient.  Toxicities of treatment assessed.     Vitals/Pain:  Vitals:    04/26/19 0751   BP: 139/87   BP Location: Right arm   Patient Position: Sitting   BP Cuff Size: Adult   Pulse: 70   Temp: 36.5 °C (97.7 °F)   TempSrc: Oral   SpO2: 93%   Weight: 57.7 kg (127 lb 1.6 oz)   Height: 1.683 m (5' 6.25\")   Pain Score: No pain        Allergies:   Carboplatin and Percocet [oxycodone-acetaminophen]    Current Medications:  Current Outpatient Prescriptions   Medication Sig Dispense Refill   • Cholecalciferol (VITAMIN D PO) Take  by mouth.     • Dextromethorphan-Guaifenesin (ROBITUSSIN DM PO) Take  by mouth.     • artificial tear (ARTIFICIAL TEARS) 0.4 % ophthalmic solution Place 1 Drop in both eyes 3 times a day as needed for Dry Eyes. 1 Bottle 11   • cetirizine (ZYRTEC) 10 MG Tab Take 1 Tab by mouth every day. 30 Tab 5   • fluticasone (FLONASE) 50 MCG/ACT nasal spray Spray 2 Sprays in nose every day. 16 g 5   • famotidine (PEPCID) 20 MG Tab Take 1 Tab by mouth 2 times a day. 30 Tab 11   • budesonide-formoterol (SYMBICORT) 160-4.5 MCG/ACT Aerosol Inhale 2 Puffs by mouth 2 Times a Day. 1 Inhaler 2   • nicotine (NICODERM) 14 MG/24HR PATCH 24 HR Apply 1 Patch to skin as directed every 24 hours. (Patient not taking: Reported on 4/26/2019) 30 Patch 0   • ondansetron (ZOFRAN ODT) 4 MG TABLET DISPERSIBLE Take 1 Tab by mouth every four hours as needed for Nausea (give PO if no IV route available). (Patient not taking: Reported on 4/26/2019) 60 Tab 0     No current facility-administered medications for this encounter.          PCP:  Jin Woo, Johnny Ass't    "

## 2019-04-26 NOTE — PROGRESS NOTES
RADIATION ONCOLOGY FOLLOW-UP    DATE OF SERVICE: 4/26/2019    IDENTIFICATION:   A 54 y.o. female with stage III non-small cell lung answer status post concurrent chemo rads complete 4/6/2019    HISTORY OF PRESENT ILLNESS:   Since last seen patient's been feeling great she is gained weight her energy is good she has less cough.  She is been seen by the pulmonologist who is now putting her on 2 L of oxygen when she exerts herself.  She is seeing Dr. Gauthier who is going to follow her and she is getting authorization for immunotherapy.  She essentially has no new complaints.    CURRENT MEDICATIONS:  Current Outpatient Prescriptions   Medication Sig Dispense Refill   • Cholecalciferol (VITAMIN D PO) Take  by mouth.     • Dextromethorphan-Guaifenesin (ROBITUSSIN DM PO) Take  by mouth.     • artificial tear (ARTIFICIAL TEARS) 0.4 % ophthalmic solution Place 1 Drop in both eyes 3 times a day as needed for Dry Eyes. 1 Bottle 11   • cetirizine (ZYRTEC) 10 MG Tab Take 1 Tab by mouth every day. 30 Tab 5   • fluticasone (FLONASE) 50 MCG/ACT nasal spray Spray 2 Sprays in nose every day. 16 g 5   • famotidine (PEPCID) 20 MG Tab Take 1 Tab by mouth 2 times a day. 30 Tab 11   • budesonide-formoterol (SYMBICORT) 160-4.5 MCG/ACT Aerosol Inhale 2 Puffs by mouth 2 Times a Day. 1 Inhaler 2   • nicotine (NICODERM) 14 MG/24HR PATCH 24 HR Apply 1 Patch to skin as directed every 24 hours. (Patient not taking: Reported on 4/26/2019) 30 Patch 0   • ondansetron (ZOFRAN ODT) 4 MG TABLET DISPERSIBLE Take 1 Tab by mouth every four hours as needed for Nausea (give PO if no IV route available). (Patient not taking: Reported on 4/26/2019) 60 Tab 0     No current facility-administered medications for this encounter.        ALLERGIES:  Carboplatin and Percocet [oxycodone-acetaminophen]    FAMILY HISTORY:    Family History   Problem Relation Age of Onset   • Cancer Mother         Lung cancer    [unfilled]        SOCIAL HISTORY:     reports that she quit  "smoking about 3 months ago. Her smoking use included Cigarettes. She smoked 0.50 packs per day. She has quit using smokeless tobacco. She reports that she does not drink alcohol or use drugs.      REVIEW OF SYSTEMS: Is significant for that mentioned in the HPI  The rest of the review of systems has been reviewed by me and is documented in the nursing note in [facility] dated 1/22/2019    PHYSICAL EXAM:     ECOG PERFORMANCE STATUS:  0= Fully active, able to carry on all pre-disease performance without restriction.       Vitals:    04/26/19 0751   BP: 139/87   BP Location: Right arm   Patient Position: Sitting   BP Cuff Size: Adult   Pulse: 70   Temp: 36.5 °C (97.7 °F)   TempSrc: Oral   SpO2: 93%   Weight: 57.7 kg (127 lb 1.6 oz)   Height: 1.683 m (5' 6.25\")   Pain Score: No pain        GENERAL: Well-appearing alert and oriented x3 in no apparent distress  HEENT:  Pupils are equal, round, and reactive to light.  Extraocular muscles   are intact. Sclerae nonicteric.  Conjunctivae pink.  Oral cavity, tongue   protrudes midline.   NECK:  Supple without evidence of thyromegaly.  NODES:  No peripheral adenopathy of the neck, supraclavicular fossa or axillae   bilaterally.  LUNGS: Decreased breath sounds throughout and scattered crackles in the bases bilaterally  HEART:  Regular rate and rhythm.  No murmur appreciated  ABDOMEN:  Soft. No evidence of hepatosplenomegaly.  Positive bowel sounds.  EXTREMITIES:  Without Edema.  Clubbing present  NEUROLOGIC:  Cranial nerves II through XII were intact. Normal stance and gait motor and sensory grossly within normal limits          IMPRESSION:    A 54 y.o. female with lstage III non-small cell lung answer status post concurrent chemo rads complete 4/6/2019      RECOMMENDATIONS:   Patient will continue her follow-up with Dr. Gauthier based on national cancer guidelines.  She is also going to get onto immune therapy in the near future.  I am happy to see her as needed.  I did tell her that she " is at risk for developing radiation pneumonitis and if she develops some shortness of breath and pulmonary infiltrates consistent with radiation pneumonitis I am happy to see her again to reevaluate the situation.      Thank you for the opportunity to participate in her care.  If any questions or comments, please do not hesitate in calling.      Please note that this dictation was created using voice recognition software. I have made every reasonable attempt to correct obvious errors, but I expect that there are errors of grammar and possibly content that I did not discover before finalizing the note.

## 2019-05-06 ENCOUNTER — HOSPITAL ENCOUNTER (OUTPATIENT)
Dept: LAB | Facility: MEDICAL CENTER | Age: 54
End: 2019-05-06
Attending: INTERNAL MEDICINE
Payer: MEDICAID

## 2019-05-06 ENCOUNTER — HOSPITAL ENCOUNTER (OUTPATIENT)
Dept: LAB | Facility: MEDICAL CENTER | Age: 54
End: 2019-05-06
Attending: NURSE PRACTITIONER
Payer: MEDICAID

## 2019-05-06 PROCEDURE — 80053 COMPREHEN METABOLIC PANEL: CPT

## 2019-05-06 PROCEDURE — 84443 ASSAY THYROID STIM HORMONE: CPT

## 2019-05-07 LAB
ALBUMIN SERPL BCP-MCNC: 3.6 G/DL (ref 3.2–4.9)
ALBUMIN/GLOB SERPL: 1.2 G/DL
ALP SERPL-CCNC: 80 U/L (ref 30–99)
ALT SERPL-CCNC: 13 U/L (ref 2–50)
ANION GAP SERPL CALC-SCNC: 9 MMOL/L (ref 0–11.9)
AST SERPL-CCNC: 14 U/L (ref 12–45)
BILIRUB SERPL-MCNC: 0.3 MG/DL (ref 0.1–1.5)
BUN SERPL-MCNC: 12 MG/DL (ref 8–22)
CALCIUM SERPL-MCNC: 9 MG/DL (ref 8.5–10.5)
CHLORIDE SERPL-SCNC: 105 MMOL/L (ref 96–112)
CO2 SERPL-SCNC: 23 MMOL/L (ref 20–33)
CREAT SERPL-MCNC: 0.68 MG/DL (ref 0.5–1.4)
GLOBULIN SER CALC-MCNC: 2.9 G/DL (ref 1.9–3.5)
GLUCOSE SERPL-MCNC: 136 MG/DL (ref 65–99)
POTASSIUM SERPL-SCNC: 4.1 MMOL/L (ref 3.6–5.5)
PROT SERPL-MCNC: 6.5 G/DL (ref 6–8.2)
SODIUM SERPL-SCNC: 137 MMOL/L (ref 135–145)
TSH SERPL DL<=0.005 MIU/L-ACNC: 1.63 UIU/ML (ref 0.38–5.33)

## 2019-05-08 ENCOUNTER — APPOINTMENT (OUTPATIENT)
Dept: ONCOLOGY | Facility: MEDICAL CENTER | Age: 54
End: 2019-05-08
Attending: INTERNAL MEDICINE
Payer: MEDICAID

## 2019-05-09 ENCOUNTER — APPOINTMENT (OUTPATIENT)
Dept: ONCOLOGY | Facility: MEDICAL CENTER | Age: 54
End: 2019-05-09
Attending: INTERNAL MEDICINE
Payer: MEDICAID

## 2019-05-22 ENCOUNTER — OUTPATIENT INFUSION SERVICES (OUTPATIENT)
Dept: ONCOLOGY | Facility: MEDICAL CENTER | Age: 54
End: 2019-05-22
Attending: INTERNAL MEDICINE
Payer: MEDICAID

## 2019-05-22 VITALS
HEIGHT: 66 IN | TEMPERATURE: 98 F | OXYGEN SATURATION: 91 % | HEART RATE: 84 BPM | BODY MASS INDEX: 21.36 KG/M2 | DIASTOLIC BLOOD PRESSURE: 86 MMHG | SYSTOLIC BLOOD PRESSURE: 154 MMHG | RESPIRATION RATE: 18 BRPM | WEIGHT: 132.94 LBS

## 2019-05-22 DIAGNOSIS — C34.90 NON-SMALL CELL LUNG CANCER, UNSPECIFIED LATERALITY (HCC): ICD-10-CM

## 2019-05-22 DIAGNOSIS — E03.9 HYPOTHYROIDISM, UNSPECIFIED TYPE: ICD-10-CM

## 2019-05-22 PROCEDURE — 306780 HCHG STAT FOR TRANSFUSION PER CASE

## 2019-05-22 RX ORDER — 0.9 % SODIUM CHLORIDE 0.9 %
5 VIAL (ML) INJECTION PRN
Status: CANCELLED | OUTPATIENT
Start: 2019-06-04

## 2019-05-22 RX ORDER — ONDANSETRON 8 MG/1
8 TABLET, ORALLY DISINTEGRATING ORAL
Status: CANCELLED | OUTPATIENT
Start: 2019-06-05

## 2019-05-22 RX ORDER — 0.9 % SODIUM CHLORIDE 0.9 %
5 VIAL (ML) INJECTION PRN
Status: CANCELLED | OUTPATIENT
Start: 2019-06-05

## 2019-05-22 RX ORDER — 0.9 % SODIUM CHLORIDE 0.9 %
VIAL (ML) INJECTION PRN
Status: CANCELLED | OUTPATIENT
Start: 2019-06-04

## 2019-05-22 RX ORDER — 0.9 % SODIUM CHLORIDE 0.9 %
VIAL (ML) INJECTION PRN
Status: CANCELLED | OUTPATIENT
Start: 2019-06-05

## 2019-05-22 RX ORDER — SODIUM CHLORIDE 9 MG/ML
INJECTION, SOLUTION INTRAVENOUS CONTINUOUS
Status: CANCELLED | OUTPATIENT
Start: 2019-06-05

## 2019-05-22 RX ORDER — PROCHLORPERAZINE MALEATE 10 MG
10 TABLET ORAL EVERY 6 HOURS PRN
Status: CANCELLED | OUTPATIENT
Start: 2019-06-05

## 2019-05-22 RX ORDER — ONDANSETRON 2 MG/ML
4 INJECTION INTRAMUSCULAR; INTRAVENOUS
Status: CANCELLED | OUTPATIENT
Start: 2019-06-05

## 2019-05-22 NOTE — PROGRESS NOTES
Patient arrived with daughter for first dose of Durvalumab, did see Abrahan Morillo 2 weeks ago. Orders not signed to proceed with treatment today, physician contacted few times prior to patients arrival in this regard. After waiting  for 1 hr and 15 min for orders to be signed, patient and daughter decided to leave and also cancelled future appointment. Physician notified. Patient educated in importance of continuing treatment.

## 2019-06-05 ENCOUNTER — OUTPATIENT INFUSION SERVICES (OUTPATIENT)
Dept: ONCOLOGY | Facility: MEDICAL CENTER | Age: 54
End: 2019-06-05
Attending: INTERNAL MEDICINE
Payer: MEDICAID

## 2019-06-05 VITALS
RESPIRATION RATE: 18 BRPM | SYSTOLIC BLOOD PRESSURE: 117 MMHG | HEIGHT: 66 IN | OXYGEN SATURATION: 91 % | BODY MASS INDEX: 21.47 KG/M2 | WEIGHT: 133.6 LBS | TEMPERATURE: 98.3 F | HEART RATE: 89 BPM | DIASTOLIC BLOOD PRESSURE: 82 MMHG

## 2019-06-05 DIAGNOSIS — C34.90 NON-SMALL CELL LUNG CANCER, UNSPECIFIED LATERALITY (HCC): ICD-10-CM

## 2019-06-05 PROCEDURE — 96413 CHEMO IV INFUSION 1 HR: CPT

## 2019-06-05 PROCEDURE — A4212 NON CORING NEEDLE OR STYLET: HCPCS

## 2019-06-05 PROCEDURE — 700111 HCHG RX REV CODE 636 W/ 250 OVERRIDE (IP)

## 2019-06-05 PROCEDURE — 700105 HCHG RX REV CODE 258

## 2019-06-05 PROCEDURE — 700105 HCHG RX REV CODE 258: Performed by: INTERNAL MEDICINE

## 2019-06-05 RX ORDER — ALPRAZOLAM 0.25 MG/1
0.25 TABLET ORAL NIGHTLY PRN
COMMUNITY
End: 2020-09-21

## 2019-06-05 RX ORDER — LIDOCAINE HYDROCHLORIDE 10 MG/ML
INJECTION, SOLUTION EPIDURAL; INFILTRATION; INTRACAUDAL; PERINEURAL
Status: COMPLETED
Start: 2019-06-05 | End: 2019-06-05

## 2019-06-05 RX ORDER — LIDOCAINE AND PRILOCAINE 25; 25 MG/G; MG/G
CREAM TOPICAL
Qty: 1 TUBE | Refills: 3 | Status: SHIPPED | OUTPATIENT
Start: 2019-06-05 | End: 2019-08-15

## 2019-06-05 RX ADMIN — SODIUM CHLORIDE 620 MG: 9 INJECTION, SOLUTION INTRAVENOUS at 10:12

## 2019-06-05 RX ADMIN — LIDOCAINE HYDROCHLORIDE 2 ML: 10 INJECTION, SOLUTION EPIDURAL; INFILTRATION; INTRACAUDAL; PERINEURAL at 09:40

## 2019-06-05 RX ADMIN — HEPARIN 500 UNITS: 100 SYRINGE at 11:25

## 2019-06-05 NOTE — PROGRESS NOTES
Chemotherapy Verification - SECONDARY RN       Height = 66.14 inches  Weight = 60.6 kg  BSA = 1.68 m2       Medication: Durvalumab (Imfinzi)  Dose: 10 mg/kg  Calculated Dose: 606 mg                             (In mg/m2, AUC, mg/kg)         I confirm that this process was performed independently.

## 2019-06-05 NOTE — PROGRESS NOTES
Patient presents for day one cycle one Durvalumab. Reviewed plan of care, questions answered as needed, patient and her daughter verbalize understanding. Port accessed flushes well with brisk blood return. Durvalumab infused as ordered, in-line filter in place. Port flushed per protocol and de-accessed, sterile gauze to site. Patient scheduled for next several appointments and released in no acute distress with her daughter.

## 2019-06-05 NOTE — PROGRESS NOTES
Chemotherapy Verification - PRIMARY RN      Height = 168 cm  Weight = 60.6 kg  BSA = 1.68 m2       Medication: Durvalumab  Dose: 10 mg/kg  Calculated Dose: 606 mg                             (In mg/m2, AUC, mg/kg)           I confirm this process was performed independently with the BSA and all final chemotherapy dosing calculations congruent.  Any discrepancies of 10% or greater have been addressed with the chemotherapy pharmacist. The resolution of the discrepancy has been documented in the EPIC progress notes.

## 2019-06-05 NOTE — PROGRESS NOTES
"Pharmacy chemotherapy verification:    DX:NSCLC    Protocol: Durvalumab    Durvalumab 10 mg/kg IV over 60 min on day 1  14 day cycle until DP or UT for a maximum of 12 months  NCCN Guidelines for NSCLC V.3.2019  THADDEUS Leung et al. Durvalumab after Chemoradiotherapy in Stage III Non Small-Cell Lung Cancer. N Engl J Med 2017;1-11    /82   Pulse 89   Temp 36.8 °C (98.3 °F) (Temporal)   Resp 18   Ht 1.68 m (5' 6.14\")   Wt 60.6 kg (133 lb 9.6 oz)   LMP  (LMP Unknown)   SpO2 91%   BMI 21.47 kg/m²    Body surface area is 1.68 meters squared.       All lab results within treatment parameters.     Drug Order   (Drug name, dose, route, IV Fluid & volume, frequency, number of doses) Cycle: 1      Previous treatment: taxol + carboplatin x 7 cycles 3/14/19   Medication = Durvalumab  Base Dose= 10 mg/kg  Calc Dose: Base Dose x 60.6kg = 606mg  Final Dose = 620mg  Route = IV  Fluid & Volume =  mL  Admin Duration = Over 60 minutes          <10% difference, rounded to vial size per protocol, ok to treat with final written dose        By my signature below, I confirm this process was performed independently with the BSA and all final chemotherapy dosing calculations congruent. I have reviewed the above chemotherapy order and that my calculation of the final dose and BSA (when applicable) corroborate those calculations of the  pharmacist. Discrepancies of 5% or greater in the written dose have been addressed and documented within the [facility] Progress notes.    Laney Larios, PharmD  "

## 2019-06-05 NOTE — PROGRESS NOTES
"Pharmacy Chemotherapy Calculation:    Patient Name: Amy Maki  Dx: NSCLC    Cycle 1  Previous treatment = s/p 7 cycles of weekly Carbo/Taxol + XRT, last 03/14/19    Protocol: Durvalumab     *Dosing Reference*  Durvalumab 10mg/kg IV over 60 min on day 1  Q14 days until DP/UT or a max of 12 months  NCCN Guidelines for NSCLC V.3.2019  Xochitl ANDREW, et al. N Engl J Med.  2017 Nov 16;377(20):6139-8297.  Xochitl ANDREW, et al. N Engl J Med.  2018 Dec 13;379(84):5482-3517.     Allergies:Percocet [oxycodone-acetaminophen]    /82   Pulse 89   Temp 36.8 °C (98.3 °F) (Temporal)   Resp 18   Ht 1.68 m (5' 6.14\")   Wt 60.6 kg (133 lb 9.6 oz)   LMP  (LMP Unknown)   SpO2 91%   BMI 21.47 kg/m²  Body surface area is 1.68 meters squared.    Labs 06/05/19: [facility]  ANC ~3500 Plt = 273k Hgb = 13.4     Labs 06/03/19: [facility]  SCr = 0.9 mg/dL CrCl~68 mL/min LFTs/Tbili = WNL     Labs 05/06/19:  TSH = 1.630       Durvalumab (Imfinzi) 10 mg/kg x 60.6 kg = 606 mg    Dose rounded to nearest vial size (within 10%)    Ok to treat with dose as ordered = 620 mg IV      Ernst Moss, PharmD, BCOP    "

## 2019-06-10 ENCOUNTER — PATIENT OUTREACH (OUTPATIENT)
Dept: OTHER | Facility: MEDICAL CENTER | Age: 54
End: 2019-06-10

## 2019-06-10 NOTE — PROGRESS NOTES
Oncology Nurse Navigator reached out to patient regarding her request to an application for the [facility] Application.  I left my contact information for her Amy was not available at this time.

## 2019-06-13 ENCOUNTER — PATIENT OUTREACH (OUTPATIENT)
Dept: HEALTH INFORMATION MANAGEMENT | Facility: OTHER | Age: 54
End: 2019-06-13

## 2019-06-18 RX ORDER — 0.9 % SODIUM CHLORIDE 0.9 %
VIAL (ML) INJECTION PRN
Status: CANCELLED | OUTPATIENT
Start: 2019-06-18

## 2019-06-18 RX ORDER — 0.9 % SODIUM CHLORIDE 0.9 %
VIAL (ML) INJECTION PRN
Status: CANCELLED | OUTPATIENT
Start: 2019-06-19

## 2019-06-18 RX ORDER — 0.9 % SODIUM CHLORIDE 0.9 %
5 VIAL (ML) INJECTION PRN
Status: CANCELLED | OUTPATIENT
Start: 2019-06-18

## 2019-06-18 RX ORDER — ONDANSETRON 8 MG/1
8 TABLET, ORALLY DISINTEGRATING ORAL
Status: CANCELLED | OUTPATIENT
Start: 2019-06-19

## 2019-06-18 RX ORDER — PROCHLORPERAZINE MALEATE 10 MG
10 TABLET ORAL EVERY 6 HOURS PRN
Status: CANCELLED | OUTPATIENT
Start: 2019-06-19

## 2019-06-18 RX ORDER — SODIUM CHLORIDE 9 MG/ML
INJECTION, SOLUTION INTRAVENOUS CONTINUOUS
Status: CANCELLED | OUTPATIENT
Start: 2019-06-19

## 2019-06-18 RX ORDER — 0.9 % SODIUM CHLORIDE 0.9 %
5 VIAL (ML) INJECTION PRN
Status: CANCELLED | OUTPATIENT
Start: 2019-06-19

## 2019-06-18 RX ORDER — ONDANSETRON 2 MG/ML
4 INJECTION INTRAMUSCULAR; INTRAVENOUS
Status: CANCELLED | OUTPATIENT
Start: 2019-06-19

## 2019-06-19 ENCOUNTER — OUTPATIENT INFUSION SERVICES (OUTPATIENT)
Dept: ONCOLOGY | Facility: MEDICAL CENTER | Age: 54
End: 2019-06-19
Attending: INTERNAL MEDICINE
Payer: MEDICAID

## 2019-06-19 ENCOUNTER — PATIENT OUTREACH (OUTPATIENT)
Dept: OTHER | Facility: MEDICAL CENTER | Age: 54
End: 2019-06-19

## 2019-06-19 VITALS
HEART RATE: 91 BPM | TEMPERATURE: 99.1 F | HEIGHT: 66 IN | DIASTOLIC BLOOD PRESSURE: 82 MMHG | RESPIRATION RATE: 18 BRPM | OXYGEN SATURATION: 94 % | SYSTOLIC BLOOD PRESSURE: 133 MMHG | WEIGHT: 134.48 LBS | BODY MASS INDEX: 21.61 KG/M2

## 2019-06-19 DIAGNOSIS — C34.90 NON-SMALL CELL LUNG CANCER, UNSPECIFIED LATERALITY (HCC): ICD-10-CM

## 2019-06-19 PROCEDURE — 306780 HCHG STAT FOR TRANSFUSION PER CASE

## 2019-06-19 NOTE — PROGRESS NOTES
"Patient arrived with daughter for Imfinzi. When this RN told them we needed a CMP and TSH patient and patients daughter stated that \"MD told us that those only have to be once a month, how long do those take?\" I told patients it can take up to an hour to get CMP results. They requested we contact doctor to see if we can not do them because they are on a tight schedule today. Called Lu Caldwell RN whom stated that a CMP is required for this medication, and that pharmacy cannot give the medication without a kidney function. Patient and daughter then stated they were leaving and that they would come back at their next scheduled appointment  In July despite offer to reschedule for this week. Lu JENKINS notified whom is in contact with MD. Left ambulatory with daughter at this time.    "

## 2019-06-19 NOTE — PROGRESS NOTES
Navigation attempted to meet with patient to get RCF application but patient left please refer to the nurses progress note.

## 2019-07-03 ENCOUNTER — OUTPATIENT INFUSION SERVICES (OUTPATIENT)
Dept: ONCOLOGY | Facility: MEDICAL CENTER | Age: 54
End: 2019-07-03
Attending: INTERNAL MEDICINE
Payer: MEDICAID

## 2019-07-03 ENCOUNTER — PATIENT OUTREACH (OUTPATIENT)
Dept: OTHER | Facility: MEDICAL CENTER | Age: 54
End: 2019-07-03

## 2019-07-03 VITALS
TEMPERATURE: 98 F | DIASTOLIC BLOOD PRESSURE: 69 MMHG | BODY MASS INDEX: 21.61 KG/M2 | WEIGHT: 134.48 LBS | RESPIRATION RATE: 18 BRPM | HEART RATE: 97 BPM | OXYGEN SATURATION: 95 % | HEIGHT: 66 IN | SYSTOLIC BLOOD PRESSURE: 122 MMHG

## 2019-07-03 DIAGNOSIS — C34.90 NON-SMALL CELL LUNG CANCER, UNSPECIFIED LATERALITY (HCC): ICD-10-CM

## 2019-07-03 PROCEDURE — A4212 NON CORING NEEDLE OR STYLET: HCPCS

## 2019-07-03 PROCEDURE — 700105 HCHG RX REV CODE 258

## 2019-07-03 PROCEDURE — 700111 HCHG RX REV CODE 636 W/ 250 OVERRIDE (IP)

## 2019-07-03 PROCEDURE — 700105 HCHG RX REV CODE 258: Performed by: INTERNAL MEDICINE

## 2019-07-03 PROCEDURE — 96413 CHEMO IV INFUSION 1 HR: CPT

## 2019-07-03 RX ORDER — LIDOCAINE HYDROCHLORIDE 10 MG/ML
INJECTION, SOLUTION EPIDURAL; INFILTRATION; INTRACAUDAL; PERINEURAL
Status: COMPLETED
Start: 2019-07-03 | End: 2019-07-03

## 2019-07-03 RX ORDER — 0.9 % SODIUM CHLORIDE 0.9 %
VIAL (ML) INJECTION PRN
Status: CANCELLED | OUTPATIENT
Start: 2019-07-16

## 2019-07-03 RX ORDER — ONDANSETRON 2 MG/ML
4 INJECTION INTRAMUSCULAR; INTRAVENOUS
Status: CANCELLED | OUTPATIENT
Start: 2019-07-17

## 2019-07-03 RX ORDER — 0.9 % SODIUM CHLORIDE 0.9 %
5 VIAL (ML) INJECTION PRN
Status: CANCELLED | OUTPATIENT
Start: 2019-07-17

## 2019-07-03 RX ORDER — 0.9 % SODIUM CHLORIDE 0.9 %
VIAL (ML) INJECTION PRN
Status: CANCELLED | OUTPATIENT
Start: 2019-07-17

## 2019-07-03 RX ORDER — PROCHLORPERAZINE MALEATE 10 MG
10 TABLET ORAL EVERY 6 HOURS PRN
Status: CANCELLED | OUTPATIENT
Start: 2019-07-17

## 2019-07-03 RX ORDER — SODIUM CHLORIDE 9 MG/ML
INJECTION, SOLUTION INTRAVENOUS CONTINUOUS
Status: CANCELLED | OUTPATIENT
Start: 2019-07-17

## 2019-07-03 RX ORDER — 0.9 % SODIUM CHLORIDE 0.9 %
5 VIAL (ML) INJECTION PRN
Status: CANCELLED | OUTPATIENT
Start: 2019-07-16

## 2019-07-03 RX ORDER — ONDANSETRON 8 MG/1
8 TABLET, ORALLY DISINTEGRATING ORAL
Status: CANCELLED | OUTPATIENT
Start: 2019-07-17

## 2019-07-03 RX ADMIN — LIDOCAINE HYDROCHLORIDE 2 ML: 10 INJECTION, SOLUTION EPIDURAL; INFILTRATION; INTRACAUDAL; PERINEURAL at 09:55

## 2019-07-03 RX ADMIN — HEPARIN 500 UNITS: 100 SYRINGE at 11:30

## 2019-07-03 RX ADMIN — SODIUM CHLORIDE 620 MG: 9 INJECTION, SOLUTION INTRAVENOUS at 10:15

## 2019-07-03 NOTE — PROGRESS NOTES
Chemotherapy Verification - SECONDARY RN       Height = 66.25 inches  Weight = 61 kg  BSA = 1.69 m2       Medication: Durvalumab (Imfinzi)  Dose: 10 mg/kg  Calculated Dose: 610 mg                             (In mg/m2, AUC, mg/kg)       I confirm that this process was performed independently.

## 2019-07-03 NOTE — PROGRESS NOTES
into [facility] for Cycle 2 of Imfinzi for Lung Cancer. Pt denied having any new or acute complaints today, reports tolerating past treatments well. Port accessed in a sterile manner, had + blood return, flushed briskly. Pt given Chemotherapy as prescribed, tolerated well, denied having any complaints during or after infusion. Port had + blood return after, flushed per [facility] policy, de-accessed, needle intact, insertion site covered with sterile gauze and paper tape. Discharge home with daughter to self care in [facility]. Appointment confirm for next treatment.

## 2019-07-03 NOTE — PROGRESS NOTES
"Pharmacy Chemotherapy Calculation:    Patient Name: Amy Maki  Dx: NSCLC    Cycle 2- delayed per pt choice  Previous treatment = C1 = 6/5/19    Protocol: Durvalumab     *Dosing Reference*  Durvalumab 10mg/kg IV over 60 min on day 1  Q14 days until DP/UT or a max of 12 months  NCCN Guidelines for NSCLC V.3.2019  Xochitl ANDREW, et al. N Engl J Med.  2017 Nov 16;377(20):5058-3440.  Xochitl ANDREW, et al. N Engl J Med.  2018 Dec 13;379(80):0363-1377.     Allergies:Percocet [oxycodone-acetaminophen]    /69   Pulse 97   Temp 36.7 °C (98 °F) (Temporal)   Resp 18   Ht 1.683 m (5' 6.25\")   Wt 61 kg (134 lb 7.7 oz)   LMP  (LMP Unknown)   SpO2 95%   BMI 21.54 kg/m²  Body surface area is 1.69 meters squared.    All lab results 7/1/19 and 7/3/19 within treatment parameters.       Durvalumab (Imfinzi) 10 mg/kg x 61kg = 610mg    Dose rounded to nearest vial size (within 10%)    Ok to treat with dose as ordered = 620 mg IV      ANA Larios PharmHermesD.      "

## 2019-07-03 NOTE — PROGRESS NOTES
"Pharmacy Chemotherapy Verification  Patient NameL Amy Maki  DX:NSCLC    Protocol: Durvalumab    Durvalumab 10 mg/kg IV over 60 min on day 1  14 day cycle until DP or UT for a maximum of 12 months  NCCN Guidelines for NSCLC V.3.2019  THADDEUS Leung et al. Durvalumab after Chemoradiotherapy in Stage III Non Small-Cell Lung Cancer. N Engl J Med 2017;1-11    Allergies:Carboplatin and Percocet [oxycodone-acetaminophen]  /69   Pulse 97   Temp 36.7 °C (98 °F) (Temporal)   Resp 18   Ht 1.683 m (5' 6.25\")   Wt 61 kg (134 lb 7.7 oz)   LMP  (LMP Unknown)   SpO2 95%   BMI 21.54 kg/m²    Body surface area is 1.69 meters squared.       Labs 7/1/19  ANC 2600 Hgb 10.9 Plt 226k  SCr 0.9 CrCl 67.7 mL/min   AST/ALT/AP = 16/19/89 Tbili = 0.3  TSH = 3.21    Drug Order   (Drug name, dose, route, IV Fluid & volume, frequency, number of doses) Cycle: 2   - delayed due to scheduling  Previous treatment: C1 6/5/19; CARBOplatin/PACLitaxel x 7 cycles 3/14/19   Medication = Durvalumab  Base Dose = 10 mg/kg  Calc Dose: Base Dose x 61 kg = 610 mg  Final Dose = 620 mg  Route = IV  Fluid & Volume =  mL  Admin Duration = Over 60 minutes          <10% difference, rounded to vial size per protocol, ok to treat with final written dose      By my signature below, I confirm this process was performed independently with the BSA and all final chemotherapy dosing calculations congruent. I have reviewed the above chemotherapy order and that my calculation of the final dose and BSA (when applicable) corroborate those calculations of the  pharmacist. Discrepancies of 5% or greater in the written dose have been addressed and documented within the [facility] Progress notes.    Shruthi Juarez, PharmD, BCOP    "

## 2019-07-03 NOTE — PROGRESS NOTES
RCF application completed and was given to [facility]  Emily.  I explained to her that Emily would be the point of contact whether the application is approved or not.

## 2019-07-03 NOTE — PROGRESS NOTES
Chemotherapy Verification - PRIMARY RN      Height = 66.25 in  Weight = 134 lb  BSA = 1.69 m2       Medication: Imfinzi  Dose: 10 mg/kg  Calculated Dose: 610 mg                             (In mg/m2, AUC, mg/kg)     I confirm this process was performed independently with the BSA and all final chemotherapy dosing calculations congruent.  Any discrepancies of 10% or greater have been addressed with the chemotherapy pharmacist. The resolution of the discrepancy has been documented in the EPIC progress notes.

## 2019-07-17 ENCOUNTER — OUTPATIENT INFUSION SERVICES (OUTPATIENT)
Dept: ONCOLOGY | Facility: MEDICAL CENTER | Age: 54
End: 2019-07-17
Attending: INTERNAL MEDICINE
Payer: MEDICAID

## 2019-07-17 VITALS
HEIGHT: 66 IN | RESPIRATION RATE: 18 BRPM | WEIGHT: 135.14 LBS | SYSTOLIC BLOOD PRESSURE: 142 MMHG | OXYGEN SATURATION: 98 % | BODY MASS INDEX: 21.72 KG/M2 | DIASTOLIC BLOOD PRESSURE: 80 MMHG | TEMPERATURE: 98.9 F | HEART RATE: 88 BPM

## 2019-07-17 DIAGNOSIS — C34.90 NON-SMALL CELL LUNG CANCER, UNSPECIFIED LATERALITY (HCC): ICD-10-CM

## 2019-07-17 LAB — T4 FREE SERPL-MCNC: 0.94 NG/DL (ref 0.53–1.43)

## 2019-07-17 PROCEDURE — A4212 NON CORING NEEDLE OR STYLET: HCPCS

## 2019-07-17 PROCEDURE — 96413 CHEMO IV INFUSION 1 HR: CPT

## 2019-07-17 PROCEDURE — 700105 HCHG RX REV CODE 258: Performed by: INTERNAL MEDICINE

## 2019-07-17 PROCEDURE — 700111 HCHG RX REV CODE 636 W/ 250 OVERRIDE (IP): Performed by: INTERNAL MEDICINE

## 2019-07-17 PROCEDURE — 84439 ASSAY OF FREE THYROXINE: CPT

## 2019-07-17 PROCEDURE — 700111 HCHG RX REV CODE 636 W/ 250 OVERRIDE (IP)

## 2019-07-17 PROCEDURE — 700105 HCHG RX REV CODE 258

## 2019-07-17 RX ADMIN — SODIUM CHLORIDE 620 MG: 9 INJECTION, SOLUTION INTRAVENOUS at 10:23

## 2019-07-17 RX ADMIN — HEPARIN 500 UNITS: 100 SYRINGE at 11:45

## 2019-07-17 NOTE — PROGRESS NOTES
Chemotherapy Verification - SECONDARY RN       Height = 66.14 in  Weight = 61.3 kg  BSA = 1.69 m2       Medication: Durvalumab  Dose: 10 mg/kg  Calculated Dose: 613 mg                             (In mg/m2, AUC, mg/kg)     I confirm that this process was performed independently.

## 2019-07-17 NOTE — PROGRESS NOTES
"Pharmacy Chemotherapy Verification  Patient NameL Amy Maki  DX:NSCLC    Protocol: Durvalumab    Durvalumab 10 mg/kg IV over 60 min on day 1  14 day cycle until DP or UT for a maximum of 12 months  NCCN Guidelines for NSCLC V.3.2019  THADDEUS Leung et al. Durvalumab after Chemoradiotherapy in Stage III Non Small-Cell Lung Cancer. N Engl J Med 2017;1-11    Allergies:Carboplatin and Percocet [oxycodone-acetaminophen]  /80   Pulse 88   Temp 37.2 °C (98.9 °F) (Temporal)   Resp 18   Ht 1.68 m (5' 6.14\")   Wt 61.3 kg (135 lb 2.3 oz)   LMP  (LMP Unknown)   SpO2 98%   BMI 21.72 kg/m²    Body surface area is 1.69 meters squared.       Labs 7/17/19  Free T4 = in process    Labs 7/17/19 ([facility])  ANC 4200 Hgb 14.7 Plt 257k    Labs 7/15/19  SCr 0.9 CrCl 67.9 mL/min   AST/ALT/AP = 16/17/91 Tbili = 0.2  TSH = 1.43    Drug Order   (Drug name, dose, route, IV Fluid & volume, frequency, number of doses) Cycle: 3   Previous treatment: C2 7/3/19; CARBOplatin/PACLitaxel x 7 cycles 3/14/19   Medication = Durvalumab  Base Dose = 10 mg/kg  Calc Dose: Base Dose x 61.3 kg = 613 mg  Final Dose = 620 mg  Route = IV  Fluid & Volume =  mL  Admin Duration = Over 60 minutes          <10% difference, rounded to vial size per protocol, ok to treat with final written dose      By my signature below, I confirm this process was performed independently with the BSA and all final chemotherapy dosing calculations congruent. I have reviewed the above chemotherapy order and that my calculation of the final dose and BSA (when applicable) corroborate those calculations of the  pharmacist. Discrepancies of 5% or greater in the written dose have been addressed and documented within the [facility] Progress notes.    Shruthi Juarez, PharmD, BCOP    "

## 2019-07-17 NOTE — PROGRESS NOTES
Chemotherapy Verification - PRIMARY RN    Cycle 3, Day 1  Free thyroxine in process      Height = 168cm  Weight = 61.3kg  BSA = 1.69m2       Medication: Imfinzi  Dose: 10mg/kg  Calculated Dose: 613mg                             (In mg/m2, AUC, mg/kg)       I confirm this process was performed independently with the BSA and all final chemotherapy dosing calculations congruent.  Any discrepancies of 10% or greater have been addressed with the chemotherapy pharmacist. The resolution of the discrepancy has been documented in the EPIC progress notes.

## 2019-07-17 NOTE — PROGRESS NOTES
"Pharmacy Chemotherapy Calculation:    Patient Name: Amy Maki  Dx: NSCLC    Cycle 3  Previous treatment = C2 = 7/3/19    Protocol: Durvalumab     *Dosing Reference*  Durvalumab 10mg/kg IV over 60 min on day 1  Q14 days until DP/UT or a max of 12 months  NCCN Guidelines for NSCLC V.3.2019  felicity Song. N Engl J Med.  2017 Nov 16;377(20):2547-1444.  Xochitl ANDREW, et al. N Engl J Med.  2018 Dec 13;379(32):2695-5724.     Allergies:Percocet [oxycodone-acetaminophen]    /80   Pulse 88   Temp 37.2 °C (98.9 °F) (Temporal)   Resp 18   Ht 1.68 m (5' 6.14\")   Wt 61.3 kg (135 lb 2.3 oz)   LMP  (LMP Unknown)   SpO2 98%   BMI 21.72 kg/m²  Body surface area is 1.69 meters squared.    All lab results 7/15/19 and 7/17/19 within treatment parameters.       Durvalumab (Imfinzi) 10 mg/kg x 61.3kg = 613mg    Dose rounded to nearest vial size (within 10%)    Ok to treat with dose as ordered = 620 mg IV      ANA Larios PharmHermesD.      "

## 2019-07-17 NOTE — PROGRESS NOTES
Pt returns to infusion center for scheduled Imfinzi.  Port accessed in sterile fashion, free thyroxine drawn.  Labs reviewed.  Pt tolerated infusion without incident.  Port flushed with saline and heparin per policy, de-accessed, gauze dressing placed.  Pt left infusion center ambulatory and in good condition.  Return appointment scheduled.

## 2019-07-31 ENCOUNTER — OUTPATIENT INFUSION SERVICES (OUTPATIENT)
Dept: ONCOLOGY | Facility: MEDICAL CENTER | Age: 54
End: 2019-07-31
Attending: INTERNAL MEDICINE
Payer: MEDICAID

## 2019-07-31 VITALS
WEIGHT: 135.8 LBS | RESPIRATION RATE: 18 BRPM | HEIGHT: 66 IN | BODY MASS INDEX: 21.83 KG/M2 | HEART RATE: 86 BPM | SYSTOLIC BLOOD PRESSURE: 145 MMHG | OXYGEN SATURATION: 94 % | DIASTOLIC BLOOD PRESSURE: 81 MMHG | TEMPERATURE: 98.7 F

## 2019-07-31 DIAGNOSIS — C34.90 NON-SMALL CELL LUNG CANCER, UNSPECIFIED LATERALITY (HCC): ICD-10-CM

## 2019-07-31 LAB — T4 FREE SERPL-MCNC: 1.84 NG/DL (ref 0.53–1.43)

## 2019-07-31 PROCEDURE — A4212 NON CORING NEEDLE OR STYLET: HCPCS

## 2019-07-31 PROCEDURE — 84439 ASSAY OF FREE THYROXINE: CPT

## 2019-07-31 PROCEDURE — 96413 CHEMO IV INFUSION 1 HR: CPT

## 2019-07-31 PROCEDURE — 700111 HCHG RX REV CODE 636 W/ 250 OVERRIDE (IP): Performed by: INTERNAL MEDICINE

## 2019-07-31 PROCEDURE — 700105 HCHG RX REV CODE 258

## 2019-07-31 PROCEDURE — 700111 HCHG RX REV CODE 636 W/ 250 OVERRIDE (IP)

## 2019-07-31 PROCEDURE — 700105 HCHG RX REV CODE 258: Performed by: INTERNAL MEDICINE

## 2019-07-31 RX ORDER — ONDANSETRON 8 MG/1
8 TABLET, ORALLY DISINTEGRATING ORAL
Status: CANCELLED | OUTPATIENT
Start: 2019-07-31

## 2019-07-31 RX ORDER — 0.9 % SODIUM CHLORIDE 0.9 %
VIAL (ML) INJECTION PRN
Status: CANCELLED | OUTPATIENT
Start: 2019-07-31

## 2019-07-31 RX ORDER — PROCHLORPERAZINE MALEATE 10 MG
10 TABLET ORAL EVERY 6 HOURS PRN
Status: CANCELLED | OUTPATIENT
Start: 2019-07-31

## 2019-07-31 RX ORDER — SODIUM CHLORIDE 9 MG/ML
INJECTION, SOLUTION INTRAVENOUS CONTINUOUS
Status: CANCELLED | OUTPATIENT
Start: 2019-07-31

## 2019-07-31 RX ORDER — 0.9 % SODIUM CHLORIDE 0.9 %
5 VIAL (ML) INJECTION PRN
Status: CANCELLED | OUTPATIENT
Start: 2019-07-31

## 2019-07-31 RX ORDER — ONDANSETRON 2 MG/ML
4 INJECTION INTRAMUSCULAR; INTRAVENOUS
Status: CANCELLED | OUTPATIENT
Start: 2019-07-31

## 2019-07-31 RX ADMIN — HEPARIN 500 UNITS: 100 SYRINGE at 12:15

## 2019-07-31 RX ADMIN — SODIUM CHLORIDE 620 MG: 9 INJECTION, SOLUTION INTRAVENOUS at 10:53

## 2019-07-31 NOTE — PROGRESS NOTES
Pt ambulatory to [facility] w/ daughter for C4 of Imfinzi for NSCLC.  Pt w/ no s/s of infection, pt has no complaints at this time.  Pt PORT accessed using sterile technique, brisk blood return noted, free T4 lab drawn per orders, flushed per protocol.  Pt had all other labs done prior to visit, verified results w/n parameters for tx today.  Imfinzi infused w/ filter in place w/ no adverse reactions.  PORT w/ brisk blood return noted post-infusion, flushed and heparinized per protocol, de-accessed, gauze and tape bandage applied.  Pt left on foot in care of daughter in [place].  Confirmed pt's next appt.

## 2019-07-31 NOTE — PROGRESS NOTES
"Pharmacy Chemotherapy Verification  Patient NameL Amy Maki  DX:NSCLC    Protocol: Durvalumab    Durvalumab 10 mg/kg IV over 60 min on day 1  14 day cycle until DP or UT for a maximum of 12 months  NCCN Guidelines for NSCLC V.3.2019  THADDEUS Leung et al. Durvalumab after Chemoradiotherapy in Stage III Non Small-Cell Lung Cancer. N Engl J Med 2017;1-11    Allergies:Carboplatin and Percocet [oxycodone-acetaminophen]  /81   Pulse 86   Temp 37.1 °C (98.7 °F) (Temporal)   Resp 18   Ht 1.676 m (5' 6\") Comment: 1 inch off shoes.  Wt 61.6 kg (135 lb 12.9 oz)   LMP  (LMP Unknown)   SpO2 94%   BMI 21.92 kg/m²    Body surface area is 1.69 meters squared.       Labs from 7/29/19 and 7/31/19 reviewed - all within treatment parameters. Noted: TSH low and free T4 elevated.    Drug Order   (Drug name, dose, route, IV Fluid & volume, frequency, number of doses) Cycle: 4   Previous treatment: 7/17/19; CARBOplatin/PACLitaxel x 7 cycles 3/14/19   Medication = Durvalumab  Base Dose = 10 mg/kg  Calc Dose: Base Dose x 61.6 kg = 616 mg  Final Dose = 620 mg  Route = IV  Fluid & Volume =  mL  Admin Duration = Over 60 minutes          <10% difference, rounded to vial size per protocol, ok to treat with final written dose      By my signature below, I confirm this process was performed independently with the BSA and all final chemotherapy dosing calculations congruent. I have reviewed the above chemotherapy order and that my calculation of the final dose and BSA (when applicable) corroborate those calculations of the  pharmacist. Discrepancies of 10% or greater in the written dose have been addressed and documented within the [facility] Progress notes.    Sobeida Baker, PharmD, BCOP      "

## 2019-07-31 NOTE — PROGRESS NOTES
Chemotherapy Verification - SECONDARY RN       Height = 66 inches  Weight = 61.6 kg  BSA = 1.69 m2       Medication: Durvalumab (Imfinzi) Dose: 10 mg/kg  Calculated Dose: 616 mg                             (In mg/m2, AUC, mg/kg)         I confirm that this process was performed independently.

## 2019-07-31 NOTE — PROGRESS NOTES
"Pharmacy Chemotherapy Calculation:    Patient Name: Amy Maki  Dx: NSCLC    Cycle 4  Previous treatment = C3 = 7/17/19    Protocol: Durvalumab     *Dosing Reference*  Durvalumab 10mg/kg IV over 60 min on day 1  Q14 days until DP/UT or a max of 12 months  NCCN Guidelines for NSCLC V.3.2019  felicity Song. N Engl J Med.  2017 Nov 16;377(20):3847-1304.  Xochitl ANDREW, et al. N Engl J Med.  2018 Dec 13;379(55):1054-2766.     Allergies:Percocet [oxycodone-acetaminophen]    /81   Pulse 86   Temp 37.1 °C (98.7 °F) (Temporal)   Resp 18   Ht 1.676 m (5' 6\") Comment: 1 inch off shoes.  Wt 61.6 kg (135 lb 12.9 oz)   LMP  (LMP Unknown)   SpO2 94%   BMI 21.92 kg/m²  Body surface area is 1.69 meters squared.    All lab results 7/29 and 7/31/19([facility]) within treatment parameters.       Durvalumab (Imfinzi) 10 mg/kg x 61.6kg = 616mg    Dose rounded to nearest vial size (within 10%)    Ok to treat with dose as ordered = 620 mg IV      ANA Larios, Pharm.D.      "

## 2019-07-31 NOTE — PROGRESS NOTES
Chemotherapy Verification - PRIMARY RN      Height = 1.676 m  Weight = 61.6 kg  BSA = 1.69 m2       Medication: durvalumab  Dose: 10 mg/kg  Calculated Dose: 616 mg                             (In mg/m2, AUC, mg/kg)     I confirm this process was performed independently with the BSA and all final chemotherapy dosing calculations congruent.  Any discrepancies of 10% or greater have been addressed with the chemotherapy pharmacist. The resolution of the discrepancy has been documented in the EPIC progress notes.

## 2019-08-02 ENCOUNTER — PATIENT OUTREACH (OUTPATIENT)
Dept: OTHER | Facility: MEDICAL CENTER | Age: 54
End: 2019-08-02
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
## 2019-08-06 ENCOUNTER — PATIENT OUTREACH (OUTPATIENT)
Dept: OTHER | Facility: MEDICAL CENTER | Age: 54
End: 2019-08-06

## 2019-08-06 NOTE — PROGRESS NOTES
On August 6, 2019, Oncology Social Worker Emily Martinez attempted telephone call to pt.  OSW Juan left voicemail message for pt. informing her [facility] had approved her application and agreed to provide assistance in the amount of $700 for gas and food.  Check will be mailed today.

## 2019-08-13 NOTE — PROGRESS NOTES
"Pharmacy Chemotherapy Verification  Patient NameL Amy Maki  DX:NSCLC    Protocol: Durvalumab    Durvalumab 10 mg/kg IV over 60 min on day 1  14 day cycle until DP or UT for a maximum of 12 months  NCCN Guidelines for NSCLC V.3.2019  THADDEUS Leung et al. Durvalumab after Chemoradiotherapy in Stage III Non Small-Cell Lung Cancer. N Engl J Med 2017;1-11    Allergies:Carboplatin and Percocet [oxycodone-acetaminophen]  /78   Pulse 100   Temp 37 °C (98.6 °F) (Temporal)   Resp 18   Ht 1.689 m (5' 6.5\") Comment: 1 inch off shoes.  Wt 61.3 kg (135 lb 2.3 oz)   LMP  (LMP Unknown)   SpO2 94%   BMI 21.49 kg/m²    Body surface area is 1.7 meters squared.       Labs from 8/14/19 ([facility]) and 8/12/19 ([facility]) reviewed - all within treatment parameters. Noted: TSH low     Drug Order   (Drug name, dose, route, IV Fluid & volume, frequency, number of doses) Cycle: 5  Previous treatment: 7/31/19; CARBOplatin/PACLitaxel x 7 cycles 3/14/19   Medication = Durvalumab  Base Dose = 10 mg/kg  Calc Dose: Base Dose x 61.3 kg = 613 mg  Final Dose = 620 mg  Route = IV  Fluid & Volume =  mL  Admin Duration = Over 60 minutes          <10% difference, rounded to vial size per protocol, ok to treat with final written dose      By my signature below, I confirm this process was performed independently with the BSA and all final chemotherapy dosing calculations congruent. I have reviewed the above chemotherapy order and that my calculation of the final dose and BSA (when applicable) corroborate those calculations of the  pharmacist. Discrepancies of 10% or greater in the written dose have been addressed and documented within the [facility] Progress notes.    Reid Mejia, PharmD      "

## 2019-08-14 ENCOUNTER — OUTPATIENT INFUSION SERVICES (OUTPATIENT)
Dept: ONCOLOGY | Facility: MEDICAL CENTER | Age: 54
End: 2019-08-14
Attending: INTERNAL MEDICINE
Payer: MEDICAID

## 2019-08-14 VITALS
DIASTOLIC BLOOD PRESSURE: 78 MMHG | RESPIRATION RATE: 18 BRPM | OXYGEN SATURATION: 94 % | BODY MASS INDEX: 21.21 KG/M2 | SYSTOLIC BLOOD PRESSURE: 125 MMHG | TEMPERATURE: 98.6 F | HEART RATE: 100 BPM | WEIGHT: 135.14 LBS | HEIGHT: 67 IN

## 2019-08-14 DIAGNOSIS — C34.90 NON-SMALL CELL LUNG CANCER, UNSPECIFIED LATERALITY (HCC): ICD-10-CM

## 2019-08-14 LAB — T4 FREE SERPL-MCNC: 2.43 NG/DL (ref 0.53–1.43)

## 2019-08-14 PROCEDURE — 700105 HCHG RX REV CODE 258: Performed by: INTERNAL MEDICINE

## 2019-08-14 PROCEDURE — A4212 NON CORING NEEDLE OR STYLET: HCPCS

## 2019-08-14 PROCEDURE — 96413 CHEMO IV INFUSION 1 HR: CPT

## 2019-08-14 PROCEDURE — 700105 HCHG RX REV CODE 258

## 2019-08-14 PROCEDURE — 700111 HCHG RX REV CODE 636 W/ 250 OVERRIDE (IP): Performed by: INTERNAL MEDICINE

## 2019-08-14 PROCEDURE — 84439 ASSAY OF FREE THYROXINE: CPT

## 2019-08-14 PROCEDURE — 700111 HCHG RX REV CODE 636 W/ 250 OVERRIDE (IP)

## 2019-08-14 RX ORDER — PROCHLORPERAZINE MALEATE 10 MG
10 TABLET ORAL EVERY 6 HOURS PRN
Status: CANCELLED | OUTPATIENT
Start: 2019-08-14

## 2019-08-14 RX ORDER — SODIUM CHLORIDE 9 MG/ML
INJECTION, SOLUTION INTRAVENOUS CONTINUOUS
Status: CANCELLED | OUTPATIENT
Start: 2019-08-14

## 2019-08-14 RX ORDER — 0.9 % SODIUM CHLORIDE 0.9 %
VIAL (ML) INJECTION PRN
Status: CANCELLED | OUTPATIENT
Start: 2019-08-14

## 2019-08-14 RX ORDER — ONDANSETRON 2 MG/ML
4 INJECTION INTRAMUSCULAR; INTRAVENOUS
Status: CANCELLED | OUTPATIENT
Start: 2019-08-14

## 2019-08-14 RX ORDER — 0.9 % SODIUM CHLORIDE 0.9 %
5 VIAL (ML) INJECTION PRN
Status: CANCELLED | OUTPATIENT
Start: 2019-08-14

## 2019-08-14 RX ORDER — ONDANSETRON 8 MG/1
8 TABLET, ORALLY DISINTEGRATING ORAL
Status: CANCELLED | OUTPATIENT
Start: 2019-08-14

## 2019-08-14 RX ADMIN — SODIUM CHLORIDE 620 MG: 9 INJECTION, SOLUTION INTRAVENOUS at 10:00

## 2019-08-14 RX ADMIN — HEPARIN 500 UNITS: 100 SYRINGE at 11:17

## 2019-08-14 NOTE — PROGRESS NOTES
Chemotherapy Verification - PRIMARY RN      Height = 168.9cm  Weight = 61.3kg  BSA = 1.7m2       Medication: Imfinzi  Dose: 10mg/kg  Calculated Dose: 613mg                             (In mg/m2, AUC, mg/kg)     I confirm this process was performed independently with the BSA and all final chemotherapy dosing calculations congruent.  Any discrepancies of 10% or greater have been addressed with the chemotherapy pharmacist. The resolution of the discrepancy has been documented in the EPIC progress notes.

## 2019-08-14 NOTE — PROGRESS NOTES
"Pharmacy Chemotherapy Verification  Patient Name: Amy Maki  Dx: NSCLC    Cycle 5  Previous treatment = C4 7/31/19    Protocol: Durvalumab     *Dosing Reference*  Durvalumab 10 mg/kg IV over 60 min on day 1  Q14 days until DP/UT or a max of 12 months  NCCN Guidelines for NSCLC V.3.2019  Xochitl ANDREW et al. N Engl J Med.  2017 Nov 16;377(20):4030-6214.  Xochitl ANDREW, et al. N Engl J Med.  2018 Dec 13;379(01):0006-1216.     Allergies:Percocet [oxycodone-acetaminophen]  /78   Pulse 100   Temp 37 °C (98.6 °F) (Temporal)   Resp 18   Ht 1.689 m (5' 6.5\") Comment: 1 inch off shoes.  Wt 61.3 kg (135 lb 2.3 oz)   LMP  (LMP Unknown)   SpO2 94%   BMI 21.49 kg/m²  Body surface area is 1.7 meters squared.    Labs 8/14/19 ([facility])  ANC 4400 Hgb 13.5 Plt 248k     Labs 8/14/19 ([facility])  Free T4 = in process    Labs 8/12/19 ([facility])  SCr 0.8 CrCl 76.4 mL/min   AST/ALT/AP = 20/20/83 Tbili = 0.3  TSH 0.01  Durvalumab (Imfinzi) 10 mg/kg x 61.3 kg = 613 mg    Dose rounded to nearest vial size (within 10%)    Ok to treat with dose as ordered = 620 mg IV    Shruthi Juarez, PharmD, BCOP        "

## 2019-08-14 NOTE — PROGRESS NOTES
Chemotherapy Verification - SECONDARY RN       Height = 66.5 in  Weight = 61.3 kg  BSA = 1.7 m2       Medication: Durvalumab  Dose: 10 mg/kg  Calculated Dose: 613 mg                             (In mg/m2, AUC, mg/kg)     I confirm that this process was performed independently.

## 2019-08-14 NOTE — PROGRESS NOTES
Pt returns to infusion center for scheduled Imfinzi.  Port accessed in sterile fashion, thyroid panel and free thyroxine level drawn as ordered.  Labs reviewed.  Pt tolerated infusion without incident.  Port flushed with saline and heparin per policy, de-accessed, gauze dressing placed.  Pt left infusion center ambulatory and in good condition.  Return appointment scheduled.

## 2019-08-16 ENCOUNTER — PATIENT OUTREACH (OUTPATIENT)
Dept: OTHER | Facility: MEDICAL CENTER | Age: 54
End: 2019-08-16

## 2019-08-16 NOTE — PROGRESS NOTES
"On August 16, 2019, Oncology Social Worker Emily Martinez contacted pt. via telephone.  DARVIN Martinez informed pt. [facility] has agreed to help with the water pump repair in one of their vehicles and tires for the other.  DARVIN Martinez also informed pt. this would be the last assistance [facility] will be able to provide her.  Pt. stated this was \"a blessing and more than enough help.\"  DARVIN Martinez also informed pt. she would be referring pt. to Financial Resource Advocate Ino Finney to assist with the outstanding bill of $55,000 [facility] that pt. included in correspondence to DARVIN Martinez.  Pt. stated that was already taken care of and she only had to pay $25.00 which they sent off today.  No other barriers to care brought forward at this time.    "

## 2019-08-16 NOTE — PROGRESS NOTES
On August 16, 2019, Oncology Social Worker Emily Martinez contacted pt. via telephone.  Pt. stated she was feeling much better today than yesterday.  Pt. stated she had to take an ambulance ride to hospital yesterday and was told she had vertigo.  Pt. stated she had never experienced vertigo before.  DARVIN Martinez informed pt. she received paperwork left by patient and was confused as to the fact that pt. states she has two cars.  DARVIN Martinez informed pt. their last conversation pt. had stressed importance of the sole car in the household was used to take her to and from appointments.  Pt. stated she has always told people they have two cars.  DARVIN Martinez informed pt. she would submit the paperwork to [facility] and will call pt. once they provide her with decision on their assistance.  DARVIN Martinez also informed pt. about [employer] program for transportation.  DARVIN Martinez emailed pt. this information as well.

## 2019-08-28 ENCOUNTER — OUTPATIENT INFUSION SERVICES (OUTPATIENT)
Dept: ONCOLOGY | Facility: MEDICAL CENTER | Age: 54
End: 2019-08-28
Attending: INTERNAL MEDICINE
Payer: MEDICAID

## 2019-08-28 VITALS
HEIGHT: 67 IN | BODY MASS INDEX: 21.76 KG/M2 | DIASTOLIC BLOOD PRESSURE: 78 MMHG | OXYGEN SATURATION: 92 % | RESPIRATION RATE: 18 BRPM | HEART RATE: 97 BPM | SYSTOLIC BLOOD PRESSURE: 149 MMHG | TEMPERATURE: 98.6 F | WEIGHT: 138.67 LBS

## 2019-08-28 DIAGNOSIS — C34.90 NON-SMALL CELL LUNG CANCER, UNSPECIFIED LATERALITY (HCC): ICD-10-CM

## 2019-08-28 LAB — T4 FREE SERPL-MCNC: 1.81 NG/DL (ref 0.53–1.43)

## 2019-08-28 PROCEDURE — 700111 HCHG RX REV CODE 636 W/ 250 OVERRIDE (IP)

## 2019-08-28 PROCEDURE — 700111 HCHG RX REV CODE 636 W/ 250 OVERRIDE (IP): Performed by: INTERNAL MEDICINE

## 2019-08-28 PROCEDURE — 84439 ASSAY OF FREE THYROXINE: CPT

## 2019-08-28 PROCEDURE — 96413 CHEMO IV INFUSION 1 HR: CPT

## 2019-08-28 PROCEDURE — 96365 THER/PROPH/DIAG IV INF INIT: CPT

## 2019-08-28 PROCEDURE — 700105 HCHG RX REV CODE 258

## 2019-08-28 PROCEDURE — A4212 NON CORING NEEDLE OR STYLET: HCPCS

## 2019-08-28 PROCEDURE — 700105 HCHG RX REV CODE 258: Performed by: INTERNAL MEDICINE

## 2019-08-28 RX ORDER — PROCHLORPERAZINE MALEATE 10 MG
10 TABLET ORAL EVERY 6 HOURS PRN
Status: CANCELLED | OUTPATIENT
Start: 2019-08-28

## 2019-08-28 RX ORDER — 0.9 % SODIUM CHLORIDE 0.9 %
5 VIAL (ML) INJECTION PRN
Status: CANCELLED | OUTPATIENT
Start: 2019-08-28

## 2019-08-28 RX ORDER — 0.9 % SODIUM CHLORIDE 0.9 %
VIAL (ML) INJECTION PRN
Status: CANCELLED | OUTPATIENT
Start: 2019-08-28

## 2019-08-28 RX ORDER — SODIUM CHLORIDE 9 MG/ML
INJECTION, SOLUTION INTRAVENOUS CONTINUOUS
Status: CANCELLED | OUTPATIENT
Start: 2019-08-28

## 2019-08-28 RX ORDER — ONDANSETRON 8 MG/1
8 TABLET, ORALLY DISINTEGRATING ORAL
Status: CANCELLED | OUTPATIENT
Start: 2019-08-28

## 2019-08-28 RX ORDER — ONDANSETRON 2 MG/ML
4 INJECTION INTRAMUSCULAR; INTRAVENOUS
Status: CANCELLED | OUTPATIENT
Start: 2019-08-28

## 2019-08-28 RX ADMIN — HEPARIN 500 UNITS: 100 SYRINGE at 12:10

## 2019-08-28 RX ADMIN — SODIUM CHLORIDE 620 MG: 9 INJECTION, SOLUTION INTRAVENOUS at 11:02

## 2019-08-28 NOTE — PROGRESS NOTES
Pt returns to infusion center for scheduled chemotherapy.  Pt with EMLA cream to port site.  Port accessed in sterile fashion, brisk blood return noted.  Lab drawn as ordered; other labs reviewed as CBC drawn at MD's office today.  Pt tolerated infusion without incident. Port flushed with saline per policy, de-accessed, gauze dressing placed.  Pt left infusion center ambulatory and in good condition.  Return appointment scheduled.

## 2019-08-28 NOTE — PROGRESS NOTES
"Pharmacy Chemotherapy Verification  Patient NameL Amy Maki  DX:NSCLC    Protocol: Durvalumab    Durvalumab 10 mg/kg IV over 60 min on day 1  14 day cycle until DP or UT for a maximum of 12 months  NCCN Guidelines for NSCLC V.3.2019  THADDEUS Leung et al. Durvalumab after Chemoradiotherapy in Stage III Non Small-Cell Lung Cancer. N Engl J Med 2017;1-11    Allergies:Carboplatin and Percocet [oxycodone-acetaminophen]  /78   Pulse 97   Temp 37 °C (98.6 °F) (Temporal)   Resp 18   Ht 1.689 m (5' 6.5\") Comment: 1 inch off shoes.  Wt 62.9 kg (138 lb 10.7 oz)   LMP  (LMP Unknown)   SpO2 92%   BMI 22.05 kg/m²    Body surface area is 1.72 meters squared.       Labs from 8/26/19 and CBC from 8/28/19 ([facility]) reviewed - all within treatment parameters. TSH continues downward trend, T4 elevated.    Drug Order   (Drug name, dose, route, IV Fluid & volume, frequency, number of doses) Cycle: 6  Previous treatment: 8/14/19; CARBOplatin/PACLitaxel x 7 cycles 3/14/19   Medication = Durvalumab  Base Dose = 10 mg/kg  Calc Dose: Base Dose x 62.9 kg = 629 mg  Final Dose = 620 mg  Route = IV  Fluid & Volume =  mL  Admin Duration = Over 60 minutes          <10% difference, rounded to vial size per protocol, ok to treat with final written dose      By my signature below, I confirm this process was performed independently with the BSA and all final chemotherapy dosing calculations congruent. I have reviewed the above chemotherapy order and that my calculation of the final dose and BSA (when applicable) corroborate those calculations of the  pharmacist. Discrepancies of 10% or greater in the written dose have been addressed and documented within the [facility] Progress notes.    Sobeida Baker, PharmD, BCOP      "

## 2019-08-28 NOTE — PROGRESS NOTES
Chemotherapy Verification - PRIMARY RN      Height = 168.9cm  Weight = 62.9kg  BSA = 1.72m2       Medication: Imfinzi  Dose: 10mg/kg  Calculated Dose: 629mg                             (In mg/m2, AUC, mg/kg)     I confirm this process was performed independently with the BSA and all final chemotherapy dosing calculations congruent.  Any discrepancies of 10% or greater have been addressed with the chemotherapy pharmacist. The resolution of the discrepancy has been documented in the EPIC progress notes.

## 2019-08-28 NOTE — PROGRESS NOTES
Chemotherapy Verification - SECONDARY RN       Height = 168.9 cm  Weight = 62.9 kg  BSA = 1.717 m2       Medication: Imfinzi  Dose: 10 mg/kg  Calculated Dose: 629 mg                             (In mg/m2, AUC, mg/kg)       I confirm that this process was performed independently.

## 2019-08-28 NOTE — PROGRESS NOTES
"Pharmacy Chemotherapy Verification  Patient Name: Amy Maki  Dx: NSCLC    Cycle 6  Previous treatment = C5 8/14/19    Protocol: Durvalumab     *Dosing Reference*  Durvalumab 10 mg/kg IV over 60 min on day 1  Q14 days until DP/UT or a max of 12 months  NCCN Guidelines for NSCLC V.3.2019  Xochitl ANDREW et al. N Engl J Med.  2017 Nov 16;377(20):2634-4568.  Xochitl ANDREW, et al. N Engl J Med.  2018 Dec 13;379(76):8080-7232.     Allergies:Percocet [oxycodone-acetaminophen]  /78   Pulse 97   Temp 37 °C (98.6 °F) (Temporal)   Resp 18   Ht 1.689 m (5' 6.5\") Comment: 1 inch off shoes.  Wt 62.9 kg (138 lb 10.7 oz)   LMP  (LMP Unknown)   SpO2 92%   BMI 22.05 kg/m²  Body surface area is 1.72 meters squared.    All lab results within treatment parameters.       Durvalumab (Imfinzi) 10 mg/kg x 62.9kg = 629 mg    Dose rounded to nearest vial size (within 10%)    Ok to treat with dose as ordered = 620 mg IV    ANA Larios PharmHermesD.          "

## 2019-08-29 ENCOUNTER — PATIENT OUTREACH (OUTPATIENT)
Dept: OTHER | Facility: MEDICAL CENTER | Age: 54
End: 2019-08-29

## 2019-08-29 NOTE — PROGRESS NOTES
"On August 29, 2019, Oncology Social Worker Emily Martinez contacted pt. via telephone to inform her there was a check in the amount of $1350 from [facility] getting mailed to pt.  OSW Juan explained additional assistance was granted by [facility] to assist pt. with rent and/or daily living expenses.  Pt. became tearful stating she was \"zurdo grateful for all of this.\"  Pt. stated she is waiting for the car repair shop to call her to when her car has been fixed.    "

## 2019-09-11 ENCOUNTER — OUTPATIENT INFUSION SERVICES (OUTPATIENT)
Dept: ONCOLOGY | Facility: MEDICAL CENTER | Age: 54
End: 2019-09-11
Attending: INTERNAL MEDICINE
Payer: MEDICAID

## 2019-09-11 VITALS
HEART RATE: 92 BPM | HEIGHT: 66 IN | WEIGHT: 141.31 LBS | BODY MASS INDEX: 22.71 KG/M2 | SYSTOLIC BLOOD PRESSURE: 134 MMHG | TEMPERATURE: 98.6 F | DIASTOLIC BLOOD PRESSURE: 75 MMHG | OXYGEN SATURATION: 93 % | RESPIRATION RATE: 18 BRPM

## 2019-09-11 DIAGNOSIS — C34.90 NON-SMALL CELL LUNG CANCER, UNSPECIFIED LATERALITY (HCC): ICD-10-CM

## 2019-09-11 LAB — T4 FREE SERPL-MCNC: 0.91 NG/DL (ref 0.53–1.43)

## 2019-09-11 PROCEDURE — 84439 ASSAY OF FREE THYROXINE: CPT

## 2019-09-11 PROCEDURE — 96413 CHEMO IV INFUSION 1 HR: CPT

## 2019-09-11 PROCEDURE — 700105 HCHG RX REV CODE 258: Performed by: INTERNAL MEDICINE

## 2019-09-11 PROCEDURE — 700111 HCHG RX REV CODE 636 W/ 250 OVERRIDE (IP)

## 2019-09-11 PROCEDURE — A4212 NON CORING NEEDLE OR STYLET: HCPCS

## 2019-09-11 PROCEDURE — 700105 HCHG RX REV CODE 258

## 2019-09-11 RX ORDER — PROCHLORPERAZINE MALEATE 10 MG
10 TABLET ORAL EVERY 6 HOURS PRN
Status: CANCELLED | OUTPATIENT
Start: 2019-09-11

## 2019-09-11 RX ORDER — 0.9 % SODIUM CHLORIDE 0.9 %
VIAL (ML) INJECTION PRN
Status: CANCELLED | OUTPATIENT
Start: 2019-09-11

## 2019-09-11 RX ORDER — SODIUM CHLORIDE 9 MG/ML
INJECTION, SOLUTION INTRAVENOUS CONTINUOUS
Status: CANCELLED | OUTPATIENT
Start: 2019-09-11

## 2019-09-11 RX ORDER — 0.9 % SODIUM CHLORIDE 0.9 %
5 VIAL (ML) INJECTION PRN
Status: CANCELLED | OUTPATIENT
Start: 2019-09-11

## 2019-09-11 RX ORDER — ONDANSETRON 2 MG/ML
4 INJECTION INTRAMUSCULAR; INTRAVENOUS
Status: CANCELLED | OUTPATIENT
Start: 2019-09-11

## 2019-09-11 RX ORDER — ONDANSETRON 8 MG/1
8 TABLET, ORALLY DISINTEGRATING ORAL
Status: CANCELLED | OUTPATIENT
Start: 2019-09-11

## 2019-09-11 RX ADMIN — HEPARIN 500 UNITS: 100 SYRINGE at 11:19

## 2019-09-11 RX ADMIN — SODIUM CHLORIDE 620 MG: 9 INJECTION, SOLUTION INTRAVENOUS at 10:11

## 2019-09-11 NOTE — PROGRESS NOTES
Chemotherapy Verification - SECONDARY RN       Height = 168.3 cm  Weight = 64.1 kg  BSA = 1.73 m2       Medication: Imfinzi  Dose: 10 mg/kg  Calculated Dose: 641 mg round to vial                             (In mg/m2, AUC, mg/kg)     I confirm that this process was performed independently.

## 2019-09-11 NOTE — PROGRESS NOTES
"Pharmacy Chemotherapy Verification  Patient Name: Amy Maki  Dx: NSCLC    Cycle 7  Previous treatment = C6 on 8/28/19    Protocol: Durvalumab     *Dosing Reference*  Durvalumab 10 mg/kg IV over 60 min on day 1  Q14 days until DP/UT or a max of 12 months  NCCN Guidelines for NSCLC V.3.2019  Xochitl ANDREW et al. N Engl J Med.  2017 Nov 16;377(20):9524-7248.  Xochitl ANDREW, et al. N Engl J Med.  2018 Dec 13;379(95):4405-5762.     Allergies:Percocet [oxycodone-acetaminophen]  /75   Pulse 92   Temp 37 °C (98.6 °F) (Temporal)   Resp 18   Ht 1.683 m (5' 6.25\")   Wt 64.1 kg (141 lb 5 oz)   LMP  (LMP Unknown)   SpO2 93%   BMI 22.64 kg/m²  Body surface area is 1.73 meters squared.    Labs 9/11/19 ([facility])  ANC~ 6200 Plt = 272k   Hgb = 14.6  Free T4 = pending     Labs 9/9/19 ([facility])    SCr = 0.8 mg/dL CrCl ~ 79.8 mL/min   LFT's = WNLs  TBili = 0.3   TSH = 0.02    Durvalumab (Imfinzi) 10 mg/kg x 64.1 kg = 641 mg    Dose rounded to nearest vial size (within 10%)    Ok to treat with dose as ordered = 620 mg IV    Yony Woo, PharmD      "

## 2019-09-11 NOTE — PROGRESS NOTES
Chemotherapy Verification - PRIMARY RN      Height = 168.3 cm  Weight = 64.1 kg  BSA = 1.73 m2       Medication: Durvalumab  Dose: 10 mg/kg  Calculated Dose: 641 mg                             (In mg/m2, AUC, mg/kg)         I confirm this process was performed independently with the BSA and all final chemotherapy dosing calculations congruent.  Any discrepancies of 10% or greater have been addressed with the chemotherapy pharmacist. The resolution of the discrepancy has been documented in the EPIC progress notes.

## 2019-09-11 NOTE — PROGRESS NOTES
Patient presents for day one cycle seven Imfinzi. Patient in good spirits, voices no new concerns. Port accessed, flushes well with brisk blood return. Lab results within parameters to proceed with treatment today. Imfinzi infused as ordered, in-lien filter in place, line flushed clear. Port flushed per protocol and de-accessed, sterile gauze to site. Patient scheduled for next appointment and released in no acute distress with her daughter.

## 2019-09-11 NOTE — PROGRESS NOTES
"Pharmacy Chemotherapy Verification  Patient NameL Amy Maki  DX:NSCLC    Protocol: Durvalumab    Durvalumab 10 mg/kg IV over 60 min on day 1  14 day cycle until DP or UT for a maximum of 12 months  NCCN Guidelines for NSCLC V.3.2019  THADDEUS Leung et al. Durvalumab after Chemoradiotherapy in Stage III Non Small-Cell Lung Cancer. N Engl J Med 2017;1-11    Allergies:Carboplatin and Percocet [oxycodone-acetaminophen]  /75   Pulse 92   Temp 37 °C (98.6 °F) (Temporal)   Resp 18   Ht 1.683 m (5' 6.25\")   Wt 64.1 kg (141 lb 5 oz)   LMP  (LMP Unknown)   SpO2 93%   BMI 22.64 kg/m²    Body surface area is 1.73 meters squared.       Labs from 9/9/19 and CBC from 9/11/19 ([facility]) reviewed - all within treatment parameters. TSH remains low, T4 elevated.    Drug Order   (Drug name, dose, route, IV Fluid & volume, frequency, number of doses) Cycle: 7  Previous treatment: 8/28/19; CARBOplatin/PACLitaxel x 7 cycles 3/14/19   Medication = Durvalumab  Base Dose = 10 mg/kg  Calc Dose: Base Dose x 64.1 kg = 641 mg  Final Dose = 620 mg  Route = IV  Fluid & Volume =  mL  Admin Duration = Over 60 minutes          <10% difference, rounded to vial size per protocol, ok to treat with final written dose      By my signature below, I confirm this process was performed independently with the BSA and all final chemotherapy dosing calculations congruent. I have reviewed the above chemotherapy order and that my calculation of the final dose and BSA (when applicable) corroborate those calculations of the  pharmacist. Discrepancies of 10% or greater in the written dose have been addressed and documented within the [facility] Progress notes.    Reid Mejia, CanD        "

## 2019-09-25 ENCOUNTER — OUTPATIENT INFUSION SERVICES (OUTPATIENT)
Dept: ONCOLOGY | Facility: MEDICAL CENTER | Age: 54
End: 2019-09-25
Attending: INTERNAL MEDICINE
Payer: MEDICAID

## 2019-09-25 VITALS
WEIGHT: 142.86 LBS | HEART RATE: 82 BPM | BODY MASS INDEX: 22.96 KG/M2 | HEIGHT: 66 IN | OXYGEN SATURATION: 97 % | SYSTOLIC BLOOD PRESSURE: 136 MMHG | RESPIRATION RATE: 18 BRPM | TEMPERATURE: 97.2 F | DIASTOLIC BLOOD PRESSURE: 89 MMHG

## 2019-09-25 DIAGNOSIS — C34.90 NON-SMALL CELL LUNG CANCER, UNSPECIFIED LATERALITY (HCC): ICD-10-CM

## 2019-09-25 DIAGNOSIS — E03.9 HYPOTHYROIDISM, UNSPECIFIED TYPE: ICD-10-CM

## 2019-09-25 LAB
T3FREE SERPL-MCNC: 2.31 PG/ML (ref 2.4–4.2)
T4 FREE SERPL-MCNC: 0.48 NG/DL (ref 0.53–1.43)
TSH SERPL DL<=0.005 MIU/L-ACNC: 9.83 UIU/ML (ref 0.38–5.33)

## 2019-09-25 PROCEDURE — 84439 ASSAY OF FREE THYROXINE: CPT

## 2019-09-25 PROCEDURE — A4212 NON CORING NEEDLE OR STYLET: HCPCS

## 2019-09-25 PROCEDURE — 700111 HCHG RX REV CODE 636 W/ 250 OVERRIDE (IP)

## 2019-09-25 PROCEDURE — 700105 HCHG RX REV CODE 258: Performed by: INTERNAL MEDICINE

## 2019-09-25 PROCEDURE — 96413 CHEMO IV INFUSION 1 HR: CPT

## 2019-09-25 PROCEDURE — 700105 HCHG RX REV CODE 258

## 2019-09-25 PROCEDURE — 84481 FREE ASSAY (FT-3): CPT

## 2019-09-25 PROCEDURE — 84443 ASSAY THYROID STIM HORMONE: CPT

## 2019-09-25 RX ORDER — 0.9 % SODIUM CHLORIDE 0.9 %
VIAL (ML) INJECTION PRN
Status: CANCELLED | OUTPATIENT
Start: 2019-09-25

## 2019-09-25 RX ORDER — ONDANSETRON 2 MG/ML
4 INJECTION INTRAMUSCULAR; INTRAVENOUS
Status: CANCELLED | OUTPATIENT
Start: 2019-09-25

## 2019-09-25 RX ORDER — 0.9 % SODIUM CHLORIDE 0.9 %
5 VIAL (ML) INJECTION PRN
Status: CANCELLED | OUTPATIENT
Start: 2019-09-25

## 2019-09-25 RX ORDER — SODIUM CHLORIDE 9 MG/ML
INJECTION, SOLUTION INTRAVENOUS CONTINUOUS
Status: CANCELLED | OUTPATIENT
Start: 2019-09-25

## 2019-09-25 RX ORDER — PROCHLORPERAZINE MALEATE 10 MG
10 TABLET ORAL EVERY 6 HOURS PRN
Status: CANCELLED | OUTPATIENT
Start: 2019-09-25

## 2019-09-25 RX ORDER — ONDANSETRON 8 MG/1
8 TABLET, ORALLY DISINTEGRATING ORAL
Status: CANCELLED | OUTPATIENT
Start: 2019-09-25

## 2019-09-25 RX ADMIN — SODIUM CHLORIDE 620 MG: 9 INJECTION, SOLUTION INTRAVENOUS at 09:50

## 2019-09-25 RX ADMIN — HEPARIN 500 UNITS: 100 SYRINGE at 10:57

## 2019-09-25 NOTE — PROGRESS NOTES
Chemotherapy Verification - PRIMARY RN      Height = 168.5 cm  Weight = 64.8 kg  BSA = 1.74 m2       Medication: Durvalumab  Dose: 10 mg/kg  Calculated Dose: 648 mg                             (In mg/m2, AUC, mg/kg)         I confirm this process was performed independently with the BSA and all final chemotherapy dosing calculations congruent.  Any discrepancies of 10% or greater have been addressed with the chemotherapy pharmacist. The resolution of the discrepancy has been documented in the EPIC progress notes.

## 2019-09-25 NOTE — PROGRESS NOTES
"Pharmacy Chemotherapy Verification    Patient Name: Amy Maki  Dx: NSCLC    Cycle 8  Previous treatment = C7 on 9/11/19    Protocol: Durvalumab     *Dosing Reference*  Durvalumab 10 mg/kg IV over 60 min on day 1  Q14 days until DP/UT or a max of 12 months  NCCN Guidelines for NSCLC V.3.2019  Xochitl ANDREW et al. N Engl J Med.  2017 Nov 16;377(20):3972-0695.  Xochitl ANDREW, et al. N Engl J Med.  2018 Dec 13;379(93):5138-5882.     Allergies:Percocet [oxycodone-acetaminophen]  /89   Pulse 82   Temp 36.2 °C (97.2 °F) (Temporal)   Resp 18   Ht 1.685 m (5' 6.34\")   Wt 64.8 kg (142 lb 13.7 oz)   LMP  (LMP Unknown)   SpO2 97%   BMI 22.82 kg/m²  Body surface area is 1.74 meters squared.    All lab results 9/23/19 at [facility] and 9/25/19 from [facility] within treatment parameters.  MD to be notified of elevated TSH.      Durvalumab (Imfinzi) 10 mg/kg x 64.8kg = 648mg    Dose rounded to nearest vial size (within 10%)    Ok to treat with dose as ordered = 620 mg IV    Laney Larios, PharmD      "

## 2019-09-25 NOTE — PROGRESS NOTES
"Pharmacy Chemotherapy Verification    Dx: NSCLC    Protocol: Durvalumab   *Dosing Reference*  Durvalumab 10 mg/kg IV over 60 min on day 1  Q14 days until DP/UT or a max of 12 months  NCCN Guidelines for NSCLC V.3.2019  Xochitl ANDREW, et al. N Engl J Med.  2017 Nov 16;377(20):2446-9204.  Xochitl ANDREW, et al. N Engl J Med.  2018 Dec 13;379(35):3996-5170.     Allergies:Carboplatin and Percocet [oxycodone-acetaminophen]  /89   Pulse 82   Temp 36.2 °C (97.2 °F) (Temporal)   Resp 18   Ht 1.685 m (5' 6.34\")   Wt 64.8 kg (142 lb 13.7 oz)   LMP  (LMP Unknown)   SpO2 97%   BMI 22.82 kg/m²    Body surface area is 1.74 meters squared.       CBC ([facility] on 9/25/19), CMP and thyroid panel ([facility] on 9/23/19) within treatment plan parameters.      Drug Order   (Drug name, dose, route, IV Fluid & volume, frequency, number of doses) Cycle 8  Previous treatment = C7 on 9/11/19   Medication = Durvalumab (Imfinzi)  Base Dose = 10 mg/kg  Calc Dose: Base Dose x 64.8 kg = 648 mg  Final Dose = 620 mg  Route = IV  Fluid & Volume =  mL  Admin Duration = Over 60 minutes          Rounded to vial size (within 10%) per dose rounding protocol     By my signature below, I confirm this process was performed independently with the BSA and all final chemotherapy dosing calculations congruent. I have reviewed the above chemotherapy order and that my calculation of the final dose and BSA (when applicable) corroborate those calculations of the  pharmacist. Discrepancies of 10% or greater in the written dose have been addressed and documented within the [facility] Progress notes.      Tamanna Stacy, PharmD  "

## 2019-09-25 NOTE — PROGRESS NOTES
Chemotherapy Verification - SECONDARY RN       Height = 168.5 cm  Weight = 64.8 kg  BSA = 1.74 m2       Medication: Imfinzi  Dose: 10 mg/kg  Calculated Dose: 648 mg                             (In mg/m2, AUC, mg/kg)     I confirm that this process was performed independently.

## 2019-09-25 NOTE — PROGRESS NOTES
Patient presents for Durvalumab infusion. Received call from Belle CHIN, she would like a full thyroid panel drawn and she is addressing the TSH value. Patient in good spirits, voices no new concerns. Port accessed using sterile technique, flushes well with blood work drawn as ordered. Durvalumab infused as ordered, in-line filter in place. Port flushed per protocol and de-accessed, sterile gauze to site. Patient scheduled for next appointment and released in no acute distress with her family.

## 2019-10-09 ENCOUNTER — OUTPATIENT INFUSION SERVICES (OUTPATIENT)
Dept: ONCOLOGY | Facility: MEDICAL CENTER | Age: 54
End: 2019-10-09
Attending: INTERNAL MEDICINE
Payer: MEDICAID

## 2019-10-09 VITALS
SYSTOLIC BLOOD PRESSURE: 147 MMHG | WEIGHT: 145.72 LBS | HEIGHT: 66 IN | HEART RATE: 79 BPM | RESPIRATION RATE: 18 BRPM | DIASTOLIC BLOOD PRESSURE: 87 MMHG | TEMPERATURE: 98.3 F | BODY MASS INDEX: 23.42 KG/M2 | OXYGEN SATURATION: 96 %

## 2019-10-09 DIAGNOSIS — E03.9 HYPOTHYROIDISM, UNSPECIFIED TYPE: ICD-10-CM

## 2019-10-09 DIAGNOSIS — C34.90 NON-SMALL CELL LUNG CANCER, UNSPECIFIED LATERALITY (HCC): ICD-10-CM

## 2019-10-09 LAB
APPEARANCE UR: CLEAR
BASOPHILS # BLD AUTO: 0.5 % (ref 0–1.8)
BASOPHILS # BLD: 0.03 K/UL (ref 0–0.12)
COLOR UR AUTO: YELLOW
CORTIS SERPL-MCNC: 6.6 UG/DL (ref 0–23)
EOSINOPHIL # BLD AUTO: 0.08 K/UL (ref 0–0.51)
EOSINOPHIL NFR BLD: 1.2 % (ref 0–6.9)
ERYTHROCYTE [DISTWIDTH] IN BLOOD BY AUTOMATED COUNT: 43.5 FL (ref 35.9–50)
GLUCOSE UR QL STRIP.AUTO: NEGATIVE MG/DL
HCT VFR BLD AUTO: 40.7 % (ref 37–47)
HGB BLD-MCNC: 13.3 G/DL (ref 12–16)
IMM GRANULOCYTES # BLD AUTO: 0.01 K/UL (ref 0–0.11)
IMM GRANULOCYTES NFR BLD AUTO: 0.2 % (ref 0–0.9)
KETONES UR QL STRIP.AUTO: NEGATIVE MG/DL
LEUKOCYTE ESTERASE UR QL STRIP.AUTO: ABNORMAL
LYMPHOCYTES # BLD AUTO: 1.03 K/UL (ref 1–4.8)
LYMPHOCYTES NFR BLD: 15.6 % (ref 22–41)
MCH RBC QN AUTO: 29.8 PG (ref 27–33)
MCHC RBC AUTO-ENTMCNC: 32.7 G/DL (ref 33.6–35)
MCV RBC AUTO: 91.3 FL (ref 81.4–97.8)
MONOCYTES # BLD AUTO: 0.7 K/UL (ref 0–0.85)
MONOCYTES NFR BLD AUTO: 10.6 % (ref 0–13.4)
NEUTROPHILS # BLD AUTO: 4.75 K/UL (ref 2–7.15)
NEUTROPHILS NFR BLD: 71.9 % (ref 44–72)
NITRITE UR QL STRIP.AUTO: NEGATIVE
NRBC # BLD AUTO: 0 K/UL
NRBC BLD-RTO: 0 /100 WBC
PH UR STRIP.AUTO: 5.5 [PH] (ref 5–8)
PLATELET # BLD AUTO: 279 K/UL (ref 164–446)
PMV BLD AUTO: 8.7 FL (ref 9–12.9)
PROT UR QL STRIP: NEGATIVE MG/DL
RBC # BLD AUTO: 4.46 M/UL (ref 4.2–5.4)
RBC UR QL AUTO: NEGATIVE
SP GR UR: 1.01 (ref 1–1.03)
TSH SERPL DL<=0.005 MIU/L-ACNC: 33.4 UIU/ML (ref 0.38–5.33)
WBC # BLD AUTO: 6.6 K/UL (ref 4.8–10.8)

## 2019-10-09 PROCEDURE — 700105 HCHG RX REV CODE 258

## 2019-10-09 PROCEDURE — 700111 HCHG RX REV CODE 636 W/ 250 OVERRIDE (IP)

## 2019-10-09 PROCEDURE — 84443 ASSAY THYROID STIM HORMONE: CPT

## 2019-10-09 PROCEDURE — 700111 HCHG RX REV CODE 636 W/ 250 OVERRIDE (IP): Performed by: INTERNAL MEDICINE

## 2019-10-09 PROCEDURE — 85025 COMPLETE CBC W/AUTO DIFF WBC: CPT

## 2019-10-09 PROCEDURE — 700105 HCHG RX REV CODE 258: Performed by: INTERNAL MEDICINE

## 2019-10-09 PROCEDURE — 81002 URINALYSIS NONAUTO W/O SCOPE: CPT

## 2019-10-09 PROCEDURE — 96413 CHEMO IV INFUSION 1 HR: CPT

## 2019-10-09 PROCEDURE — 82533 TOTAL CORTISOL: CPT

## 2019-10-09 PROCEDURE — A4212 NON CORING NEEDLE OR STYLET: HCPCS

## 2019-10-09 RX ORDER — 0.9 % SODIUM CHLORIDE 0.9 %
VIAL (ML) INJECTION PRN
Status: CANCELLED | OUTPATIENT
Start: 2019-10-09

## 2019-10-09 RX ORDER — ONDANSETRON 8 MG/1
8 TABLET, ORALLY DISINTEGRATING ORAL
Status: CANCELLED | OUTPATIENT
Start: 2019-10-09

## 2019-10-09 RX ORDER — 0.9 % SODIUM CHLORIDE 0.9 %
5 VIAL (ML) INJECTION PRN
Status: CANCELLED | OUTPATIENT
Start: 2019-10-09

## 2019-10-09 RX ORDER — SODIUM CHLORIDE 9 MG/ML
INJECTION, SOLUTION INTRAVENOUS CONTINUOUS
Status: CANCELLED | OUTPATIENT
Start: 2019-10-09

## 2019-10-09 RX ORDER — PROCHLORPERAZINE MALEATE 10 MG
10 TABLET ORAL EVERY 6 HOURS PRN
Status: CANCELLED | OUTPATIENT
Start: 2019-10-09

## 2019-10-09 RX ORDER — SODIUM CHLORIDE 9 MG/ML
INJECTION, SOLUTION INTRAVENOUS CONTINUOUS
Status: DISCONTINUED | OUTPATIENT
Start: 2019-10-09 | End: 2019-10-09 | Stop reason: HOSPADM

## 2019-10-09 RX ORDER — ONDANSETRON 2 MG/ML
4 INJECTION INTRAMUSCULAR; INTRAVENOUS
Status: CANCELLED | OUTPATIENT
Start: 2019-10-09

## 2019-10-09 RX ADMIN — HEPARIN 500 UNITS: 100 SYRINGE at 11:55

## 2019-10-09 RX ADMIN — SODIUM CHLORIDE 620 MG: 9 INJECTION, SOLUTION INTRAVENOUS at 10:41

## 2019-10-09 RX ADMIN — SODIUM CHLORIDE: 9 INJECTION, SOLUTION INTRAVENOUS at 10:41

## 2019-10-09 NOTE — PROGRESS NOTES
Patient presents to [facility] for Imfiniz infusion. Pt had labs drawn on 10/7/19, labs verified and within parameters to receive treatment today. Port accessed per protocol, brisk blood return noted. Imfinzi infused as ordered and with filter in place, infusion tolerated well with no s/s of adverse reaction. Port flushed per protocol, needle removed intact. Pt discharged in stable, ambulatory condition with next appointment scheduled for 10/23/19.

## 2019-10-09 NOTE — PROGRESS NOTES
"Pharmacy Chemotherapy Verification    Patient Name: Amy Maki  Dx: NSCLC    Cycle 9  Previous treatment = 9/25/19    Protocol: Durvalumab   Durvalumab 10 mg/kg IV over 60 min on day 1  Q14 days until DP/UT or a max of 12 months  NCCN Guidelines for NSCLC V.3.2019  Xochitl ANDREW, et al. N Engl J Med.  2017 Nov 16;377(20):1655-2410.  Xochitl ANDREW, et al. N Engl J Med.  2018 Dec 13;379(54):1284-3517.     Allergies:Percocet [oxycodone-acetaminophen]  /87   Pulse 79   Temp 36.8 °C (98.3 °F) (Temporal)   Resp 18   Ht 1.685 m (5' 6.34\")   Wt 66.1 kg (145 lb 11.6 oz)   LMP  (LMP Unknown)   SpO2 96%   BMI 23.28 kg/m²  Body surface area is 1.76 meters squared.    Labs from 10/7/19 ([facility] and [facility]) reviewed - all within treatment parameters. MD aware of elevated TSH.      Durvalumab (Imfinzi) 10 mg/kg x 66.1 kg = 661 mg    Dose rounded to nearest vial size (within 10%)    Ok to treat with dose as ordered = 620 mg IV    Sobeida Baker, PharmD, BCOP      "

## 2019-10-09 NOTE — PROGRESS NOTES
Chemotherapy Verification - PRIMARY RN      Height = 168.5 cm  Weight = 66.1 kg  BSA = 1.76 m2       Medication: Durvalumab (Imfinzi)  Dose: 10mg/kg    Calculated Dose: 661 mg         I confirm this process was performed independently with the BSA and all final chemotherapy dosing calculations congruent.  Any discrepancies of 10% or greater have been addressed with the chemotherapy pharmacist. The resolution of the discrepancy has been documented in the EPIC progress notes.

## 2019-10-09 NOTE — PROGRESS NOTES
"Pharmacy Chemotherapy Verification    Dx: NSCLC    Protocol: Durvalumab   *Dosing Reference*  Durvalumab 10 mg/kg IV over 60 min on day 1  Q14 days until DP/UT or a max of 12 months  NCCN Guidelines for NSCLC V.3.2019  Xochitl ANDREW, et al. N Engl J Med.  2017 Nov 16;377(20):8987-7686.  Xochitl ANDREW, et al. N Engl J Med.  2018 Dec 13;379(48):7846-3928.     Allergies:Carboplatin and Percocet [oxycodone-acetaminophen]  /87   Pulse 79   Temp 36.8 °C (98.3 °F) (Temporal)   Resp 18   Ht 1.685 m (5' 6.34\")   Wt 66.1 kg (145 lb 11.6 oz)   LMP  (LMP Unknown)   SpO2 96%   BMI 23.28 kg/m²    Body surface area is 1.76 meters squared.       CBC (10/9/19 @ [facility]), CMP (10/7/19 @ [facility]) and thyroid panel (pending) within treatment plan parameters.      Drug Order   (Drug name, dose, route, IV Fluid & volume, frequency, number of doses) Cycle 9  Previous treatment = C8 on 9/25/19   Medication = Durvalumab (Imfinzi)  Base Dose = 10 mg/kg  Calc Dose: Base Dose x 66.1 kg = 661 mg  Final Dose = 620 mg  Route = IV  Fluid & Volume =  mL  Admin Duration = Over 60 minutes          Rounded to vial size (within 10%) per dose rounding protocol     By my signature below, I confirm this process was performed independently with the BSA and all final chemotherapy dosing calculations congruent. I have reviewed the above chemotherapy order and that my calculation of the final dose and BSA (when applicable) corroborate those calculations of the  pharmacist. Discrepancies of 10% or greater in the written dose have been addressed and documented within the [facility] Progress notes.      Tamanna Stacy, PharmD  "

## 2019-10-09 NOTE — PROGRESS NOTES
Chemotherapy Verification - SECONDARY RN       Height = 168.5 cm  Weight = 66.1 kg  BSA = 1.76 m2       Medication: Imfinzi  Dose: 10 mg/kg  Calculated Dose: 661 mg round to vial                             (In mg/m2, AUC, mg/kg)       I confirm that this process was performed independently.

## 2019-10-23 ENCOUNTER — OUTPATIENT INFUSION SERVICES (OUTPATIENT)
Dept: ONCOLOGY | Facility: MEDICAL CENTER | Age: 54
End: 2019-10-23
Attending: INTERNAL MEDICINE
Payer: MEDICAID

## 2019-10-23 VITALS
HEART RATE: 91 BPM | HEIGHT: 66 IN | RESPIRATION RATE: 18 BRPM | DIASTOLIC BLOOD PRESSURE: 83 MMHG | OXYGEN SATURATION: 95 % | BODY MASS INDEX: 23.92 KG/M2 | WEIGHT: 148.81 LBS | TEMPERATURE: 98.3 F | SYSTOLIC BLOOD PRESSURE: 143 MMHG

## 2019-10-23 DIAGNOSIS — C34.90 NON-SMALL CELL LUNG CANCER, UNSPECIFIED LATERALITY (HCC): ICD-10-CM

## 2019-10-23 LAB
APPEARANCE UR: CLEAR
BACTERIA #/AREA URNS HPF: NEGATIVE /HPF
BILIRUB UR QL STRIP.AUTO: NEGATIVE
COLOR UR: YELLOW
CORTIS SERPL-MCNC: 6.8 UG/DL (ref 0–23)
EPI CELLS #/AREA URNS HPF: NEGATIVE /HPF
GLUCOSE UR STRIP.AUTO-MCNC: NEGATIVE MG/DL
HYALINE CASTS #/AREA URNS LPF: NORMAL /LPF
KETONES UR STRIP.AUTO-MCNC: NEGATIVE MG/DL
LEUKOCYTE ESTERASE UR QL STRIP.AUTO: ABNORMAL
MICRO URNS: ABNORMAL
NITRITE UR QL STRIP.AUTO: NEGATIVE
PH UR STRIP.AUTO: 7.5 [PH] (ref 5–8)
PROT UR QL STRIP: NEGATIVE MG/DL
RBC # URNS HPF: NORMAL /HPF
RBC UR QL AUTO: NEGATIVE
SP GR UR STRIP.AUTO: 1.01
UROBILINOGEN UR STRIP.AUTO-MCNC: 0.2 MG/DL
WBC #/AREA URNS HPF: NORMAL /HPF

## 2019-10-23 PROCEDURE — 700111 HCHG RX REV CODE 636 W/ 250 OVERRIDE (IP): Performed by: INTERNAL MEDICINE

## 2019-10-23 PROCEDURE — A4212 NON CORING NEEDLE OR STYLET: HCPCS | Performed by: CLINICAL NURSE SPECIALIST

## 2019-10-23 PROCEDURE — 81001 URINALYSIS AUTO W/SCOPE: CPT

## 2019-10-23 PROCEDURE — 700105 HCHG RX REV CODE 258: Performed by: INTERNAL MEDICINE

## 2019-10-23 PROCEDURE — 96413 CHEMO IV INFUSION 1 HR: CPT | Performed by: CLINICAL NURSE SPECIALIST

## 2019-10-23 PROCEDURE — 700111 HCHG RX REV CODE 636 W/ 250 OVERRIDE (IP)

## 2019-10-23 PROCEDURE — 82533 TOTAL CORTISOL: CPT

## 2019-10-23 RX ORDER — ONDANSETRON 2 MG/ML
4 INJECTION INTRAMUSCULAR; INTRAVENOUS
Status: CANCELLED | OUTPATIENT
Start: 2019-10-23

## 2019-10-23 RX ORDER — 0.9 % SODIUM CHLORIDE 0.9 %
VIAL (ML) INJECTION PRN
Status: CANCELLED | OUTPATIENT
Start: 2019-10-23

## 2019-10-23 RX ORDER — ONDANSETRON 8 MG/1
8 TABLET, ORALLY DISINTEGRATING ORAL
Status: CANCELLED | OUTPATIENT
Start: 2019-10-23

## 2019-10-23 RX ORDER — SODIUM CHLORIDE 9 MG/ML
INJECTION, SOLUTION INTRAVENOUS CONTINUOUS
Status: CANCELLED | OUTPATIENT
Start: 2019-10-23

## 2019-10-23 RX ORDER — PROCHLORPERAZINE MALEATE 10 MG
10 TABLET ORAL EVERY 6 HOURS PRN
Status: CANCELLED | OUTPATIENT
Start: 2019-10-23

## 2019-10-23 RX ORDER — 0.9 % SODIUM CHLORIDE 0.9 %
5 VIAL (ML) INJECTION PRN
Status: CANCELLED | OUTPATIENT
Start: 2019-10-23

## 2019-10-23 RX ADMIN — SODIUM CHLORIDE 620 MG: 9 INJECTION, SOLUTION INTRAVENOUS at 10:12

## 2019-10-23 RX ADMIN — HEPARIN 500 UNITS: 100 SYRINGE at 11:31

## 2019-10-23 NOTE — PROGRESS NOTES
"Pharmacy Chemotherapy Verification    Patient Name: Amy Maki  Dx: NSCLC    Cycle 10  Previous treatment = 10/9/19    Protocol: Durvalumab   Durvalumab 10 mg/kg IV over 60 min on day 1  Q14 days until DP/UT or a max of 12 months  NCCN Guidelines for NSCLC V.3.2019  Xochitl ANDREW, et al. N Engl J Med.  2017 Nov 16;377(20):0780-3210.  Xochitl ANDREW, et al. N Engl J Med.  2018 Dec 13;379(71):7565-1758.     Allergies:Percocet [oxycodone-acetaminophen]  /83   Pulse 91   Temp 36.8 °C (98.3 °F) (Temporal)   Resp 18   Ht 1.68 m (5' 6.14\")   Wt 67.5 kg (148 lb 13 oz)   LMP  (LMP Unknown)   SpO2 95%   BMI 23.92 kg/m²  Body surface area is 1.77 meters squared.    Labs from 10/21 and 10/23/19 ([facility] and [facility]) reviewed - all within treatment parameters. MD aware of elevated TSH, extra labs ordered      Durvalumab (Imfinzi) 10 mg/kg x 67.5 kg = 675 mg    <10% difference, okay to treat with final dose = 620 mg IV    Sobeida Baker, PharmD, BCOP      "

## 2019-10-23 NOTE — PROGRESS NOTES
Chemotherapy Verification - PRIMARY RN      Height = 1.68m  Weight = 67.5kg  BSA = 1.77m2       Medication: durvalumab  Dose: 10mg/kg  Calculated Dose: 675mg                             (In mg/m2, AUC, mg/kg)           I confirm this process was performed independently with the BSA and all final chemotherapy dosing calculations congruent.  Any discrepancies of 10% or greater have been addressed with the chemotherapy pharmacist. The resolution of the discrepancy has been documented in the EPIC progress notes.

## 2019-10-23 NOTE — PROGRESS NOTES
Patient to infusion for durvalumab infusion.  No concerns/copmlaints today.  Labs previously drawn within parameters to treat.  TSH elevated at 74.78.  Paper lab orders for thyroid panel with TSH, cortisol and UA obtained.  Patient states MD aware and she has thyroid ultrasound scheduled next week.  Patient came to infusion immediately from MD visit.  Port accessed with brisk blood return.  Labs drawn.  Durvalumab completed with filter in place over one hour.  [facility] to [facility] in stable condition.

## 2019-10-23 NOTE — PROGRESS NOTES
Chemotherapy Verification - SECONDARY RN       Height = 66.14  Weight = 67.5 kg  BSA = 1.77 m2       Medication: Durvalumab (Imfinzi)  Dose: 10 mg/kg  Calculated Dose: 675 mg                             (In mg/m2, AUC, mg/kg)       I confirm that this process was performed independently.

## 2019-10-23 NOTE — PROGRESS NOTES
"Pharmacy Chemotherapy Verification    Dx: NSCLC    Protocol: Durvalumab   *Dosing Reference*  Durvalumab 10 mg/kg IV over 60 min on day 1  Q14 days until DP/UT or a max of 12 months  NCCN Guidelines for NSCLC V.3.2019  Xochitl ANDREW, et al. N Engl J Med.  2017 Nov 16;377(20):1101-4005.  Xochitl ANDREW et al. N Engl J Med.  2018 Dec 13;379(50):8717-9129.     Allergies:Carboplatin and Percocet [oxycodone-acetaminophen]  /83   Pulse 91   Temp 36.8 °C (98.3 °F) (Temporal)   Resp 18   Ht 1.68 m (5' 6.14\")   Wt 67.5 kg (148 lb 13 oz)   LMP  (LMP Unknown)   SpO2 95%   BMI 23.92 kg/m²    Body surface area is 1.77 meters squared.       CBC (10/23/19@ [facility]), CMP (10/21/19@ [facility]) within treatment plan parameters. TSH elevated - MD aware      Drug Order   (Drug name, dose, route, IV Fluid & volume, frequency, number of doses) Cycle 10  Previous treatment = C9 on 10/9/19   Medication = Durvalumab (Imfinzi)  Base Dose = 10 mg/kg  Calc Dose: Base Dose x 67.5kg = 675mg  Final Dose = 620 mg  Route = IV  Fluid & Volume =  mL  Admin Duration = Over 60 minutes          Rounded to vial size (within 10%) per dose rounding protocol     By my signature below, I confirm this process was performed independently with the BSA and all final chemotherapy dosing calculations congruent. I have reviewed the above chemotherapy order and that my calculation of the final dose and BSA (when applicable) corroborate those calculations of the  pharmacist. Discrepancies of 10% or greater in the written dose have been addressed and documented within the [facility] Progress notes.      Laney Larios, PharmD  "

## 2019-11-05 NOTE — PROGRESS NOTES
"Pharmacy Chemotherapy Verification    Patient Name: Amy Maki  Dx: NSCLC    Cycle 11  Previous treatment = 10/23/19    Protocol: Durvalumab   Durvalumab 10 mg/kg IV over 60 min on day 1  Q14 days until DP/UT or a max of 12 months  NCCN Guidelines for NSCLC V.3.2019  Xochitl ANDREW et al. N Engl J Med.  2017 Nov 16;377(20):5956-3569.  Xochitl ANDREW, et al. N Engl J Med.  2018 Dec 13;379(23):5260-2301.     Allergies:Percocet [oxycodone-acetaminophen]  /91   Pulse 92   Temp 36.9 °C (98.4 °F) (Temporal)   Resp 18   Ht 1.683 m (5' 6.25\")   Wt 67.3 kg (148 lb 5.9 oz)   LMP  (LMP Unknown)   SpO2 96%   BMI 23.77 kg/m²  Body surface area is 1.77 meters squared.    Labs 11/6/19 ([facility])  Free T4 = in process    Labs 11/6/19 ([facility])  ACN 4400 Hgb 13.9 Plt 317k    Labs 11/4/19  SCr 1.2 CrCl 55.7 mL/min   AST/ALT/AP = 24/21/104 Tbili = 0.3  TSH = 54.48    Durvalumab (Imfinzi) 10 mg/kg x 67.3 kg = 673 mg    <10% (rounded to vial size), okay to treat with final dose = 620 mg IV    hSruthi Juarez, PharmD, BCOP      "

## 2019-11-06 ENCOUNTER — OUTPATIENT INFUSION SERVICES (OUTPATIENT)
Dept: ONCOLOGY | Facility: MEDICAL CENTER | Age: 54
End: 2019-11-06
Attending: INTERNAL MEDICINE
Payer: MEDICAID

## 2019-11-06 VITALS
BODY MASS INDEX: 23.84 KG/M2 | WEIGHT: 148.37 LBS | HEIGHT: 66 IN | SYSTOLIC BLOOD PRESSURE: 140 MMHG | OXYGEN SATURATION: 96 % | TEMPERATURE: 98.4 F | DIASTOLIC BLOOD PRESSURE: 91 MMHG | RESPIRATION RATE: 18 BRPM | HEART RATE: 92 BPM

## 2019-11-06 DIAGNOSIS — C34.90 NON-SMALL CELL LUNG CANCER, UNSPECIFIED LATERALITY (HCC): ICD-10-CM

## 2019-11-06 LAB — T4 FREE SERPL-MCNC: 0.81 NG/DL (ref 0.53–1.43)

## 2019-11-06 PROCEDURE — 96413 CHEMO IV INFUSION 1 HR: CPT

## 2019-11-06 PROCEDURE — 700111 HCHG RX REV CODE 636 W/ 250 OVERRIDE (IP): Performed by: INTERNAL MEDICINE

## 2019-11-06 PROCEDURE — 700111 HCHG RX REV CODE 636 W/ 250 OVERRIDE (IP)

## 2019-11-06 PROCEDURE — 700105 HCHG RX REV CODE 258: Performed by: INTERNAL MEDICINE

## 2019-11-06 PROCEDURE — 84439 ASSAY OF FREE THYROXINE: CPT

## 2019-11-06 PROCEDURE — A4212 NON CORING NEEDLE OR STYLET: HCPCS

## 2019-11-06 RX ORDER — SODIUM CHLORIDE 9 MG/ML
INJECTION, SOLUTION INTRAVENOUS CONTINUOUS
Status: DISCONTINUED | OUTPATIENT
Start: 2019-11-06 | End: 2019-11-06 | Stop reason: HOSPADM

## 2019-11-06 RX ORDER — 0.9 % SODIUM CHLORIDE 0.9 %
VIAL (ML) INJECTION PRN
Status: CANCELLED | OUTPATIENT
Start: 2019-11-06

## 2019-11-06 RX ORDER — LEVOTHYROXINE SODIUM 112 UG/1
112 TABLET ORAL
Status: ON HOLD | COMMUNITY
End: 2020-04-18

## 2019-11-06 RX ORDER — SODIUM CHLORIDE 9 MG/ML
INJECTION, SOLUTION INTRAVENOUS CONTINUOUS
Status: CANCELLED | OUTPATIENT
Start: 2019-11-06

## 2019-11-06 RX ORDER — PROCHLORPERAZINE MALEATE 10 MG
10 TABLET ORAL EVERY 6 HOURS PRN
Status: CANCELLED | OUTPATIENT
Start: 2019-11-06

## 2019-11-06 RX ORDER — 0.9 % SODIUM CHLORIDE 0.9 %
5 VIAL (ML) INJECTION PRN
Status: CANCELLED | OUTPATIENT
Start: 2019-11-06

## 2019-11-06 RX ORDER — ONDANSETRON 8 MG/1
8 TABLET, ORALLY DISINTEGRATING ORAL
Status: CANCELLED | OUTPATIENT
Start: 2019-11-06

## 2019-11-06 RX ORDER — ONDANSETRON 2 MG/ML
4 INJECTION INTRAMUSCULAR; INTRAVENOUS
Status: CANCELLED | OUTPATIENT
Start: 2019-11-06

## 2019-11-06 RX ADMIN — SODIUM CHLORIDE 620 MG: 9 INJECTION, SOLUTION INTRAVENOUS at 10:26

## 2019-11-06 RX ADMIN — HEPARIN 500 UNITS: 100 SYRINGE at 11:35

## 2019-11-06 RX ADMIN — SODIUM CHLORIDE: 9 INJECTION, SOLUTION INTRAVENOUS at 10:20

## 2019-11-06 NOTE — PROGRESS NOTES
Patient here for chemotherapy today. Denies any complaints. Labs drawn prior to appointment and are wnl. Only lab not drawn was t4, will draw today. Port accessed using sterile technique, brisk blood return obtained. Labs sent. Awaiting medications from pharmacy. Continue to monitor.

## 2019-11-06 NOTE — PROGRESS NOTES
Patient tolerated infusion with no reactions. Port flushed and heparin locked per protocol. Site de-accessed and covered with gauze and tape. Discharged home to self care in no distress at this time. Next appointment in place.

## 2019-11-06 NOTE — PROGRESS NOTES
"Pharmacy Chemotherapy Verification    Dx: NSCLC    Protocol: Durvalumab   *Dosing Reference*  Durvalumab 10 mg/kg IV over 60 min on day 1  Q14 days until DP/UT or a max of 12 months  NCCN Guidelines for NSCLC V.3.2019  Xochitl ANDREW, et al. N Engl J Med.  2017 Nov 16;377(20):6034-4723.  Xochitl ANDREW et al. N Engl J Med.  2018 Dec 13;379(76):0082-2281.     Allergies:Carboplatin and Percocet [oxycodone-acetaminophen]  /91   Pulse 92   Temp 36.9 °C (98.4 °F) (Temporal)   Resp 18   Ht 1.683 m (5' 6.25\")   Wt 67.3 kg (148 lb 5.9 oz)   LMP  (LMP Unknown)   SpO2 96%   BMI 23.77 kg/m²    Body surface area is 1.77 meters squared.       Labs 11/4/19 ([facility]):  SCr: 1.2  CrCL: 55.77  LFTs: WNL  TBili: 0.3  TSH: 54.48    Labs 11/6/19 ([facility])  ANC:4400 PLT:317k HGB:13.9    FreeT4: Pending     Drug Order   (Drug name, dose, route, IV Fluid & volume, frequency, number of doses) Cycle 11  Previous treatment = C10 on 10/23/19   Medication = Durvalumab (Imfinzi)  Base Dose = 10 mg/kg  Calc Dose: Base Dose x 67.3kg = 673mg  Final Dose = 620 mg  Route = IV  Fluid & Volume =  mL  Admin Duration = Over 60 minutes          Rounded to vial size (within 10%) per dose rounding protocol     By my signature below, I confirm this process was performed independently with the BSA and all final chemotherapy dosing calculations congruent. I have reviewed the above chemotherapy order and that my calculation of the final dose and BSA (when applicable) corroborate those calculations of the  pharmacist. Discrepancies of 10% or greater in the written dose have been addressed and documented within the [facility] Progress notes.      Stefania Quevedo, PharmD  "

## 2019-11-06 NOTE — PROGRESS NOTES
Chemotherapy Verification - PRIMARY RN      Height = 168.3cm  Weight = 67.3kg  BSA = 1.77m2       Medication: Durvalumab  Dose: 10mg/kg  Calculated Dose: 673mg                             (In mg/m2, AUC, mg/kg)     MD aware of TSH- upped levothyroxine this am when seeing patient    I confirm this process was performed independently with the BSA and all final chemotherapy dosing calculations congruent.  Any discrepancies of 10% or greater have been addressed with the chemotherapy pharmacist. The resolution of the discrepancy has been documented in the EPIC progress notes.

## 2019-11-06 NOTE — PROGRESS NOTES
Chemotherapy Verification - SECONDARY RN     C11    Height = 168.3 cm  Weight = 67.3 kg  BSA = 1.77 m2       Medication: Durvalumab (Imfinzi)  Dose: 10 mg/kg  Calculated Dose: 673 mg                               I confirm that this process was performed independently.

## 2019-11-15 RX ORDER — SODIUM CHLORIDE 9 MG/ML
INJECTION, SOLUTION INTRAVENOUS CONTINUOUS
Status: CANCELLED | OUTPATIENT
Start: 2019-11-20

## 2019-11-15 RX ORDER — 0.9 % SODIUM CHLORIDE 0.9 %
VIAL (ML) INJECTION PRN
Status: CANCELLED | OUTPATIENT
Start: 2019-11-20

## 2019-11-15 RX ORDER — 0.9 % SODIUM CHLORIDE 0.9 %
VIAL (ML) INJECTION PRN
Status: CANCELLED | OUTPATIENT
Start: 2019-11-19

## 2019-11-15 RX ORDER — ONDANSETRON 8 MG/1
8 TABLET, ORALLY DISINTEGRATING ORAL
Status: CANCELLED | OUTPATIENT
Start: 2019-11-20

## 2019-11-15 RX ORDER — 0.9 % SODIUM CHLORIDE 0.9 %
5 VIAL (ML) INJECTION PRN
Status: CANCELLED | OUTPATIENT
Start: 2019-11-20

## 2019-11-15 RX ORDER — PROCHLORPERAZINE MALEATE 10 MG
10 TABLET ORAL EVERY 6 HOURS PRN
Status: CANCELLED | OUTPATIENT
Start: 2019-11-20

## 2019-11-15 RX ORDER — 0.9 % SODIUM CHLORIDE 0.9 %
5 VIAL (ML) INJECTION PRN
Status: CANCELLED | OUTPATIENT
Start: 2019-11-19

## 2019-11-15 RX ORDER — ONDANSETRON 2 MG/ML
4 INJECTION INTRAMUSCULAR; INTRAVENOUS
Status: CANCELLED | OUTPATIENT
Start: 2019-11-20

## 2019-11-20 ENCOUNTER — OUTPATIENT INFUSION SERVICES (OUTPATIENT)
Dept: ONCOLOGY | Facility: MEDICAL CENTER | Age: 54
End: 2019-11-20
Attending: INTERNAL MEDICINE
Payer: MEDICAID

## 2019-11-20 VITALS
HEART RATE: 83 BPM | TEMPERATURE: 97.1 F | HEIGHT: 66 IN | SYSTOLIC BLOOD PRESSURE: 137 MMHG | BODY MASS INDEX: 24.2 KG/M2 | WEIGHT: 150.57 LBS | RESPIRATION RATE: 18 BRPM | OXYGEN SATURATION: 93 % | DIASTOLIC BLOOD PRESSURE: 75 MMHG

## 2019-11-20 DIAGNOSIS — C34.90 NON-SMALL CELL LUNG CANCER, UNSPECIFIED LATERALITY (HCC): ICD-10-CM

## 2019-11-20 LAB — T4 FREE SERPL-MCNC: 1.23 NG/DL (ref 0.53–1.43)

## 2019-11-20 PROCEDURE — 96413 CHEMO IV INFUSION 1 HR: CPT | Performed by: CLINICAL NURSE SPECIALIST

## 2019-11-20 PROCEDURE — A4212 NON CORING NEEDLE OR STYLET: HCPCS | Performed by: CLINICAL NURSE SPECIALIST

## 2019-11-20 PROCEDURE — 700111 HCHG RX REV CODE 636 W/ 250 OVERRIDE (IP)

## 2019-11-20 PROCEDURE — 700111 HCHG RX REV CODE 636 W/ 250 OVERRIDE (IP): Performed by: INTERNAL MEDICINE

## 2019-11-20 PROCEDURE — 700105 HCHG RX REV CODE 258: Performed by: INTERNAL MEDICINE

## 2019-11-20 PROCEDURE — 84439 ASSAY OF FREE THYROXINE: CPT

## 2019-11-20 RX ADMIN — HEPARIN 500 UNITS: 100 SYRINGE at 11:59

## 2019-11-20 RX ADMIN — SODIUM CHLORIDE 620 MG: 9 INJECTION, SOLUTION INTRAVENOUS at 10:41

## 2019-11-20 NOTE — PROGRESS NOTES
"Patient to infusion for C12D1 durvalumab.  Complains of right scapular pain that radiates down her arm for \"months\".  Pain has been getting worse.  MD notified previously and gave patient Flexeril and referred back to PCP.  Patient has not seen PCP for this but mentioned pain again this AM to oncology PA.  Palpation to scapular area relieved pain briefly.  Tylenol and heat help to relieve the pain.  Patient denies radiating to jaw, chest, nausea, diaphoresis.  Patient will go to Urgent Care following infusion for further evaluation.  Labs drawn previously within parameters to treat.  Free T4 drawn in infusion. Durvalumab completed as ordered with filter in place.  Mediport flushed, heparin locked and deaccessed.  Patient discharged ambulatory to home in stable condition with supportive son-in-law.   "

## 2019-11-20 NOTE — PROGRESS NOTES
"Pharmacy Chemotherapy Verification    Patient Name: Amy Maki  Dx: NSCLC    Cycle 12  Previous treatment = 11/6/19    Protocol: Durvalumab   Durvalumab 10 mg/kg IV over 60 min on day 1  Q14 days until DP/UT or a max of 12 months  NCCN Guidelines for NSCLC V.3.2019  Xochitl ANDREW, et al. N Engl J Med.  2017 Nov 16;377(20):3817-0612.  Xochitl ANDREW, et al. N Engl J Med.  2018 Dec 13;379(45):3091-9878.     Allergies:Percocet [oxycodone-acetaminophen]  /75   Pulse 83   Temp 36.2 °C (97.1 °F) (Temporal)   Resp 18   Ht 1.67 m (5' 5.75\")   Wt 68.3 kg (150 lb 9.2 oz)   LMP  (LMP Unknown)   SpO2 93%   BMI 24.49 kg/m²  Body surface area is 1.78 meters squared.    [facility] within treatment parameters.   MD aware of elevated TSH, thyroid evaluation pending.      Durvalumab (Imfinzi) 10 mg/kg x 68.3kg = 683mg    <10% (rounded to vial size), okay to treat with final dose = 620 mg IV    Laney Larios, PharmD      "

## 2019-11-20 NOTE — PROGRESS NOTES
"Chemotherapy Verification - PRIMARY RN      Height = 1.67m  Weight = 68.3kg  BSA = 1.78m2       Medication: Durvalumab  Dose: 10mg/kg  Calculated Dose: 683mg                             (In mg/m2, AUC, mg/kg)     Patient name and birthday visualized with labs on patient's \"my chart\".    I confirm this process was performed independently with the BSA and all final chemotherapy dosing calculations congruent.  Any discrepancies of 10% or greater have been addressed with the chemotherapy pharmacist. The resolution of the discrepancy has been documented in the EPIC progress notes.       "

## 2019-11-20 NOTE — PROGRESS NOTES
"Pharmacy Chemotherapy Verification    Dx: NSCLC    Protocol: Durvalumab   *Dosing Reference*  Durvalumab 10 mg/kg IV over 60 min on day 1  Q14 days until DP/UT or a max of 12 months  NCCN Guidelines for NSCLC V.3.2019  Xochitl ANDREW, et al. N Engl J Med.  2017 Nov 16;377(20):2827-1536.  Xochitl ANDREW, et al. N Engl J Med.  2018 Dec 13;379(23):3208-6324.     Allergies:Carboplatin and Percocet [oxycodone-acetaminophen]  /75   Pulse 83   Temp 36.2 °C (97.1 °F) (Temporal)   Resp 18   Ht 1.67 m (5' 5.75\")   Wt 68.3 kg (150 lb 9.2 oz)   LMP  (LMP Unknown)   SpO2 93%   BMI 24.49 kg/m²    Body surface area is 1.78 meters squared.       CBC (11/20/19 @ [facility]), CMP (11/18/19 @ [facility]) and thyroid panel (TSH elevated, MD aware; T4 pending) within treatment plan parameters.       Drug Order   (Drug name, dose, route, IV Fluid & volume, frequency, number of doses) Cycle 12  Previous treatment = C11 on 11/6/19   Medication = Durvalumab (Imfinzi)  Base Dose = 10 mg/kg  Calc Dose: Base Dose x 68.3 kg = 683 mg  Final Dose = 620 mg  Route = IV  Fluid & Volume =  mL  Admin Duration = Over 60 minutes          Rounded to vial size (within 10%) per dose rounding protocol     By my signature below, I confirm this process was performed independently with the BSA and all final chemotherapy dosing calculations congruent. I have reviewed the above chemotherapy order and that my calculation of the final dose and BSA (when applicable) corroborate those calculations of the  pharmacist. Discrepancies of 10% or greater in the written dose have been addressed and documented within the [facility] Progress notes.      Tamanna Stacy, PharmD  "

## 2019-11-20 NOTE — PROGRESS NOTES
Chemotherapy Verification - SECONDARY RN       Height = 167 cm  Weight = 68.3 kg  BSA = 1.78 m2       Medication: Durvalumab  Dose: 10 mg/kg  Calculated Dose: 683 mg                             (In mg/m2, AUC, mg/kg)       I confirm that this process was performed independently.

## 2019-11-27 PROBLEM — C39.9 MALIGNANT NEOPLASM OF RESPIRATORY TRACT (HCC): Status: ACTIVE | Noted: 2019-11-27

## 2019-11-27 PROBLEM — J96.10 CHRONIC RESPIRATORY FAILURE (HCC): Status: ACTIVE | Noted: 2019-11-27

## 2019-12-04 ENCOUNTER — OUTPATIENT INFUSION SERVICES (OUTPATIENT)
Dept: ONCOLOGY | Facility: MEDICAL CENTER | Age: 54
End: 2019-12-04
Attending: INTERNAL MEDICINE
Payer: MEDICAID

## 2019-12-04 VITALS
HEIGHT: 66 IN | SYSTOLIC BLOOD PRESSURE: 111 MMHG | BODY MASS INDEX: 23.88 KG/M2 | RESPIRATION RATE: 18 BRPM | WEIGHT: 148.59 LBS | HEART RATE: 96 BPM | TEMPERATURE: 98.6 F | DIASTOLIC BLOOD PRESSURE: 79 MMHG | OXYGEN SATURATION: 94 %

## 2019-12-04 DIAGNOSIS — C34.90 NON-SMALL CELL LUNG CANCER, UNSPECIFIED LATERALITY (HCC): ICD-10-CM

## 2019-12-04 LAB — T4 FREE SERPL-MCNC: 1.17 NG/DL (ref 0.53–1.43)

## 2019-12-04 PROCEDURE — A4212 NON CORING NEEDLE OR STYLET: HCPCS

## 2019-12-04 PROCEDURE — 700105 HCHG RX REV CODE 258: Performed by: INTERNAL MEDICINE

## 2019-12-04 PROCEDURE — 84439 ASSAY OF FREE THYROXINE: CPT

## 2019-12-04 PROCEDURE — 96413 CHEMO IV INFUSION 1 HR: CPT

## 2019-12-04 PROCEDURE — 700111 HCHG RX REV CODE 636 W/ 250 OVERRIDE (IP): Performed by: INTERNAL MEDICINE

## 2019-12-04 RX ORDER — 0.9 % SODIUM CHLORIDE 0.9 %
VIAL (ML) INJECTION PRN
Status: CANCELLED | OUTPATIENT
Start: 2019-12-04

## 2019-12-04 RX ORDER — 0.9 % SODIUM CHLORIDE 0.9 %
5 VIAL (ML) INJECTION PRN
Status: CANCELLED | OUTPATIENT
Start: 2019-12-04

## 2019-12-04 RX ORDER — SODIUM CHLORIDE 9 MG/ML
INJECTION, SOLUTION INTRAVENOUS CONTINUOUS
Status: CANCELLED | OUTPATIENT
Start: 2019-12-04

## 2019-12-04 RX ORDER — PROCHLORPERAZINE MALEATE 10 MG
10 TABLET ORAL EVERY 6 HOURS PRN
Status: CANCELLED | OUTPATIENT
Start: 2019-12-04

## 2019-12-04 RX ORDER — ONDANSETRON 8 MG/1
8 TABLET, ORALLY DISINTEGRATING ORAL
Status: CANCELLED | OUTPATIENT
Start: 2019-12-04

## 2019-12-04 RX ORDER — ONDANSETRON 2 MG/ML
4 INJECTION INTRAMUSCULAR; INTRAVENOUS
Status: CANCELLED | OUTPATIENT
Start: 2019-12-04

## 2019-12-04 RX ORDER — SODIUM CHLORIDE 9 MG/ML
INJECTION, SOLUTION INTRAVENOUS CONTINUOUS
Status: DISCONTINUED | OUTPATIENT
Start: 2019-12-04 | End: 2019-12-04 | Stop reason: HOSPADM

## 2019-12-04 RX ADMIN — HEPARIN 500 UNITS: 100 SYRINGE at 11:20

## 2019-12-04 RX ADMIN — SODIUM CHLORIDE 620 MG: 9 INJECTION, SOLUTION INTRAVENOUS at 10:10

## 2019-12-04 RX ADMIN — SODIUM CHLORIDE: 9 INJECTION, SOLUTION INTRAVENOUS at 10:09

## 2019-12-04 NOTE — PROGRESS NOTES
Chemotherapy Verification - PRIMARY RN      Height = 1.68 m  Weight = 67.4 kg  BSA = 1.77 m2       Medication: Durvalumab  Dose: 10 mg/kg  Calculated Dose: 674 mg                            (In mg/m2, AUC, mg/kg)       I confirm this process was performed independently with the BSA and all final chemotherapy dosing calculations congruent.  Any discrepancies of 10% or greater have been addressed with the chemotherapy pharmacist. The resolution of the discrepancy has been documented in the EPIC progress notes.

## 2019-12-04 NOTE — PROGRESS NOTES
Chemotherapy Verification - SECONDARY RN       Height = 168 cm  Weight = 67.4 kg  BSA = 1.77 m2       Medication: Imfinzi  Dose: 10 mg/kg  Calculated Dose: 674 mg                             (In mg/m2, AUC, mg/kg)         I confirm that this process was performed independently.

## 2019-12-04 NOTE — PROGRESS NOTES
"Pharmacy Chemotherapy Verification    Dx: NSCLC    Protocol: Durvalumab   *Dosing Reference*  Durvalumab 10 mg/kg IV over 60 min on day 1  Q14 days until DP/UT or a max of 12 months  NCCN Guidelines for NSCLC V.3.2019  Xochitl ANDREW, et al. N Engl J Med.  2017 Nov 16;377(20):2033-7804.  Xochitl ANDREW, et al. N Engl J Med.  2018 Dec 13;379(08):5519-1606.     Allergies:Carboplatin and Percocet [oxycodone-acetaminophen]  /79   Pulse 96   Temp 37 °C (98.6 °F) (Temporal)   Resp 18   Ht 1.68 m (5' 6.14\")   Wt 67.4 kg (148 lb 9.4 oz)   LMP  (LMP Unknown)   SpO2 94%   BMI 23.88 kg/m²    Body surface area is 1.77 meters squared.       CBC (12/4/19 @ [facility]) and CMP and thyroid panel (12/2/19 @ [facility]) within treatment plan parameters.       Drug Order   (Drug name, dose, route, IV Fluid & volume, frequency, number of doses) Cycle 13  Previous treatment = C12 on 11/20/19   Medication = Durvalumab (Imfinzi)  Base Dose = 10 mg/kg  Calc Dose: Base Dose x 67.4 kg = 674 mg  Final Dose = 620 mg  Route = IV  Fluid & Volume =  mL  Admin Duration = Over 60 minutes          Rounded to vial size (within 10%) per dose rounding protocol     By my signature below, I confirm this process was performed independently with the BSA and all final chemotherapy dosing calculations congruent. I have reviewed the above chemotherapy order and that my calculation of the final dose and BSA (when applicable) corroborate those calculations of the  pharmacist. Discrepancies of 10% or greater in the written dose have been addressed and documented within the [facility] Progress notes.      Tamanna Stacy, PharmD  "

## 2019-12-04 NOTE — PROGRESS NOTES
Patient presents to clinic for Imfinzi infusion. Pt currently getting over a cold, Dr. Gauthier aware, OK to proceed with treatment today. Labs drawn 12/2/19. Labs reviewed and verified, pt within parameters to receive treatment. Port accessed per protocol, brisk blood return noted. Imfinzi infused over 1 hour with in-line filter in place. Pt tolerated well with no s/s of adverse reaction. Port flushed per protocol, needle removed intact. Pt discharged in stable, ambulatory condition.

## 2019-12-04 NOTE — PROGRESS NOTES
"Pharmacy Chemotherapy Verification    Patient Name: Amy Maki  Dx: NSCLC    Cycle 13  Previous treatment = 11/20/19    Protocol: Durvalumab   Durvalumab 10 mg/kg IV over 60 min on day 1  Q14 days until DP/UT or a max of 12 months  NCCN Guidelines for NSCLC V.3.2019  Xochitl ANDREW et al. N Engl J Med.  2017 Nov 16;377(20):2113-9924.  Xochitl ANDREW, et al. N Engl J Med.  2018 Dec 13;379(85):8558-9340.     Allergies:Percocet [oxycodone-acetaminophen]  /79   Pulse 96   Temp 37 °C (98.6 °F) (Temporal)   Resp 18   Ht 1.68 m (5' 6.14\")   Wt 67.4 kg (148 lb 9.4 oz)   LMP  (LMP Unknown)   SpO2 94%   BMI 23.88 kg/m²  Body surface area is 1.77 meters squared.    All lab results 1242/19 CT and 12/4/19 [facility] within treatment parameters. TSH elevated- pt taking levothyroxine 100mcg daily.      Durvalumab (Imfinzi) 10 mg/kg x 67.4kg = 674mg    <10% (rounded to vial size), okay to treat with final dose = 620 mg IV    Laney Larios, PharmD      "

## 2019-12-18 ENCOUNTER — OUTPATIENT INFUSION SERVICES (OUTPATIENT)
Dept: ONCOLOGY | Facility: MEDICAL CENTER | Age: 54
End: 2019-12-18
Attending: INTERNAL MEDICINE
Payer: MEDICAID

## 2019-12-18 VITALS
OXYGEN SATURATION: 94 % | HEART RATE: 86 BPM | DIASTOLIC BLOOD PRESSURE: 83 MMHG | SYSTOLIC BLOOD PRESSURE: 127 MMHG | HEIGHT: 66 IN | WEIGHT: 147.05 LBS | RESPIRATION RATE: 18 BRPM | BODY MASS INDEX: 23.63 KG/M2 | TEMPERATURE: 98.1 F

## 2019-12-18 DIAGNOSIS — C34.90 NON-SMALL CELL LUNG CANCER, UNSPECIFIED LATERALITY (HCC): ICD-10-CM

## 2019-12-18 LAB — T4 FREE SERPL-MCNC: 1.25 NG/DL (ref 0.53–1.43)

## 2019-12-18 PROCEDURE — 84439 ASSAY OF FREE THYROXINE: CPT

## 2019-12-18 PROCEDURE — 700111 HCHG RX REV CODE 636 W/ 250 OVERRIDE (IP)

## 2019-12-18 PROCEDURE — 700111 HCHG RX REV CODE 636 W/ 250 OVERRIDE (IP): Performed by: INTERNAL MEDICINE

## 2019-12-18 PROCEDURE — A4212 NON CORING NEEDLE OR STYLET: HCPCS

## 2019-12-18 PROCEDURE — 96413 CHEMO IV INFUSION 1 HR: CPT

## 2019-12-18 PROCEDURE — 700105 HCHG RX REV CODE 258: Performed by: INTERNAL MEDICINE

## 2019-12-18 RX ORDER — ONDANSETRON 8 MG/1
8 TABLET, ORALLY DISINTEGRATING ORAL
Status: CANCELLED | OUTPATIENT
Start: 2019-12-18

## 2019-12-18 RX ORDER — ONDANSETRON 2 MG/ML
4 INJECTION INTRAMUSCULAR; INTRAVENOUS
Status: CANCELLED | OUTPATIENT
Start: 2019-12-18

## 2019-12-18 RX ORDER — 0.9 % SODIUM CHLORIDE 0.9 %
VIAL (ML) INJECTION PRN
Status: CANCELLED | OUTPATIENT
Start: 2019-12-18

## 2019-12-18 RX ORDER — PROCHLORPERAZINE MALEATE 10 MG
10 TABLET ORAL EVERY 6 HOURS PRN
Status: CANCELLED | OUTPATIENT
Start: 2019-12-18

## 2019-12-18 RX ORDER — SODIUM CHLORIDE 9 MG/ML
INJECTION, SOLUTION INTRAVENOUS CONTINUOUS
Status: CANCELLED | OUTPATIENT
Start: 2019-12-18

## 2019-12-18 RX ORDER — 0.9 % SODIUM CHLORIDE 0.9 %
5 VIAL (ML) INJECTION PRN
Status: CANCELLED | OUTPATIENT
Start: 2019-12-18

## 2019-12-18 RX ORDER — SODIUM CHLORIDE 9 MG/ML
INJECTION, SOLUTION INTRAVENOUS CONTINUOUS
Status: DISCONTINUED | OUTPATIENT
Start: 2019-12-18 | End: 2019-12-18 | Stop reason: HOSPADM

## 2019-12-18 RX ADMIN — SODIUM CHLORIDE 620 MG: 9 INJECTION, SOLUTION INTRAVENOUS at 10:05

## 2019-12-18 RX ADMIN — HEPARIN 500 UNITS: 100 SYRINGE at 11:21

## 2019-12-18 RX ADMIN — SODIUM CHLORIDE: 9 INJECTION, SOLUTION INTRAVENOUS at 10:00

## 2019-12-18 NOTE — PROGRESS NOTES
Chemotherapy Verification - PRIMARY RN      Height = 167cm  Weight = 66.7kg  BSA = 1.76m2       Medication: Durvalumab/Imfinzi  Dose: 10mg/kg  Calculated Dose: 667mg                             (In mg/m2, AUC, mg/kg)       I confirm this process was performed independently with the BSA and all final chemotherapy dosing calculations congruent.  Any discrepancies of 10% or greater have been addressed with the chemotherapy pharmacist. The resolution of the discrepancy has been documented in the EPIC progress notes.

## 2019-12-18 NOTE — PROGRESS NOTES
Chemotherapy Verification - SECONDARY RN       Height = 1.67 m  Weight = 66.7 kg  BSA = 1.76 m2       Medication: durvalumab  Dose: 10 mgkg  Calculated Dose: 667 mg                             (In mg/m2, AUC, mg/kg)         I confirm that this process was performed independently.

## 2019-12-18 NOTE — PROGRESS NOTES
"Pharmacy Chemotherapy Verification    Dx: NSCLC    Protocol: Durvalumab   *Dosing Reference*  Durvalumab 10 mg/kg IV over 60 min on day 1   Q14 days until DP/UT or a max of 12 months  NCCN Guidelines for NSCLC V.3.2019  Xochitl ANDREW, et al. N Engl J Med.  2017 Nov 16;377(20):3840-8200.  felicity Song. N Engl J Med.  2018 Dec 13;379(86):2031-3105.     Allergies:Carboplatin and Percocet [oxycodone-acetaminophen]  /83   Pulse 86   Temp 36.7 °C (98.1 °F) (Temporal)   Resp 18   Ht 1.67 m (5' 5.75\") Comment: took 1 inch off of shoes  Wt 66.7 kg (147 lb 0.8 oz)   LMP  (LMP Unknown)   SpO2 94%   BMI 23.92 kg/m²    Body surface area is 1.76 meters squared.       Labs 12/18/19 ([facility])  ANC~ 3200 Plt = 310k   Hgb = 14.1       Labs 12/16/19 [place]  SCr = 0.76 mg/dL CrCl ~ 88 mL/min   LFT's = WNL TBili = 0.4   TSH = 4.36     Free T4 = Pending    Drug Order   (Drug name, dose, route, IV Fluid & volume, frequency, number of doses) Cycle 14  Previous treatment = C13 on 12/4/19   Medication = Durvalumab (Imfinzi)  Base Dose = 10 mg/kg  Calc Dose: Base Dose x 66.7 kg = 667 mg  Final Dose = 620 mg  Route = IV  Fluid & Volume =  mL  Admin Duration = Over 60 minutes          Rounded to nearest vial size (within 10%) per dose rounding protocol     By my signature below, I confirm this process was performed independently with the BSA and all final chemotherapy dosing calculations congruent. I have reviewed the above chemotherapy order and that my calculation of the final dose and BSA (when applicable) corroborate those calculations of the  pharmacist. Discrepancies of 10% or greater in the written dose have been addressed and documented within the [facility] Progress notes.      Stefania Quevedo, PharmD  "

## 2019-12-18 NOTE — PROGRESS NOTES
"Pharmacy Chemotherapy Verification    Dx: NSCLC    Cycle 14  Previous treatment: 12/4/19    Protocol: Durvalumab   *Dosing Reference*  Durvalumab 10 mg/kg IV over 60 min on day 1  Q14 days until DP/UT or a max of 12 months  NCCN Guidelines for NSCLC V.3.2019  Xochitl ANDREW, et al. N Engl J Med.  2017 Nov 16;377(20):2076-5576.  Xochitl ANDREW, et al. N Engl J Med.  2018 Dec 13;379(90):5312-7709.     Allergies:Percocet [oxycodone-acetaminophen]  /83   Pulse 86   Temp 36.7 °C (98.1 °F) (Temporal)   Resp 18   Ht 1.67 m (5' 5.75\") Comment: took 1 inch off of shoes  Wt 66.7 kg (147 lb 0.8 oz)   LMP  (LMP Unknown)   SpO2 94%   BMI 23.92 kg/m²  Body surface area is 1.76 meters squared.    CBC (12/18/19 @ [facility]) and CMP (12/16/19 @ [facility]) and thyroid panel (pending) within treatment plan parameters.      Durvalumab (Imfinzi) 10 mg/kg x 66.7 kg = 667 mg    Rounded down to vial size (within 10%) per dose rounding protocol = 620 mg IV      Tamanna Stacy, PharmD  "

## 2019-12-18 NOTE — PROGRESS NOTES
Here for cycle 14 Imfinzi today. Denies any complaints. Port accessed using sterile technique with brisk blood return. Patient had labs done this morning. Reviewed and WNL. Chart to pharmacy. Awaiting medications at this time. Continue to monitor.

## 2019-12-29 NOTE — DISCHARGE PLANNING
Anticipated Discharge Disposition: Home    Action: Notified of requested paperwork by family for work excuse.  Completed template per protocol.  Supplied to family members.       Barriers to Discharge: none    Plan: Home with support of family, and outpt f/u as identified prior to discharge.      no

## 2020-01-15 ENCOUNTER — OUTPATIENT INFUSION SERVICES (OUTPATIENT)
Dept: ONCOLOGY | Facility: MEDICAL CENTER | Age: 55
End: 2020-01-15
Attending: INTERNAL MEDICINE
Payer: MEDICAID

## 2020-01-15 VITALS
OXYGEN SATURATION: 93 % | SYSTOLIC BLOOD PRESSURE: 143 MMHG | HEART RATE: 84 BPM | TEMPERATURE: 97.8 F | BODY MASS INDEX: 24.48 KG/M2 | HEIGHT: 66 IN | WEIGHT: 152.34 LBS | DIASTOLIC BLOOD PRESSURE: 85 MMHG | RESPIRATION RATE: 18 BRPM

## 2020-01-15 DIAGNOSIS — C34.90 NON-SMALL CELL LUNG CANCER, UNSPECIFIED LATERALITY (HCC): ICD-10-CM

## 2020-01-15 PROCEDURE — A4212 NON CORING NEEDLE OR STYLET: HCPCS

## 2020-01-15 PROCEDURE — 96413 CHEMO IV INFUSION 1 HR: CPT

## 2020-01-15 PROCEDURE — 700105 HCHG RX REV CODE 258: Performed by: INTERNAL MEDICINE

## 2020-01-15 PROCEDURE — 700111 HCHG RX REV CODE 636 W/ 250 OVERRIDE (IP): Performed by: INTERNAL MEDICINE

## 2020-01-15 RX ORDER — 0.9 % SODIUM CHLORIDE 0.9 %
VIAL (ML) INJECTION PRN
Status: CANCELLED | OUTPATIENT
Start: 2020-01-15

## 2020-01-15 RX ORDER — PROCHLORPERAZINE MALEATE 10 MG
10 TABLET ORAL EVERY 6 HOURS PRN
Status: CANCELLED | OUTPATIENT
Start: 2020-01-15

## 2020-01-15 RX ORDER — 0.9 % SODIUM CHLORIDE 0.9 %
5 VIAL (ML) INJECTION PRN
Status: CANCELLED | OUTPATIENT
Start: 2020-01-15

## 2020-01-15 RX ORDER — SODIUM CHLORIDE 9 MG/ML
INJECTION, SOLUTION INTRAVENOUS CONTINUOUS
Status: CANCELLED | OUTPATIENT
Start: 2020-01-15

## 2020-01-15 RX ORDER — SODIUM CHLORIDE 9 MG/ML
INJECTION, SOLUTION INTRAVENOUS CONTINUOUS
Status: DISCONTINUED | OUTPATIENT
Start: 2020-01-15 | End: 2020-01-15 | Stop reason: HOSPADM

## 2020-01-15 RX ORDER — ONDANSETRON 8 MG/1
8 TABLET, ORALLY DISINTEGRATING ORAL
Status: CANCELLED | OUTPATIENT
Start: 2020-01-15

## 2020-01-15 RX ORDER — ONDANSETRON 2 MG/ML
4 INJECTION INTRAMUSCULAR; INTRAVENOUS
Status: CANCELLED | OUTPATIENT
Start: 2020-01-15

## 2020-01-15 RX ADMIN — SODIUM CHLORIDE 740 MG: 9 INJECTION, SOLUTION INTRAVENOUS at 10:34

## 2020-01-15 RX ADMIN — HEPARIN 500 UNITS: 100 SYRINGE at 11:53

## 2020-01-15 RX ADMIN — SODIUM CHLORIDE: 9 INJECTION, SOLUTION INTRAVENOUS at 10:33

## 2020-01-15 NOTE — PROGRESS NOTES
Chemotherapy Verification - PRIMARY RN      Height = 1.67 m  Weight = 69.1 kg  BSA = 1.79 m2       Medication: Durvalumb  Dose: 10 mg/kg  Calculated Dose: 691 mg                            (In mg/m2, AUC, mg/kg)       I confirm this process was performed independently with the BSA and all final chemotherapy dosing calculations congruent.  Any discrepancies of 10% or greater have been addressed with the chemotherapy pharmacist. The resolution of the discrepancy has been documented in the EPIC progress notes.

## 2020-01-15 NOTE — PROGRESS NOTES
Patient presents to clinic for Imfinzi infusion. Pt denies any recent infections, fevers, or changes in condition. Labs drawn from outside lab. Labs reviewed and verified, pt within parameters to receive treatment today. Port accessed per protocol, brisk blood return noted. Imfinzi infused as ordered over 1 hour with in-line filter in place. Pt tolerated infusion well with no s/s of adverse reaction. Port flushed per protocol, needle removed intact. Pt discharged in stable, ambulatory condition.

## 2020-01-15 NOTE — PROGRESS NOTES
"Pharmacy Chemotherapy Verification    Dx: NSCLC    Protocol: Durvalumab   *Dosing Reference*  Durvalumab 10 mg/kg IV over 60 min on day 1   Q14 days until DP/UT or a max of 12 months  NCCN Guidelines for NSCLC V.3.2019  Xochitl ANDREW, et al. N Engl J Med.  2017 Nov 16;377(20):4993-6537.  Xochitl ANDREW et al. N Engl J Med.  2018 Dec 13;379(44):7227-0597.     Allergies:Carboplatin and Percocet [oxycodone-acetaminophen]  /85   Pulse 84   Temp 36.6 °C (97.8 °F) (Temporal)   Resp 18   Ht 1.67 m (5' 5.75\")   Wt 69.1 kg (152 lb 5.4 oz)   LMP  (LMP Unknown)   SpO2 93%   BMI 24.78 kg/m²    Body surface area is 1.79 meters squared.       Labs 1/15/20 ([facility])  ANC~ 3800 Plt = 292k   Hgb = 14.3       Labs 1/13/20 ([facility])  SCr = 0.91 mg/dL CrCl ~ 75.3 mL/min   LFT's = WNLs  TBili = 0.2   TSH = 23.24 Free T4 = 0.76    Drug Order   (Drug name, dose, route, IV Fluid & volume, frequency, number of doses) Cycle 15  Previous treatment = C14 on 12/18/19   Medication = Durvalumab (Imfinzi)  Base Dose = 10 mg/kg  Calc Dose: Base Dose x 69.1 kg = 691 mg  Final Dose = 740 mg  Route = IV  Fluid & Volume =  mL  Admin Duration = Over 60 minutes          Rounded to nearest vial size (within 10%) per dose rounding protocol     By my signature below, I confirm this process was performed independently with the BSA and all final chemotherapy dosing calculations congruent. I have reviewed the above chemotherapy order and that my calculation of the final dose and BSA (when applicable) corroborate those calculations of the  pharmacist. Discrepancies of 10% or greater in the written dose have been addressed and documented within the [facility] Progress notes.      Yony Woo, PharmD  "

## 2020-01-15 NOTE — PROGRESS NOTES
"Pharmacy Chemotherapy Verification    Dx: NSCLC    Cycle 15  Previous treatment: 12/18/19    Protocol: Durvalumab   *Dosing Reference*  Durvalumab 10 mg/kg IV over 60 min on day 1  Q14 days until DP/UT or a max of 12 months  NCCN Guidelines for NSCLC V.3.2019  Xochitl ANDREW, et al. N Engl J Med.  2017 Nov 16;377(20):6781-5675.  Xochitl ANDREW, et al. N Engl J Med.  2018 Dec 13;379(29):1840-7919.     Allergies:Percocet [oxycodone-acetaminophen]  /85   Pulse 84   Temp 36.6 °C (97.8 °F) (Temporal)   Resp 18   Ht 1.67 m (5' 5.75\")   Wt 69.1 kg (152 lb 5.4 oz)   LMP  (LMP Unknown)   SpO2 93%   BMI 24.78 kg/m²  Body surface area is 1.79 meters squared.    All lab results 1/15/20 at [facility] within treatment parameters. MD aware of elevated TSH.      Durvalumab (Imfinzi) 10 mg/kg x 69.1kg = 691mg    Rounded down to vial size (within 10%) per dose rounding protocol = 740mg IV      Laney Larios, PharmD  "

## 2020-01-15 NOTE — PROGRESS NOTES
Chemotherapy Verification - SECONDARY RN     D1C15    Height = 167 cm  Weight = 69.1 kg  BSA = 1.79 m2       Medication: Durvalumab (Imfinzi)  Dose: 10 mg/kg  Calculated Dose: 691 mg - ordered dose 740 mg - calculated dose within 10% of ordered dose                            I confirm that this process was performed independently.

## 2020-01-29 ENCOUNTER — OUTPATIENT INFUSION SERVICES (OUTPATIENT)
Dept: ONCOLOGY | Facility: MEDICAL CENTER | Age: 55
End: 2020-01-29
Attending: INTERNAL MEDICINE
Payer: MEDICAID

## 2020-01-29 VITALS
RESPIRATION RATE: 18 BRPM | OXYGEN SATURATION: 94 % | WEIGHT: 150.57 LBS | SYSTOLIC BLOOD PRESSURE: 139 MMHG | HEIGHT: 66 IN | BODY MASS INDEX: 24.2 KG/M2 | HEART RATE: 90 BPM | TEMPERATURE: 97.7 F | DIASTOLIC BLOOD PRESSURE: 69 MMHG

## 2020-01-29 DIAGNOSIS — C34.90 NON-SMALL CELL LUNG CANCER, UNSPECIFIED LATERALITY (HCC): ICD-10-CM

## 2020-01-29 LAB — T4 FREE SERPL-MCNC: 1.56 NG/DL (ref 0.53–1.43)

## 2020-01-29 PROCEDURE — 84439 ASSAY OF FREE THYROXINE: CPT

## 2020-01-29 PROCEDURE — 700105 HCHG RX REV CODE 258: Performed by: INTERNAL MEDICINE

## 2020-01-29 PROCEDURE — 96413 CHEMO IV INFUSION 1 HR: CPT

## 2020-01-29 PROCEDURE — 700111 HCHG RX REV CODE 636 W/ 250 OVERRIDE (IP): Performed by: INTERNAL MEDICINE

## 2020-01-29 PROCEDURE — A4212 NON CORING NEEDLE OR STYLET: HCPCS

## 2020-01-29 RX ORDER — 0.9 % SODIUM CHLORIDE 0.9 %
5 VIAL (ML) INJECTION PRN
Status: CANCELLED | OUTPATIENT
Start: 2020-01-29

## 2020-01-29 RX ORDER — 0.9 % SODIUM CHLORIDE 0.9 %
VIAL (ML) INJECTION PRN
Status: CANCELLED | OUTPATIENT
Start: 2020-01-29

## 2020-01-29 RX ORDER — SODIUM CHLORIDE 9 MG/ML
INJECTION, SOLUTION INTRAVENOUS CONTINUOUS
Status: CANCELLED | OUTPATIENT
Start: 2020-01-29

## 2020-01-29 RX ORDER — ONDANSETRON 2 MG/ML
4 INJECTION INTRAMUSCULAR; INTRAVENOUS
Status: CANCELLED | OUTPATIENT
Start: 2020-01-29

## 2020-01-29 RX ORDER — PROCHLORPERAZINE MALEATE 10 MG
10 TABLET ORAL EVERY 6 HOURS PRN
Status: CANCELLED | OUTPATIENT
Start: 2020-01-29

## 2020-01-29 RX ORDER — ONDANSETRON 8 MG/1
8 TABLET, ORALLY DISINTEGRATING ORAL
Status: CANCELLED | OUTPATIENT
Start: 2020-01-29

## 2020-01-29 RX ADMIN — HEPARIN 500 UNITS: 100 SYRINGE at 11:48

## 2020-01-29 RX ADMIN — SODIUM CHLORIDE 740 MG: 9 INJECTION, SOLUTION INTRAVENOUS at 10:35

## 2020-01-29 NOTE — PROGRESS NOTES
"Pharmacy Chemotherapy Verification  Patient Name: Amy Maki  Dx: NSCLC    Protocol: Durvalumab   *Dosing Reference*  Durvalumab 10 mg/kg IV over 60 min on day 1   Q14 days until DP/UT or a max of 12 months  NCCN Guidelines for NSCLC V.3.2019  Xochitl ANDREW, et al. N Engl J Med.  2017 Nov 16;377(20):8655-8120.  Xochitl ANDREW, et al. N Engl J Med.  2018 Dec 13;379(68):4347-7782.     Allergies:Carboplatin and Percocet [oxycodone-acetaminophen]  /69   Pulse 90   Temp 36.5 °C (97.7 °F) (Temporal)   Resp 18   Ht 1.675 m (5' 5.95\")   Wt 68.3 kg (150 lb 9.2 oz)   LMP  (LMP Unknown)   SpO2 94%   BMI 24.34 kg/m²    Body surface area is 1.78 meters squared.      Labs 1/29/20  TSH/Free T4 = in process    Labs 1/29/20 ([facility])  ANC 3000 Hgb 15.2 Plt 231k    Labs 1/27/20 ([facility])  SCr 0.97 CrCl 69.8 mL/mnin   AST/ALT/AP = 23/23/61 Tbili = 0.4  TSH = 23.24 Free T4 = 0.76    Drug Order   (Drug name, dose, route, IV Fluid & volume, frequency, number of doses) Cycle 16  Previous treatment = C15 on 1/15/20   Medication = Durvalumab (Imfinzi)  Base Dose = 10 mg/kg  Calc Dose: Base Dose x 68.3 kg = 683 mg  Final Dose = 740 mg  Route = IV  Fluid & Volume =  mL  Admin Duration = Over 60 minutes          Rounded to nearest vial size (within 10%) per dose rounding protocol     By my signature below, I confirm this process was performed independently with the BSA and all final chemotherapy dosing calculations congruent. I have reviewed the above chemotherapy order and that my calculation of the final dose and BSA (when applicable) corroborate those calculations of the  pharmacist. Discrepancies of 10% or greater in the written dose have been addressed and documented within the [facility] Progress notes.    Shruthi Juarez, PharmD, BCOP    "

## 2020-01-29 NOTE — PROGRESS NOTES
Chemotherapy Verification - PRIMARY RN      Height = 65.95 in  Weight = 150 lb  BSA = 1.78 m2       Medication: Imfinzi  Dose: 10 mg/kg  Calculated Dose: 683 mg                             (In mg/m2, AUC, mg/kg)     I confirm this process was performed independently with the BSA and all final chemotherapy dosing calculations congruent.  Any discrepancies of 10% or greater have been addressed with the chemotherapy pharmacist. The resolution of the discrepancy has been documented in the EPIC progress notes.

## 2020-01-29 NOTE — PROGRESS NOTES
Chemotherapy Verification - PRIMARY RN      Height = 167.5cm  Weight = 68.3kg  BSA = 1.78       Medication: IMFINZI  Dose: 10 MG/KG  Calculated Dose: 683 MG                             (In mg/m2, AUC, mg/kg)       I confirm this process was performed independently with the BSA and all final chemotherapy dosing calculations congruent.  Any discrepancies of 10% or greater have been addressed with the chemotherapy pharmacist. The resolution of the discrepancy has been documented in the EPIC progress notes.

## 2020-01-29 NOTE — PROGRESS NOTES
Pt arrived to [facility] for Cycle 16 chemo administration.  POC reviewed with patient.  Pt verbalized understanding of POC.  Port accessed with 20g, 1inch estrada needle x 1 attempt, flushes easily with brisk blood return and labs drawn.  Pt tolerated well.       Chemotherapy dosage verified by two RN's and confirmed.  Imfinzi given as ordered, see MAR.  Blood return verified prior to and after chemotherapy infusion.  See Chemotherapy flowsheet.  PT tolerated well.    No side effects or complications noted.   Port flushed per protocol and de accessed.  PT home with daughter.  Will return for next visit on 02/12//2020 for next appointment, which was confirmed with patient.

## 2020-01-29 NOTE — PROGRESS NOTES
"Pharmacy Chemotherapy Verification    Dx: NSCLC    Cycle 16  Previous treatment: 1/15/20    Protocol: Durvalumab   *Dosing Reference*  Durvalumab 10 mg/kg IV over 60 min on day 1  Q14 days until DP/UT or a max of 12 months  NCCN Guidelines for NSCLC V.3.2019  Xochitl ANDREW, et al. N Engl J Med.  2017 Nov 16;377(20):1863-7948.  Xochitl ANDREW, et al. N Engl J Med.  2018 Dec 13;379(83):5465-6184.     Allergies:Percocet [oxycodone-acetaminophen]  /69   Pulse 90   Temp 36.5 °C (97.7 °F) (Temporal)   Resp 18   Ht 1.675 m (5' 5.95\")   Wt 68.3 kg (150 lb 9.2 oz)   LMP  (LMP Unknown)   SpO2 94%   BMI 24.34 kg/m²  Body surface area is 1.78 meters squared.    All lab results [facility] 1/29/20 and [facility] 1/27/20  within treatment parameters.    Thyroid panel pending, MD to be notified if abnormal.    Durvalumab (Imfinzi) 10 mg/kg x 68.3kg = 683mg    Rounded down to vial size (within 10%) per dose rounding protocol = 740mg IV      Laney Larios, PharmD  "

## 2020-01-30 ENCOUNTER — PATIENT OUTREACH (OUTPATIENT)
Dept: OTHER | Facility: MEDICAL CENTER | Age: 55
End: 2020-01-30

## 2020-01-30 NOTE — PROGRESS NOTES
"On January 30, 2020, Oncology Social Worker Emily Martinez contacted pt. via telephone.  Pt. states \"so far so good\" and states things are going \"okay.\"  Pt. stated her lung cancer has been gone for 6 months but shared she now has thyroid cancer.  Pt. states she was given the results yesterday but has been informed it is the \"very treatable kind, where they go in and just take it out.\"  Pt. shared her ENT doctor was the one who found the nodule and she is feeling \"good about it because I have good doctors.\"  Pt. denies any barriers to care.  DARVIN Martinez encouraged pt. to call her if she had any questions or needs.  Pt. agreed and thanked DARVIN Martinez for \"checking in on me.\"    "

## 2020-02-12 ENCOUNTER — OUTPATIENT INFUSION SERVICES (OUTPATIENT)
Dept: ONCOLOGY | Facility: MEDICAL CENTER | Age: 55
End: 2020-02-12
Attending: INTERNAL MEDICINE
Payer: MEDICAID

## 2020-02-12 VITALS
HEIGHT: 66 IN | RESPIRATION RATE: 18 BRPM | HEART RATE: 90 BPM | WEIGHT: 151.46 LBS | DIASTOLIC BLOOD PRESSURE: 81 MMHG | SYSTOLIC BLOOD PRESSURE: 138 MMHG | BODY MASS INDEX: 24.34 KG/M2 | OXYGEN SATURATION: 96 % | TEMPERATURE: 97.3 F

## 2020-02-12 DIAGNOSIS — E03.9 HYPOTHYROIDISM, UNSPECIFIED TYPE: ICD-10-CM

## 2020-02-12 DIAGNOSIS — C34.90 NON-SMALL CELL LUNG CANCER, UNSPECIFIED LATERALITY (HCC): ICD-10-CM

## 2020-02-12 LAB
T3FREE SERPL-MCNC: 3.7 PG/ML (ref 2.4–4.2)
T4 FREE SERPL-MCNC: 1.48 NG/DL (ref 0.53–1.43)

## 2020-02-12 PROCEDURE — 700111 HCHG RX REV CODE 636 W/ 250 OVERRIDE (IP): Performed by: INTERNAL MEDICINE

## 2020-02-12 PROCEDURE — 84439 ASSAY OF FREE THYROXINE: CPT

## 2020-02-12 PROCEDURE — 700111 HCHG RX REV CODE 636 W/ 250 OVERRIDE (IP)

## 2020-02-12 PROCEDURE — 84481 FREE ASSAY (FT-3): CPT

## 2020-02-12 PROCEDURE — A4212 NON CORING NEEDLE OR STYLET: HCPCS

## 2020-02-12 PROCEDURE — 96413 CHEMO IV INFUSION 1 HR: CPT

## 2020-02-12 PROCEDURE — 700105 HCHG RX REV CODE 258: Performed by: INTERNAL MEDICINE

## 2020-02-12 PROCEDURE — 84479 ASSAY OF THYROID (T3 OR T4): CPT

## 2020-02-12 RX ORDER — PROCHLORPERAZINE MALEATE 10 MG
10 TABLET ORAL EVERY 6 HOURS PRN
Status: CANCELLED | OUTPATIENT
Start: 2020-02-13

## 2020-02-12 RX ORDER — 0.9 % SODIUM CHLORIDE 0.9 %
VIAL (ML) INJECTION PRN
Status: CANCELLED | OUTPATIENT
Start: 2020-02-12

## 2020-02-12 RX ORDER — 0.9 % SODIUM CHLORIDE 0.9 %
VIAL (ML) INJECTION PRN
Status: CANCELLED | OUTPATIENT
Start: 2020-02-13

## 2020-02-12 RX ORDER — SODIUM CHLORIDE 9 MG/ML
INJECTION, SOLUTION INTRAVENOUS CONTINUOUS
Status: DISCONTINUED | OUTPATIENT
Start: 2020-02-12 | End: 2020-02-12 | Stop reason: HOSPADM

## 2020-02-12 RX ORDER — SODIUM CHLORIDE 9 MG/ML
INJECTION, SOLUTION INTRAVENOUS CONTINUOUS
Status: CANCELLED | OUTPATIENT
Start: 2020-02-13

## 2020-02-12 RX ORDER — ONDANSETRON 2 MG/ML
4 INJECTION INTRAMUSCULAR; INTRAVENOUS
Status: CANCELLED | OUTPATIENT
Start: 2020-02-13

## 2020-02-12 RX ORDER — 0.9 % SODIUM CHLORIDE 0.9 %
5 VIAL (ML) INJECTION PRN
Status: CANCELLED | OUTPATIENT
Start: 2020-02-12

## 2020-02-12 RX ORDER — ONDANSETRON 8 MG/1
8 TABLET, ORALLY DISINTEGRATING ORAL
Status: CANCELLED | OUTPATIENT
Start: 2020-02-13

## 2020-02-12 RX ORDER — 0.9 % SODIUM CHLORIDE 0.9 %
5 VIAL (ML) INJECTION PRN
Status: CANCELLED | OUTPATIENT
Start: 2020-02-13

## 2020-02-12 RX ADMIN — SODIUM CHLORIDE 740 MG: 9 INJECTION, SOLUTION INTRAVENOUS at 10:38

## 2020-02-12 RX ADMIN — SODIUM CHLORIDE: 9 INJECTION, SOLUTION INTRAVENOUS at 10:35

## 2020-02-12 RX ADMIN — HEPARIN 500 UNITS: 100 SYRINGE at 11:52

## 2020-02-12 NOTE — PROGRESS NOTES
"Pharmacy Chemotherapy Verification    Dx: NSCLC  Protocol: Durvalumab   *Dosing Reference*  Durvalumab 10 mg/kg IV over 60 min on day 1   Q14 days until DP/UT or a max of 12 months  NCCN Guidelines for NSCLC V.3.2019  Xochitl ANDREW, et al. N Engl J Med.  2017 Nov 16;377(20):5360-8041.  Xochitl ANDREW et al. N Engl J Med.  2018 Dec 13;379(02):6409-6187.     Allergies:Carboplatin and Percocet [oxycodone-acetaminophen]  /81   Pulse 90   Temp 36.3 °C (97.3 °F) (Temporal)   Resp 18   Ht 1.676 m (5' 6\")   Wt 68.7 kg (151 lb 7.3 oz)   LMP  (LMP Unknown)   SpO2 96%   BMI 24.45 kg/m²    Body surface area is 1.79 meters squared.      Labs 2/12/20 ([facility]):  ANC~ 2100 Plt = 242k   Hgb = 14.9   Labs 2/10/20 ([facility]):    SCr = 0.79mg/dL CrCl ~ 86  mL/min   LFT's = WNL TBili = 0.5   TSH = 0.28   Free T4 = Pending    Drug Order   (Drug name, dose, route, IV Fluid & volume, frequency, number of doses) Cycle 17  Previous treatment = C16 1/29/20   Medication = Durvalumab (Imfinzi)  Base Dose = 10 mg/kg  Calc Dose: Base Dose x 68.3 kg = 683 mg  Final Dose = 740 mg  Route = IV  Fluid & Volume =  mL  Admin Duration = Over 60 minutes          Rounded to nearest vial size (within 10%) per dose rounding protocol     By my signature below, I confirm this process was performed independently with the BSA and all final chemotherapy dosing calculations congruent. I have reviewed the above chemotherapy order and that my calculation of the final dose and BSA (when applicable) corroborate those calculations of the  pharmacist. Discrepancies of 10% or greater in the written dose have been addressed and documented within the [facility] Progress notes.    Stefania Bryant, PharmD    "

## 2020-02-12 NOTE — PROGRESS NOTES
Tolerated treatment well with no reactions. Port flushed and heparin locked per protocol. Site de-accessed and covered with gauze and tape. Discharged home to self care in no distress at this time. Sees [facility] thyroid MD today whom will review the labs that are still pending that this RN sabrina today. Next appointment in place and confirmed.

## 2020-02-12 NOTE — PROGRESS NOTES
Chemotherapy Verification - SECONDARY RN       Height = 167.6 cm  Weight = 68.7 kg  BSA = 1.788 m2       Medication: Imfinzi  Dose: 10 mg/kg  Calculated Dose: 687 mg                             (In mg/m2, AUC, mg/kg)       I confirm that this process was performed independently.

## 2020-02-12 NOTE — PROGRESS NOTES
"Pharmacy Chemotherapy Verification  Patient Name: Amy Maki  Dx: NSCLC    Cycle 17  Previous treatment: 1/29/20    Protocol: Durvalumab   *Dosing Reference*  Durvalumab 10 mg/kg IV over 60 min on day 1  Q14 days until DP/UT or a max of 12 months  NCCN Guidelines for NSCLC V.3.2019  Xochitl ANDREW et al. N Engl J Med.  2017 Nov 16;377(20):2076-9463.  Xochitl ANDREW, et al. N Engl J Med.  2018 Dec 13;379(49):1530-9822.     Allergies:Percocet [oxycodone-acetaminophen]  /81   Pulse 90   Temp 36.3 °C (97.3 °F) (Temporal)   Resp 18   Ht 1.676 m (5' 6\")   Wt 68.7 kg (151 lb 7.3 oz)   LMP  (LMP Unknown)   SpO2 96%   BMI 24.45 kg/m²  Body surface area is 1.79 meters squared.     Labs 2/12/20 ([facility])  TSH/Free T4 = in process    Labs 2/12/20 ([facility])  ANC 2100 Hgb 14.9 Plt 242k    Labs 2/10/20 ([facility])  SCr 0.79 CrCl 86.2 mL/min   AST/ALT/AP = 21/25/55 Tbili = 0.5    Durvalumab (Imfinzi) 10 mg/kg x 68.7kg = 687 mg    Rounded down to vial size (within 10%) per dose rounding protocol = 740 mg IV    Shruthi Juarez, PharmD, BCOP    "

## 2020-02-12 NOTE — PROGRESS NOTES
Patient here for imfinzi today. Had labs previously. Thyroid low. MD notified and other labs ordered. Port accessed using sterile technique. Brisk blood return obtained. Labs collected and sent. Pharmacy notified that patient has met other parameters lab wise. Awaiting medications at this time. Continue to monitor.

## 2020-02-12 NOTE — PROGRESS NOTES
Chemotherapy Verification - PRIMARY RN      Height = 167.6cm  Weight = 68.7kg  BSA = 1.79m2       Medication: Durvalumab/Imfinzi  Dose:10mg/kg   Calculated Dose: 687mg- rounded to vial size = 740mg                             (In mg/m2, AUC, mg/kg)         I confirm this process was performed independently with the BSA and all final chemotherapy dosing calculations congruent.  Any discrepancies of 10% or greater have been addressed with the chemotherapy pharmacist. The resolution of the discrepancy has been documented in the EPIC progress notes.

## 2020-02-13 LAB — T3RU NFR SERPL: 39 % (ref 28–41)

## 2020-02-26 ENCOUNTER — OUTPATIENT INFUSION SERVICES (OUTPATIENT)
Dept: ONCOLOGY | Facility: MEDICAL CENTER | Age: 55
End: 2020-02-26
Attending: INTERNAL MEDICINE
Payer: MEDICAID

## 2020-02-26 VITALS
OXYGEN SATURATION: 93 % | WEIGHT: 150.35 LBS | RESPIRATION RATE: 18 BRPM | DIASTOLIC BLOOD PRESSURE: 75 MMHG | HEIGHT: 66 IN | SYSTOLIC BLOOD PRESSURE: 134 MMHG | HEART RATE: 102 BPM | TEMPERATURE: 97.3 F | BODY MASS INDEX: 24.16 KG/M2

## 2020-02-26 DIAGNOSIS — C34.90 NON-SMALL CELL LUNG CANCER, UNSPECIFIED LATERALITY (HCC): ICD-10-CM

## 2020-02-26 PROCEDURE — 700105 HCHG RX REV CODE 258: Performed by: INTERNAL MEDICINE

## 2020-02-26 PROCEDURE — 700111 HCHG RX REV CODE 636 W/ 250 OVERRIDE (IP)

## 2020-02-26 PROCEDURE — 700111 HCHG RX REV CODE 636 W/ 250 OVERRIDE (IP): Performed by: INTERNAL MEDICINE

## 2020-02-26 PROCEDURE — 96413 CHEMO IV INFUSION 1 HR: CPT

## 2020-02-26 PROCEDURE — A4212 NON CORING NEEDLE OR STYLET: HCPCS

## 2020-02-26 RX ORDER — 0.9 % SODIUM CHLORIDE 0.9 %
VIAL (ML) INJECTION PRN
Status: CANCELLED | OUTPATIENT
Start: 2020-02-27

## 2020-02-26 RX ORDER — 0.9 % SODIUM CHLORIDE 0.9 %
5 VIAL (ML) INJECTION PRN
Status: CANCELLED | OUTPATIENT
Start: 2020-02-26

## 2020-02-26 RX ORDER — ONDANSETRON 2 MG/ML
4 INJECTION INTRAMUSCULAR; INTRAVENOUS
Status: CANCELLED | OUTPATIENT
Start: 2020-02-27

## 2020-02-26 RX ORDER — PROMETHAZINE HYDROCHLORIDE 6.25 MG/5ML
SYRUP ORAL
COMMUNITY
End: 2020-04-16

## 2020-02-26 RX ORDER — ONDANSETRON 8 MG/1
8 TABLET, ORALLY DISINTEGRATING ORAL
Status: CANCELLED | OUTPATIENT
Start: 2020-02-27

## 2020-02-26 RX ORDER — DURVALUMAB 120 MG/2.4ML
INJECTION, SOLUTION INTRAVENOUS
COMMUNITY
Start: 2020-01-29 | End: 2020-09-21

## 2020-02-26 RX ORDER — SODIUM CHLORIDE 9 MG/ML
INJECTION, SOLUTION INTRAVENOUS CONTINUOUS
Status: CANCELLED | OUTPATIENT
Start: 2020-02-27

## 2020-02-26 RX ORDER — PROCHLORPERAZINE MALEATE 10 MG
10 TABLET ORAL EVERY 6 HOURS PRN
Status: CANCELLED | OUTPATIENT
Start: 2020-02-27

## 2020-02-26 RX ORDER — TIOTROPIUM BROMIDE 18 UG/1
18 CAPSULE ORAL; RESPIRATORY (INHALATION) EVERY MORNING
COMMUNITY

## 2020-02-26 RX ORDER — 0.9 % SODIUM CHLORIDE 0.9 %
VIAL (ML) INJECTION PRN
Status: CANCELLED | OUTPATIENT
Start: 2020-02-26

## 2020-02-26 RX ORDER — 0.9 % SODIUM CHLORIDE 0.9 %
5 VIAL (ML) INJECTION PRN
Status: CANCELLED | OUTPATIENT
Start: 2020-02-27

## 2020-02-26 RX ORDER — CARBOXYMETHYLCELLULOSE SODIUM 2.5 MG/ML
1-2 SOLUTION/ DROPS OPHTHALMIC PRN
Status: ON HOLD | COMMUNITY
End: 2020-04-17

## 2020-02-26 RX ORDER — ALBUTEROL SULFATE 90 UG/1
1-2 AEROSOL, METERED RESPIRATORY (INHALATION) EVERY 4 HOURS PRN
COMMUNITY

## 2020-02-26 RX ADMIN — HEPARIN 500 UNITS: 100 SYRINGE at 11:58

## 2020-02-26 RX ADMIN — SODIUM CHLORIDE 620 MG: 9 INJECTION, SOLUTION INTRAVENOUS at 10:39

## 2020-02-26 NOTE — PROGRESS NOTES
Chemotherapy Verification - PRIMARY RN      Height = 1.676 m  Weight = 68.2 kg  BSA = 1.78 m^2       Medication: durvalumab  Dose: 10 mg/kg  Calculated Dose: 682 mg                             (In mg/m2, AUC, mg/kg)           I confirm this process was performed independently with the BSA and all final chemotherapy dosing calculations congruent.  Any discrepancies of 10% or greater have been addressed with the chemotherapy pharmacist. The resolution of the discrepancy has been documented in the EPIC progress notes.

## 2020-02-26 NOTE — PROGRESS NOTES
"Pharmacy Chemotherapy Verification  Patient Name: Amy Maki  Dx: NSCLC  Protocol: Durvalumab   *Dosing Reference*  Durvalumab 10 mg/kg IV over 60 min on day 1  Q14 days until DP/UT or a max of 12 months  NCCN Guidelines for NSCLC V.3.2019  Xochitl ANDERW, et al. N Engl J Med.  2017 Nov 16;377(20):1336-6986.  Xochitl ANDREW, et al. N Engl J Med.  2018 Dec 13;379(00):5316-2090.     Allergies:Carboplatin and Percocet [oxycodone-acetaminophen]  /75   Pulse (!) 102   Temp 36.3 °C (97.3 °F) (Temporal)   Resp 18   Ht 1.676 m (5' 6\")   Wt 68.2 kg (150 lb 5.7 oz)   LMP  (LMP Unknown)   SpO2 93%   BMI 24.27 kg/m²    Body surface area is 1.78 meters squared.      Labs 2/26/20 ([facility])  ANC~ 4000 Hgb 14.1 Plt 302k    Labs 2/24/20 ([facility])  SCr 0.83 CrCl 81.4 mL/min   AST/ALT/AP = 22/25/61 Tbili = 0.7  TSH 0.09 Free T4 1.78    Drug Order   (Drug name, dose, route, IV Fluid & volume, frequency, number of doses) Cycle 18  Previous treatment = C17 2/12/20   Medication = Durvalumab (Imfinzi)  Base Dose = 10 mg/kg  Calc Dose: Base Dose x 68.2 kg = 682 mg  Final Dose = 620 mg  Route = IV  Fluid & Volume =  mL  Admin Duration = Over 60 minutes          Rounded to nearest vial size (within 10%) per dose rounding protocol     By my signature below, I confirm this process was performed independently with the BSA and all final chemotherapy dosing calculations congruent. I have reviewed the above chemotherapy order and that my calculation of the final dose and BSA (when applicable) corroborate those calculations of the  pharmacist. Discrepancies of 10% or greater in the written dose have been addressed and documented within the [facility] Progress notes.    Shruthi Juarez, PharmD, BCOP    "

## 2020-02-26 NOTE — PROGRESS NOTES
"Pharmacy Chemotherapy Verification    Patient Name: Amy Maki  Dx: NSCLC    Cycle 18  Previous treatment: 2/12/20    Protocol: Durvalumab   *Dosing Reference*  Durvalumab 10 mg/kg IV over 60 min on day 1  Q14 days until DP/UT or a max of 12 months  NCCN Guidelines for NSCLC V.3.2019  Xochitl ANDREW et al. N Engl J Med.  2017 Nov 16;377(20):0828-2942.  Xochitl ANDREW, et al. N Engl J Med.  2018 Dec 13;379(54):3169-2920.     Allergies:Percocet [oxycodone-acetaminophen]  /75   Pulse (!) 102   Temp 36.3 °C (97.3 °F) (Temporal)   Resp 18   Ht 1.676 m (5' 6\")   Wt 68.2 kg (150 lb 5.7 oz)   LMP  (LMP Unknown)   SpO2 93%   BMI 24.27 kg/m²  Body surface area is 1.78 meters squared.     All lab results 2/24/20 [facility] and 2/26/20 [facility] within treatment parameters.       Durvalumab (Imfinzi) 10 mg/kg x 68.2kg = 682mg    Rounded down to vial size (within 10%) per dose rounding protocol = 620mg IV    ANA Larios, Pharm.D.  "

## 2020-02-26 NOTE — PROGRESS NOTES
Amy into [facility] for Day 1/ Cycle 18 of imfinzi. Labs done prior, scanned into media. Pt denied having any new or acute complaints today, reports tolerating past treatments well. Port accessed in a sterile manner, had + blood return, flushed briskly. Pt given imfinzi as prescribed, tolerated well, denied having any complaints during or after infusion. Port had + blood return after, flushed per [facility] policy, de-accessed, needle intact, insertion site covered with sterile gauze and paper tape. Next appointment scheduled. Patient discharged home to self care.

## 2020-02-26 NOTE — PROGRESS NOTES
Chemotherapy Verification - SECONDARY RN       Height = 167.6 cm  Weight = 68.2 kg  BSA = 1.78 m2       Medication: Durvalumab  Dose: 10 mg/kg  Calculated Dose: 682 mg  Round to vial sound                            (In mg/m2, AUC, mg/kg)     I confirm that this process was performed independently.

## 2020-03-10 ENCOUNTER — TELEPHONE (OUTPATIENT)
Dept: ADMISSIONS | Facility: MEDICAL CENTER | Age: 55
End: 2020-03-10

## 2020-03-11 ENCOUNTER — OUTPATIENT INFUSION SERVICES (OUTPATIENT)
Dept: ONCOLOGY | Facility: MEDICAL CENTER | Age: 55
End: 2020-03-11
Attending: INTERNAL MEDICINE
Payer: MEDICAID

## 2020-03-11 VITALS
RESPIRATION RATE: 18 BRPM | BODY MASS INDEX: 24.45 KG/M2 | DIASTOLIC BLOOD PRESSURE: 74 MMHG | OXYGEN SATURATION: 94 % | HEIGHT: 66 IN | HEART RATE: 84 BPM | SYSTOLIC BLOOD PRESSURE: 119 MMHG | TEMPERATURE: 98.6 F | WEIGHT: 152.12 LBS

## 2020-03-11 DIAGNOSIS — C34.90 NON-SMALL CELL LUNG CANCER, UNSPECIFIED LATERALITY (HCC): Primary | ICD-10-CM

## 2020-03-11 LAB
T4 FREE SERPL-MCNC: 1.59 NG/DL (ref 0.53–1.43)
TSH SERPL DL<=0.005 MIU/L-ACNC: 0.25 UIU/ML (ref 0.38–5.33)

## 2020-03-11 PROCEDURE — 700105 HCHG RX REV CODE 258: Performed by: INTERNAL MEDICINE

## 2020-03-11 PROCEDURE — 700111 HCHG RX REV CODE 636 W/ 250 OVERRIDE (IP): Performed by: INTERNAL MEDICINE

## 2020-03-11 PROCEDURE — 84443 ASSAY THYROID STIM HORMONE: CPT

## 2020-03-11 PROCEDURE — A4212 NON CORING NEEDLE OR STYLET: HCPCS

## 2020-03-11 PROCEDURE — 96413 CHEMO IV INFUSION 1 HR: CPT

## 2020-03-11 PROCEDURE — 84439 ASSAY OF FREE THYROXINE: CPT

## 2020-03-11 RX ORDER — 0.9 % SODIUM CHLORIDE 0.9 %
VIAL (ML) INJECTION PRN
Status: CANCELLED | OUTPATIENT
Start: 2020-03-11

## 2020-03-11 RX ORDER — 0.9 % SODIUM CHLORIDE 0.9 %
5 VIAL (ML) INJECTION PRN
Status: CANCELLED | OUTPATIENT
Start: 2020-03-11

## 2020-03-11 RX ORDER — PROCHLORPERAZINE MALEATE 10 MG
10 TABLET ORAL EVERY 6 HOURS PRN
Status: CANCELLED | OUTPATIENT
Start: 2020-03-12

## 2020-03-11 RX ORDER — 0.9 % SODIUM CHLORIDE 0.9 %
VIAL (ML) INJECTION PRN
Status: CANCELLED | OUTPATIENT
Start: 2020-03-12

## 2020-03-11 RX ORDER — SODIUM CHLORIDE 9 MG/ML
INJECTION, SOLUTION INTRAVENOUS CONTINUOUS
Status: CANCELLED | OUTPATIENT
Start: 2020-03-12

## 2020-03-11 RX ORDER — ONDANSETRON 8 MG/1
8 TABLET, ORALLY DISINTEGRATING ORAL
Status: CANCELLED | OUTPATIENT
Start: 2020-03-12

## 2020-03-11 RX ORDER — 0.9 % SODIUM CHLORIDE 0.9 %
5 VIAL (ML) INJECTION PRN
Status: CANCELLED | OUTPATIENT
Start: 2020-03-12

## 2020-03-11 RX ORDER — ONDANSETRON 2 MG/ML
4 INJECTION INTRAMUSCULAR; INTRAVENOUS
Status: CANCELLED | OUTPATIENT
Start: 2020-03-12

## 2020-03-11 RX ADMIN — SODIUM CHLORIDE 740 MG: 9 INJECTION, SOLUTION INTRAVENOUS at 10:35

## 2020-03-11 RX ADMIN — HEPARIN 500 UNITS: 100 SYRINGE at 11:52

## 2020-03-11 ASSESSMENT — FIBROSIS 4 INDEX: FIB4 SCORE: 1.05

## 2020-03-11 NOTE — PROGRESS NOTES
"Pharmacy Chemotherapy Verification    Patient Name: Amy Maki  Dx: NSCLC    Cycle 19  Previous treatment: 2/26/20    Protocol: Durvalumab   *Dosing Reference*  Durvalumab 10 mg/kg IV over 60 min on day 1  Q14 days until DP/UT or a max of 12 months  NCCN Guidelines for NSCLC V.3.2019  Xochitl ANDREW et al. N Engl J Med.  2017 Nov 16;377(20):5406-1644.  Xochitl ANDREW, et al. N Engl J Med.  2018 Dec 13;379(11):2689-9595.     Allergies:Percocet [oxycodone-acetaminophen]  /74   Pulse 84   Temp 37 °C (98.6 °F) (Temporal)   Resp 18   Ht 1.678 m (5' 6.06\")   Wt 69 kg (152 lb 1.9 oz)   LMP  (LMP Unknown)   SpO2 94%   BMI 24.51 kg/m²  Body surface area is 1.79 meters squared.     All lab results 3/9/20 CT, and 3/11/20 [facility] within treatment parameters.  Thyroid panel pending, MD to be notified if abnormal.      Durvalumab (Imfinzi) 10 mg/kg x 69kg = 690mg    Rounded to vial size (within 10%) per dose rounding protocol = 740mg IV    ANA Larios, Pharm.D.  "

## 2020-03-11 NOTE — PROGRESS NOTES
Pt arrived ambulatory to [facility] for Day 1/ Cycle 19 of Durvalumab. Pt denies fever, acute illness or any s/s infection today; POC discussed and pt reports tolerating past treatments well. Port accessed per protocol x1 attempt without difficulty; brisk, positive blood return noted and pt tolerated well. Pt denies need for antiemetics at this time; Chemotherapy administered per MD orders and pt tolerated well. No signs or symptoms of reaction or complications noted. Port flushed per protocol and deaccessed with needle intact, sterile gauze and paper tape applied; pt tolerated well. Follow-up care and future appointments discussed with pt; pt verbalized understanding. Pt discharged home to self care in no apparent distress at this time.

## 2020-03-11 NOTE — PROGRESS NOTES
Chemotherapy Verification - PRIMARY RN      Height = 167.8cm  Weight = 69kg  BSA = 1.79m2       Medication: Druvalumab  Dose: 10mg/kg  Calculated Dose: 690mg                            (In mg/m2, AUC, mg/kg)     I confirm this process was performed independently with the BSA and all final chemotherapy dosing calculations congruent.  Any discrepancies of 10% or greater have been addressed with the chemotherapy pharmacist. The resolution of the discrepancy has been documented in the EPIC progress notes.

## 2020-03-11 NOTE — PROGRESS NOTES
"Pharmacy Chemotherapy Verification  Patient Name: Amy Maki  Dx: NSCLC  Protocol: Durvalumab   *Dosing Reference*  Durvalumab 10 mg/kg IV over 60 min on day 1   Q14 days until DP/UT or a max of 12 months  NCCN Guidelines for NSCLC V.3.2019  Xochitl ANDREW, et al. N Engl J Med.  2017 Nov 16;377(20):8952-4093.  Xochitl ANDREW, et al. N Engl J Med.  2018 Dec 13;379(98):4159-7018.     Allergies:Carboplatin and Percocet [oxycodone-acetaminophen]  /74   Pulse 84   Temp 37 °C (98.6 °F) (Temporal)   Resp 18   Ht 1.678 m (5' 6.06\")   Wt 69 kg (152 lb 1.9 oz)   LMP  (LMP Unknown)   SpO2 94%   BMI 24.51 kg/m²    Body surface area is 1.79 meters squared.      Labs 3/11/20 ([facility]):  ANC~ 3900 Plt = 331k   Hgb = 14.3   Labs 3/9/20 ([facility]):    SCr = 0.69mg/dL CrCl ~ 98 mL/min   LFT's = WNL TBili = 0.5   TSH = pending Free T4 = pending    Drug Order   (Drug name, dose, route, IV Fluid & volume, frequency, number of doses) Cycle 19  Previous treatment = C18 2/26/20   Medication = Durvalumab (Imfinzi)  Base Dose = 10 mg/kg  Calc Dose: Base Dose x 69 kg = 690 mg  Final Dose = 740 mg  Route = IV  Fluid & Volume =  mL  Admin Duration = Over 60 minutes          Rounded to nearest vial size (within 10%) per dose rounding protocol     By my signature below, I confirm this process was performed independently with the BSA and all final chemotherapy dosing calculations congruent. I have reviewed the above chemotherapy order and that my calculation of the final dose and BSA (when applicable) corroborate those calculations of the  pharmacist. Discrepancies of 10% or greater in the written dose have been addressed and documented within the [facility] Progress notes.    Stefania Bryant, PharmD      "

## 2020-03-11 NOTE — PROGRESS NOTES
Chemotherapy Verification - SECONDARY RN       Height = 167.8 cm  Weight = 69 kg  BSA = 1.79 m2       Medication: Imfinzi  Dose: 10 mg/kg  Calculated Dose: 690 mg                             (In mg/m2, AUC, mg/kg)     I confirm that this process was performed independently.

## 2020-03-25 ENCOUNTER — OUTPATIENT INFUSION SERVICES (OUTPATIENT)
Dept: ONCOLOGY | Facility: MEDICAL CENTER | Age: 55
End: 2020-03-25
Attending: INTERNAL MEDICINE
Payer: MEDICAID

## 2020-03-25 VITALS
RESPIRATION RATE: 18 BRPM | HEIGHT: 66 IN | TEMPERATURE: 99 F | DIASTOLIC BLOOD PRESSURE: 86 MMHG | HEART RATE: 92 BPM | OXYGEN SATURATION: 95 % | WEIGHT: 153.44 LBS | SYSTOLIC BLOOD PRESSURE: 115 MMHG | BODY MASS INDEX: 24.66 KG/M2

## 2020-03-25 DIAGNOSIS — C34.90 NON-SMALL CELL LUNG CANCER, UNSPECIFIED LATERALITY (HCC): ICD-10-CM

## 2020-03-25 LAB — T4 FREE SERPL-MCNC: 1.72 NG/DL (ref 0.53–1.43)

## 2020-03-25 PROCEDURE — 84439 ASSAY OF FREE THYROXINE: CPT

## 2020-03-25 PROCEDURE — 700111 HCHG RX REV CODE 636 W/ 250 OVERRIDE (IP)

## 2020-03-25 PROCEDURE — 96413 CHEMO IV INFUSION 1 HR: CPT

## 2020-03-25 PROCEDURE — A4212 NON CORING NEEDLE OR STYLET: HCPCS

## 2020-03-25 PROCEDURE — 700111 HCHG RX REV CODE 636 W/ 250 OVERRIDE (IP): Performed by: INTERNAL MEDICINE

## 2020-03-25 PROCEDURE — 700105 HCHG RX REV CODE 258: Performed by: INTERNAL MEDICINE

## 2020-03-25 RX ORDER — 0.9 % SODIUM CHLORIDE 0.9 %
VIAL (ML) INJECTION PRN
Status: CANCELLED | OUTPATIENT
Start: 2020-03-26

## 2020-03-25 RX ORDER — 0.9 % SODIUM CHLORIDE 0.9 %
VIAL (ML) INJECTION PRN
Status: CANCELLED | OUTPATIENT
Start: 2020-03-25

## 2020-03-25 RX ORDER — PROCHLORPERAZINE MALEATE 10 MG
10 TABLET ORAL EVERY 6 HOURS PRN
Status: CANCELLED | OUTPATIENT
Start: 2020-03-26

## 2020-03-25 RX ORDER — ONDANSETRON 8 MG/1
8 TABLET, ORALLY DISINTEGRATING ORAL
Status: CANCELLED | OUTPATIENT
Start: 2020-03-26

## 2020-03-25 RX ORDER — SODIUM CHLORIDE 9 MG/ML
INJECTION, SOLUTION INTRAVENOUS CONTINUOUS
Status: CANCELLED | OUTPATIENT
Start: 2020-03-26

## 2020-03-25 RX ORDER — 0.9 % SODIUM CHLORIDE 0.9 %
5 VIAL (ML) INJECTION PRN
Status: CANCELLED | OUTPATIENT
Start: 2020-03-26

## 2020-03-25 RX ORDER — 0.9 % SODIUM CHLORIDE 0.9 %
5 VIAL (ML) INJECTION PRN
Status: CANCELLED | OUTPATIENT
Start: 2020-03-25

## 2020-03-25 RX ORDER — ONDANSETRON 2 MG/ML
4 INJECTION INTRAMUSCULAR; INTRAVENOUS
Status: CANCELLED | OUTPATIENT
Start: 2020-03-26

## 2020-03-25 RX ADMIN — HEPARIN 500 UNITS: 100 SYRINGE at 11:13

## 2020-03-25 RX ADMIN — SODIUM CHLORIDE 740 MG: 9 INJECTION, SOLUTION INTRAVENOUS at 09:54

## 2020-03-25 ASSESSMENT — FIBROSIS 4 INDEX: FIB4 SCORE: 1.05

## 2020-03-25 NOTE — PROGRESS NOTES
Pt arrived to [facility], ambulatory, for Imfinzi infusion. Pt voices no complaints. Port accessed in sterile manner, positive blood return noted. Labs reviewed from 3/23, pt within parameters to treat today. T4 drawn per orders. Imfinzi infused with no s/sx of adverse reaction. Port flushed and heparin locked per policy, port de-accessed. Pt left [facility] with no s/sx of distress. Follow up appointment confirmed.

## 2020-03-25 NOTE — PROGRESS NOTES
Chemotherapy Verification - SECONDARY RN     D1C20    Height = 167.6 cm  Weight = 69.6 kg  BSA = 1.8 m2       Medication: Durvalumab (Imfinzi)  Dose: 10 mg/kg  Calculated Dose: 696 mg                             I confirm that this process was performed independently.

## 2020-03-25 NOTE — PROGRESS NOTES
"Pharmacy Chemotherapy Verification    Dx: NSCLC    Cycle 20  Previous treatment: 3/11/20    Protocol: Durvalumab   *Dosing Reference*  Durvalumab 10 mg/kg IV over 60 min on day 1  Q14 days until DP/UT or a max of 12 months  NCCN Guidelines for NSCLC V.3.2019  Xochitl ANDREW, et al. N Engl J Med.  2017 Nov 16;377(20):3569-4308.  Xochitl ANDREW, et al. N Engl J Med.  2018 Dec 13;379(57):3976-0780.     Allergies:Percocet [oxycodone-acetaminophen]  /86   Pulse 92   Temp 37.2 °C (99 °F) (Temporal)   Resp 18   Ht 1.676 m (5' 6\")   Wt 69.6 kg (153 lb 7 oz)   LMP  (LMP Unknown)   SpO2 95%   BMI 24.77 kg/m²  Body surface area is 1.8 meters squared.     CBC (3/25/20 @ [facility]) and CMP and thyroid panel (3/23/20 @ [facility]) within treatment plan parameters.      Durvalumab (Imfinzi) 10 mg/kg x 69.6 kg = 696 mg    Rounded to vial size (within 10%) per dose rounding protocol = 740 mg IV      Tamanna Stacy, PharmD  "

## 2020-03-25 NOTE — PROGRESS NOTES
"Pharmacy Chemotherapy Verification  Patient Name: Amy Maki  Dx: NSCLC  Protocol: Durvalumab   *Dosing Reference*  Durvalumab 10 mg/kg IV over 60 min on day 1   Q14 days until DP/UT or a max of 12 months  NCCN Guidelines for NSCLC V.3.2019  Xochitl ANDREW, et al. N Engl J Med.  2017 Nov 16;377(20):0958-0559.  Xochitl ANDREW, et al. N Engl J Med.  2018 Dec 13;379(59):4086-3613.     Allergies:Carboplatin and Percocet [oxycodone-acetaminophen]  /86   Pulse 92   Temp 37.2 °C (99 °F) (Temporal)   Resp 18   Ht 1.676 m (5' 6\")   Wt 69.6 kg (153 lb 7 oz)   LMP  (LMP Unknown)   SpO2 95%   BMI 24.77 kg/m²    Body surface area is 1.8 meters squared.      Labs 3/23/20 ([facility])  ANC~ 3700 Plt = 301k   Hgb = 13.5     SCr = 0.82 mg/dL CrCl ~ 84 mL/min   LFT's = WNLs  TBili = 0.4   TSH = 0.18 Free T4 = pending  Drug Order   (Drug name, dose, route, IV Fluid & volume, frequency, number of doses) Cycle 20  Previous treatment = C19 on 3/11/20   Medication = Durvalumab (Imfinzi)  Base Dose = 10 mg/kg  Calc Dose: Base Dose x 69.6 kg = 696 mg  Final Dose = 740 mg  Route = IV  Fluid & Volume =  mL  Admin Duration = Over 60 minutes          Rounded to nearest vial size (within 10%) per dose rounding protocol     By my signature below, I confirm this process was performed independently with the BSA and all final chemotherapy dosing calculations congruent. I have reviewed the above chemotherapy order and that my calculation of the final dose and BSA (when applicable) corroborate those calculations of the  pharmacist. Discrepancies of 10% or greater in the written dose have been addressed and documented within the [facility] Progress notes.    Yony Woo, PharmD      "

## 2020-03-25 NOTE — PROGRESS NOTES
Chemotherapy Verification - PRIMARY RN      Height = 167.6 cm  Weight = 69.6 kg  BSA = 1.8 m2       Medication: Imfinzi  Dose: 10 mg/kg  Calculated Dose: 696 mg                             (In mg/m2, AUC, mg/kg)       I confirm this process was performed independently with the BSA and all final chemotherapy dosing calculations congruent.  Any discrepancies of 10% or greater have been addressed with the chemotherapy pharmacist. The resolution of the discrepancy has been documented in the EPIC progress notes.

## 2020-04-06 NOTE — PROGRESS NOTES
"Pharmacy Chemotherapy Verification  Patient Name: Amy Maki  Dx: NSCLC  Protocol: Durvalumab   *Dosing Reference*  Durvalumab 10 mg/kg IV over 60 min on day 1  Q14 days until DP/UT or a max of 12 months  NCCN Guidelines for NSCLC V.3.2019  Xochitl ANDREW, et al. N Engl J Med.  2017 Nov 16;377(20):0457-6459.  Xochitl ANDREW, et al. N Engl J Med.  2018 Dec 13;379(49):4583-0678.     Allergies:Carboplatin and Percocet [oxycodone-acetaminophen]  /83   Pulse 100   Temp 37.2 °C (99 °F) (Temporal)   Resp 18   Ht 1.676 m (5' 6\")   Wt 71.7 kg (158 lb 1.1 oz)   LMP  (LMP Unknown)   SpO2 90%   BMI 25.51 kg/m²    Body surface area is 1.83 meters squared.      Labs 4/8/20 ([facility])  TSH/Free T4 in process    Labs 4/6/20 ([facility])  SCr 0.94 CrCl 75.5 mL/min   AST/ALT/AP = 18/24/73 Tbili = 0.5    Labs 4/8/20 ([facility])  ANC 3800 Hgb 14.5 Plt 292k    Drug Order   (Drug name, dose, route, IV Fluid & volume, frequency, number of doses) Cycle 21  Previous treatment = 3/25/20   Medication = Durvalumab (Imfinzi)  Base Dose = 10 mg/kg  Calc Dose: Base Dose x 71.7 kg = 717 mg  Final Dose = 740 mg  Route = IV  Fluid & Volume =  mL  Admin Duration = Over 60 minutes          Rounded to nearest vial size (within 10%) per dose rounding protocol     By my signature below, I confirm this process was performed independently with the BSA and all final chemotherapy dosing calculations congruent. I have reviewed the above chemotherapy order and that my calculation of the final dose and BSA (when applicable) corroborate those calculations of the  pharmacist. Discrepancies of 10% or greater in the written dose have been addressed and documented within the [facility] Progress notes.    Shruthi Juarez, PharmD, BCOP    "

## 2020-04-07 ENCOUNTER — TELEPHONE (OUTPATIENT)
Dept: ONCOLOGY | Facility: MEDICAL CENTER | Age: 55
End: 2020-04-07

## 2020-04-08 ENCOUNTER — OUTPATIENT INFUSION SERVICES (OUTPATIENT)
Dept: ONCOLOGY | Facility: MEDICAL CENTER | Age: 55
End: 2020-04-08
Attending: INTERNAL MEDICINE
Payer: MEDICAID

## 2020-04-08 VITALS
SYSTOLIC BLOOD PRESSURE: 152 MMHG | RESPIRATION RATE: 18 BRPM | HEART RATE: 100 BPM | BODY MASS INDEX: 25.4 KG/M2 | OXYGEN SATURATION: 90 % | DIASTOLIC BLOOD PRESSURE: 83 MMHG | TEMPERATURE: 99 F | HEIGHT: 66 IN | WEIGHT: 158.07 LBS

## 2020-04-08 DIAGNOSIS — C34.90 NON-SMALL CELL LUNG CANCER, UNSPECIFIED LATERALITY (HCC): ICD-10-CM

## 2020-04-08 LAB
T4 FREE SERPL-MCNC: 1.65 NG/DL (ref 0.53–1.43)
TSH SERPL DL<=0.005 MIU/L-ACNC: 0.62 UIU/ML (ref 0.38–5.33)

## 2020-04-08 PROCEDURE — 84443 ASSAY THYROID STIM HORMONE: CPT

## 2020-04-08 PROCEDURE — A4212 NON CORING NEEDLE OR STYLET: HCPCS

## 2020-04-08 PROCEDURE — 700111 HCHG RX REV CODE 636 W/ 250 OVERRIDE (IP)

## 2020-04-08 PROCEDURE — 84439 ASSAY OF FREE THYROXINE: CPT

## 2020-04-08 PROCEDURE — 700111 HCHG RX REV CODE 636 W/ 250 OVERRIDE (IP): Performed by: INTERNAL MEDICINE

## 2020-04-08 PROCEDURE — 96413 CHEMO IV INFUSION 1 HR: CPT

## 2020-04-08 PROCEDURE — 700105 HCHG RX REV CODE 258: Performed by: INTERNAL MEDICINE

## 2020-04-08 RX ORDER — PROCHLORPERAZINE MALEATE 10 MG
10 TABLET ORAL EVERY 6 HOURS PRN
Status: CANCELLED | OUTPATIENT
Start: 2020-04-08

## 2020-04-08 RX ORDER — 0.9 % SODIUM CHLORIDE 0.9 %
5 VIAL (ML) INJECTION PRN
Status: CANCELLED | OUTPATIENT
Start: 2020-04-08

## 2020-04-08 RX ORDER — ONDANSETRON 2 MG/ML
4 INJECTION INTRAMUSCULAR; INTRAVENOUS
Status: CANCELLED | OUTPATIENT
Start: 2020-04-08

## 2020-04-08 RX ORDER — ONDANSETRON 8 MG/1
8 TABLET, ORALLY DISINTEGRATING ORAL
Status: CANCELLED | OUTPATIENT
Start: 2020-04-08

## 2020-04-08 RX ORDER — 0.9 % SODIUM CHLORIDE 0.9 %
VIAL (ML) INJECTION PRN
Status: CANCELLED | OUTPATIENT
Start: 2020-04-08

## 2020-04-08 RX ORDER — SODIUM CHLORIDE 9 MG/ML
INJECTION, SOLUTION INTRAVENOUS CONTINUOUS
Status: DISCONTINUED | OUTPATIENT
Start: 2020-04-08 | End: 2020-04-08 | Stop reason: HOSPADM

## 2020-04-08 RX ORDER — SODIUM CHLORIDE 9 MG/ML
INJECTION, SOLUTION INTRAVENOUS CONTINUOUS
Status: CANCELLED | OUTPATIENT
Start: 2020-04-08

## 2020-04-08 RX ADMIN — SODIUM CHLORIDE 740 MG: 9 INJECTION, SOLUTION INTRAVENOUS at 10:37

## 2020-04-08 RX ADMIN — HEPARIN 500 UNITS: 100 SYRINGE at 11:56

## 2020-04-08 RX ADMIN — SODIUM CHLORIDE: 9 INJECTION, SOLUTION INTRAVENOUS at 09:57

## 2020-04-08 ASSESSMENT — FIBROSIS 4 INDEX: FIB4 SCORE: 1.05

## 2020-04-08 NOTE — PROGRESS NOTES
"Pharmacy Chemotherapy Verification    Dx: NSCLC    Cycle 21  Previous treatment: 3/25/20    Protocol: Durvalumab   *Dosing Reference*  Durvalumab 10 mg/kg IV over 60 min on day 1  Q14 days until DP/UT or a max of 12 months  NCCN Guidelines for NSCLC V.3.2019  Xochitl ANDREW, et al. N Engl J Med.  2017 Nov 16;377(20):4565-7926.  Xochitl ANDREW, et al. N Engl J Med.  2018 Dec 13;379(80):0798-3690.     Allergies:Percocet [oxycodone-acetaminophen]  /83   Pulse 100   Temp 37.2 °C (99 °F) (Temporal)   Resp 18   Ht 1.676 m (5' 6\")   Wt 71.7 kg (158 lb 1.1 oz)   LMP  (LMP Unknown)   SpO2 90%   BMI 25.51 kg/m²  Body surface area is 1.83 meters squared.     Labs 4/6/20 @ [facility] and thyroid panel (4/6/20 @ [facility], T4 @ [facility] 4/8/20) within treatment plan parameters.      Durvalumab (Imfinzi) 10 mg/kg x 71.7 kg = 717 mg    Rounded to vial size (within 10%) per dose rounding protocol = 740 mg IV      Devon Singh, PharmD  "

## 2020-04-08 NOTE — PROGRESS NOTES
Pt to infusion services ambulatory per self.  Pt here for scheduled immunotherapy infusion, day 1, cycle 21 of Durvalumab.  Plan of care reviewed. Pt verbalizes understanding.  Pt denies any s/sx of infection today.  Pt with R chest port.  Port site cleansed and port accessed in sterile fashion.  Port flushes easily; + brisk blood return verified.  TSH/Free Thyroxine drawn today per MD orders (CBC and CMP provided by patient; done in MD office and scanned to media).  Lab results reviewed and within parameters established by MD for treatment today.

## 2020-04-08 NOTE — PROGRESS NOTES
Chemotherapy Verification - PRIMARY RN      Height = 66 inches  Weight = 71.7 kg  BSA = 1.82 m2       Medication: Durvalumab (Imfinzi)  Dose: 10 mg/kg  Calculated Dose: 717 mg                             (In mg/m2, AUC, mg/kg)           I confirm this process was performed independently with the BSA and all final chemotherapy dosing calculations congruent.  Any discrepancies of 10% or greater have been addressed with the chemotherapy pharmacist. The resolution of the discrepancy has been documented in the EPIC progress notes.

## 2020-04-08 NOTE — PROGRESS NOTES
Chemotherapy Verification - SECONDARY RN       Height = 167.6 cm  Weight = 71.7 kg  BSA = 1.827 m2       Medication: Imfinzi  Dose: 10 mg/kg  Calculated Dose: 717 mg                             (In mg/m2, AUC, mg/kg)       I confirm that this process was performed independently.

## 2020-04-08 NOTE — PROGRESS NOTES
Late entry for 1156:  Imfinzi administered per MD orders.  Imfinzi infusion completed without incident.  No adverse effects observed or expressed.  Line flushed clear with normal saline.  Port flushed with normal saline and heparin per policy.  Port de-accessed; needle intact.  Pressure dressing applied.  Pt left infusion services in no apparent distress after completion of treatment, ambulatory.  Next appointment scheduled.

## 2020-04-16 NOTE — OR NURSING
COVID-19 Pre-surgery screenin. Do you have an undiagnosed respiratory illness or symptoms such as coughing or sneezing? NO  a. Onset of Sx   b. Acute vs. chronic respiratory illness     2. Do you have an unexplained fever greater than 100.4 degrees Fahrenheit or 38 degrees Celsius?                    NO     3. Have you had direct exposure to a patient who tested positive for Covid-19?                          NO     4. Have you traveled within the last 14 days to [place], [place], [place], [place], or [place]?                     NO      Informed of visitor policy

## 2020-04-16 NOTE — OR NURSING
1. Caller Name: Amy Maki                    Call Back Number:[phone]  [facility] PCP or Specialty Provider: Yes         2.  Does patient have any active symptoms of respiratory illness (fever OR cough OR shortness of breath OR sore throat)? No.    3.  Does patient have any comoribidities? Immunosuppressed    4.  Has the patient traveled in the last 14 days OR had any known contact with someone who is suspected or confirmed to have COVID-19?  No.    5. Disposition: Cleared by RN Triage; OK to keep/schedule appointment    Note routed to [facility] Provider:  no

## 2020-04-17 ENCOUNTER — HOSPITAL ENCOUNTER (OUTPATIENT)
Facility: MEDICAL CENTER | Age: 55
End: 2020-04-18
Attending: SURGERY | Admitting: SURGERY
Payer: MEDICAID

## 2020-04-17 ENCOUNTER — ANESTHESIA (OUTPATIENT)
Dept: SURGERY | Facility: MEDICAL CENTER | Age: 55
End: 2020-04-17
Payer: MEDICAID

## 2020-04-17 ENCOUNTER — ANESTHESIA EVENT (OUTPATIENT)
Dept: SURGERY | Facility: MEDICAL CENTER | Age: 55
End: 2020-04-17
Payer: MEDICAID

## 2020-04-17 DIAGNOSIS — G89.18 POST-OPERATIVE PAIN: ICD-10-CM

## 2020-04-17 LAB
ABO + RH BLD: NORMAL
ABO GROUP BLD: NORMAL
ANION GAP SERPL CALC-SCNC: 12 MMOL/L (ref 7–16)
BLD GP AB SCN SERPL QL: NORMAL
BUN SERPL-MCNC: 18 MG/DL (ref 8–22)
CALCIUM SERPL-MCNC: 9.3 MG/DL (ref 8.5–10.5)
CHLORIDE SERPL-SCNC: 108 MMOL/L (ref 96–112)
CO2 SERPL-SCNC: 19 MMOL/L (ref 20–33)
CREAT SERPL-MCNC: 0.84 MG/DL (ref 0.5–1.4)
ERYTHROCYTE [DISTWIDTH] IN BLOOD BY AUTOMATED COUNT: 42.6 FL (ref 35.9–50)
GLUCOSE SERPL-MCNC: 92 MG/DL (ref 65–99)
HCT VFR BLD AUTO: 45.3 % (ref 37–47)
HGB BLD-MCNC: 14.5 G/DL (ref 12–16)
MCH RBC QN AUTO: 30.2 PG (ref 27–33)
MCHC RBC AUTO-ENTMCNC: 32 G/DL (ref 33.6–35)
MCV RBC AUTO: 94.4 FL (ref 81.4–97.8)
PATHOLOGY CONSULT NOTE: NORMAL
PLATELET # BLD AUTO: 315 K/UL (ref 164–446)
PMV BLD AUTO: 8.8 FL (ref 9–12.9)
POTASSIUM SERPL-SCNC: 4.1 MMOL/L (ref 3.6–5.5)
RBC # BLD AUTO: 4.8 M/UL (ref 4.2–5.4)
RH BLD: NORMAL
SODIUM SERPL-SCNC: 139 MMOL/L (ref 135–145)
WBC # BLD AUTO: 5.5 K/UL (ref 4.8–10.8)

## 2020-04-17 PROCEDURE — 86901 BLOOD TYPING SEROLOGIC RH(D): CPT

## 2020-04-17 PROCEDURE — 86800 THYROGLOBULIN ANTIBODY: CPT

## 2020-04-17 PROCEDURE — 700102 HCHG RX REV CODE 250 W/ 637 OVERRIDE(OP): Performed by: ANESTHESIOLOGY

## 2020-04-17 PROCEDURE — A9270 NON-COVERED ITEM OR SERVICE: HCPCS | Performed by: SURGERY

## 2020-04-17 PROCEDURE — 36415 COLL VENOUS BLD VENIPUNCTURE: CPT

## 2020-04-17 PROCEDURE — 160009 HCHG ANES TIME/MIN: Performed by: SURGERY

## 2020-04-17 PROCEDURE — 86900 BLOOD TYPING SEROLOGIC ABO: CPT

## 2020-04-17 PROCEDURE — G0378 HOSPITAL OBSERVATION PER HR: HCPCS

## 2020-04-17 PROCEDURE — 160036 HCHG PACU - EA ADDL 30 MINS PHASE I: Performed by: SURGERY

## 2020-04-17 PROCEDURE — 160042 HCHG SURGERY MINUTES - EA ADDL 1 MIN LEVEL 5: Performed by: SURGERY

## 2020-04-17 PROCEDURE — 80048 BASIC METABOLIC PNL TOTAL CA: CPT

## 2020-04-17 PROCEDURE — 88307 TISSUE EXAM BY PATHOLOGIST: CPT

## 2020-04-17 PROCEDURE — A9270 NON-COVERED ITEM OR SERVICE: HCPCS | Performed by: ANESTHESIOLOGY

## 2020-04-17 PROCEDURE — 84432 ASSAY OF THYROGLOBULIN: CPT

## 2020-04-17 PROCEDURE — 86850 RBC ANTIBODY SCREEN: CPT

## 2020-04-17 PROCEDURE — 160002 HCHG RECOVERY MINUTES (STAT): Performed by: SURGERY

## 2020-04-17 PROCEDURE — A6402 STERILE GAUZE <= 16 SQ IN: HCPCS | Performed by: SURGERY

## 2020-04-17 PROCEDURE — 160048 HCHG OR STATISTICAL LEVEL 1-5: Performed by: SURGERY

## 2020-04-17 PROCEDURE — 160035 HCHG PACU - 1ST 60 MINS PHASE I: Performed by: SURGERY

## 2020-04-17 PROCEDURE — 700111 HCHG RX REV CODE 636 W/ 250 OVERRIDE (IP): Performed by: ANESTHESIOLOGY

## 2020-04-17 PROCEDURE — 700102 HCHG RX REV CODE 250 W/ 637 OVERRIDE(OP): Performed by: SURGERY

## 2020-04-17 PROCEDURE — 160031 HCHG SURGERY MINUTES - 1ST 30 MINS LEVEL 5: Performed by: SURGERY

## 2020-04-17 PROCEDURE — 502594 HCHG SCISSOR HANDLE, HARMONIC ACE: Performed by: SURGERY

## 2020-04-17 PROCEDURE — 700101 HCHG RX REV CODE 250: Performed by: ANESTHESIOLOGY

## 2020-04-17 PROCEDURE — 700105 HCHG RX REV CODE 258: Performed by: SURGERY

## 2020-04-17 PROCEDURE — 700101 HCHG RX REV CODE 250: Performed by: SURGERY

## 2020-04-17 PROCEDURE — 85027 COMPLETE CBC AUTOMATED: CPT

## 2020-04-17 PROCEDURE — 501838 HCHG SUTURE GENERAL: Performed by: SURGERY

## 2020-04-17 RX ORDER — SCOLOPAMINE TRANSDERMAL SYSTEM 1 MG/1
1 PATCH, EXTENDED RELEASE TRANSDERMAL
Status: DISCONTINUED | OUTPATIENT
Start: 2020-04-17 | End: 2020-04-18 | Stop reason: HOSPADM

## 2020-04-17 RX ORDER — ONDANSETRON 2 MG/ML
4 INJECTION INTRAMUSCULAR; INTRAVENOUS
Status: DISCONTINUED | OUTPATIENT
Start: 2020-04-17 | End: 2020-04-17 | Stop reason: HOSPADM

## 2020-04-17 RX ORDER — HYDRALAZINE HYDROCHLORIDE 20 MG/ML
5 INJECTION INTRAMUSCULAR; INTRAVENOUS
Status: DISCONTINUED | OUTPATIENT
Start: 2020-04-17 | End: 2020-04-17 | Stop reason: HOSPADM

## 2020-04-17 RX ORDER — ONDANSETRON 4 MG/1
4 TABLET, ORALLY DISINTEGRATING ORAL EVERY 4 HOURS PRN
Status: DISCONTINUED | OUTPATIENT
Start: 2020-04-17 | End: 2020-04-18 | Stop reason: HOSPADM

## 2020-04-17 RX ORDER — HYDROCODONE BITARTRATE AND ACETAMINOPHEN 5; 325 MG/1; MG/1
1-2 TABLET ORAL EVERY 6 HOURS PRN
Status: DISCONTINUED | OUTPATIENT
Start: 2020-04-17 | End: 2020-04-18 | Stop reason: HOSPADM

## 2020-04-17 RX ORDER — ALPRAZOLAM 0.25 MG/1
0.25 TABLET ORAL NIGHTLY PRN
Status: DISCONTINUED | OUTPATIENT
Start: 2020-04-17 | End: 2020-04-18 | Stop reason: HOSPADM

## 2020-04-17 RX ORDER — SCOLOPAMINE TRANSDERMAL SYSTEM 1 MG/1
PATCH, EXTENDED RELEASE TRANSDERMAL
Status: COMPLETED
Start: 2020-04-17 | End: 2020-04-17

## 2020-04-17 RX ORDER — ALBUTEROL SULFATE 90 UG/1
1-2 AEROSOL, METERED RESPIRATORY (INHALATION) EVERY 4 HOURS PRN
Status: DISCONTINUED | OUTPATIENT
Start: 2020-04-17 | End: 2020-04-18 | Stop reason: HOSPADM

## 2020-04-17 RX ORDER — ONDANSETRON 2 MG/ML
INJECTION INTRAMUSCULAR; INTRAVENOUS PRN
Status: DISCONTINUED | OUTPATIENT
Start: 2020-04-17 | End: 2020-04-17 | Stop reason: SURG

## 2020-04-17 RX ORDER — LEVOTHYROXINE SODIUM 0.12 MG/1
125 TABLET ORAL
Status: ON HOLD | COMMUNITY
End: 2020-04-18

## 2020-04-17 RX ORDER — DIPHENHYDRAMINE HYDROCHLORIDE 50 MG/ML
12.5 INJECTION INTRAMUSCULAR; INTRAVENOUS
Status: DISCONTINUED | OUTPATIENT
Start: 2020-04-17 | End: 2020-04-17 | Stop reason: HOSPADM

## 2020-04-17 RX ORDER — MIDAZOLAM HYDROCHLORIDE 1 MG/ML
INJECTION INTRAMUSCULAR; INTRAVENOUS PRN
Status: DISCONTINUED | OUTPATIENT
Start: 2020-04-17 | End: 2020-04-17 | Stop reason: SURG

## 2020-04-17 RX ORDER — SODIUM CHLORIDE, SODIUM LACTATE, POTASSIUM CHLORIDE, CALCIUM CHLORIDE 600; 310; 30; 20 MG/100ML; MG/100ML; MG/100ML; MG/100ML
INJECTION, SOLUTION INTRAVENOUS CONTINUOUS
Status: ACTIVE | OUTPATIENT
Start: 2020-04-17 | End: 2020-04-17

## 2020-04-17 RX ORDER — HYDROMORPHONE HYDROCHLORIDE 1 MG/ML
0.1 INJECTION, SOLUTION INTRAMUSCULAR; INTRAVENOUS; SUBCUTANEOUS
Status: DISCONTINUED | OUTPATIENT
Start: 2020-04-17 | End: 2020-04-17 | Stop reason: HOSPADM

## 2020-04-17 RX ORDER — SODIUM CHLORIDE, SODIUM LACTATE, POTASSIUM CHLORIDE, CALCIUM CHLORIDE 600; 310; 30; 20 MG/100ML; MG/100ML; MG/100ML; MG/100ML
INJECTION, SOLUTION INTRAVENOUS CONTINUOUS
Status: DISCONTINUED | OUTPATIENT
Start: 2020-04-17 | End: 2020-04-17 | Stop reason: HOSPADM

## 2020-04-17 RX ORDER — ROCURONIUM BROMIDE 10 MG/ML
INJECTION, SOLUTION INTRAVENOUS PRN
Status: DISCONTINUED | OUTPATIENT
Start: 2020-04-17 | End: 2020-04-17 | Stop reason: SURG

## 2020-04-17 RX ORDER — IPRATROPIUM BROMIDE AND ALBUTEROL SULFATE 2.5; .5 MG/3ML; MG/3ML
3 SOLUTION RESPIRATORY (INHALATION)
Status: DISCONTINUED | OUTPATIENT
Start: 2020-04-17 | End: 2020-04-17 | Stop reason: HOSPADM

## 2020-04-17 RX ORDER — CEFAZOLIN SODIUM 1 G/3ML
INJECTION, POWDER, FOR SOLUTION INTRAMUSCULAR; INTRAVENOUS PRN
Status: DISCONTINUED | OUTPATIENT
Start: 2020-04-17 | End: 2020-04-17 | Stop reason: SURG

## 2020-04-17 RX ORDER — LABETALOL HYDROCHLORIDE 5 MG/ML
5 INJECTION, SOLUTION INTRAVENOUS
Status: DISCONTINUED | OUTPATIENT
Start: 2020-04-17 | End: 2020-04-17 | Stop reason: HOSPADM

## 2020-04-17 RX ORDER — DIPHENHYDRAMINE HYDROCHLORIDE 50 MG/ML
25 INJECTION INTRAMUSCULAR; INTRAVENOUS EVERY 6 HOURS PRN
Status: DISCONTINUED | OUTPATIENT
Start: 2020-04-17 | End: 2020-04-18 | Stop reason: HOSPADM

## 2020-04-17 RX ORDER — HYDROMORPHONE HYDROCHLORIDE 1 MG/ML
0.2 INJECTION, SOLUTION INTRAMUSCULAR; INTRAVENOUS; SUBCUTANEOUS
Status: DISCONTINUED | OUTPATIENT
Start: 2020-04-17 | End: 2020-04-17 | Stop reason: HOSPADM

## 2020-04-17 RX ORDER — ONDANSETRON 2 MG/ML
4 INJECTION INTRAMUSCULAR; INTRAVENOUS EVERY 4 HOURS PRN
Status: DISCONTINUED | OUTPATIENT
Start: 2020-04-17 | End: 2020-04-18 | Stop reason: HOSPADM

## 2020-04-17 RX ORDER — HALOPERIDOL 5 MG/ML
1 INJECTION INTRAMUSCULAR
Status: DISCONTINUED | OUTPATIENT
Start: 2020-04-17 | End: 2020-04-17 | Stop reason: HOSPADM

## 2020-04-17 RX ORDER — SCOLOPAMINE TRANSDERMAL SYSTEM 1 MG/1
PATCH, EXTENDED RELEASE TRANSDERMAL PRN
Status: DISCONTINUED | OUTPATIENT
Start: 2020-04-17 | End: 2020-04-17 | Stop reason: SURG

## 2020-04-17 RX ORDER — DEXAMETHASONE SODIUM PHOSPHATE 4 MG/ML
INJECTION, SOLUTION INTRA-ARTICULAR; INTRALESIONAL; INTRAMUSCULAR; INTRAVENOUS; SOFT TISSUE PRN
Status: DISCONTINUED | OUTPATIENT
Start: 2020-04-17 | End: 2020-04-17 | Stop reason: SURG

## 2020-04-17 RX ORDER — VARENICLINE TARTRATE 1 MG/1
1 TABLET, FILM COATED ORAL 2 TIMES DAILY
Status: DISCONTINUED | OUTPATIENT
Start: 2020-04-17 | End: 2020-04-17

## 2020-04-17 RX ORDER — MEPERIDINE HYDROCHLORIDE 25 MG/ML
12.5 INJECTION INTRAMUSCULAR; INTRAVENOUS; SUBCUTANEOUS
Status: DISCONTINUED | OUTPATIENT
Start: 2020-04-17 | End: 2020-04-17 | Stop reason: HOSPADM

## 2020-04-17 RX ORDER — SODIUM CHLORIDE, SODIUM LACTATE, POTASSIUM CHLORIDE, CALCIUM CHLORIDE 600; 310; 30; 20 MG/100ML; MG/100ML; MG/100ML; MG/100ML
INJECTION, SOLUTION INTRAVENOUS CONTINUOUS
Status: DISCONTINUED | OUTPATIENT
Start: 2020-04-17 | End: 2020-04-18 | Stop reason: HOSPADM

## 2020-04-17 RX ORDER — HYDROMORPHONE HYDROCHLORIDE 1 MG/ML
0.4 INJECTION, SOLUTION INTRAMUSCULAR; INTRAVENOUS; SUBCUTANEOUS
Status: DISCONTINUED | OUTPATIENT
Start: 2020-04-17 | End: 2020-04-17 | Stop reason: HOSPADM

## 2020-04-17 RX ORDER — MIDAZOLAM HYDROCHLORIDE 1 MG/ML
1 INJECTION INTRAMUSCULAR; INTRAVENOUS
Status: DISCONTINUED | OUTPATIENT
Start: 2020-04-17 | End: 2020-04-17 | Stop reason: HOSPADM

## 2020-04-17 RX ORDER — BUDESONIDE AND FORMOTEROL FUMARATE DIHYDRATE 160; 4.5 UG/1; UG/1
2 AEROSOL RESPIRATORY (INHALATION) 2 TIMES DAILY
Status: DISCONTINUED | OUTPATIENT
Start: 2020-04-17 | End: 2020-04-18 | Stop reason: HOSPADM

## 2020-04-17 RX ORDER — SUCCINYLCHOLINE/SOD CL,ISO/PF 200MG/10ML
SYRINGE (ML) INTRAVENOUS PRN
Status: DISCONTINUED | OUTPATIENT
Start: 2020-04-17 | End: 2020-04-17 | Stop reason: SURG

## 2020-04-17 RX ADMIN — CEFAZOLIN 2 G: 330 INJECTION, POWDER, FOR SOLUTION INTRAMUSCULAR; INTRAVENOUS at 07:39

## 2020-04-17 RX ADMIN — LIDOCAINE HYDROCHLORIDE 0.5 ML: 10 INJECTION, SOLUTION EPIDURAL; INFILTRATION; INTRACAUDAL at 06:59

## 2020-04-17 RX ADMIN — HALOPERIDOL LACTATE 1 MG: 5 INJECTION, SOLUTION INTRAMUSCULAR at 09:56

## 2020-04-17 RX ADMIN — SCOLOPAMINE TRANSDERMAL SYSTEM 1 PATCH: 1 PATCH, EXTENDED RELEASE TRANSDERMAL at 07:10

## 2020-04-17 RX ADMIN — ONDANSETRON 4 MG: 2 INJECTION INTRAMUSCULAR; INTRAVENOUS at 07:41

## 2020-04-17 RX ADMIN — FENTANYL CITRATE 25 MCG: 50 INJECTION, SOLUTION INTRAMUSCULAR; INTRAVENOUS at 08:16

## 2020-04-17 RX ADMIN — ONDANSETRON 4 MG: 2 INJECTION INTRAMUSCULAR; INTRAVENOUS at 08:45

## 2020-04-17 RX ADMIN — FENTANYL CITRATE 50 MCG: 50 INJECTION, SOLUTION INTRAMUSCULAR; INTRAVENOUS at 07:29

## 2020-04-17 RX ADMIN — Medication 120 MG: at 07:34

## 2020-04-17 RX ADMIN — SODIUM CHLORIDE, POTASSIUM CHLORIDE, SODIUM LACTATE AND CALCIUM CHLORIDE: 600; 310; 30; 20 INJECTION, SOLUTION INTRAVENOUS at 06:59

## 2020-04-17 RX ADMIN — FENTANYL CITRATE 50 MCG: 50 INJECTION, SOLUTION INTRAMUSCULAR; INTRAVENOUS at 07:48

## 2020-04-17 RX ADMIN — PROPOFOL 200 MG: 10 INJECTION, EMULSION INTRAVENOUS at 07:34

## 2020-04-17 RX ADMIN — FENTANYL CITRATE 50 MCG: 0.05 INJECTION, SOLUTION INTRAMUSCULAR; INTRAVENOUS at 09:56

## 2020-04-17 RX ADMIN — MIDAZOLAM HYDROCHLORIDE 2 MG: 1 INJECTION, SOLUTION INTRAMUSCULAR; INTRAVENOUS at 07:28

## 2020-04-17 RX ADMIN — FENTANYL CITRATE 25 MCG: 50 INJECTION, SOLUTION INTRAMUSCULAR; INTRAVENOUS at 08:47

## 2020-04-17 RX ADMIN — FENTANYL CITRATE 50 MCG: 0.05 INJECTION, SOLUTION INTRAMUSCULAR; INTRAVENOUS at 09:25

## 2020-04-17 RX ADMIN — HYDROCODONE BITARTRATE AND ACETAMINOPHEN 30 ML: 7.5; 325 SOLUTION ORAL at 09:25

## 2020-04-17 RX ADMIN — FENTANYL CITRATE 50 MCG: 50 INJECTION, SOLUTION INTRAMUSCULAR; INTRAVENOUS at 07:57

## 2020-04-17 RX ADMIN — ROCURONIUM BROMIDE 10 MG: 10 INJECTION, SOLUTION INTRAVENOUS at 07:33

## 2020-04-17 RX ADMIN — DEXAMETHASONE SODIUM PHOSPHATE 4 MG: 4 INJECTION, SOLUTION INTRA-ARTICULAR; INTRALESIONAL; INTRAMUSCULAR; INTRAVENOUS; SOFT TISSUE at 07:37

## 2020-04-17 RX ADMIN — LIDOCAINE HYDROCHLORIDE 100 MG: 20 INJECTION, SOLUTION INTRAVENOUS at 07:34

## 2020-04-17 ASSESSMENT — LIFESTYLE VARIABLES
EVER HAD A DRINK FIRST THING IN THE MORNING TO STEADY YOUR NERVES TO GET RID OF A HANGOVER: NO
TOTAL SCORE: 0
TOTAL SCORE: 0
DOES PATIENT WANT TO STOP DRINKING: NO
HOW MANY TIMES IN THE PAST YEAR HAVE YOU HAD 5 OR MORE DRINKS IN A DAY: 0
ON A TYPICAL DAY WHEN YOU DRINK ALCOHOL HOW MANY DRINKS DO YOU HAVE: 0
TOTAL SCORE: 0
ALCOHOL_USE: NO
EVER FELT BAD OR GUILTY ABOUT YOUR DRINKING: NO
HAVE PEOPLE ANNOYED YOU BY CRITICIZING YOUR DRINKING: NO
HAVE YOU EVER FELT YOU SHOULD CUT DOWN ON YOUR DRINKING: NO
EVER_SMOKED: YES
CONSUMPTION TOTAL: NEGATIVE
AVERAGE NUMBER OF DAYS PER WEEK YOU HAVE A DRINK CONTAINING ALCOHOL: 0

## 2020-04-17 ASSESSMENT — COGNITIVE AND FUNCTIONAL STATUS - GENERAL
SUGGESTED CMS G CODE MODIFIER DAILY ACTIVITY: CH
SUGGESTED CMS G CODE MODIFIER MOBILITY: CH
MOBILITY SCORE: 24
DAILY ACTIVITIY SCORE: 24

## 2020-04-17 ASSESSMENT — PATIENT HEALTH QUESTIONNAIRE - PHQ9
2. FEELING DOWN, DEPRESSED, IRRITABLE, OR HOPELESS: NOT AT ALL
1. LITTLE INTEREST OR PLEASURE IN DOING THINGS: NOT AT ALL
SUM OF ALL RESPONSES TO PHQ9 QUESTIONS 1 AND 2: 0

## 2020-04-17 ASSESSMENT — FIBROSIS 4 INDEX: FIB4 SCORE: 1.05

## 2020-04-17 ASSESSMENT — PAIN SCALES - GENERAL: PAIN_LEVEL: 4

## 2020-04-17 NOTE — ANESTHESIA POSTPROCEDURE EVALUATION
Patient: Amy Maki    Procedure Summary     Date:  04/17/20 Room / Location:  [place] [address] / SURGERY [facility]    Anesthesia Start:  0728 Anesthesia Stop:  0857    Procedures:       THYROIDECTOMY, TOTAL WITH PARATHYROID AUTOTRANSPLANTATION (Bilateral Neck)      DISSECTION, NECK (Right Neck) Diagnosis:  (THYROID CANCER)    Surgeon:  Kimberly Roca M.D. Responsible Provider:  Chase Corrales M.D.    Anesthesia Type:  general ASA Status:  3          Final Anesthesia Type: general  Last vitals  BP   Blood Pressure: 134/86    Temp   36.6 °C (97.8 °F)    Pulse   Pulse: 78   Resp   12    SpO2   95 %      Anesthesia Post Evaluation    Patient location during evaluation: PACU  Patient participation: complete - patient participated  Level of consciousness: awake and alert  Pain score: 4    Airway patency: patent  Anesthetic complications: no  Cardiovascular status: hemodynamically stable  Respiratory status: acceptable  Hydration status: euvolemic    PONV: none

## 2020-04-17 NOTE — OP REPORT
Operative Report    Date: 4/17/2020    Surgeon: Kimberly Roca M.D.    Assistant: ELSY Brooks    Anesthesiologist: Savanna HARRIS    Pre-operative Diagnosis: E04.9 malignant neoplasm of thyroid gland     Post-operative Diagnosis: same     Procedure:   1. 97967 Thyroidectomy, total or subtotal for malignancy; with limited neck dissection , bilateral recurrent laryngeal nerve monitoring  2. 01967 parathyroid autotransplantation     Procedure in detail: The patient was identified in the pre-operative holding area and brought to the operating room. Correct side and site were identified.  GETA was smoothly induced. The patient was prepped and draped in the usual sterile fashion.    With the patient in the supine position, the head of the bed slightly elevated, the neck slightly extended, and the patient intubated with a NIM endotracheal tube, the precordial electrodes were placed by the surgical assistant, who then monitored the recurrent laryngeal nerves bilaterally throughout the procedure.     The neck was prepared with betadiene and draped in the usual fashion. The neck was entered through a lower transverse cervical incision and carried down through the platysma. Subplatysmal flaps were dissected superiorly and inferiorly down to the sternal notch. The midline cervical fascia was incised down to the capsule of the thyroid. The strap muscles were mobilized off the right thyroid lobe, and the superior pole vessels progressively divided with the Harmonic. There was a firm white nodule in the upper pole, 2 cm, consistent with the known cancer. The thyroid veins were divided with the Harmonic. The recurrent laryngeal nerve and both parathyroids were identified and protected.    While mobilizing the right inferior parathyroid off the thyroid lobe, it was divested of its blood supply, so it was cut into very small pieces and autotransplanted into the right strap muscle.       Branches of the inferior thyroid artery were  divided on the capsule of the gland with the Harmonic. Smaller vessels were either divided with the Harmonic or, when near to the recurrent nerve, divided between clamps and suture ligated with 3-0 Vicryl suture as the gland was dissected free from the nerve and off the trachea. The gland was transected at the medial border of the left thyroid lobe and the right thyroid lobe and the isthmus were sent for permanent pathologic exam.    A right sided central neck dissection was undertaken. The tissue medial and lateral to the recurrent laryngeal nerve including the lymph nodes was excised, from the hyoid bone down to the thoracic inlet. This tissue was sent for permanent pathology.    The strap muscles were then mobilized off of the left thyroid lobe, and the superior pole vessels progressively divided with the Harmonic. The middle and inferior thyroid veins were divided with the Harmonic. The recurrent laryngeal nerve and both parathyroids were identified and protected. Branches of the inferior thyroid artery were divided on the capsule of the gland with the Harminic. Smaller vessels were either divided with the Harmonic or, when close to the nerve, divided between clamps and suture ligated with 3-0 Vicryl as the gland was dissected free of the nerve and off of the trachea. The left  thyroid lobe was sent for permanent pathology. Good hemostasis was assured.     The integrity of both recurrent laryngeal nerves was documented. Small pieces of Surgicel Fibrillar were placed in both sides of the neck. The midline cervical fascia was reapproximated with running 3-0 Vicryl. Platysma was reapproximated with interrupted 3-0 Vicryl. The skin was closed with interrupted 5-0 silk suture.     The patient was awakened from general anesthetic, and was taken to the recovery room in stable condition.    Sponge and needle counts were correct at the end of the case.     Specimen:   1. right thyroid lobe and isthmus  2. Right central  neck contents  3. left thyroid lobe    EBL: minimal    Dispo: stable, extubated, to PACU    Kimberly Roca M.D.  [facility]  [phone]

## 2020-04-17 NOTE — PROGRESS NOTES
2 RN Skin Check Completed:    Patient has incision to anterior neck with dressing CDI.  All other skin is pink and blanchable.

## 2020-04-17 NOTE — PROGRESS NOTES
Pharmacy Medication Reconciliation      Medication reconciliation updated and complete per pt at bedside  Allergies have been verified and updated   No oral ABX within the last 14 days  Pt home pharmacy:[facility]

## 2020-04-17 NOTE — ANESTHESIA TIME REPORT
Anesthesia Start and Stop Event Times     Date Time Event    4/17/2020 0711 Ready for Procedure     0728 Anesthesia Start     0857 Anesthesia Stop        Responsible Staff  04/17/20    Name Role Begin End    Chase Corrales M.D. Anesth 0728 0857        Preop Diagnosis (Free Text):  Pre-op Diagnosis     THYROID CANCER        Preop Diagnosis (Codes):    Post op Diagnosis  Thyroid cancer (HCC)      Premium Reason  Non-Premium    Comments:

## 2020-04-17 NOTE — PROGRESS NOTES
Pre-op complete.  2nd COD sent to blood bank, labs drawn. Patient updated on plan of care. All questions answered at this time.  Call light within reach, advised to call for any questions or concerns. Hourly rounding in place.

## 2020-04-17 NOTE — PROGRESS NOTES
Report received from PACU RN.  Patient arrived to floor via wheelchair and ambulated to bed.  Assessment complete.  A&O x 4. Patient calls appropriately.  Patient mobilizes with stand by assist. Bed alarm n/a.   Patient has 0/10 pain. Declines pain and interventions at this time.  Denies N&V. Tolerating regular diet.  Surgical site to anterior neck with gauze dressing CDI.  + void, + flatus, - BM.  Patient denies SOB.  SCD's on.  Review plan with of care with patient. Call light and personal belongings with in reach. Hourly rounding in place. All needs met at this time.

## 2020-04-17 NOTE — OR NURSING
Patient awake and alert and resting comfortably in bed. Pt has been updated on plan of care and all questions have been answered. Safety measures in place, bed in lowest position, pt has been educated on all safety precautions in PACU. Vital signs stable, Dressings is CDI.  Patient pain is tolerable, no nausea reported. Pt family has been updated on pt status. Will continue to monitor.     Pt was transferred to floor in  for ERAS protocol and  with oxygen at 2L, tank was more than half full and in the mcgrath, oxygen tubing was connected to the pt and handoff was given to artie Venegas.

## 2020-04-17 NOTE — ANESTHESIA PROCEDURE NOTES
Airway  Date/Time: 4/17/2020 7:35 AM  Performed by: Chase Corrales M.D.  Authorized by: Chase Corrales M.D.     Location:  OR  Urgency:  Elective  Indications for Airway Management:  Anesthesia      Spontaneous Ventilation: absent    Sedation Level:  Deep  Preoxygenated: Yes    Patient Position:  Sniffing  Final Airway Type:  Endotracheal airway  Final Endotracheal Airway:  ETT and NIM tube  Cuffed: Yes    Technique Used for Successful ETT Placement:  Video laryngoscopy  Insertion Site:  Oral  Blade Type:  Monse  Laryngoscope Blade/Videolaryngoscope Blade Size:  3  ETT Size (mm):  6.0  Measured from:  Lips  ETT to Lips (cm):  20  Placement Verified by: auscultation and capnometry    Cormack-Lehane Classification:  Grade I - full view of glottis  Number of Attempts at Approach:  1

## 2020-04-17 NOTE — ANESTHESIA PREPROCEDURE EVALUATION
Relevant Problems   PULMONARY   (+) COPD with asthma (HCC)      Other   (+) Anxiety   (+) Chronic respiratory failure (HCC)   (+) Non-small cell lung cancer (HCC)   (+) Severe protein-calorie malnutrition (HCC)   (+) Tobacco abuse   Thyroid cancer  H/O PE    Physical Exam    Airway   Mallampati: II  TM distance: >3 FB  Neck ROM: full       Cardiovascular - normal exam  Rhythm: regular  Rate: normal  (-) murmur     Dental - normal exam         Pulmonary - normal exam  Breath sounds clear to auscultation     Abdominal    Neurological - normal exam                 Anesthesia Plan    ASA 3 (see relevant problem list)       Plan - general       Airway plan will be ETT        Induction: intravenous    Postoperative Plan: Postoperative administration of opioids is intended.    Pertinent diagnostic labs and testing reviewed    Informed Consent:    Anesthetic plan and risks discussed with patient.    Use of blood products discussed with: patient whom consented to blood products.

## 2020-04-18 VITALS
TEMPERATURE: 98.9 F | WEIGHT: 155.87 LBS | SYSTOLIC BLOOD PRESSURE: 113 MMHG | HEIGHT: 66 IN | DIASTOLIC BLOOD PRESSURE: 76 MMHG | HEART RATE: 79 BPM | RESPIRATION RATE: 16 BRPM | OXYGEN SATURATION: 95 % | BODY MASS INDEX: 25.05 KG/M2

## 2020-04-18 LAB
ALBUMIN SERPL BCP-MCNC: 3.5 G/DL (ref 3.2–4.9)
CALCIUM SERPL-MCNC: 8.6 MG/DL (ref 8.5–10.5)
THYROGLOB AB SERPL-ACNC: 3.8 IU/ML (ref 0–4)
THYROGLOB AB SERPL-ACNC: 5.3 IU/ML (ref 0–4)
THYROGLOB SERPL-MCNC: 31.9 NG/ML (ref 1.3–31.8)
THYROGLOB SERPL-MCNC: ABNORMAL NG/ML (ref 1.3–31.8)

## 2020-04-18 PROCEDURE — 700102 HCHG RX REV CODE 250 W/ 637 OVERRIDE(OP): Performed by: SURGERY

## 2020-04-18 PROCEDURE — G0378 HOSPITAL OBSERVATION PER HR: HCPCS

## 2020-04-18 PROCEDURE — 36415 COLL VENOUS BLD VENIPUNCTURE: CPT

## 2020-04-18 PROCEDURE — A9270 NON-COVERED ITEM OR SERVICE: HCPCS | Performed by: SURGERY

## 2020-04-18 PROCEDURE — 82040 ASSAY OF SERUM ALBUMIN: CPT

## 2020-04-18 PROCEDURE — 82310 ASSAY OF CALCIUM: CPT

## 2020-04-18 RX ORDER — LEVOTHYROXINE SODIUM 0.15 MG/1
150 TABLET ORAL
Qty: 30 TAB | Refills: 3 | Status: SHIPPED | OUTPATIENT
Start: 2020-04-19 | End: 2020-09-21

## 2020-04-18 RX ORDER — HYDROCODONE BITARTRATE AND ACETAMINOPHEN 5; 325 MG/1; MG/1
1-2 TABLET ORAL EVERY 6 HOURS PRN
Qty: 20 TAB | Refills: 0 | Status: SHIPPED | OUTPATIENT
Start: 2020-04-18 | End: 2020-04-25

## 2020-04-18 RX ADMIN — BUDESONIDE AND FORMOTEROL FUMARATE DIHYDRATE 2 PUFF: 160; 4.5 AEROSOL RESPIRATORY (INHALATION) at 06:34

## 2020-04-18 RX ADMIN — GLYCOPYRROLATE 1 CAPSULE: 15.6 CAPSULE RESPIRATORY (INHALATION) at 06:34

## 2020-04-18 RX ADMIN — LEVOTHYROXINE SODIUM 150 MCG: 25 TABLET ORAL at 06:34

## 2020-04-18 NOTE — PROGRESS NOTES
Bedside report received.  Assessment complete.  A&O x 4. 1/2 L NC. Pt does not wear regularly at home. Patient calls appropriately.  Patient ambulates with no assist. Bed alarm NA.   Patient has 2/10 pain. Pt declining intervention  Denies N&V. Tolerating regular diet.  Surgical incision on anterior neck closed with sutures. No drainage. Some bruising noted around site.  + void, + flatus, 4/17 BM.  Patient denies SOB.  SCD's on.  Review plan with of care with patient. Call light and personal belongings with in reach. Hourly rounding in place. All needs met at this time.

## 2020-04-18 NOTE — PROGRESS NOTES
Pt discharged home with family, wheel chair escort to [facility].   Pt was offered education on new medication as well as follow up appointments and care. All questions were answered. PIV removed, and pt left with all belongings.

## 2020-04-18 NOTE — CARE PLAN
Problem: Safety  Goal: Will remain free from injury  Outcome: PROGRESSING AS EXPECTED  Intervention: Provide assistance with mobility  Note: Educated pt on safety and fall risks. Pt calling appropriately remaining free from injury      Problem: Pain Management  Goal: Pain level will decrease to patient's comfort goal  Outcome: PROGRESSING AS EXPECTED  Intervention: Follow pain managment plan developed in collaboration with patient and Interdisciplinary Team  Note: Medicating pt appropriately as needed. Pt reporting decreased pain

## 2020-04-18 NOTE — PROGRESS NOTES
"Surgical Progress Note:    POD# 1  S/P TT     No overnight events. Minimal pain.    PE:  /81   Pulse 84   Temp 36.4 °C (97.5 °F) (Temporal)   Resp 16   Ht 1.676 m (5' 6\")   Wt 70.7 kg (155 lb 13.8 oz)   LMP  (LMP Unknown)   SpO2 95%   BMI 25.16 kg/m²     I/O:     Intake/Output Summary (Last 24 hours) at 4/18/2020 0810  Last data filed at 4/17/2020 1610  Gross per 24 hour   Intake 1680 ml   Output 35 ml   Net 1645 ml       NAD  Breathing comfortably  Voice strong  Incision c/d/i with sutures, no hematoma    Labs:  Calcium:   6p: not done   12a: 8.6   6a:not done      A/P: POD# 1  S/P TT   - doing well  - will remove silk sutures from neck incision and place steri strips.  - d/c home      Kimberly Roca M.D.  [facility]  [phone]          "

## 2020-04-18 NOTE — PROGRESS NOTES
Received report from NOC RN. Pt A & Ox4. Resting in bed comfortable. C/O mild pain and tenderness relieved with ice and popsicle. Denies SOB.  Tolerating regular diet, denies N/Vs numbness or tingling. . +BS, +urine.   Sutures removed per order, steri strip applied. D/C order received. Pt updated on plan of d.c this am.     Pt wearing non-skid socks, SCDs on, bed locked and low. Call bell w/in reach. Pt able to make needs known, no further needs at this time.

## 2020-04-18 NOTE — DISCHARGE INSTRUCTIONS
You had surgery called thyroidectomy. This means that part or all of your thyroid gland was removed. The main job of the thyroid gland is to make thyroid hormone. This hormone controls your body’s metabolism. This is the way your body creates and uses energy. Removing the thyroid gland removes your body’s source of thyroid hormone. So after the surgery, you will need to take thyroid hormone pills daily. This helps keep the level of thyroid hormone in your body steady. This sheet tells you more about how to care for yourself after surgery.     Recovering After Surgery:     Get plenty of rest.     Care for your incision as directed.     Avoid heavy lifting and strenuous activity for 3-5 weeks.     Walk a few times daily. But don’t push yourself too hard. Slowly increase your pace and distance, as you feel able.     Return to work when your doctor says it’s okay.     Taking Your Thyroid Medication:     Take your medication as directed.     Use a pillbox labeled with the days of the week. This will help you remember whether you’ve taken your medication each day.     Take your medication with a liquid. But avoid taking it with soy milk. Soy milk can affect how well your body absorbs the medication. The pill needs to reach your stomach and not dissolve in your throat.     Try to take your medication with the same types and amounts of food and liquid each day. This helps control the amount of thyroid hormone in your system.     After taking your medication:     Wait 4 hours before eating or drinking anything that contains soy.     Wait 4 hours before taking certain medications. These include:                 Iron supplements                 Calcium supplements                 Antacids that contain either calcium or aluminum hydroxide                 Medications that lower your cholesterol     Do not stop taking your medication even if you become pregnant.     Never stop taking medications on your own.     Keeping Your  Doctor’s Appointments:     See your doctor for regular visits. These are needed to monitor your health.     Have routine blood tests done. These check the level of thyroid hormone in your body. This helps your doctor know whether to adjust the dosage of your medication if needed.     Tell your doctor about any signs of further thyroid problems.     Signs that you may have too much thyroid hormone in your body include:                 Restlessness                 Rapid weight loss                 Sweating                 Signs that you may have too little thyroid hormone in your body include:                 Fatigue or sluggishness                 Puffy hands, face, or feet                 Hoarseness                 Muscle pain                 Slow pulse (less than 60 beats per minute)     When to Call Your Doctor:  Call your doctor right away if you have any of the following:  Fever above 100.4°F  Swelling or bleeding at the incision site  Choking  Trouble breathing  A sore throat that lasts longer than 7 days  Tingling or cramps in your hands, feet, or lips     FOLLOW-UP:  · Please call my office at [phone] to make an appointment in 1 week     Office address:  [address] [place] [address] #[facility] [place] [address]     Kimberly Roca M.D.  [facility]  [phone]            Discharge Instructions    Discharged to home by car with relative. Discharged via wheelchair, hospital escort: Yes.  Special equipment needed: Not Applicable    Be sure to schedule a follow-up appointment with your primary care doctor or any specialists as instructed.     Discharge Plan:   Diet Plan: (P) Discussed  Activity Level: (P) Discussed  Confirmed Follow up Appointment: (P) Patient to Call and Schedule Appointment  Confirmed Symptoms Management: (P) Discussed  Influenza Vaccine Indication: Not indicated: Previously immunized this influenza season and > 8 years of age    I understand that a diet low in cholesterol, fat, and sodium is  recommended for good health. Unless I have been given specific instructions below for another diet, I accept this instruction as my diet prescription.   Other diet: regular    Special Instructions: None    · Is patient discharged on Warfarin / Coumadin?   No     Depression / Suicide Risk    As you are discharged from this [facility] facility, it is important to learn how to keep safe from harming yourself.    Recognize the warning signs:  · Abrupt changes in personality, positive or negative- including increase in energy   · Giving away possessions  · Change in eating patterns- significant weight changes-  positive or negative  · Change in sleeping patterns- unable to sleep or sleeping all the time   · Unwillingness or inability to communicate  · Depression  · Unusual sadness, discouragement and loneliness  · Talk of wanting to die  · Neglect of personal appearance   · Rebelliousness- reckless behavior  · Withdrawal from people/activities they love  · Confusion- inability to concentrate     If you or a loved one observes any of these behaviors or has concerns about self-harm, here's what you can do:  · Talk about it- your feelings and reasons for harming yourself  · Remove any means that you might use to hurt yourself (examples: pills, rope, extension cords, firearm)  · Get professional help from the community (Mental Health, Substance Abuse, psychological counseling)  · Do not be alone:Call your Safe Contact- someone whom you trust who will be there for you.  · Call your local CRISIS HOTLINE 708-2370 or 371-519-6734  · Call your local Children's Mobile Crisis Response Team [place] (936) 911-8597 or www.[employer]Sp[employer]  · Call the toll free National Suicide Prevention Hotlines   · National Suicide Prevention Lifeline 202-640-OWXT (9976)  · National Hope Line Network 800-SUICIDE (654-2574)    Thyroidectomy, Care After  Refer to this sheet in the next few weeks. These instructions provide you with  information about caring for yourself after your procedure. Your health care provider may also give you more specific instructions. Your treatment has been planned according to current medical practices, but problems sometimes occur. Call your health care provider if you have any problems or questions after your procedure.  WHAT TO EXPECT AFTER THE PROCEDURE  After your procedure, it is typical to have:  · Mild pain in the neck or upper body, especially when swallowing.  · A sore throat.  · A weak voice.  HOME CARE INSTRUCTIONS   · Take medicines only as directed by your health care provider.  · If your entire thyroid gland was removed, you may need to take thyroid hormone medicine from now on.  · Do not take medicines that contain aspirin and ibuprofen until your health care provider says that you can. These medicines can increase your risk of bleeding.  · Some pain medicines cause constipation. Drink enough fluid to keep your urine clear or pale yellow. This can help to prevent constipation.  · Start slowly with eating. You may need to have only liquids and soft foods for a few days or as directed by your health care provider.  · Do not take baths, swim, or use a hot tub until your health care provider approves.  · There are many different ways to close and cover an incision, including stitches (sutures), skin glue, and adhesive strips. Follow your health care provider's instructions for:  ¨ Incision care.  ¨ Bandage (dressing) changes and removal.  ¨ Incision closure removal.  · Resume your usual activities as directed by your health care provider.  · For the first 10 days after the procedure or as instructed by your health care provider:  ¨ Do not lift anything heavier than 20 lb (9.1 kg).  ¨ Do not jog, swim, or do other strenuous exercises.  ¨ Do not play contact sports.  · Keep all follow-up visits as directed by your health care provider. This is important.  SEEK MEDICAL CARE IF:  · The soreness in your  throat gets worse.  · You have increased pain at your incision or incisions.  · You have increased bleeding from an incision.  · Your incision becomes infected. Watch for:  ¨ Swelling.  ¨ Redness.  ¨ Warmth.  ¨ Pus.  · You notice a bad smell coming from an incision or dressing.  · You have a fever.  · You feel lightheaded or faint.  · You have numbness, tingling, or muscle spasms in your:  ¨ Arms.  ¨ Hands.  ¨ Feet.  ¨ Face.  · You have trouble swallowing.  SEEK IMMEDIATE MEDICAL CARE IF:   · You develop a rash.  · You have difficulty breathing.  · You hear whistling noises coming from your chest.  · You develop a cough that gets worse.  · Your speech changes, or you have hoarseness that gets worse.     This information is not intended to replace advice given to you by your health care provider. Make sure you discuss any questions you have with your health care provider.     Document Released: 07/07/2006 Document Revised: 01/08/2016 Document Reviewed: 05/20/2015  [employer] Interactive Patient Education ©2016 [employer]

## 2020-04-18 NOTE — DISCHARGE SUMMARY
Discharge Summary      DATE OF ADMISSION: 4/17/2020    DATE OF DISCHARGE: 4/18/2020    DISCHARGE DIAGNOSIS (ES):  Thyroid CA    DISCHARGE CONDITION:  ? good    CONSULTATIONS:  ? none    PROCEDURES:  ? 4/17/20: total thyroidectomy, parathyroid autotransplantation    BRIEF HPI:  ? Admitted with above, see admission history and physical.     HOSPITAL COURSE:  ? Underwent procedure as above. On day of discharge, the patient has stable vital signs, tolerating a regular diet, and pain is well controlled with PO meds. The patient is stable for discharge to home.    MEDS:   Current Outpatient Medications   Medication Sig Dispense Refill   • [START ON 4/19/2020] levothyroxine (SYNTHROID) 150 MCG Tab Take 1 Tab by mouth Every morning on an empty stomach. 30 Tab 3   • HYDROcodone-acetaminophen (NORCO) 5-325 MG Tab per tablet Take 1-2 Tabs by mouth every 6 hours as needed for up to 7 days. 20 Tab 0     Start eliquis on Sunday    FOLLOW-UP:  ? Please call my office at [phone] to make an appointment in 1 week(s)    DISCHARGE INSTRUCTIONS:      Discharge instructions after thyroidectomy:    You had surgery called thyroidectomy. This means that part or all of your thyroid gland was removed. The main job of the thyroid gland is to make thyroid hormone. This hormone controls your body’s metabolism. This is the way your body creates and uses energy. Removing the thyroid gland removes your body’s source of thyroid hormone. So after the surgery, you will need to take thyroid hormone pills daily. This helps keep the level of thyroid hormone in your body steady. This sheet tells you more about how to care for yourself after surgery.    Recovering After Surgery:    Get plenty of rest.    Care for your incision as directed.    Avoid heavy lifting and strenuous activity for 3-5 weeks.    Walk a few times daily. But don’t push yourself too hard. Slowly increase your pace and distance, as you feel able.    Return to work when your doctor  says it’s okay.    Taking Your Thyroid Medication:    Take your medication as directed.    Use a pillbox labeled with the days of the week. This will help you remember whether you’ve taken your medication each day.    Take your medication with a liquid. But avoid taking it with soy milk. Soy milk can affect how well your body absorbs the medication. The pill needs to reach your stomach and not dissolve in your throat.    Try to take your medication with the same types and amounts of food and liquid each day. This helps control the amount of thyroid hormone in your system.    After taking your medication:    Wait 4 hours before eating or drinking anything that contains soy.    Wait 4 hours before taking certain medications. These include:     Iron supplements     Calcium supplements     Antacids that contain either calcium or aluminum hydroxide     Medications that lower your cholesterol    Do not stop taking your medication even if you become pregnant.    Never stop taking medications on your own.    Keeping Your Doctor’s Appointments:    See your doctor for regular visits. These are needed to monitor your health.    Have routine blood tests done. These check the level of thyroid hormone in your body. This helps your doctor know whether to adjust the dosage of your medication if needed.    Tell your doctor about any signs of further thyroid problems.    Signs that you may have too much thyroid hormone in your body include:     Restlessness     Rapid weight loss     Sweating     Signs that you may have too little thyroid hormone in your body include:     Fatigue or sluggishness     Puffy hands, face, or feet     Hoarseness     Muscle pain     Slow pulse (less than 60 beats per minute)    When to Call Your Doctor:  Call your doctor right away if you have any of the following:  Fever above 100.4°F  Swelling or bleeding at the incision site  Choking  Trouble breathing  A sore throat that lasts longer than 7  days  Tingling or cramps in your hands, feet, or lips    FOLLOW-UP:  ? Please call my office at [phone] to make an appointment in 1 week    Office address:  [address] [place] [address] [id number]  [place] [id number]    Kimberly Roca M.D.  [facility]  [phone]

## 2020-04-22 ENCOUNTER — APPOINTMENT (OUTPATIENT)
Dept: ONCOLOGY | Facility: MEDICAL CENTER | Age: 55
End: 2020-04-22
Attending: INTERNAL MEDICINE
Payer: MEDICAID

## 2020-05-06 ENCOUNTER — OUTPATIENT INFUSION SERVICES (OUTPATIENT)
Dept: ONCOLOGY | Facility: MEDICAL CENTER | Age: 55
End: 2020-05-06
Attending: INTERNAL MEDICINE
Payer: MEDICAID

## 2020-05-06 VITALS
SYSTOLIC BLOOD PRESSURE: 152 MMHG | TEMPERATURE: 97.6 F | DIASTOLIC BLOOD PRESSURE: 79 MMHG | HEART RATE: 110 BPM | BODY MASS INDEX: 24.98 KG/M2 | OXYGEN SATURATION: 92 % | HEIGHT: 67 IN | RESPIRATION RATE: 18 BRPM | WEIGHT: 159.17 LBS

## 2020-05-06 DIAGNOSIS — C34.90 NON-SMALL CELL LUNG CANCER, UNSPECIFIED LATERALITY (HCC): ICD-10-CM

## 2020-05-06 LAB — T4 FREE SERPL-MCNC: 2.16 NG/DL (ref 0.93–1.7)

## 2020-05-06 PROCEDURE — 84479 ASSAY OF THYROID (T3 OR T4): CPT

## 2020-05-06 PROCEDURE — 700111 HCHG RX REV CODE 636 W/ 250 OVERRIDE (IP): Performed by: INTERNAL MEDICINE

## 2020-05-06 PROCEDURE — 84436 ASSAY OF TOTAL THYROXINE: CPT

## 2020-05-06 PROCEDURE — 700111 HCHG RX REV CODE 636 W/ 250 OVERRIDE (IP)

## 2020-05-06 PROCEDURE — 96413 CHEMO IV INFUSION 1 HR: CPT

## 2020-05-06 PROCEDURE — 700105 HCHG RX REV CODE 258: Performed by: INTERNAL MEDICINE

## 2020-05-06 PROCEDURE — A4212 NON CORING NEEDLE OR STYLET: HCPCS

## 2020-05-06 PROCEDURE — 84439 ASSAY OF FREE THYROXINE: CPT

## 2020-05-06 RX ORDER — 0.9 % SODIUM CHLORIDE 0.9 %
VIAL (ML) INJECTION PRN
Status: CANCELLED | OUTPATIENT
Start: 2020-05-06

## 2020-05-06 RX ORDER — ONDANSETRON 8 MG/1
8 TABLET, ORALLY DISINTEGRATING ORAL
Status: CANCELLED | OUTPATIENT
Start: 2020-05-06

## 2020-05-06 RX ORDER — ONDANSETRON 2 MG/ML
4 INJECTION INTRAMUSCULAR; INTRAVENOUS
Status: CANCELLED | OUTPATIENT
Start: 2020-05-06

## 2020-05-06 RX ORDER — PROCHLORPERAZINE MALEATE 10 MG
10 TABLET ORAL EVERY 6 HOURS PRN
Status: CANCELLED | OUTPATIENT
Start: 2020-05-06

## 2020-05-06 RX ORDER — SODIUM CHLORIDE 9 MG/ML
INJECTION, SOLUTION INTRAVENOUS CONTINUOUS
Status: CANCELLED | OUTPATIENT
Start: 2020-05-06

## 2020-05-06 RX ORDER — 0.9 % SODIUM CHLORIDE 0.9 %
5 VIAL (ML) INJECTION PRN
Status: CANCELLED | OUTPATIENT
Start: 2020-05-06

## 2020-05-06 RX ADMIN — HEPARIN 500 UNITS: 100 SYRINGE at 11:41

## 2020-05-06 RX ADMIN — SODIUM CHLORIDE 740 MG: 9 INJECTION, SOLUTION INTRAVENOUS at 10:25

## 2020-05-06 ASSESSMENT — FIBROSIS 4 INDEX: FIB4 SCORE: 0.93

## 2020-05-06 ASSESSMENT — PAIN SCALES - GENERAL: PAINLEVEL: NO PAIN

## 2020-05-06 NOTE — PROGRESS NOTES
"Pharmacy Chemotherapy Verification    Dx: NSCLC    Cycle 22  Previous treatment: 4/8/20    Protocol: Durvalumab   *Dosing Reference*  Durvalumab 10 mg/kg IV over 60 min on day 1  Q14 days until DP/UT or a max of 12 months  NCCN Guidelines for NSCLC V.3.2019  Xochitl ANDREW, et al. N Engl J Med.  2017 Nov 16;377(20):7735-5580.  Xochitl ANDREW, et al. N Engl J Med.  2018 Dec 13;379(69):6787-3671.     Allergies:Percocet [oxycodone-acetaminophen]  /79   Pulse (!) 110   Temp 36.4 °C (97.6 °F) (Temporal)   Resp 18   Ht 1.689 m (5' 6.5\")   Wt 72.2 kg (159 lb 2.8 oz)   LMP  (LMP Unknown)   SpO2 92%   BMI 25.31 kg/m²  Body surface area is 1.84 meters squared.     Labs: 5/6/20   PAPER LAB report  Meets treatment parameters.    Durvalumab (Imfinzi) 10 mg/kg x 72.2 kg = 722 mg    Rounded to vial size (within 10%) per dose rounding protocol = 740 mg IV      "

## 2020-05-06 NOTE — PROGRESS NOTES
Chemotherapy Verification - PRIMARY RN      Height = 168.9 cm  Weight = 72.2 kg  BSA = 1.84 m^2       Medication: durvalumab (IMFINZI)  Dose: 10 mg/kg  Calculated Dose: 722 mg                             (In mg/m2, AUC, mg/kg)     I confirm this process was performed independently with the BSA and all final chemotherapy dosing calculations congruent.  Any discrepancies of 10% or greater have been addressed with the chemotherapy pharmacist. The resolution of the discrepancy has been documented in the EPIC progress notes.

## 2020-05-06 NOTE — PROGRESS NOTES
Chemotherapy Verification - SECONDARY RN       Height = 168.9 cm  Weight = 72.2 kg  BSA = 1.84 m2       Medication: Imfinzi  Dose: 10 mg/kg  Calculated Dose: 722 mg                             (In mg/m2, AUC, mg/kg)       I confirm that this process was performed independently.

## 2020-05-06 NOTE — PROGRESS NOTES
Patient here for durvalumab (IMFINZI). Right chest port accessed using sterile technique; brisk blood return noted. Labs previously reviewed and within parameters to proceed with treatment. Durvalumab (IMFINZI) given per MAR. Heparin instilled prior to de-accessing port. Port de-accessed; gauze/tape applied to site. Next appointment scheduled. Discharged to self care; no apparent distress noted.

## 2020-05-06 NOTE — PROGRESS NOTES
"Pharmacy Chemotherapy Verification    Patient Name: Amy Maki  Dx: NSCLC    Protocol: Durvalumab   *Dosing Reference*  Durvalumab 10 mg/kg IV over 60 min on day 1  Q14 days until DP/UT or a max of 12 months  NCCN Guidelines for NSCLC V.3.2019  Xochitl ANDREW, et al. N Engl J Med.  2017 Nov 16;377(20):5824-5617.  Xochitl ANDREW, et al. N Engl J Med.  2018 Dec 13;379(10):2999-6081.     Allergies:Carboplatin and Percocet [oxycodone-acetaminophen]  /79   Pulse (!) 110   Temp 36.4 °C (97.6 °F) (Temporal)   Resp 18   Ht 1.689 m (5' 6.5\")   Wt 72.2 kg (159 lb 2.8 oz)   LMP  (LMP Unknown)   SpO2 92%   BMI 25.31 kg/m²    Body surface area is 1.84 meters squared.      All lab results 5/4/20 [facility] within treatment parameters.       Drug Order   (Drug name, dose, route, IV Fluid & volume, frequency, number of doses) Cycle 22- delayed for thyroidectomy  Previous treatment = 4/8/20   Medication = Durvalumab (Imfinzi)  Base Dose = 10 mg/kg  Calc Dose: Base Dose x 72.2kg = 722mg  Final Dose = 740 mg  Route = IV  Fluid & Volume =  mL  Admin Duration = Over 60 minutes          Rounded to nearest vial size (within 10%) per dose rounding protocol     By my signature below, I confirm this process was performed independently with the BSA and all final chemotherapy dosing calculations congruent. I have reviewed the above chemotherapy order and that my calculation of the final dose and BSA (when applicable) corroborate those calculations of the  pharmacist. Discrepancies of 10% or greater in the written dose have been addressed and documented within the [facility] Progress notes.    Loli PastranaD.      "

## 2020-05-08 LAB
FT4I SERPL CALC-MCNC: 5.6 UNITS (ref 1.7–4.2)
T3RU NFR SERPL: 39 % (ref 28–41)
T4 SERPL-MCNC: 14.42 UG/DL (ref 5.1–14.1)

## 2020-05-19 ENCOUNTER — TELEPHONE (OUTPATIENT)
Dept: ONCOLOGY | Facility: MEDICAL CENTER | Age: 55
End: 2020-05-19

## 2020-05-19 RX ORDER — SODIUM CHLORIDE 9 MG/ML
INJECTION, SOLUTION INTRAVENOUS CONTINUOUS
Status: CANCELLED | OUTPATIENT
Start: 2020-05-21

## 2020-05-19 RX ORDER — 0.9 % SODIUM CHLORIDE 0.9 %
5 VIAL (ML) INJECTION PRN
Status: CANCELLED | OUTPATIENT
Start: 2020-05-19

## 2020-05-19 RX ORDER — 0.9 % SODIUM CHLORIDE 0.9 %
VIAL (ML) INJECTION PRN
Status: CANCELLED | OUTPATIENT
Start: 2020-05-21

## 2020-05-19 RX ORDER — PROCHLORPERAZINE MALEATE 10 MG
10 TABLET ORAL EVERY 6 HOURS PRN
Status: CANCELLED | OUTPATIENT
Start: 2020-05-21

## 2020-05-19 RX ORDER — ONDANSETRON 8 MG/1
8 TABLET, ORALLY DISINTEGRATING ORAL
Status: CANCELLED | OUTPATIENT
Start: 2020-05-21

## 2020-05-19 RX ORDER — 0.9 % SODIUM CHLORIDE 0.9 %
VIAL (ML) INJECTION PRN
Status: CANCELLED | OUTPATIENT
Start: 2020-05-19

## 2020-05-19 RX ORDER — ONDANSETRON 2 MG/ML
4 INJECTION INTRAMUSCULAR; INTRAVENOUS
Status: CANCELLED | OUTPATIENT
Start: 2020-05-21

## 2020-05-19 RX ORDER — 0.9 % SODIUM CHLORIDE 0.9 %
5 VIAL (ML) INJECTION PRN
Status: CANCELLED | OUTPATIENT
Start: 2020-05-21

## 2020-05-20 ENCOUNTER — OUTPATIENT INFUSION SERVICES (OUTPATIENT)
Dept: ONCOLOGY | Facility: MEDICAL CENTER | Age: 55
End: 2020-05-20
Attending: INTERNAL MEDICINE
Payer: MEDICAID

## 2020-05-20 VITALS
BODY MASS INDEX: 26.29 KG/M2 | DIASTOLIC BLOOD PRESSURE: 88 MMHG | HEIGHT: 66 IN | HEART RATE: 101 BPM | SYSTOLIC BLOOD PRESSURE: 131 MMHG | OXYGEN SATURATION: 91 % | WEIGHT: 163.58 LBS | RESPIRATION RATE: 18 BRPM | TEMPERATURE: 97.4 F

## 2020-05-20 DIAGNOSIS — C34.90 NON-SMALL CELL LUNG CANCER, UNSPECIFIED LATERALITY (HCC): ICD-10-CM

## 2020-05-20 LAB — T4 FREE SERPL-MCNC: 1.92 NG/DL (ref 0.93–1.7)

## 2020-05-20 PROCEDURE — 700105 HCHG RX REV CODE 258: Performed by: INTERNAL MEDICINE

## 2020-05-20 PROCEDURE — 96413 CHEMO IV INFUSION 1 HR: CPT

## 2020-05-20 PROCEDURE — 84439 ASSAY OF FREE THYROXINE: CPT

## 2020-05-20 PROCEDURE — 700111 HCHG RX REV CODE 636 W/ 250 OVERRIDE (IP): Performed by: INTERNAL MEDICINE

## 2020-05-20 PROCEDURE — A4212 NON CORING NEEDLE OR STYLET: HCPCS

## 2020-05-20 RX ORDER — LEVOTHYROXINE SODIUM 137 UG/1
TABLET ORAL
COMMUNITY
End: 2020-09-21

## 2020-05-20 RX ORDER — LEVOTHYROXINE SODIUM 137 UG/1
137 TABLET ORAL
COMMUNITY
Start: 2020-05-12 | End: 2020-06-03

## 2020-05-20 RX ORDER — DURVALUMAB 500 MG/10ML
INJECTION, SOLUTION INTRAVENOUS
COMMUNITY
Start: 2020-04-08 | End: 2020-09-21

## 2020-05-20 RX ADMIN — SODIUM CHLORIDE 740 MG: 9 INJECTION, SOLUTION INTRAVENOUS at 11:10

## 2020-05-20 RX ADMIN — HEPARIN 500 UNITS: 100 SYRINGE at 12:29

## 2020-05-20 ASSESSMENT — FIBROSIS 4 INDEX: FIB4 SCORE: 0.93

## 2020-05-20 NOTE — PROGRESS NOTES
Pt arrived ambulatory to [facility] for Day 1/ Cycle 23 of Durvalumab. Pt denied fever, signs or symptoms of infection or acute illness today. POC discussed and pt verbalized understanding.     Port accessed without difficulty and labs drawn at this time; pt tolerated well. Medication administered per MD orders and pt tolerated well. No signs or symptoms of reaction or complications noted. Port flushed per protocol and de-accessed with needle intact and pt tolerated well. Gauze and paper tape applied at this time.     Follow-up care and future appointment printed for pt and pt verbalized understanding. Pt discharged home to self care in no apparent distress at this time.

## 2020-05-20 NOTE — PROGRESS NOTES
Chemotherapy Verification - SECONDARY RN       Height = 1.685m  Weight = 74.2kg  BSA = 1.86m2       Medication: durvalumab  Dose: 10mg/kg  Calculated Dose: 742mg                            (In mg/m2, AUC, mg/kg)       I confirm that this process was performed independently.

## 2020-05-20 NOTE — PROGRESS NOTES
"Pharmacy Chemotherapy Verification    Patient Name: Amy Maki  Dx: NSCLC    Protocol: Durvalumab   *Dosing Reference*  Durvalumab 10 mg/kg IV over 60 min on day 1  Q14 days until DP/UT or a max of 12 months  NCCN Guidelines for NSCLC V.3.2019  Xochitl ANDREW, et al. N Engl J Med.  2017 Nov 16;377(20):1418-9986.  Xochitl ANDREW, et al. N Engl J Med.  2018 Dec 13;379(35):8450-2071.     Allergies:Carboplatin and Percocet [oxycodone-acetaminophen]  /88   Pulse (!) 101   Temp 36.3 °C (97.4 °F) (Temporal)   Resp 18   Ht 1.685 m (5' 6.34\")   Wt 74.2 kg (163 lb 9.3 oz)   LMP  (LMP Unknown)   SpO2 91%   BMI 26.13 kg/m²    Body surface area is 1.86 meters squared.      All lab results 5/20/20 [facility] within treatment parameters.        Drug Order   (Drug name, dose, route, IV Fluid & volume, frequency, number of doses) Cycle 23  Previous treatment = 5/6/20   Medication = Durvalumab (Imfinzi)  Base Dose = 10 mg/kg  Calc Dose: Base Dose x 74.2kg = 742mg  Final Dose = 740 mg  Route = IV  Fluid & Volume =  mL  Admin Duration = Over 60 minutes          Rounded to nearest vial size (within 10%) per dose rounding protocol     By my signature below, I confirm this process was performed independently with the BSA and all final chemotherapy dosing calculations congruent. I have reviewed the above chemotherapy order and that my calculation of the final dose and BSA (when applicable) corroborate those calculations of the  pharmacist. Discrepancies of 10% or greater in the written dose have been addressed and documented within the [facility] Progress notes.    Loli PastranaD.      "

## 2020-05-20 NOTE — PROGRESS NOTES
Chemotherapy Verification - PRIMARY RN      Height = 168.5cm  Weight = 74.2kg  BSA = 1.86m2       Medication: Durvalumab  Dose: 10mg/kg  Calculated Dose: 742mg                            (In mg/m2, AUC, mg/kg)     I confirm this process was performed independently with the BSA and all final chemotherapy dosing calculations congruent.  Any discrepancies of 10% or greater have been addressed with the chemotherapy pharmacist. The resolution of the discrepancy has been documented in the EPIC progress notes.

## 2020-05-20 NOTE — PROGRESS NOTES
"Pharmacy Chemotherapy Verification    Dx: NSCLC    Cycle 23  Previous treatment: 4/22/20    Protocol: Durvalumab   *Dosing Reference*  Durvalumab 10 mg/kg IV over 60 min on day 1  Q14 days until DP/UT or a max of 12 months  NCCN Guidelines for NSCLC V.3.2019  Xochitl ANDREW, et al. N Engl J Med.  2017 Nov 16;377(20):8635-7849.  Xochitl ANDREW, et al. N Engl J Med.  2018 Dec 13;379(21):4853-4027.     Allergies:Percocet [oxycodone-acetaminophen]  /88   Pulse (!) 101   Temp 36.3 °C (97.4 °F) (Temporal)   Resp 18   Ht 1.685 m (5' 6.34\")   Wt 74.2 kg (163 lb 9.3 oz)   LMP  (LMP Unknown)   SpO2 91%   BMI 26.13 kg/m²  Body surface area is 1.86 meters squared.     Labs: 5/18 (CMP) & 5/20/20  (CBC)  PAPER LAB reports  Meets treatment parameters.    Durvalumab (Imfinzi) 10 mg/kg x 74.2 kg = 722 mg    Rounded to vial size (within 10%) per dose rounding protocol = 740 mg IV      "

## 2020-05-28 PROBLEM — C39.9 MALIGNANT NEOPLASM OF RESPIRATORY TRACT (HCC): Status: RESOLVED | Noted: 2019-11-27 | Resolved: 2020-05-28

## 2020-05-28 PROBLEM — R50.9 FEVER: Status: RESOLVED | Noted: 2019-02-04 | Resolved: 2020-05-28

## 2020-05-28 PROBLEM — E43 SEVERE PROTEIN-CALORIE MALNUTRITION (HCC): Status: RESOLVED | Noted: 2019-01-20 | Resolved: 2020-05-28

## 2020-05-28 PROBLEM — R05.9 COUGH: Status: RESOLVED | Noted: 2019-01-31 | Resolved: 2020-05-28

## 2020-05-28 PROBLEM — J44.9 CHRONIC OBSTRUCTIVE PULMONARY DISEASE (HCC): Status: ACTIVE | Noted: 2020-05-28

## 2020-05-28 PROBLEM — Z72.0 TOBACCO ABUSE: Status: RESOLVED | Noted: 2019-01-20 | Resolved: 2020-05-28

## 2020-06-02 ENCOUNTER — TELEPHONE (OUTPATIENT)
Dept: ADMISSIONS | Facility: MEDICAL CENTER | Age: 55
End: 2020-06-02

## 2020-06-03 ENCOUNTER — OUTPATIENT INFUSION SERVICES (OUTPATIENT)
Dept: ONCOLOGY | Facility: MEDICAL CENTER | Age: 55
End: 2020-06-03
Attending: INTERNAL MEDICINE
Payer: MEDICAID

## 2020-06-03 VITALS
SYSTOLIC BLOOD PRESSURE: 124 MMHG | WEIGHT: 161.38 LBS | HEIGHT: 66 IN | TEMPERATURE: 97.8 F | BODY MASS INDEX: 25.94 KG/M2 | HEART RATE: 93 BPM | RESPIRATION RATE: 18 BRPM | DIASTOLIC BLOOD PRESSURE: 75 MMHG | OXYGEN SATURATION: 93 %

## 2020-06-03 DIAGNOSIS — C34.90 NON-SMALL CELL LUNG CANCER, UNSPECIFIED LATERALITY (HCC): ICD-10-CM

## 2020-06-03 PROCEDURE — 700111 HCHG RX REV CODE 636 W/ 250 OVERRIDE (IP): Performed by: INTERNAL MEDICINE

## 2020-06-03 PROCEDURE — 700111 HCHG RX REV CODE 636 W/ 250 OVERRIDE (IP)

## 2020-06-03 PROCEDURE — 96413 CHEMO IV INFUSION 1 HR: CPT

## 2020-06-03 PROCEDURE — A4212 NON CORING NEEDLE OR STYLET: HCPCS

## 2020-06-03 PROCEDURE — 700105 HCHG RX REV CODE 258: Performed by: INTERNAL MEDICINE

## 2020-06-03 RX ORDER — 0.9 % SODIUM CHLORIDE 0.9 %
VIAL (ML) INJECTION PRN
Status: CANCELLED | OUTPATIENT
Start: 2020-06-03

## 2020-06-03 RX ORDER — ONDANSETRON 8 MG/1
8 TABLET, ORALLY DISINTEGRATING ORAL
Status: CANCELLED | OUTPATIENT
Start: 2020-06-03

## 2020-06-03 RX ORDER — 0.9 % SODIUM CHLORIDE 0.9 %
5 VIAL (ML) INJECTION PRN
Status: CANCELLED | OUTPATIENT
Start: 2020-06-03

## 2020-06-03 RX ORDER — PROCHLORPERAZINE MALEATE 10 MG
10 TABLET ORAL EVERY 6 HOURS PRN
Status: CANCELLED | OUTPATIENT
Start: 2020-06-03

## 2020-06-03 RX ORDER — SODIUM CHLORIDE 9 MG/ML
INJECTION, SOLUTION INTRAVENOUS CONTINUOUS
Status: CANCELLED | OUTPATIENT
Start: 2020-06-03

## 2020-06-03 RX ORDER — ONDANSETRON 2 MG/ML
4 INJECTION INTRAMUSCULAR; INTRAVENOUS
Status: CANCELLED | OUTPATIENT
Start: 2020-06-03

## 2020-06-03 RX ADMIN — SODIUM CHLORIDE 740 MG: 9 INJECTION, SOLUTION INTRAVENOUS at 10:39

## 2020-06-03 RX ADMIN — HEPARIN: 100 SYRINGE at 11:51

## 2020-06-03 ASSESSMENT — FIBROSIS 4 INDEX: FIB4 SCORE: 0.93

## 2020-06-03 NOTE — PROGRESS NOTES
"Pharmacy Chemotherapy Verification    Patient Name: Amy Maki  Dx: NSCLC    Protocol: Durvalumab   *Dosing Reference*  Durvalumab 10 mg/kg IV over 60 min on day 1  Q14 days until DP/UT or a max of 12 months  NCCN Guidelines for NSCLC V.3.2019  Xochitl ANDREW, et al. N Engl J Med.  2017 Nov 16;377(20):2090-2778.  Xochitl NADREW, et al. N Engl J Med.  2018 Dec 13;379(61):5711-8763.     Allergies:Carboplatin and Percocet [oxycodone-acetaminophen]  /75   Pulse 93   Temp 36.6 °C (97.8 °F) (Temporal)   Resp 18   Ht 1.676 m (5' 6\")   Wt 73.2 kg (161 lb 6 oz)   LMP  (LMP Unknown)   SpO2 93%   BMI 26.05 kg/m²    Body surface area is 1.85 meters squared.      Labs 6/3/20 ([facility])  ANC~ 2500 Plt = 311k   Hgb = 14.2      Labs 6/1/20 ([facility])   SCr = 0.88 mg/dL CrCl ~ 82.2 mL/min   LFT's = WNLs  TBili = 0.2   TSH = 0.09 Free T4 = pending      Drug Order   (Drug name, dose, route, IV Fluid & volume, frequency, number of doses) Cycle 24  Previous treatment = 5/20/20   Medication = Durvalumab (Imfinzi)  Base Dose = 10 mg/kg  Calc Dose: Base Dose x 73.2 kg = 732 mg  Final Dose = 740 mg  Route = IV  Fluid & Volume =  mL  Admin Duration = Over 60 minutes          Rounded to nearest vial size (within 10%) per dose rounding protocol     By my signature below, I confirm this process was performed independently with the BSA and all final chemotherapy dosing calculations congruent. I have reviewed the above chemotherapy order and that my calculation of the final dose and BSA (when applicable) corroborate those calculations of the  pharmacist. Discrepancies of 10% or greater in the written dose have been addressed and documented within the [facility] Progress notes.    Yony Woo, PharmD    "

## 2020-06-03 NOTE — PROGRESS NOTES
"Pharmacy Chemotherapy Verification    Dx: NSCLC    Cycle 24   Previous treatment: 5/20/20    Protocol: Durvalumab     Durvalumab 10 mg/kg IV over 60 min on day 1  Q14 days until DP/UT or a max of 12 months    *Dosing Reference*  NCCN Guidelines for NSCLC V.3.2019  Xochitl ANDREW, et al. N Engl J Med.  2017 Nov 16;377(20):5336-5973.  Xochitl ANDREW, et al. N Engl J Med.  2018 Dec 13;379(42):6596-8371.     Allergies:Percocet [oxycodone-acetaminophen]  /75   Pulse 93   Temp 36.6 °C (97.8 °F) (Temporal)   Resp 18   Ht 1.676 m (5' 6\")   Wt 73.2 kg (161 lb 6 oz)   LMP  (LMP Unknown)   SpO2 93%   BMI 26.05 kg/m²  Body surface area is 1.85 meters squared.     Labs:  6/3/20  PAPER LAB reports <media tab>  Meets treatment parameters.  TSH 0.09, T4 1.65    Durvalumab (Imfinzi) 10 mg/kg x 73.2 kg = 732 mg    Rounded to vial size (within 10%) per dose rounding protocol = 740 mg IV      "

## 2020-06-03 NOTE — PROGRESS NOTES
Chemotherapy Verification - PRIMARY RN      Height = 167.6 cm  Weight = 73.2 kg  BSA = 1.85 m2       Medication: Durvalumab  Dose: 10 mg/kg  Calculated Dose: 732 mg                             (In mg/m2, AUC, mg/kg)         I confirm this process was performed independently with the BSA and all final chemotherapy dosing calculations congruent.  Any discrepancies of 10% or greater have been addressed with the chemotherapy pharmacist. The resolution of the discrepancy has been documented in the EPIC progress notes.

## 2020-06-03 NOTE — PROGRESS NOTES
Patient presents for Imfinzi infusion. Reviewed plan of care, patient verbalizes understanding. Patient states this is her last treatment. Labs within parameters to proceed with infusion today. Port accessed using sterile technique, flushes well with blood return. Imfinzi infused as ordered, in line filter in place. Port flushed per protocol and de-accessed. Patient scheduled for a monthly port flush and released in no acute distress.

## 2020-06-03 NOTE — PROGRESS NOTES
Chemotherapy Verification - SECONDARY RN       Height = 167.6 cm  Weight = 73.2 kg  BSA = 1.85 m2       Medication: Durvalumab  Dose: 10 mg/kg  Calculated Dose: 732 mg                              (In mg/m2, AUC, mg/kg)

## 2020-06-17 ENCOUNTER — APPOINTMENT (OUTPATIENT)
Dept: ONCOLOGY | Facility: MEDICAL CENTER | Age: 55
End: 2020-06-17
Attending: INTERNAL MEDICINE
Payer: MEDICAID

## 2020-07-09 RX ORDER — HEPARIN SODIUM (PORCINE) LOCK FLUSH IV SOLN 100 UNIT/ML 100 UNIT/ML
500 SOLUTION INTRAVENOUS PRN
Status: CANCELLED | OUTPATIENT
Start: 2020-07-09

## 2020-07-09 RX ORDER — 0.9 % SODIUM CHLORIDE 0.9 %
20 VIAL (ML) INJECTION PRN
Status: CANCELLED | OUTPATIENT
Start: 2020-07-09

## 2020-07-15 ENCOUNTER — APPOINTMENT (OUTPATIENT)
Dept: ONCOLOGY | Facility: MEDICAL CENTER | Age: 55
End: 2020-07-15
Attending: INTERNAL MEDICINE
Payer: MEDICAID

## 2020-09-02 RX ORDER — SODIUM CHLORIDE 9 MG/ML
INJECTION, SOLUTION INTRAVENOUS CONTINUOUS
Status: CANCELLED | OUTPATIENT
Start: 2020-09-24

## 2020-09-21 ENCOUNTER — PRE-ADMISSION TESTING (OUTPATIENT)
Dept: ADMISSIONS | Facility: MEDICAL CENTER | Age: 55
End: 2020-09-21
Attending: INTERNAL MEDICINE
Payer: MEDICAID

## 2020-09-21 RX ORDER — LEVOTHYROXINE SODIUM 112 UG/1
100 TABLET ORAL
COMMUNITY
End: 2022-06-27

## 2020-09-21 RX ORDER — CHOLECALCIFEROL (VITAMIN D3) 125 MCG
2000 TABLET ORAL DAILY
COMMUNITY
End: 2022-06-27

## 2020-09-24 ENCOUNTER — APPOINTMENT (OUTPATIENT)
Dept: RADIOLOGY | Facility: MEDICAL CENTER | Age: 55
End: 2020-09-24
Attending: INTERNAL MEDICINE
Payer: MEDICAID

## 2020-09-24 ENCOUNTER — HOSPITAL ENCOUNTER (OUTPATIENT)
Facility: MEDICAL CENTER | Age: 55
End: 2020-09-24
Attending: INTERNAL MEDICINE | Admitting: INTERNAL MEDICINE
Payer: MEDICAID

## 2020-09-24 VITALS
DIASTOLIC BLOOD PRESSURE: 67 MMHG | SYSTOLIC BLOOD PRESSURE: 113 MMHG | OXYGEN SATURATION: 99 % | HEIGHT: 66 IN | RESPIRATION RATE: 16 BRPM | HEART RATE: 78 BPM | WEIGHT: 160 LBS | BODY MASS INDEX: 25.71 KG/M2

## 2020-09-24 DIAGNOSIS — C34.82 MALIGNANT NEOPLASM OF OVERLAPPING SITES OF LEFT LUNG (HCC): ICD-10-CM

## 2020-09-24 PROCEDURE — 700105 HCHG RX REV CODE 258: Performed by: NURSE PRACTITIONER

## 2020-09-24 PROCEDURE — 700111 HCHG RX REV CODE 636 W/ 250 OVERRIDE (IP): Performed by: RADIOLOGY

## 2020-09-24 PROCEDURE — 36590 REMOVAL TUNNELED CV CATH: CPT

## 2020-09-24 PROCEDURE — 700111 HCHG RX REV CODE 636 W/ 250 OVERRIDE (IP)

## 2020-09-24 PROCEDURE — 700101 HCHG RX REV CODE 250

## 2020-09-24 RX ORDER — SODIUM CHLORIDE 9 MG/ML
INJECTION, SOLUTION INTRAVENOUS CONTINUOUS
Status: DISCONTINUED | OUTPATIENT
Start: 2020-09-24 | End: 2020-09-24 | Stop reason: HOSPADM

## 2020-09-24 RX ORDER — SODIUM CHLORIDE 9 MG/ML
500 INJECTION, SOLUTION INTRAVENOUS
Status: DISCONTINUED | OUTPATIENT
Start: 2020-09-24 | End: 2020-09-24 | Stop reason: HOSPADM

## 2020-09-24 RX ORDER — MIDAZOLAM HYDROCHLORIDE 1 MG/ML
INJECTION INTRAMUSCULAR; INTRAVENOUS
Status: COMPLETED
Start: 2020-09-24 | End: 2020-09-24

## 2020-09-24 RX ORDER — LIDOCAINE HYDROCHLORIDE AND EPINEPHRINE 10; 10 MG/ML; UG/ML
INJECTION, SOLUTION INFILTRATION; PERINEURAL
Status: COMPLETED
Start: 2020-09-24 | End: 2020-09-24

## 2020-09-24 RX ORDER — ONDANSETRON 2 MG/ML
4 INJECTION INTRAMUSCULAR; INTRAVENOUS PRN
Status: DISCONTINUED | OUTPATIENT
Start: 2020-09-24 | End: 2020-09-24 | Stop reason: HOSPADM

## 2020-09-24 RX ORDER — MIDAZOLAM HYDROCHLORIDE 1 MG/ML
.5-2 INJECTION INTRAMUSCULAR; INTRAVENOUS PRN
Status: DISCONTINUED | OUTPATIENT
Start: 2020-09-24 | End: 2020-09-24 | Stop reason: HOSPADM

## 2020-09-24 RX ORDER — FLUMAZENIL 0.1 MG/ML
INJECTION INTRAVENOUS
Status: COMPLETED
Start: 2020-09-24 | End: 2020-09-24

## 2020-09-24 RX ADMIN — MIDAZOLAM HYDROCHLORIDE 1 MG: 1 INJECTION, SOLUTION INTRAMUSCULAR; INTRAVENOUS at 08:32

## 2020-09-24 RX ADMIN — SODIUM CHLORIDE: 9 INJECTION, SOLUTION INTRAVENOUS at 08:44

## 2020-09-24 RX ADMIN — LIDOCAINE HYDROCHLORIDE,EPINEPHRINE BITARTRATE: 10; .01 INJECTION, SOLUTION INFILTRATION; PERINEURAL at 08:35

## 2020-09-24 RX ADMIN — FENTANYL CITRATE 50 MCG: 50 INJECTION, SOLUTION INTRAMUSCULAR; INTRAVENOUS at 08:32

## 2020-09-24 RX ADMIN — FENTANYL CITRATE 50 MCG: 50 INJECTION INTRAMUSCULAR; INTRAVENOUS at 08:32

## 2020-09-24 RX ADMIN — MIDAZOLAM HYDROCHLORIDE 1 MG: 1 INJECTION, SOLUTION INTRAMUSCULAR; INTRAVENOUS at 08:37

## 2020-09-24 RX ADMIN — FENTANYL CITRATE 25 MCG: 50 INJECTION, SOLUTION INTRAMUSCULAR; INTRAVENOUS at 08:37

## 2020-09-24 ASSESSMENT — FIBROSIS 4 INDEX: FIB4 SCORE: 0.93

## 2020-09-24 NOTE — PROGRESS NOTES
Patient underwent a tunneled port removal by Dr. Paredes. Procedure site was marked by MD and verified using imaging guidance. Patient was placed in a supine position. Vitals were taken every 5 minutes and remained stable during procedure (see doc flow sheet for results). Patient appeared to tolerate procedure well, all sedation medication given at discretion of MD Paredes. Right chest wall surgical site sutured by MD Paredes, then dermabond, steri strips, and tegaderm applied. Patient brought to [facility] for recovery.

## 2020-09-24 NOTE — OR SURGEON
Immediate Post- Operative Note        PostOp Diagnosis: NO LONGER REQUIRES PORT ACCESS FOR CHEMOTHERAPY      Procedure(s): RIGHT INTERNAL JUGULAR PORT REMOVAL, PORT RESERVOIR AND CATHETER REMOVED INTACT        Estimated Blood Loss: < 5CC        Complications: NONE            9/24/2020             9:06 AM                      Joseph Paredes M.D.

## 2020-09-24 NOTE — PROGRESS NOTES
Patient cleared for discharge home. A+Ox4, on room air, awake and talking. Discharge instructions reviewed with patient, she signed her discharge packet, all questions answered. Son in law at bedside for discharge teaching. IV removed. Dressing C/D/I. Left at this time with all personal belongings, daughter is driving her home. This RN escorted patient into car safely

## 2020-09-24 NOTE — H&P
History and Physical    Date: 9/24/2020    PCP: Pcp Pt States None      CC: NO LONGER NEEDS PORT FOR CHEMOTHERAPY    HPI: This is a 55 y.o. 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   • COPD (chronic obstructive pulmonary disease) (HCC)    • Dental disorder     upper partial   • Disorder of thyroid     thyroidectomy due to ca   • Emphysema of lung (HCC)    • PE (pulmonary thromboembolism) (HCC) 01/2020   • Pneumonia     1/19/19       Past Surgical History:   Procedure Laterality Date   • THYROIDECTOMY TOTAL Bilateral 4/17/2020    Procedure: THYROIDECTOMY, TOTAL WITH PARATHYROID AUTOTRANSPLANTATION;  Surgeon: Kimberly Roca M.D.;  Location: SURGERY [facility];  Service: General   • NECK DISSECTION Right 4/17/2020    Procedure: DISSECTION, NECK;  Surgeon: Kimberly Roca M.D.;  Location: SURGERY [facility];  Service: General   • THYROIDECTOMY TOTAL  04/17/2020   • BRONCHOSCOPY-ENDO N/A 1/22/2019    Procedure: BRONCHOSCOPY-ENDO;  Surgeon: Lois Lynne M.D.;  Location: ENDOSCOPY [facility];  Service: Gastroenterology   • CHOLECYSTECTOMY      2000   • DENTAL EXTRACTION(S)      [place] teeth 1996   • PRIMARY C SECTION      12/1980   • VAGINAL HYSTERECTOMY SCOPE TOTAL         Current Facility-Administered Medications   Medication Dose Route Frequency Provider Last Rate Last Dose   • MIDAZOLAM HCL 2 MG/2ML INJ SOLN (WRAPPED)            • FLUMAZENIL 0.5 MG/5ML IV SOLN            • LIDOCAINE-EPINEPHRINE 1 %[id number] INJ SOLN                 Social History     Socioeconomic History   • Marital status:      Spouse name: Not on file   • Number of children: Not on file   • Years of education: Not on file   • Highest education level:  Not on file   Occupational History   • Not on file   Social Needs   • Financial resource strain: Not on file   • Food insecurity     Worry: Not on file     Inability: Not on file   • Transportation needs     Medical: Not on file     Non-medical: Not on file   Tobacco Use   • Smoking status: Former Smoker     Packs/day: 0.50     Years: 40.00     Pack years: 20.00     Types: Cigarettes     Quit date: 2019     Years since quittin.6   • Smokeless tobacco: Never Used   Substance and Sexual Activity   • Alcohol use: No   • Drug use: No   • Sexual activity: Not on file   Lifestyle   • Physical activity     Days per week: Not on file     Minutes per session: Not on file   • Stress: Not on file   Relationships   • Social connections     Talks on phone: Not on file     Gets together: Not on file     Attends Orthodox service: Not on file     Active member of club or organization: Not on file     Attends meetings of clubs or organizations: Not on file     Relationship status: Not on file   • Intimate partner violence     Fear of current or ex partner: Not on file     Emotionally abused: Not on file     Physically abused: Not on file     Forced sexual activity: Not on file   Other Topics Concern   • Not on file   Social History Narrative   • Not on file       Family History   Problem Relation Age of Onset   • Cancer Mother         Lung cancer        Allergies:  Carboplatin and Oxycodone    Review of Systems:  Negative except for GIVES HX OF PE AND THYROID CA. NO CURRENT COMPLAINTS    Physical Exam   Constitutional: She appears well-developed and well-nourished.   HENT:   Head: Normocephalic and atraumatic.   Cardiovascular: Normal rate.   Pulmonary/Chest: Effort normal.   R IJ PORT   Neurological: She is alert.   Skin: Skin is warm and dry.   Psychiatric: She has a normal mood and affect. Her behavior is normal.       Vital Signs                           Labs:                    Radiology:  IR-CVC TUNNEL WITH PORT  REMOVAL    (Results Pending)             Assessment and Plan:This is a 55 y.o. HERE FOR PORT REMOVAL

## 2021-02-17 PROBLEM — C73 PRIMARY THYROID CANCER (HCC): Status: ACTIVE | Noted: 2020-10-22

## 2021-02-17 PROBLEM — E89.0 POST-SURGICAL HYPOTHYROIDISM: Status: ACTIVE | Noted: 2020-10-22

## 2021-04-15 NOTE — DISCHARGE INSTRUCTIONS
Occupational Therapy  Visit Type: initial evaluation  Co-treat with: Physical therapist  Precautions:  Medical precautions: ; standard precautions.   Lines:     Basic: IV      Lines in chart and on patient reviewed, cautions maintained throughout session.  Hearing: hard of hearing  Safety Measures: bed alarm and chair alarm    SUBJECTIVE  Patient agreed to participate in therapy this date.  Pt agreeable to therapy  Patient / Family Goal: return to previous functional status    OBJECTIVE   Level of consciousness: alert    Oriented to person and place     Disoriented to time    Affect/Behavior: cooperative  Patient activity tolerance: 1 to 1 activity to rest  Functional Communication/Cognition    Overall status:  Impaired    Form of communication:  Verbal   Attention span:  Appears intact    Commands: follows one step commands consistently.    Transition between tasks: transition with cues.    Safety judgement: decreased awareness of need for safety.    Awareness of deficits: decreased awareness of deficits.  Hand Dominance: right  Range of Motion (measured in degrees unless otherwise indicated)  Shoulder:   - Flexion (180):      • Left:  100   Right:  100   - Extension (50):      • Left: WFL      • Right: WFL   - Abduction (180):      • Left:  100      • Right:  100   - Internal Rotation (70-90):       • Left: WFL      • Right: WFL   - External Rotation (90):       • Left: WFL      • Right: WFL  Elbow/Forearm:   - Flexion (140-150):      • Left: WFL      • Right: WFL   - Extension (0):      • Left: WFL      • Right: WFL   - Supination (80):      • Left: WFL      • Right: WFL   - Pronation (80):      • Left: WFL      • Right: WFL  Wrist:   - Flexion (60-80):      • Left: WFL      • Right: WFL   - Extension (60-70):      • Left: WFL      • Right: WFL  Finger/Thumb:   - Flexion:      • Left: WFL   - Extension:      • Left: WFL     • Right: WFL   - Thumb opposition:      • Left: WFL     • Right:  Implanted Port Removal, Care After  This sheet gives you information about how to care for yourself after your procedure. Your health care provider may also give you more specific instructions. If you have problems or questions, contact your health care provider.  What can I expect after the procedure?  After the procedure, it is common to have:  · Soreness or pain near your incision.  · Some swelling or bruising near your incision.  Follow these instructions at home:  Medicines  · Take over-the-counter and prescription medicines only as told by your health care provider.  · If you were prescribed an antibiotic medicine, take it as told by your health care provider. Do not stop taking the antibiotic even if you start to feel better.  Bathing  · Do not take baths, swim, or use a hot tub until your health care provider approves. Ask your health care provider if you can take showers. You may only be allowed to take sponge baths.  Incision care    · Follow instructions from your health care provider about how to take care of your incision. Make sure you:  ? Wash your hands with soap and water before you change your bandage (dressing). If soap and water are not available, use hand .  ? Change your dressing as told by your health care provider.  ? Keep your dressing dry.  ? Leave stitches (sutures), skin glue, or adhesive strips in place. These skin closures may need to stay in place for 2 weeks or longer. If adhesive strip edges start to loosen and curl up, you may trim the loose edges. Do not remove adhesive strips completely unless your health care provider tells you to do that.  · Check your incision area every day for signs of infection. Check for:  ? More redness, swelling, or pain.  ? More fluid or blood.  ? Warmth.  ? Pus or a bad smell.  Driving    · Do not drive for 24 hours if you were given a medicine to help you relax (sedative) during your procedure.  · If you did not receive a sedative, ask your  WFL  Balance  Sitting:    Static:  supervision     Dynamic:  contact guard/touching/steadying assist  Standing - Firm Surface - Eyes Open:     Static: contact guard/touching/steadying assist    Dynamic:  contact guard/touching/steadying assist    Tone    Upper Extremity:  Left:  Within functional limits  Right:  Within functional limits  Coordination  Gross Motor:  LUE: grossly intact  RUE: grossly intact  Fine Motor:  LUE: grossly intact RUE: grossly intact  Bed mobility:      Sit to supine: minimal assist  Transfers:    Assistive devices: gait belt and 2-wheeled walker    Sit to stand: minimal assist    Stand to sit: minimal assist    Functional Ambulation:    Assistance:minimal assist   Assistive device:2-wheeled walker and gait belt    Activities of Daily Living (ADLs):  Lower Body Dressing:     Assist: maximal assist    Position: chair  Toilet transfer:     Assist: minimal assist    Device: 2-wheeled walker and gait belt    Equipment: grab bar use  Toileting:     Assist: moderate assist    Assist needed for: set up, perineal hygiene and increased time to complete             ASSESSMENT    Impairments: activity tolerance, balance, bed mobility and safety awareness  Functional Limitations: bed mobility, functional mobility, grooming, bathing, toileting, functional transfers, showering, dressing and participating in meaningful/purposeful activities  Personal Occupations Profile Affected: bathing/showering, functional mobility/transfers, personal hygiene/grooming, upper body dressing, lower body dressing, toileting/toilet hygiene  Pt is an 85 year old male admitted with acute renal failure with a history of DM II, CKD III, glaucoma, and polyneuropathy. Prior to admission pt lived alone at a senior apartmant . His daughter visits him daily and spends a significant amount of time with him daily.  He ambulated with a 2ww with modified independence (no falls in the past year). He completed a shower with supervision and  health care provider when it is safe to drive.  Activity  · Return to your normal activities as told by your health care provider. Ask your health care provider what activities are safe for you.  · Do not lift anything that is heavier than 10 lb (4.5 kg), or the limit that you are told, until your health care provider says that it is safe.  · Do not do activities that involve lifting your arms over your head.  General instructions  · Do not use any products that contain nicotine or tobacco, such as cigarettes and e-cigarettes. These can delay healing. If you need help quitting, ask your health care provider.  · Keep all follow-up visits as told by your health care provider. This is important.  Contact a health care provider if:  · You have more redness, swelling, or pain around your incision.  · You have more fluid or blood coming from your incision.  · Your incision feels warm to the touch.  · You have pus or a bad smell coming from your incision.  · You have pain that is not relieved by your pain medicine.  Get help right away if you have:  · A fever or chills.  · Chest pain.  · Difficulty breathing.  Summary  · After the procedure, it is common to have pain, soreness, swelling, or bruising near your incision.  · If you were prescribed an antibiotic medicine, take it as told by your health care provider. Do not stop taking the antibiotic even if you start to feel better.  · Do not drive for 24 hours if you were given a sedative during your procedure.  · Return to your normal activities as told by your health care provider. Ask your health care provider what activities are safe for you.  This information is not intended to replace advice given to you by your health care provider. Make sure you discuss any questions you have with your health care provider.  Document Released: 11/28/2016 Document Revised: 01/31/2019 Document Reviewed: 01/31/2019  Elsevier Patient Education © 2020 [employer]      Moderate Conscious  Sedation, Adult, Care After  These instructions provide you with information about caring for yourself after your procedure. Your health care provider may also give you more specific instructions. Your treatment has been planned according to current medical practices, but problems sometimes occur. Call your health care provider if you have any problems or questions after your procedure.  What can I expect after the procedure?  After your procedure, it is common:  · To feel sleepy for several hours.  · To feel clumsy and have poor balance for several hours.  · To have poor judgment for several hours.  · To vomit if you eat too soon.  Follow these instructions at home:  For at least 24 hours after the procedure:    · Do not:  ? Participate in activities where you could fall or become injured.  ? Drive.  ? Use heavy machinery.  ? Drink alcohol.  ? Take sleeping pills or medicines that cause drowsiness.  ? Make important decisions or sign legal documents.  ? Take care of children on your own.  · Rest.  Eating and drinking  · Follow the diet recommended by your health care provider.  · If you vomit:  ? Drink water, juice, or soup when you can drink without vomiting.  ? Make sure you have little or no nausea before eating solid foods.  General instructions  · Have a responsible adult stay with you until you are awake and alert.  · Take over-the-counter and prescription medicines only as told by your health care provider.  · If you smoke, do not smoke without supervision.  · Keep all follow-up visits as told by your health care provider. This is important.  Contact a health care provider if:  · You keep feeling nauseous or you keep vomiting.  · You feel light-headed.  · You develop a rash.  · You have a fever.  Get help right away if:  · You have trouble breathing.  This information is not intended to replace advice given to you by your health care provider. Make sure you discuss any questions you have with your health  use of shower chair and grab bars. He completes dressing and toileting with modified independence. Family provides assist with transportation needs and all homemaking tasks. Therapy session was a co treat with PT to maximize safety. His daughter Sanna was present and assisted with translation.  Upon occupational therapy evaluation  Pt is oriented to person and place. He is able to follow 1 step directives without difficulty.supi ne-sit with min assist, he walked to the bathroom with min assist, 2ww, and gait belt. Transferred to toilet with min assist. Assist provided with hygiene. Max assist to don a brief over his feet, and he was able to pull up over hips with min assist. He transferred to recliner with contact guard assist. He needed cues to keep walker closer when walking. Pt displays deficits in strength, endurance, and  and would benefit from therapy to maximize independence and safety. Pt can go home with home health and daughters regular supervision.      Discharge Recommendations:  Recommendations for Discharge: OT WI: Home, Home therapy      PT/OT Mobility Equipment for Discharge: has a 2ww     OT Identified Barriers to Discharge: endurance, safety     Skilled therapy is required to address these limitations in attempt to maximize the patient's independence.  Pain at end of session:  0/10  Clinical decision making: Moderate - Patient has several limitations (3-5), comorbidities and/or complexities, as noted in detailed assessment above, that impact their occupational profile.  Resulting in several treatment options and minimal to moderate task modification consistent with moderate clinical decision making complexity.    End of Session:   Location: in chair  Safety measures: alarm system in place/re-engaged and call light within reach  Handoff to: family/caregiver    PLAN  Suggestions for next session as indicated: Self cares  OT Frequency: 6 days/week  Frequency Comments: 1/6 by 4-22 (RR  4-15)     Interventions: activity tolerance training, ADL retraining, balance, bed mobility training, transfer training, upper extremity strengthening/ROM, therapeutic exercise, therapeutic activity, patient education, neuromuscular reeducation, functional transfer training and patient/family training  Agreement to plan and goals: patient agrees with goals and treatment plan      GOALS  Review Date: 4/22/2021  Long Term Goals: (to be met by time of discharge from hospital)  Grooming: Patient will complete grooming tasks modified independent.  Upper body dressing: Patient will complete upper body dressing modified independent.  Lower body dressing: Patient will complete lower body dressing modified independent.  Toileting: Patient will complete toileting modified independent.  Bathing: Patient will complete bathingsupervision Toilet transfer: Patient will complete toilet transfer with 2-wheeled walker, modified independent.   Home setting transfer: Patient will complete home setting transfers with 2-wheeled walker, modified independent.         Documented in the chart in the following areas:  Services. Pain. Assessment. Plan.      Therapy procedure time and total treatment time can be found documented on the Time Entry flowsheet   care provider.  Document Released: 10/08/2014 Document Revised: 11/30/2018 Document Reviewed: 04/08/2017  Elsevier Patient Education © 2020 [employer] Inc[employer]

## 2021-11-16 NOTE — CARE PLAN
Problem: Pain Management  Goal: Pain level will decrease to patient's comfort goal  Outcome: PROGRESSING AS EXPECTED  Pt receiving hydrocodone cough syrup for pain. Pt also has heat pack to R rib from coughing pain.     Problem: Psychosocial Needs:  Goal: Level of anxiety will decrease  Outcome: PROGRESSING SLOWER THAN EXPECTED  One on one discussion provided and support from family. Xanax also given.        No

## 2022-03-02 NOTE — PROGRESS NOTES
----- Message from Jaquan Varela MD sent at 3/1/2022 12:31 PM CST -----  OA noted, no acute process for now.  Ok to continue with [facility] Patient off unit for port placement via bed with IR.

## 2022-06-24 ENCOUNTER — TELEPHONE (OUTPATIENT)
Dept: SCHEDULING | Facility: IMAGING CENTER | Age: 57
End: 2022-06-24

## 2022-06-24 SDOH — HEALTH STABILITY: PHYSICAL HEALTH: ON AVERAGE, HOW MANY DAYS PER WEEK DO YOU ENGAGE IN MODERATE TO STRENUOUS EXERCISE (LIKE A BRISK WALK)?: 2 DAYS

## 2022-06-24 SDOH — HEALTH STABILITY: PHYSICAL HEALTH: ON AVERAGE, HOW MANY MINUTES DO YOU ENGAGE IN EXERCISE AT THIS LEVEL?: 30 MIN

## 2022-06-24 SDOH — ECONOMIC STABILITY: FOOD INSECURITY: WITHIN THE PAST 12 MONTHS, THE FOOD YOU BOUGHT JUST DIDN'T LAST AND YOU DIDN'T HAVE MONEY TO GET MORE.: NEVER TRUE

## 2022-06-24 SDOH — HEALTH STABILITY: MENTAL HEALTH
STRESS IS WHEN SOMEONE FEELS TENSE, NERVOUS, ANXIOUS, OR CAN'T SLEEP AT NIGHT BECAUSE THEIR MIND IS TROUBLED. HOW STRESSED ARE YOU?: TO SOME EXTENT

## 2022-06-24 SDOH — ECONOMIC STABILITY: HOUSING INSECURITY: IN THE LAST 12 MONTHS, HOW MANY PLACES HAVE YOU LIVED?: 1

## 2022-06-24 SDOH — ECONOMIC STABILITY: HOUSING INSECURITY
IN THE LAST 12 MONTHS, WAS THERE A TIME WHEN YOU DID NOT HAVE A STEADY PLACE TO SLEEP OR SLEPT IN A SHELTER (INCLUDING NOW)?: NO

## 2022-06-24 SDOH — ECONOMIC STABILITY: TRANSPORTATION INSECURITY
IN THE PAST 12 MONTHS, HAS THE LACK OF TRANSPORTATION KEPT YOU FROM MEDICAL APPOINTMENTS OR FROM GETTING MEDICATIONS?: NO

## 2022-06-24 SDOH — ECONOMIC STABILITY: INCOME INSECURITY: HOW HARD IS IT FOR YOU TO PAY FOR THE VERY BASICS LIKE FOOD, HOUSING, MEDICAL CARE, AND HEATING?: NOT HARD AT ALL

## 2022-06-24 SDOH — ECONOMIC STABILITY: FOOD INSECURITY: WITHIN THE PAST 12 MONTHS, YOU WORRIED THAT YOUR FOOD WOULD RUN OUT BEFORE YOU GOT MONEY TO BUY MORE.: NEVER TRUE

## 2022-06-24 SDOH — ECONOMIC STABILITY: INCOME INSECURITY: IN THE LAST 12 MONTHS, WAS THERE A TIME WHEN YOU WERE NOT ABLE TO PAY THE MORTGAGE OR RENT ON TIME?: NO

## 2022-06-24 SDOH — ECONOMIC STABILITY: TRANSPORTATION INSECURITY
IN THE PAST 12 MONTHS, HAS LACK OF TRANSPORTATION KEPT YOU FROM MEETINGS, WORK, OR FROM GETTING THINGS NEEDED FOR DAILY LIVING?: NO

## 2022-06-24 SDOH — ECONOMIC STABILITY: TRANSPORTATION INSECURITY
IN THE PAST 12 MONTHS, HAS LACK OF RELIABLE TRANSPORTATION KEPT YOU FROM MEDICAL APPOINTMENTS, MEETINGS, WORK OR FROM GETTING THINGS NEEDED FOR DAILY LIVING?: NO

## 2022-06-24 ASSESSMENT — SOCIAL DETERMINANTS OF HEALTH (SDOH)
HOW OFTEN DO YOU ATTEND CHURCH OR RELIGIOUS SERVICES?: NEVER
HOW OFTEN DO YOU HAVE A DRINK CONTAINING ALCOHOL: NEVER
HOW OFTEN DO YOU ATTEND CHURCH OR RELIGIOUS SERVICES?: NEVER
HOW MANY DRINKS CONTAINING ALCOHOL DO YOU HAVE ON A TYPICAL DAY WHEN YOU ARE DRINKING: PATIENT DOES NOT DRINK
HOW OFTEN DO YOU GET TOGETHER WITH FRIENDS OR RELATIVES?: MORE THAN THREE TIMES A WEEK
HOW OFTEN DO YOU GET TOGETHER WITH FRIENDS OR RELATIVES?: MORE THAN THREE TIMES A WEEK
WITHIN THE PAST 12 MONTHS, YOU WORRIED THAT YOUR FOOD WOULD RUN OUT BEFORE YOU GOT THE MONEY TO BUY MORE: NEVER TRUE
HOW OFTEN DO YOU HAVE SIX OR MORE DRINKS ON ONE OCCASION: NEVER
DO YOU BELONG TO ANY CLUBS OR ORGANIZATIONS SUCH AS CHURCH GROUPS UNIONS, FRATERNAL OR ATHLETIC GROUPS, OR SCHOOL GROUPS?: NO
IN A TYPICAL WEEK, HOW MANY TIMES DO YOU TALK ON THE PHONE WITH FAMILY, FRIENDS, OR NEIGHBORS?: MORE THAN THREE TIMES A WEEK
HOW OFTEN DO YOU ATTENT MEETINGS OF THE CLUB OR ORGANIZATION YOU BELONG TO?: NEVER
HOW OFTEN DO YOU ATTENT MEETINGS OF THE CLUB OR ORGANIZATION YOU BELONG TO?: NEVER
HOW HARD IS IT FOR YOU TO PAY FOR THE VERY BASICS LIKE FOOD, HOUSING, MEDICAL CARE, AND HEATING?: NOT HARD AT ALL
IN A TYPICAL WEEK, HOW MANY TIMES DO YOU TALK ON THE PHONE WITH FAMILY, FRIENDS, OR NEIGHBORS?: MORE THAN THREE TIMES A WEEK
DO YOU BELONG TO ANY CLUBS OR ORGANIZATIONS SUCH AS CHURCH GROUPS UNIONS, FRATERNAL OR ATHLETIC GROUPS, OR SCHOOL GROUPS?: NO

## 2022-06-24 ASSESSMENT — LIFESTYLE VARIABLES
HOW MANY STANDARD DRINKS CONTAINING ALCOHOL DO YOU HAVE ON A TYPICAL DAY: PATIENT DOES NOT DRINK
HOW OFTEN DO YOU HAVE SIX OR MORE DRINKS ON ONE OCCASION: NEVER
HOW OFTEN DO YOU HAVE A DRINK CONTAINING ALCOHOL: NEVER
SKIP TO QUESTIONS 9-10: 1
AUDIT-C TOTAL SCORE: 0

## 2022-06-27 ENCOUNTER — OFFICE VISIT (OUTPATIENT)
Dept: MEDICAL GROUP | Facility: PHYSICIAN GROUP | Age: 57
End: 2022-06-27
Payer: MEDICARE

## 2022-06-27 VITALS
HEIGHT: 66 IN | BODY MASS INDEX: 24.27 KG/M2 | OXYGEN SATURATION: 95 % | SYSTOLIC BLOOD PRESSURE: 108 MMHG | TEMPERATURE: 98.2 F | HEART RATE: 89 BPM | RESPIRATION RATE: 16 BRPM | WEIGHT: 151.01 LBS | DIASTOLIC BLOOD PRESSURE: 70 MMHG

## 2022-06-27 DIAGNOSIS — Z76.89 ENCOUNTER TO ESTABLISH CARE: ICD-10-CM

## 2022-06-27 DIAGNOSIS — E89.0 POST-SURGICAL HYPOTHYROIDISM: ICD-10-CM

## 2022-06-27 DIAGNOSIS — C34.91 NON-SMALL CELL CANCER OF RIGHT LUNG (HCC): ICD-10-CM

## 2022-06-27 DIAGNOSIS — Z00.00 HEALTHCARE MAINTENANCE: ICD-10-CM

## 2022-06-27 DIAGNOSIS — I26.99 PE (PULMONARY THROMBOEMBOLISM) (HCC): ICD-10-CM

## 2022-06-27 DIAGNOSIS — J44.9 CHRONIC OBSTRUCTIVE PULMONARY DISEASE, UNSPECIFIED COPD TYPE (HCC): ICD-10-CM

## 2022-06-27 DIAGNOSIS — C73 PRIMARY THYROID CANCER (HCC): ICD-10-CM

## 2022-06-27 PROBLEM — F41.9 ANXIETY: Status: RESOLVED | Noted: 2019-01-21 | Resolved: 2022-06-27

## 2022-06-27 PROCEDURE — 99204 OFFICE O/P NEW MOD 45 MIN: CPT | Performed by: STUDENT IN AN ORGANIZED HEALTH CARE EDUCATION/TRAINING PROGRAM

## 2022-06-27 RX ORDER — LEVOTHYROXINE SODIUM 0.1 MG/1
TABLET ORAL
COMMUNITY
Start: 2022-06-01

## 2022-06-27 ASSESSMENT — ENCOUNTER SYMPTOMS
CARDIOVASCULAR NEGATIVE: 1
CHILLS: 0
RESPIRATORY NEGATIVE: 1
ABDOMINAL PAIN: 0
FEVER: 0
DIZZINESS: 0
BLOOD IN STOOL: 0
HEADACHES: 0

## 2022-06-27 ASSESSMENT — PATIENT HEALTH QUESTIONNAIRE - PHQ9: CLINICAL INTERPRETATION OF PHQ2 SCORE: 0

## 2022-06-27 NOTE — ASSESSMENT & PLAN NOTE
Chronic condition 2/2 to cancer. Now resolved. No longer on anticoagulation, was taken off asa due to easy bleeding and bruising.

## 2022-06-27 NOTE — ASSESSMENT & PLAN NOTE
Patient here for a preventive medicine visit today and to establish care.  -Reviewed all past medical history, family history, social history    -Diet and exercise appropriate counseling given  -Social determinants of health reviewed  -Tobacco, alcohol, recreational drug use: Reviewed no concerns  -Cholesterol screening: Ordered  -Diabetes screening: Not indicated  -AAA Screening: Not indicated   -Reviewed sun protective barrier including sunscreen application    Immunizations/Infectious disease:  STI screening: Declines  Safe sex practices discusssed  HIV screening: declines  Immunizations: advised on shingles, declines covid vaccination has no questions about it for me.     Cancer screenings:  Colorectal cancer screening: declines screening, no fam hx of this.   Cervical Cancer Screening: hysterectomy  Breast Cancer Screening:  Last mammogram 2021, wnl.    ----BRCA SCREENING: FHS-7 score: grandmother maternal had breast cancer.       Ob-Gyn/ History:   Patient has GYN provider: none  /Para:  Csection  Hx of abnormal Pap smears: reports abnormal pap smears leading up to hysterectomy. Does not get pap smears after hysterectomy.   Gyn Surgery: Csection, hysterectomy

## 2022-06-27 NOTE — PROGRESS NOTES
Subjective:   HISTORY OF THE PRESENT ILLNESS: Patient is a 57 y.o. female here today to establish care.     Problem   Encounter to Establish Care   Primary Thyroid Cancer (Hcc)    Diagnosed 10/2020.  Underwent resection of the thyroid along with 6 lymph nodes.  Now in remission.  Oncologist Dr. Gauthier, endocrinologist, Dr. Beckman endocrinologist Elías Perez.  Currently on levothyroxine 100 mcg daily.     Post-Surgical Hypothyroidism    Status post thyroid cancer resection.  On levothyroxine 100 mcg daily.     Pe (Pulmonary Thromboembolism) (Hcc)    This was back in 2020, undergoing treatment for lung cancer and thyroid cancer.  Had a port in place.  No no longer taking Eliquis for over a year now.     Copd (Chronic Obstructive Pulmonary Disease) (Hcc)    Has pulmonology.  Has home portable oxygen as needed uses about once to twice a month.  Spiriva, Symbicort daily, albuterol rescue inhaler.  Pulmonologist is Dr. Lopez.  Quit smoking in 2019.      Non-Small Cell Lung Cancer (Hcc)    Diagnosed January 28, 2019.  Underwent chemo, radiation, immunotherapy.  Currently in remission, still sees oncologist Dr. Gauthier cancer care specialist renal.     Anxiety (Resolved)      Current Outpatient Medications Ordered in Epic   Medication Sig Dispense Refill   • Cholecalciferol 25 MCG (1000 UT) TABLET DISPERSIBLE Take 0.5 Tablets by mouth every day.     • levothyroxine (SYNTHROID) 100 MCG Tab Take 1 tablet daily- 90 days supply     • albuterol 108 (90 Base) MCG/ACT Aero Soln inhalation aerosol Inhale 1-2 Puffs by mouth every four hours as needed for Shortness of Breath.     • tiotropium (SPIRIVA HANDIHALER) 18 MCG Cap Inhale 18 mcg by mouth every morning.     • budesonide-formoterol (SYMBICORT) 160-4.5 MCG/ACT Aerosol Inhale 2 Puffs by mouth 2 Times a Day. 1 Inhaler 2     No current Epic-ordered facility-administered medications on file.       Past Medical History:   Diagnosis Date   • PE (pulmonary thromboembolism) (HCC) 01/2020      Past Surgical History:   Procedure Laterality Date   • THYROIDECTOMY TOTAL Bilateral 4/17/2020    Procedure: THYROIDECTOMY, TOTAL WITH PARATHYROID AUTOTRANSPLANTATION;  Surgeon: Kimberly Roca M.D.;  Location: SURGERY [facility];  Service: General   • NECK DISSECTION Right 4/17/2020    Procedure: DISSECTION, NECK;  Surgeon: Kimberly Roca M.D.;  Location: SURGERY [facility];  Service: General   • THYROIDECTOMY TOTAL  04/17/2020   • BRONCHOSCOPY-ENDO N/A 1/22/2019    Procedure: BRONCHOSCOPY-ENDO;  Surgeon: Lois Lynne M.D.;  Location: ENDOSCOPY [facility];  Service: Gastroenterology   • CHOLECYSTECTOMY      2000   • DENTAL EXTRACTION(S)      [place] teeth 1996   • PRIMARY C SECTION      12/1980   • VAGINAL HYSTERECTOMY SCOPE TOTAL       Social History     Tobacco Use   • Smoking status: Former Smoker     Packs/day: 0.50     Years: 40.00     Pack years: 20.00     Types: Cigarettes     Quit date: 1/19/2019     Years since quitting: 3.4   • Smokeless tobacco: Never Used   Vaping Use   • Vaping Use: Never used   Substance Use Topics   • Alcohol use: No   • Drug use: No      Family History   Problem Relation Age of Onset   • Cancer Mother         Lung cancer    • Heart Disease Father    • No Known Problems Sister    • No Known Problems Brother      Current Outpatient Medications   Medication Sig Dispense Refill   • Cholecalciferol 25 MCG (1000 UT) TABLET DISPERSIBLE Take 0.5 Tablets by mouth every day.     • levothyroxine (SYNTHROID) 100 MCG Tab Take 1 tablet daily- 90 days supply     • albuterol 108 (90 Base) MCG/ACT Aero Soln inhalation aerosol Inhale 1-2 Puffs by mouth every four hours as needed for Shortness of Breath.     • tiotropium (SPIRIVA HANDIHALER) 18 MCG Cap Inhale 18 mcg by mouth every morning.     • budesonide-formoterol (SYMBICORT) 160-4.5 MCG/ACT Aerosol Inhale 2 Puffs by mouth 2 Times a Day. 1 Inhaler 2     No current facility-administered medications for this visit.  "      Health Maintenance: Completed    Review of Systems   Constitutional: Negative for chills and fever.   Respiratory: Negative.    Cardiovascular: Negative.    Gastrointestinal: Negative for abdominal pain, blood in stool and melena.   Neurological: Negative for dizziness and headaches.          Objective:     Exam: /70 (BP Location: Left arm, Patient Position: Sitting, BP Cuff Size: Adult)   Pulse 89   Temp 36.8 °C (98.2 °F) (Temporal)   Resp 16   Ht 1.676 m (5' 6\")   Wt 68.5 kg (151 lb 0.2 oz)   SpO2 95%  Body mass index is 24.37 kg/m².    Physical Exam  Vitals reviewed.   Constitutional:       General: She is not in acute distress.     Appearance: Normal appearance. She is not ill-appearing or toxic-appearing.   HENT:      Head: Normocephalic and atraumatic.   Eyes:      General: No scleral icterus.        Right eye: No discharge.         Left eye: No discharge.      Conjunctiva/sclera: Conjunctivae normal.   Neck:      Vascular: No carotid bruit.   Cardiovascular:      Rate and Rhythm: Normal rate and regular rhythm.      Pulses: Normal pulses.      Heart sounds: Normal heart sounds. No murmur heard.  Pulmonary:      Effort: Pulmonary effort is normal. No respiratory distress.      Breath sounds: Normal breath sounds. No wheezing or rales.   Abdominal:      General: Abdomen is flat. Bowel sounds are normal. There is no distension.      Palpations: Abdomen is soft. There is no mass.      Tenderness: There is no abdominal tenderness. There is no guarding or rebound.   Musculoskeletal:      Cervical back: Neck supple.      Right lower leg: No edema.      Left lower leg: No edema.   Skin:     General: Skin is warm and dry.   Neurological:      General: No focal deficit present.      Mental Status: She is alert.   Psychiatric:         Mood and Affect: Mood normal.         Thought Content: Thought content normal.            Assessment & Plan:   57 y.o. female with the following -    Problem List Items " Addressed This Visit     Non-small cell lung cancer (HCC)    COPD (chronic obstructive pulmonary disease) (HCC)    Primary thyroid cancer (HCC)    Relevant Medications    levothyroxine (SYNTHROID) 100 MCG Tab    Post-surgical hypothyroidism    Relevant Medications    levothyroxine (SYNTHROID) 100 MCG Tab    PE (pulmonary thromboembolism) (HCC)     Chronic condition 2/2 to cancer. Now resolved. No longer on anticoagulation, was taken off asa due to easy bleeding and bruising.            Encounter to establish care     Patient here for a preventive medicine visit today and to establish care.  -Reviewed all past medical history, family history, social history    -Diet and exercise appropriate counseling given  -Social determinants of health reviewed  -Tobacco, alcohol, recreational drug use: Reviewed no concerns  -Cholesterol screening: Ordered  -Diabetes screening: Not indicated  -AAA Screening: Not indicated   -Reviewed sun protective barrier including sunscreen application    Immunizations/Infectious disease:  STI screening: Declines  Safe sex practices discusssed  HIV screening: declines  Immunizations: advised on shingles, declines covid vaccination has no questions about it for me.     Cancer screenings:  Colorectal cancer screening: declines screening, no fam hx of this.   Cervical Cancer Screening: hysterectomy  Breast Cancer Screening:  Last mammogram 2021, wnl.    ----BRCA SCREENING: FHS-7 score: grandmother maternal had breast cancer.       Ob-Gyn/ History:   Patient has GYN provider: none  /Para:  Csection  Hx of abnormal Pap smears: reports abnormal pap smears leading up to hysterectomy. Does not get pap smears after hysterectomy.   Gyn Surgery: Csection, hysterectomy             Other Visit Diagnoses     Healthcare maintenance        Relevant Orders    CBC WITHOUT DIFFERENTIAL    Comp Metabolic Panel    Lipid Profile           Return in about 6 months (around 2022) for  symptoms.    Please note that this dictation was created using voice recognition software. I have made every reasonable attempt to correct obvious errors, but I expect that there are errors of grammar and possibly content that I did not discover before finalizing the note.

## 2022-07-21 ENCOUNTER — TELEPHONE (OUTPATIENT)
Dept: MEDICAL GROUP | Facility: PHYSICIAN GROUP | Age: 57
End: 2022-07-21
Payer: MEDICARE

## 2022-07-21 NOTE — TELEPHONE ENCOUNTER
Pt came into office stating that codes on lab orders were not going to be covered by insurance.   Codes originally were z00.00, she would like to get new orders with new codes so that she can get the labs done.

## 2022-07-22 DIAGNOSIS — Z82.49 FAMILY HISTORY OF HEART DISEASE: ICD-10-CM

## 2022-08-02 ENCOUNTER — OFFICE VISIT (OUTPATIENT)
Dept: MEDICAL GROUP | Facility: PHYSICIAN GROUP | Age: 57
End: 2022-08-02
Payer: MEDICARE

## 2022-08-02 VITALS
WEIGHT: 152.12 LBS | DIASTOLIC BLOOD PRESSURE: 72 MMHG | TEMPERATURE: 98.4 F | HEIGHT: 66 IN | HEART RATE: 78 BPM | RESPIRATION RATE: 16 BRPM | BODY MASS INDEX: 24.45 KG/M2 | OXYGEN SATURATION: 95 % | SYSTOLIC BLOOD PRESSURE: 112 MMHG

## 2022-08-02 DIAGNOSIS — L57.0 ACTINIC KERATOSIS: ICD-10-CM

## 2022-08-02 PROCEDURE — 17000 DESTRUCT PREMALG LESION: CPT | Performed by: STUDENT IN AN ORGANIZED HEALTH CARE EDUCATION/TRAINING PROGRAM

## 2022-08-02 RX ORDER — ALPRAZOLAM 0.25 MG/1
TABLET ORAL
COMMUNITY
End: 2022-08-03

## 2022-08-03 PROBLEM — L57.0 ACTINIC KERATOSIS: Status: ACTIVE | Noted: 2022-08-03

## 2022-08-03 NOTE — ASSESSMENT & PLAN NOTE
Cryotherapy performed to AK lesion on L sided chest wall. Patient tolerated procedure well.     Advised sun protection    rtc as needed

## 2022-08-03 NOTE — PROGRESS NOTES
HISTORY OF PRESENT ILLNESS: Amy is a pleasant 57 y.o. female, established patient who presents today to discuss medical problems as listed below:    Problem   Actinic Keratosis    Patient here to discuss left sided chest rough patch. Wants to know if she can get it frozen off. She has had these in the past as well.           Current Outpatient Medications Ordered in Epic   Medication Sig Dispense Refill   • levothyroxine (SYNTHROID) 100 MCG Tab Take 1 tablet daily- 90 days supply     • albuterol 108 (90 Base) MCG/ACT Aero Soln inhalation aerosol Inhale 1-2 Puffs by mouth every four hours as needed for Shortness of Breath.     • tiotropium (SPIRIVA HANDIHALER) 18 MCG Cap Inhale 18 mcg by mouth every morning.     • budesonide-formoterol (SYMBICORT) 160-4.5 MCG/ACT Aerosol Inhale 2 Puffs by mouth 2 Times a Day. 1 Inhaler 2     No current Epic-ordered facility-administered medications on file.       Review of systems:  Per HPI    Past Medical History:   Diagnosis Date   • PE (pulmonary thromboembolism) (HCC) 01/2020     Past Surgical History:   Procedure Laterality Date   • THYROIDECTOMY TOTAL Bilateral 4/17/2020    Procedure: THYROIDECTOMY, TOTAL WITH PARATHYROID AUTOTRANSPLANTATION;  Surgeon: Kimberly Roca M.D.;  Location: SURGERY [facility];  Service: General   • NECK DISSECTION Right 4/17/2020    Procedure: DISSECTION, NECK;  Surgeon: Kimberly Roca M.D.;  Location: SURGERY [facility];  Service: General   • THYROIDECTOMY TOTAL  04/17/2020   • BRONCHOSCOPY-ENDO N/A 1/22/2019    Procedure: BRONCHOSCOPY-ENDO;  Surgeon: Lois Lynne M.D.;  Location: ENDOSCOPY [facility];  Service: Gastroenterology   • CHOLECYSTECTOMY      2000   • DENTAL EXTRACTION(S)      [place] teeth 1996   • PRIMARY C SECTION      12/1980   • VAGINAL HYSTERECTOMY SCOPE TOTAL       Social History     Tobacco Use   • Smoking status: Former Smoker     Packs/day: 0.50     Years: 40.00     Pack years: 20.00     Types:  "Cigarettes     Quit date: 1/19/2019     Years since quitting: 3.5   • Smokeless tobacco: Never Used   Vaping Use   • Vaping Use: Never used   Substance Use Topics   • Alcohol use: No   • Drug use: No      Family History   Problem Relation Age of Onset   • Cancer Mother         Lung cancer    • Heart Disease Father    • No Known Problems Sister    • No Known Problems Brother      Current Outpatient Medications   Medication Sig Dispense Refill   • levothyroxine (SYNTHROID) 100 MCG Tab Take 1 tablet daily- 90 days supply     • albuterol 108 (90 Base) MCG/ACT Aero Soln inhalation aerosol Inhale 1-2 Puffs by mouth every four hours as needed for Shortness of Breath.     • tiotropium (SPIRIVA HANDIHALER) 18 MCG Cap Inhale 18 mcg by mouth every morning.     • budesonide-formoterol (SYMBICORT) 160-4.5 MCG/ACT Aerosol Inhale 2 Puffs by mouth 2 Times a Day. 1 Inhaler 2     No current facility-administered medications for this visit.       Allergies:  Allergies   Allergen Reactions   • Carboplatin Shortness of Breath, Nausea and Palpitations     Carboplatin dose # 7   • Oxycodone Hives and Itching     Tolerates hydrocodone/APAP   • Percocet [Oxycodone-Acetaminophen] Hives       Allergies, past medical history, past surgical history, family history, social history reviewed and updated.    Objective:    /72 (BP Location: Right arm, Patient Position: Sitting, BP Cuff Size: Adult)   Pulse 78   Temp 36.9 °C (98.4 °F) (Temporal)   Resp 16   Ht 1.676 m (5' 6\")   Wt 69 kg (152 lb 1.9 oz)   LMP  (LMP Unknown)   SpO2 95%   BMI 24.55 kg/m²    Body mass index is 24.55 kg/m².    Physical exam:  General: Normal appearance, no acute distress, not ill-appearing  Skin: Warm, dry. Rough scaly flat lesion on L chest wall.   MSK: calcified nodule on L wrist, normal rom, no TTP  Psychiatric: Mood within normal limits    Assessment/Plan:    Problem List Items Addressed This Visit     Actinic keratosis     Cryotherapy performed to AK " lesion on L sided chest wall. Patient tolerated procedure well.     Advised sun protection    rtc as needed                    No follow-ups on file.

## 2022-08-04 DIAGNOSIS — L57.0 ACTINIC KERATOSIS: ICD-10-CM

## 2022-11-09 ENCOUNTER — PATIENT MESSAGE (OUTPATIENT)
Dept: HEALTH INFORMATION MANAGEMENT | Facility: OTHER | Age: 57
End: 2022-11-09

## 2023-02-14 ENCOUNTER — OFFICE VISIT (OUTPATIENT)
Dept: MEDICAL GROUP | Facility: PHYSICIAN GROUP | Age: 58
End: 2023-02-14
Payer: MEDICARE

## 2023-02-14 VITALS
OXYGEN SATURATION: 97 % | SYSTOLIC BLOOD PRESSURE: 126 MMHG | WEIGHT: 146.39 LBS | HEART RATE: 97 BPM | TEMPERATURE: 99.5 F | DIASTOLIC BLOOD PRESSURE: 90 MMHG | HEIGHT: 66 IN | BODY MASS INDEX: 23.53 KG/M2 | RESPIRATION RATE: 14 BRPM

## 2023-02-14 DIAGNOSIS — F41.9 ANXIETY: ICD-10-CM

## 2023-02-14 DIAGNOSIS — F51.02 ADJUSTMENT INSOMNIA: ICD-10-CM

## 2023-02-14 PROCEDURE — 99213 OFFICE O/P EST LOW 20 MIN: CPT | Performed by: STUDENT IN AN ORGANIZED HEALTH CARE EDUCATION/TRAINING PROGRAM

## 2023-02-14 RX ORDER — ESCITALOPRAM OXALATE 10 MG/1
10 TABLET ORAL DAILY
Qty: 30 TABLET | Refills: 2 | Status: SHIPPED | OUTPATIENT
Start: 2023-02-14 | End: 2023-05-30

## 2023-02-14 ASSESSMENT — PATIENT HEALTH QUESTIONNAIRE - PHQ9
CLINICAL INTERPRETATION OF PHQ2 SCORE: 1
SUM OF ALL RESPONSES TO PHQ QUESTIONS 1-9: 5
5. POOR APPETITE OR OVEREATING: 0 - NOT AT ALL

## 2023-02-15 NOTE — PROGRESS NOTES
HISTORY OF PRESENT ILLNESS: Amy is a pleasant 58 y.o. female, established patient who presents today to discuss medical problems as listed below:    Problem   Anxiety    Lashae reports that during a routine screening they had found a possible return of her lung cancer.  This is causing her a lot of anxiety and insomnia.  She has had this in the past during her initial diagnosis oflung cancer and was given Xanax as needed.  Denies any depression, thoughts of harming himself.  She would like to talk to a therapist          Current Outpatient Medications Ordered in Epic   Medication Sig Dispense Refill    escitalopram (LEXAPRO) 10 MG Tab Take 1 Tablet by mouth every day. 30 Tablet 2    levothyroxine (SYNTHROID) 100 MCG Tab Take 1 tablet daily- 90 days supply      albuterol 108 (90 Base) MCG/ACT Aero Soln inhalation aerosol Inhale 1-2 Puffs by mouth every four hours as needed for Shortness of Breath.      tiotropium (SPIRIVA HANDIHALER) 18 MCG Cap Inhale 18 mcg by mouth every morning.      budesonide-formoterol (SYMBICORT) 160-4.5 MCG/ACT Aerosol Inhale 2 Puffs by mouth 2 Times a Day. 1 Inhaler 2     No current Epic-ordered facility-administered medications on file.       Review of systems:  Per HPI    Past Medical History:   Diagnosis Date    PE (pulmonary thromboembolism) (HCC) 01/2020     Past Surgical History:   Procedure Laterality Date    THYROIDECTOMY TOTAL Bilateral 4/17/2020    Procedure: THYROIDECTOMY, TOTAL WITH PARATHYROID AUTOTRANSPLANTATION;  Surgeon: Kimberly Roca M.D.;  Location: SURGERY [facility];  Service: General    NECK DISSECTION Right 4/17/2020    Procedure: DISSECTION, NECK;  Surgeon: Kimberly Roca M.D.;  Location: SURGERY [facility];  Service: General    THYROIDECTOMY TOTAL  04/17/2020    BRONCHOSCOPY-ENDO N/A 1/22/2019    Procedure: BRONCHOSCOPY-ENDO;  Surgeon: Lois Lynne M.D.;  Location: ENDOSCOPY [facility];  Service: Gastroenterology    CHOLECYSTECTOMY       "    DENTAL EXTRACTION(S)      [place] teeth     PRIMARY C SECTION      1980    VAGINAL HYSTERECTOMY SCOPE TOTAL       Social History     Tobacco Use    Smoking status: Former     Packs/day: 0.50     Years: 40.00     Pack years: 20.00     Types: Cigarettes     Quit date: 2019     Years since quittin.0    Smokeless tobacco: Never   Vaping Use    Vaping Use: Never used   Substance Use Topics    Alcohol use: No    Drug use: No      Family History   Problem Relation Age of Onset    Cancer Mother         Lung cancer     Heart Disease Father     No Known Problems Sister     No Known Problems Brother      Current Outpatient Medications   Medication Sig Dispense Refill    escitalopram (LEXAPRO) 10 MG Tab Take 1 Tablet by mouth every day. 30 Tablet 2    levothyroxine (SYNTHROID) 100 MCG Tab Take 1 tablet daily- 90 days supply      albuterol 108 (90 Base) MCG/ACT Aero Soln inhalation aerosol Inhale 1-2 Puffs by mouth every four hours as needed for Shortness of Breath.      tiotropium (SPIRIVA HANDIHALER) 18 MCG Cap Inhale 18 mcg by mouth every morning.      budesonide-formoterol (SYMBICORT) 160-4.5 MCG/ACT Aerosol Inhale 2 Puffs by mouth 2 Times a Day. 1 Inhaler 2     No current facility-administered medications for this visit.       Allergies:  Allergies   Allergen Reactions    Carboplatin Shortness of Breath, Nausea and Palpitations     Carboplatin dose # 7    Oxycodone Hives and Itching     Tolerates hydrocodone/APAP    Percocet [Oxycodone-Acetaminophen] Hives       Allergies, past medical history, past surgical history, family history, social history reviewed and updated.    Objective:    BP (!) 126/90   Pulse 97   Temp 37.5 °C (99.5 °F) (Temporal)   Resp 14   Ht 1.676 m (5' 6\")   Wt 66.4 kg (146 lb 6.2 oz)   LMP  (LMP Unknown)   SpO2 97%   BMI 23.63 kg/m²    Body mass index is 23.63 kg/m².    Physical exam:  General: Normal appearance, no acute distress, not ill-appearing  HEENT: EOM intact, " conjunctiva normal limits, negative right/left eye discharge.  Sclera anicteric  Cardiovascular: Normal rate and rhythm, no murmurs  Pulmonary: No respiratory distress, no wheezing, no rales, breath sounds normal.  Psych: tearful    Assessment/Plan:    Problem List Items Addressed This Visit       Anxiety     Referral to behavioral therapy  Start Lexapro 10 mg daily for generalized anxiety disorder.  Return to care in 1 month for follow-up symptoms         Relevant Medications    escitalopram (LEXAPRO) 10 MG Tab    Other Relevant Orders    Referral to Behavioral Health     Other Visit Diagnoses       Adjustment insomnia        Relevant Medications    escitalopram (LEXAPRO) 10 MG Tab    Other Relevant Orders    Referral to Behavioral Health             Return in about 4 weeks (around 3/14/2023) for symptoms, medication.   .

## 2023-02-15 NOTE — ASSESSMENT & PLAN NOTE
Referral to behavioral therapy  Start Lexapro 10 mg daily for generalized anxiety disorder.  Return to care in 1 month for follow-up symptoms

## 2023-02-23 ENCOUNTER — HOSPITAL ENCOUNTER (OUTPATIENT)
Dept: RADIOLOGY | Facility: MEDICAL CENTER | Age: 58
End: 2023-02-23
Payer: MEDICARE

## 2023-03-08 ENCOUNTER — HOSPITAL ENCOUNTER (OUTPATIENT)
Dept: RADIOLOGY | Facility: MEDICAL CENTER | Age: 58
End: 2023-03-08
Attending: PSYCHIATRY & NEUROLOGY
Payer: MEDICARE

## 2023-03-08 DIAGNOSIS — C34.82 MALIGNANT NEOPLASM OF OVERLAPPING SITES OF LEFT LUNG (HCC): ICD-10-CM

## 2023-03-08 DIAGNOSIS — C73 MALIGNANT NEOPLASM OF THYROID GLAND (HCC): ICD-10-CM

## 2023-03-08 PROCEDURE — A9552 F18 FDG: HCPCS

## 2023-03-12 ENCOUNTER — HOSPITAL ENCOUNTER (OUTPATIENT)
Dept: RADIOLOGY | Facility: MEDICAL CENTER | Age: 58
End: 2023-03-12
Attending: INTERNAL MEDICINE
Payer: MEDICARE

## 2023-03-12 DIAGNOSIS — C34.82 MALIGNANT NEOPLASM OF OVERLAPPING SITES OF LEFT LUNG (HCC): ICD-10-CM

## 2023-03-12 DIAGNOSIS — C73 MALIGNANT NEOPLASM OF THYROID GLAND (HCC): ICD-10-CM

## 2023-03-12 PROCEDURE — 70553 MRI BRAIN STEM W/O & W/DYE: CPT

## 2023-03-12 PROCEDURE — 700117 HCHG RX CONTRAST REV CODE 255: Performed by: INTERNAL MEDICINE

## 2023-03-12 PROCEDURE — A9579 GAD-BASE MR CONTRAST NOS,1ML: HCPCS | Performed by: INTERNAL MEDICINE

## 2023-03-12 RX ADMIN — GADOTERIDOL 13 ML: 279.3 INJECTION, SOLUTION INTRAVENOUS at 11:30

## 2023-04-18 NOTE — ASSESSMENT & PLAN NOTE
At baseline, not an acute exacerbation, continue respiratory care per protocol  Will need o2 at home.   Getting arranged by   
Bilateral upper lobe lung masses noted on CT, suspcious for lung cancer  Pulmonology consulted - s/p bronch with Dr Lynne  CT abdomen pelvis without evidence of malignancy  MRI brain without mets  1/22 bronchoscopy with brush biopsy done  Pathology showing poorly differentiated NSCC  Started on weekly chemotherapy - tues inpatient, thursdays outpatient    
Bronchoscopy reveals large mass in left mainstem at level of left upper lobe, nearly occluding, washes and brush biopsy sampling obtained.  Reviewed with hospitalist, will likely need radiation giving the large proximal involvement and potential for occlusion of the entire left mainstem.   Discussed results with family and patient, pathologist indicates poorly differentiated non-small cell malignancy, discussed with Dr. Conteh rad Onc and Dr. Gauthier, med onc  
Could be due to initiation of chemoradiation and irritation of tumor  Tessalon, hycodan,  No more fever, high risk for postobstructive pneumonia completed antibiotics on on   2/10  
Counseling  
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  
Needs to quit  
Nicky, NPPB  
Nutrition consulted  Body mass index is 17.73 kg/m².      
Resolved   likely reactive  No evidence of infectious etiology   
Secondary to possible cancer  Low dose xanax PRN.    
Abdomen soft, non-tender and non-distended, no rebound, no guarding and no masses. no hepatosplenomegaly.

## 2025-03-31 ENCOUNTER — APPOINTMENT (OUTPATIENT)
Dept: URGENT CARE | Facility: CLINIC | Age: 60
End: 2025-03-31
Payer: MEDICARE

## (undated) DEVICE — DRAPE MAGNETIC (INSTRA-MAG) - (30/CA)

## (undated) DEVICE — ELECTRODE 850 FOAM ADHESIVE - HYDROGEL RADIOTRNSPRNT (50/PK)

## (undated) DEVICE — SLEEVE, VASO, THIGH, MED

## (undated) DEVICE — CON SEDATION/>5 YR 1ST 15 MIN

## (undated) DEVICE — HEAD HOLDER JUNIOR/ADULT

## (undated) DEVICE — GOWN SURGEONS X-LARGE - DISP. (30/CA)

## (undated) DEVICE — SENSOR SPO2 NEO LNCS ADHESIVE (20/BX) SEE USER NOTES

## (undated) DEVICE — SUTURE 3-0 VICRYL PLUS SH - 27 INCH (36/BX)

## (undated) DEVICE — GOWN WARMING STANDARD FLEX - (30/CA)

## (undated) DEVICE — SET EXTENSION WITH 2 PORTS (48EA/CA) ***PART #2C8610 IS A SUBSTITUTE*****

## (undated) DEVICE — ELECTRODE DUAL RETURN W/ CORD - (50/PK)

## (undated) DEVICE — SUTURE 3-0 VICRYL PLUS SH - 8X 18 INCH (12/BX)

## (undated) DEVICE — BOVIE NEEDLE TIP 3CM COLORADO

## (undated) DEVICE — CANISTER SUCTION 3000ML MECHANICAL FILTER AUTO SHUTOFF MEDI-VAC NONSTERILE LF DISP  (40EA/CA)

## (undated) DEVICE — WATER IRRIGATION STERILE 1000ML (12EA/CA)

## (undated) DEVICE — SPONGE GAUZESTER 4 X 4 4PLY - (128PK/CA)

## (undated) DEVICE — SHEAR HS FOCUS 9CM CVD - (6/BX)

## (undated) DEVICE — HEADREST SHEA

## (undated) DEVICE — GLOVE BIOGEL SZ 6.5 SURGICAL PF LTX (50PR/BX 4BX/CA)

## (undated) DEVICE — PROTECTOR ULNA NERVE - (36PR/CA)

## (undated) DEVICE — SUTURE GENERAL

## (undated) DEVICE — CON SEDATION EA ADDL 15 MIN

## (undated) DEVICE — CHLORAPREP 26 ML APPLICATOR - ORANGE TINT(25/CA)

## (undated) DEVICE — CLIP MED INTNL HRZN TI ESCP - (25/BX)

## (undated) DEVICE — RETRACTOR LIGHTED RADIALUX

## (undated) DEVICE — SUTURE 3-0 SILK 12 X 18 IN - (36/BX)

## (undated) DEVICE — SODIUM CHL IRRIGATION 0.9% 1000ML (12EA/CA)

## (undated) DEVICE — PACK MINOR BASIN - (2EA/CA)

## (undated) DEVICE — PROBE PRASS STAND STIMULATING (5EA/PK)

## (undated) DEVICE — SHEET THYROID - (10EA/CA)

## (undated) DEVICE — GOWN SURGEONS LARGE - (32/CA)

## (undated) DEVICE — TUBING CLEARLINK DUO-VENT - C-FLO (48EA/CA)

## (undated) DEVICE — SUCTION INSTRUMENT YANKAUER BULBOUS TIP W/O VENT (50EA/CA)

## (undated) DEVICE — SET LEADWIRE 5 LEAD BEDSIDE DISPOSABLE ECG (1SET OF 5/EA)

## (undated) DEVICE — MASK ANESTHESIA ADULT  - (100/CA)

## (undated) DEVICE — LACTATED RINGERS INJ 1000 ML - (14EA/CA 60CA/PF)

## (undated) DEVICE — KIT ANESTHESIA W/CIRCUIT & 3/LT BAG W/FILTER (20EA/CA)

## (undated) DEVICE — DRESSING TRANSPARENT FILM TEGADERM 2.375 X 2.75"  (100EA/BX)"

## (undated) DEVICE — SUTURE 3-0 VICRYL PLUS RB-1 - 8 X 18 INCH (12/BX)

## (undated) DEVICE — BLADE SURGICAL #15 - (50/BX 3BX/CA)

## (undated) DEVICE — DRAPE SURG STERI-DRAPE 7X11OD - (40EA/CA)

## (undated) DEVICE — DRAPE STRLE REG TOWEL 18X24 - (10/BX 4BX/CA)"

## (undated) DEVICE — NEPTUNE 4 PORT MANIFOLD - (20/PK)

## (undated) DEVICE — DRAPE LG. APERTURE 33 X 51" - (10EA/BX)"

## (undated) DEVICE — GLOVE BIOGEL SZ 8.5 SURGICAL PF LTX - (50PR/BX 4BX/CA)

## (undated) DEVICE — SPONGE PEANUT - (5/PK 50PK/CA)

## (undated) DEVICE — GLOVE BIOGEL INDICATOR SZ 6.5 SURGICAL PF LTX - (50PR/BX 4BX/CA)

## (undated) DEVICE — CLIP SM INTNL HRZN TI ESCP LGT - (24EA/PK 25PK/BX)

## (undated) DEVICE — GLOVE BIOGEL SZ 8 SURGICAL PF LTX - (50PR/BX 4BX/CA)